# Patient Record
Sex: MALE | Race: WHITE | NOT HISPANIC OR LATINO | Employment: OTHER | URBAN - METROPOLITAN AREA
[De-identification: names, ages, dates, MRNs, and addresses within clinical notes are randomized per-mention and may not be internally consistent; named-entity substitution may affect disease eponyms.]

---

## 2017-03-08 ENCOUNTER — HOSPITAL ENCOUNTER (OUTPATIENT)
Facility: MEDICAL CENTER | Age: 67
End: 2017-03-08
Payer: COMMERCIAL

## 2017-03-08 LAB
ALBUMIN SERPL BCP-MCNC: 4.6 G/DL (ref 3.2–4.9)
ALBUMIN/GLOB SERPL: 1.3 G/DL
ALP SERPL-CCNC: 60 U/L (ref 30–99)
ALT SERPL-CCNC: 57 U/L (ref 2–50)
ANION GAP SERPL CALC-SCNC: 9 MMOL/L (ref 0–11.9)
AST SERPL-CCNC: 38 U/L (ref 12–45)
BDY FAT % MEASURED: 29.2 %
BILIRUB SERPL-MCNC: 0.5 MG/DL (ref 0.1–1.5)
BLOOD PRESSURE DIASTOLIC HTBPD: 86 MMHG
BLOOD PRESSURE SYSTOLIC HTBPS: 148 MMHG
BUN SERPL-MCNC: 26 MG/DL (ref 8–22)
CALCIUM SERPL-MCNC: 10 MG/DL (ref 8.5–10.5)
CHLORIDE SERPL-SCNC: 98 MMOL/L (ref 96–112)
CHOLEST SERPL-MCNC: 220 MG/DL (ref 100–199)
CO2 SERPL-SCNC: 25 MMOL/L (ref 20–33)
CREAT SERPL-MCNC: 1.11 MG/DL (ref 0.5–1.4)
DIABETES HTDIA: NO
EVENT NAME HTEVT: NORMAL
GLOBULIN SER CALC-MCNC: 3.5 G/DL (ref 1.9–3.5)
GLUCOSE SERPL-MCNC: 174 MG/DL (ref 65–99)
HDLC SERPL-MCNC: 34 MG/DL
HYPERTENSION HTHYP: NO
LDLC SERPL CALC-MCNC: ABNORMAL MG/DL
POTASSIUM SERPL-SCNC: 4.2 MMOL/L (ref 3.6–5.5)
PROT SERPL-MCNC: 8.1 G/DL (ref 6–8.2)
SCREENING LOC CITY HTCIT: NORMAL
SCREENING LOC STATE HTSTA: NORMAL
SCREENING LOCATION HTLOC: NORMAL
SODIUM SERPL-SCNC: 132 MMOL/L (ref 135–145)
SUBSCRIBER ID HTSID: NORMAL
TRIGL SERPL-MCNC: 707 MG/DL (ref 0–149)

## 2017-03-28 ENCOUNTER — RX ONLY (OUTPATIENT)
Age: 67
Setting detail: RX ONLY
End: 2017-03-28

## 2017-05-15 ENCOUNTER — HOSPITAL ENCOUNTER (OUTPATIENT)
Dept: LAB | Facility: MEDICAL CENTER | Age: 67
End: 2017-05-15
Attending: INTERNAL MEDICINE
Payer: MEDICARE

## 2017-05-15 DIAGNOSIS — R74.8 ELEVATED CPK: ICD-10-CM

## 2017-05-15 DIAGNOSIS — E11.9 DIABETES MELLITUS TYPE 2, NONINSULIN DEPENDENT (HCC): ICD-10-CM

## 2017-05-15 DIAGNOSIS — E78.5 DYSLIPIDEMIA: ICD-10-CM

## 2017-05-15 LAB
CHOLEST SERPL-MCNC: 162 MG/DL (ref 100–199)
CK SERPL-CCNC: 413 U/L (ref 0–154)
CREAT SERPL-MCNC: 0.97 MG/DL (ref 0.5–1.4)
CREAT UR-MCNC: 93.1 MG/DL
EST. AVERAGE GLUCOSE BLD GHB EST-MCNC: 186 MG/DL
GFR SERPL CREATININE-BSD FRML MDRD: >60 ML/MIN/1.73 M 2
HBA1C MFR BLD: 8.1 % (ref 0–5.6)
HDLC SERPL-MCNC: 38 MG/DL
LDLC SERPL CALC-MCNC: 84 MG/DL
MICROALBUMIN UR-MCNC: 13.3 MG/DL
MICROALBUMIN/CREAT UR: 143 MG/G (ref 0–30)
T4 FREE SERPL-MCNC: 0.93 NG/DL (ref 0.53–1.43)
TRIGL SERPL-MCNC: 198 MG/DL (ref 0–149)
TSH SERPL DL<=0.005 MIU/L-ACNC: 4.12 UIU/ML (ref 0.3–3.7)

## 2017-05-15 PROCEDURE — 36415 COLL VENOUS BLD VENIPUNCTURE: CPT

## 2017-05-15 PROCEDURE — 82550 ASSAY OF CK (CPK): CPT

## 2017-05-15 PROCEDURE — 83036 HEMOGLOBIN GLYCOSYLATED A1C: CPT

## 2017-05-15 PROCEDURE — 82043 UR ALBUMIN QUANTITATIVE: CPT

## 2017-05-15 PROCEDURE — 82570 ASSAY OF URINE CREATININE: CPT

## 2017-05-15 PROCEDURE — 82565 ASSAY OF CREATININE: CPT

## 2017-05-15 PROCEDURE — 84439 ASSAY OF FREE THYROXINE: CPT

## 2017-05-15 PROCEDURE — 80061 LIPID PANEL: CPT

## 2017-05-15 PROCEDURE — 83695 ASSAY OF LIPOPROTEIN(A): CPT

## 2017-05-15 PROCEDURE — 84443 ASSAY THYROID STIM HORMONE: CPT

## 2017-05-17 LAB — LPA SERPL-MCNC: 12 MG/DL

## 2017-05-18 ENCOUNTER — TELEPHONE (OUTPATIENT)
Dept: MEDICAL GROUP | Facility: PHYSICIAN GROUP | Age: 67
End: 2017-05-18

## 2017-05-18 NOTE — TELEPHONE ENCOUNTER
ESTABLISHED PATIENT PRE-VISIT PLANNING     Note: Patient will not be contacted if there is no indication to call.     1.  Reviewed note from last office visit with PCP and/or other med group provider: Yes    2.  If any orders were placed at last visit, do we have Results/Consult Notes?        •  Labs - Labs ordered, completed and results are in chart.       •  Imaging - Imaging was not ordered at last office visit.       •  Referrals - No referrals were ordered at last office visit.    3.  Immunizations were updated in [facility] using WebIZ?: Yes       •  Web Iz Recommendations: HEPATITIS A  HEPATITIS B MMR  TD    4.  Patient is due for the following Health Maintenance Topics:   Health Maintenance Due   Topic Date Due   • Annual Wellness Visit  1950   • DIABETES MONOFILAMENT / LE EXAM  1950   • IMM DTaP/Tdap/Td Vaccine (1 - Tdap) 06/19/1967   • IMM ZOSTER VACCINE  06/19/2008   • IMM PNEUMOCOCCAL 65+ (ADULT) LOW/MEDIUM RISK SERIES (2 of 2 - PPSV23) 09/30/2016   • RETINAL SCREENING  04/04/2017       - Patient has completed FLU, HEPATITIS A , HEPATITIS B, PNEUMOVAX (PPSV23), PREVNAR (PCV13) , TD, TDAP and ZOSTAVAX (Shingles) Immunization(s) per WebIZ. Chart has been updated.    5.  Patient was not informed to arrive 15 min prior to their scheduled appointment and bring in their medication bottles.

## 2017-05-19 ENCOUNTER — OFFICE VISIT (OUTPATIENT)
Dept: MEDICAL GROUP | Facility: PHYSICIAN GROUP | Age: 67
End: 2017-05-19
Payer: MEDICARE

## 2017-05-19 VITALS
RESPIRATION RATE: 18 BRPM | DIASTOLIC BLOOD PRESSURE: 60 MMHG | HEIGHT: 70 IN | BODY MASS INDEX: 34.79 KG/M2 | TEMPERATURE: 96.4 F | OXYGEN SATURATION: 94 % | HEART RATE: 89 BPM | WEIGHT: 243 LBS | SYSTOLIC BLOOD PRESSURE: 136 MMHG

## 2017-05-19 DIAGNOSIS — E11.9 DIABETES MELLITUS TYPE 2, NONINSULIN DEPENDENT (HCC): ICD-10-CM

## 2017-05-19 DIAGNOSIS — R74.8 ELEVATED CPK: ICD-10-CM

## 2017-05-19 DIAGNOSIS — E66.9 OBESITY (BMI 30-39.9): ICD-10-CM

## 2017-05-19 DIAGNOSIS — E03.9 HYPOTHYROIDISM, UNSPECIFIED TYPE: ICD-10-CM

## 2017-05-19 DIAGNOSIS — E78.5 DYSLIPIDEMIA: ICD-10-CM

## 2017-05-19 DIAGNOSIS — R63.8 INCREASED BMI: ICD-10-CM

## 2017-05-19 PROCEDURE — 3017F COLORECTAL CA SCREEN DOC REV: CPT | Performed by: INTERNAL MEDICINE

## 2017-05-19 PROCEDURE — 99214 OFFICE O/P EST MOD 30 MIN: CPT | Performed by: INTERNAL MEDICINE

## 2017-05-19 PROCEDURE — G8432 DEP SCR NOT DOC, RNG: HCPCS | Performed by: INTERNAL MEDICINE

## 2017-05-19 PROCEDURE — 3045F PR MOST RECENT HEMOGLOBIN A1C LEVEL 7.0-9.0%: CPT | Performed by: INTERNAL MEDICINE

## 2017-05-19 PROCEDURE — G8417 CALC BMI ABV UP PARAM F/U: HCPCS | Performed by: INTERNAL MEDICINE

## 2017-05-19 PROCEDURE — 1036F TOBACCO NON-USER: CPT | Performed by: INTERNAL MEDICINE

## 2017-05-19 PROCEDURE — 1101F PT FALLS ASSESS-DOCD LE1/YR: CPT | Performed by: INTERNAL MEDICINE

## 2017-05-19 PROCEDURE — 4040F PNEUMOC VAC/ADMIN/RCVD: CPT | Performed by: INTERNAL MEDICINE

## 2017-05-19 RX ORDER — LOSARTAN POTASSIUM 25 MG/1
25 TABLET ORAL DAILY
Qty: 30 TAB | Refills: 6 | Status: SHIPPED | OUTPATIENT
Start: 2017-05-19 | End: 2018-04-03

## 2017-05-19 RX ORDER — GLIPIZIDE 5 MG/1
5 TABLET ORAL 2 TIMES DAILY
Qty: 60 TAB | Refills: 6 | Status: SHIPPED | OUTPATIENT
Start: 2017-05-19 | End: 2017-09-22 | Stop reason: SDUPTHER

## 2017-05-19 RX ORDER — LEVOTHYROXINE SODIUM 0.07 MG/1
75 TABLET ORAL
Qty: 90 TAB | Refills: 3 | Status: SHIPPED | OUTPATIENT
Start: 2017-05-19 | End: 2017-08-31

## 2017-05-19 NOTE — PROGRESS NOTES
"Subjective:  Lico is a 66 y.o. male with the following   Past Medical History   Diagnosis Date   • Skin cancer    • Snoring    • Hypertension 07-10-15     hx of, took himself off lisinopril and HTCZ   • Diabetes (CMS-HCC) 07-10-15     hx of, took self off metformin   • High cholesterol 07-10-15     hx of, took self off simvastin      Family History   Problem Relation Age of Onset   • Heart Disease Mother    • Lung Disease Father    • Heart Disease Father      The patient is on the following medications:   Current outpatient prescriptions:   •  losartan (COZAAR) 25 MG Tab, Take 1 Tab by mouth every day., Disp: 30 Tab, Rfl: 6  •  glipiZIDE (GLUCOTROL) 5 MG Tab, Take 1 Tab by mouth 2 times a day., Disp: 60 Tab, Rfl: 6  •  levothyroxine (SYNTHROID) 75 MCG Tab, Take 1 Tab by mouth Every morning on an empty stomach., Disp: 90 Tab, Rfl: 3  •  metformin ER (GLUCOPHAGE XR) 500 MG TABLET SR 24 HR, Take 2 Tabs by mouth every day., Disp: 180 Tab, Rfl: 3  •  levothyroxine (SYNTHROID) 75 MCG Tab, Take 1 Tab by mouth Every morning on an empty stomach., Disp: 90 Tab, Rfl: 3    HPI; Patient is here today for follow-up visit regarding his recent labs, currently on metformin for diabetes denies having polyuria or polydipsia, on levothyroxine for hypothyroid symptoms denies having cold intolerance or constipation, per patient he has continued with weightlifting, previously suggested to hold off on heavy lifting due to elevated CPK, denies having chest pain or myalgia.   ROS:  See HPI    Blood pressure 136/60, pulse 89, temperature 35.8 °C (96.4 °F), resp. rate 18, height 1.778 m (5' 10\"), weight 110.224 kg (243 lb), SpO2 94 %.on RA  Objective:  Patient is well appearing and in no acute distress.  Pharynx is clear.  Neck is soft and supple with no cervical or supraclavicular lymphadenopathy, thyromegaly or masses, no JVD.  Lungs clear to auscultation bilaterally with normal respiratory effort. Abdomen soft, non-tender on " palpation,not distended. Heart regular rate and rhythm without murmur. Extremities without any clubbing, cyanosis, or edema.    Assessment and Plan:      1. diabetic mellitus; on metformin 500 mg XR twice a day,  hemoglobin A1c;     Ref. Range 11/9/2016 07:16 5/15/2017 07:34   Glycohemoglobin Latest Ref Range: 0.0-5.6 % 7.9 (H) 8.1 (H)      Ref. Range 5/15/2017 07:33   Micro Alb Creat Ratio Latest Ref Range: 0-30 mg/g 143 (H)   Creatinine, Urine Latest Units: mg/dL 93.10   Microalbumin, Urine Random Latest Units: mg/dL 13.3     2. Dyslipidemia/hypertriglyceridemia; used to be on statins, off medication due to side effects of fatigue and elevated CPK. Previously started on supplements for dyslipidemia;        Ref. Range 3/8/2017 09:41 5/15/2017 07:34   Cholesterol,Tot Latest Ref Range: 100-199 mg/dL 220 (H) 162   Triglycerides Latest Ref Range: 0-149 mg/dL 707 (H) 198 (H)   HDL Latest Ref Range: >=40 mg/dL 34 (A) 38 (A)   LDL Latest Ref Range: <100 mg/dL see below 84   Lipoprotein A Latest Ref Range: <=29 mg/dL  12       3. Increased BMI;         4.hypothyroidism; currently on levothyroxine 50 MCG daily.     Ref. Range 7/2/2016 08:24 11/9/2016 07:16 5/15/2017 07:34   TSH Latest Ref Range: 0.300-3.700 uIU/mL 3.870 (H) 6.800 (H) 4.120 (H)   Free T-4 Latest Ref Range: 0.53-1.43 ng/dL 0.80 0.84 0.93           5. elevated CPK. Currently is asymptomatic, patient has continued lifting heavy weights.    Ref. Range 11/9/2016 07:16 5/15/2017 07:34   CPK Total Latest Ref Range: 0-154 U/L 324 (H) 413 (H)       Ref. Range  10/17/2015 08:16  2/20/2016 08:34  11/9/2016 07:16    Creatinine  Latest Ref Range: 0.50-1.40 mg/dL  1.08  1.14  0.98    GFR If   Latest Ref Range: >60 mL/min/1.73 m 2  >60  >60  >60    GFR If Non   Latest Ref Range: >60 mL/min/1.73 m 2  >60  >60  >60          Patient is advised regarding preventive and supportive care, diet and lifestyle modification, daily walking ,exercise and  weight loss, avoidance of heavy lifting. Discussed diabetic therapy and potential complication of uncontrolled diabetes , glipizide 5 mg by mouth twice a day, losartan 25 mg daily, levothyroxine 75 MCG daily , alternative therapies for dyslipidemia , increase fluid intake , monitor labs.    Please note that this dictation was created using voice recognition software. I have worked with consultants from the vendor as well as technical experts from [facility] [employer] to optimize the interface. I have made every reasonable attempt to correct obvious errors, but I expect that there are errors of grammar and possibly content that I did not discover before finalizing the note.

## 2017-05-22 DIAGNOSIS — G56.02 CARPAL TUNNEL SYNDROME OF LEFT WRIST: ICD-10-CM

## 2017-05-31 DIAGNOSIS — M79.642 HAND PAIN, LEFT: ICD-10-CM

## 2017-06-05 ENCOUNTER — PATIENT OUTREACH (OUTPATIENT)
Dept: HEALTH INFORMATION MANAGEMENT | Facility: OTHER | Age: 67
End: 2017-06-05

## 2017-06-05 NOTE — PROGRESS NOTES
Outcome: Left Message with pt's wife Pina    Ilsa Checked & Epic Updated:  yes    [facility] Verified: yes    Attempt # 1ST

## 2017-06-07 NOTE — PROGRESS NOTES
6/7/17  -   Verify PCP: yes    Communication Preference Obtained: yes     Review Care Team: yes    Annual Wellness Visit Scheduling  1. Scheduling Status:AWV added to upcoming appt with PCP. Pt said that he sees Dr. Khan regularly.        Care Gap Scheduling (Attempt to Schedule EACH Overdue Care Gap!)     Health Maintenance Due   Topic Date Due   • Annual Wellness Visit     • DIABETES MONOFILAMENT / LE EXAM  Will discuss care gaps with PCP   • IMM DTaP/Tdap/Td Vaccine (1 - Tdap)    • RETINAL SCREENING  Records requested         MyChart Activation: already active

## 2017-07-27 ENCOUNTER — NON-PROVIDER VISIT (OUTPATIENT)
Dept: NEUROLOGY | Facility: MEDICAL CENTER | Age: 67
End: 2017-07-27
Payer: MEDICARE

## 2017-07-27 DIAGNOSIS — M79.642 HAND PAIN, LEFT: ICD-10-CM

## 2017-07-27 PROCEDURE — 95910 NRV CNDJ TEST 7-8 STUDIES: CPT | Performed by: PSYCHIATRY & NEUROLOGY

## 2017-07-27 PROCEDURE — 95886 MUSC TEST DONE W/N TEST COMP: CPT | Performed by: PSYCHIATRY & NEUROLOGY

## 2017-07-27 NOTE — MR AVS SNAPSHOT
Lico Batista Rita   2017 4:00 PM   Non-Provider Visit   MRN: [id number]    Department:  [facility]   Dept Phone:  [phone]    Description:  Male : 1950   Provider:  [facility]           Reason for Visit     Procedure           Allergies as of 2017     Allergen Noted Reactions    Pcn [Penicillins] 2012   Unspecified    As a child, unknown reaction      You were diagnosed with     Hand pain, left   [[id number]]         Vital Signs     Smoking Status                   Former Smoker           Basic Information     Date Of Birth Sex Race Ethnicity Preferred Language    1950 Male White Non- English      Your appointments     Sep 22, 2017  3:00 PM   Established Patient with Edward Khan M.D.   [facility] (--)    [address]  [address]  [facility] [id number] [phone]           You will be receiving a confirmation call a few days before your appointment from our automated call confirmation system.              Problem List              ICD-10-CM Priority Class Noted - Resolved    Swelling, mass, or lump in head and neck R22.0, R22.1   2015 - Present    Hypothyroidism E03.9   2016 - Present    Diabetes mellitus type 2, noninsulin dependent (CMS-HCC) E11.9   2016 - Present    Elevated CPK R74.8   2016 - Present    Increased BMI R63.8   2016 - Present    Dyslipidemia E78.5   2016 - Present      Health Maintenance        Date Due Completion Dates    DIABETES MONOFILAMENT / LE EXAM 1950 ---    IMM DTaP/Tdap/Td Vaccine (1 - Tdap) 3/27/2003 3/26/2003    RETINAL SCREENING 2017 (Done)    Override on 2016: Done    IMM INFLUENZA (1) 2017 10/13/2016, 2015, 10/31/2014, 2013, 10/9/2012, 10/26/2011, 10/6/2010    A1C SCREENING 11/15/2017 5/15/2017, 2016, 2016, 2016, 10/17/2015, 2014, 3/8/2014, 2013, 2013, 2012, 2012, 10/22/2011    FASTING  LIPID PROFILE 5/15/2018 5/15/2017, 3/8/2017, 7/2/2016, 2/20/2016, 10/17/2015, 11/29/2014, 10/4/2014, 3/8/2014, 9/7/2013, 4/30/2013, 12/8/2012, 4/7/2012, 2/25/2012, 10/22/2011    URINE ACR / MICROALBUMIN 5/15/2018 5/15/2017, 2/20/2016, 11/29/2014, 3/8/2014, 9/7/2013    SERUM CREATININE 5/15/2018 5/15/2017, 3/8/2017, 11/9/2016, 2/20/2016, 10/17/2015, 7/10/2015, 11/29/2014, 3/8/2014, 9/7/2013, 4/30/2013, 12/8/2012, 2/25/2012, 11/12/2011    COLONOSCOPY 2/24/2024 2/24/2014 (Done)    Override on 2/24/2014: Done            Current Immunizations     13-VALENT PCV PREVNAR 9/30/2015  8:21 AM    Influenza Vaccine Adult HD 10/13/2016  9:51 AM, 9/30/2015  8:20 AM, 10/31/2014    Influenza Vaccine Quad Inj (Pf) 10/26/2011, 10/6/2010    Influenza Vaccine Quad Inj (Preserved) 9/18/2013, 10/9/2012    Pneumococcal polysaccharide vaccine (PPSV-23) 4/1/2017, 9/18/2013    SHINGLES VACCINE 4/1/2017    TD Vaccine 3/26/2003      Below and/or attached are the medications your provider expects you to take. Review all of your home medications and newly ordered medications with your provider and/or pharmacist. Follow medication instructions as directed by your provider and/or pharmacist. Please keep your medication list with you and share with your provider. Update the information when medications are discontinued, doses are changed, or new medications (including over-the-counter products) are added; and carry medication information at all times in the event of emergency situations     Allergies:  PCN - Unspecified               Medications  Valid as of: July 27, 2017 -  5:44 PM    Generic Name Brand Name Tablet Size Instructions for use    GlipiZIDE (Tab) GLUCOTROL 5 MG Take 1 Tab by mouth 2 times a day.        Levothyroxine Sodium (Tab) SYNTHROID 75 MCG Take 1 Tab by mouth Every morning on an empty stomach.        Levothyroxine Sodium (Tab) SYNTHROID 75 MCG Take 1 Tab by mouth Every morning on an empty stomach.        Losartan Potassium (Tab)  COZAAR 25 MG Take 1 Tab by mouth every day.        MetFORMIN HCl (TABLET SR 24 HR) GLUCOPHAGE  MG Take 2 Tabs by mouth every day.        .                 Medicines prescribed today were sent to:     [facility] [place] - [address]    [facility] [place] [address]    Phone: [phone] Fax: [phone]    Open 24 Hours?: No      Medication refill instructions:       If your prescription bottle indicates you have medication refills left, it is not necessary to call your provider’s office. Please contact your pharmacy and they will refill your medication.    If your prescription bottle indicates you do not have any refills left, you may request refills at any time through one of the following ways: The online Smart Balloon system (except Urgent Care), by calling your provider’s office, or by asking your pharmacy to contact your provider’s office with a refill request. Medication refills are processed only during regular business hours and may not be available until the next business day. Your provider may request additional information or to have a follow-up visit with you prior to refilling your medication.   *Please Note: Medication refills are assigned a new Rx number when refilled electronically. Your pharmacy may indicate that no refills were authorized even though a new prescription for the same medication is available at the pharmacy. Please request the medicine by name with the pharmacy before contacting your provider for a refill.           Smart Balloon Access Code: Activation code not generated  Current Smart Balloon Status: Active

## 2017-07-27 NOTE — PROGRESS NOTES
[facility]  Neurodiagnostic Laboratory  [facility] [place] [id number] [phone]      Patient: LOLITA ALSTON Referring MD: SAMMI  Medical Record: [id number] Windy CODY: RONN  YOB: 1950 Indication: M79.642  Age: 67 Years 1 Months Technologist: TATIANA  Notes: [facility]#[id number]      Sensory NCS      Nerve / Sites Rec. Site Onset Lat Peak Lat NP Amp PP Amp SPAR Segments Distance Velocity     ms ms µV µV %  cm m/s   L MEDIAN - Dig II Antidr      Wrist Dig II NR NR NR NR NR Wrist - Dig II 14 NR   R MEDIAN - Dig II Antidr      Wrist Dig II 3.75 4.60 10.1 11.5  Wrist - Dig II 14 37.3   L ULNAR - Dig V Antidr      Wrist Dig V 3.15 3.90 13.1 25.7 100 Wrist - Dig V 14 44.4   R ULNAR - Dig V Antidr      Wrist Dig V 3.30 4.00 5.6 12.5 43 Wrist - Dig V 14 42.4               Motor NCS      Nerve / Sites Rec. Site Lat Amp Rel Amp SPAR Segments Dist. Misha     ms mV % %  cm m/s   L MEDIAN - APB      Wrist APB 9.35 5.7 100 62.4 Wrist - APB 8       Elbow APB 15.15 4.9 85.2 59.9 Elbow - Wrist 29 50.0   R MEDIAN - APB      Wrist APB 5.40 9.2 100 100 Wrist - APB 8       Elbow APB 11.00 8.1 88.8 100 Elbow - Wrist 24.5 43.8   L ULNAR - ADM      Wrist ADM 3.40 8.1 100 100 Wrist - ADM 8       B.Elbow ADM 6.90 6.9 86 100 B.Elbow - Wrist 19 54.3      A.Elbow ADM 8.70 6.7 82.4 100 A.Elbow - B.Elbow 10 55.6   R ULNAR - ADM      Wrist ADM 3.55 7.1 100 87.5 Wrist - ADM 8       B.Elbow ADM 6.35 5.2 73.8 75.2 B.Elbow - Wrist 16.5 58.9      A.Elbow ADM 8.10 5.9 83.4 88.6 A.Elbow - B.Elbow 10 57.1               F  Wave      Nerve Fmin    ms   L MEDIAN 41.20         EMG Summary Table     Spontaneous MUAP Recruitment    IA Fib PSW Fasc H.F. Amp Dur. PPP Pattern   L. ABD POLL BREVIS N None None None None N N N -   L. BICEPS N None None None None N N N N   L. FLEX CARPI RAD N None None None None N N N N   L. PRON TERES N None None None None N N N N   L. FLEX CARPI ULN N None None None None N N N N        SUMMARY:    Prolonged distal motor latencies of bilateral median ( much worse over the left)  Absent left median SAP    IMPRESSION:    ________________________________________________________________________      This NCV EMG is consistent with       Severe left CTS   Mild right CTS      Agree with surgical intervention regarding the severe left CTS  ________________________________________________________________________

## 2017-08-31 DIAGNOSIS — Z01.812 PRE-OPERATIVE LABORATORY EXAMINATION: ICD-10-CM

## 2017-08-31 LAB
ANION GAP SERPL CALC-SCNC: 11 MMOL/L (ref 0–11.9)
BUN SERPL-MCNC: 19 MG/DL (ref 8–22)
CALCIUM SERPL-MCNC: 10 MG/DL (ref 8.5–10.5)
CHLORIDE SERPL-SCNC: 100 MMOL/L (ref 96–112)
CO2 SERPL-SCNC: 24 MMOL/L (ref 20–33)
CREAT SERPL-MCNC: 0.88 MG/DL (ref 0.5–1.4)
GFR SERPL CREATININE-BSD FRML MDRD: >60 ML/MIN/1.73 M 2
GLUCOSE SERPL-MCNC: 141 MG/DL (ref 65–99)
POTASSIUM SERPL-SCNC: 4 MMOL/L (ref 3.6–5.5)
SODIUM SERPL-SCNC: 135 MMOL/L (ref 135–145)

## 2017-08-31 PROCEDURE — 93010 ELECTROCARDIOGRAM REPORT: CPT | Performed by: INTERNAL MEDICINE

## 2017-08-31 PROCEDURE — 93005 ELECTROCARDIOGRAM TRACING: CPT

## 2017-08-31 PROCEDURE — 36415 COLL VENOUS BLD VENIPUNCTURE: CPT

## 2017-08-31 PROCEDURE — 80048 BASIC METABOLIC PNL TOTAL CA: CPT

## 2017-08-31 RX ORDER — YEAST,DRIED (S. CEREVISIAE)
1 POWDER (GRAM) ORAL 2 TIMES DAILY
COMMUNITY
End: 2019-12-20

## 2017-08-31 RX ORDER — MAGNESIUM 200 MG
200 TABLET ORAL EVERY EVENING
COMMUNITY
End: 2021-03-23

## 2017-08-31 RX ORDER — UBIDECARENONE 200 MG
100 CAPSULE ORAL EVERY EVENING
COMMUNITY
End: 2022-02-28

## 2017-08-31 RX ORDER — LATANOPROST 50 UG/ML
1 SOLUTION/ DROPS OPHTHALMIC EVERY EVENING
Status: ON HOLD | COMMUNITY
End: 2022-03-09 | Stop reason: SDUPTHER

## 2017-08-31 NOTE — OR NURSING
Pre admit appt: Pt instructed to continue regularly prescribed medications through day before surgery.Per anesthesia protocol pt instructed to take these medications with a sip of water the day of surgery- synthroid.

## 2017-09-01 LAB — EKG IMPRESSION: NORMAL

## 2017-09-05 ENCOUNTER — HOSPITAL ENCOUNTER (OUTPATIENT)
Facility: MEDICAL CENTER | Age: 67
End: 2017-09-05
Attending: ORTHOPAEDIC SURGERY | Admitting: ORTHOPAEDIC SURGERY
Payer: MEDICARE

## 2017-09-05 VITALS
HEART RATE: 72 BPM | TEMPERATURE: 97.2 F | BODY MASS INDEX: 34.4 KG/M2 | WEIGHT: 240.3 LBS | DIASTOLIC BLOOD PRESSURE: 81 MMHG | OXYGEN SATURATION: 94 % | SYSTOLIC BLOOD PRESSURE: 137 MMHG | RESPIRATION RATE: 16 BRPM | HEIGHT: 70 IN

## 2017-09-05 PROBLEM — G56.02 CARPAL TUNNEL SYNDROME ON LEFT: Status: ACTIVE | Noted: 2017-09-05

## 2017-09-05 LAB — GLUCOSE BLD-MCNC: 146 MG/DL (ref 65–99)

## 2017-09-05 PROCEDURE — 160009 HCHG ANES TIME/MIN: Performed by: ORTHOPAEDIC SURGERY

## 2017-09-05 PROCEDURE — 160047 HCHG PACU  - EA ADDL 30 MINS PHASE II: Performed by: ORTHOPAEDIC SURGERY

## 2017-09-05 PROCEDURE — 500881 HCHG PACK, EXTREMITY: Performed by: ORTHOPAEDIC SURGERY

## 2017-09-05 PROCEDURE — 160036 HCHG PACU - EA ADDL 30 MINS PHASE I: Performed by: ORTHOPAEDIC SURGERY

## 2017-09-05 PROCEDURE — 501838 HCHG SUTURE GENERAL: Performed by: ORTHOPAEDIC SURGERY

## 2017-09-05 PROCEDURE — 160048 HCHG OR STATISTICAL LEVEL 1-5: Performed by: ORTHOPAEDIC SURGERY

## 2017-09-05 PROCEDURE — A6222 GAUZE <=16 IN NO W/SAL W/O B: HCPCS | Performed by: ORTHOPAEDIC SURGERY

## 2017-09-05 PROCEDURE — 700111 HCHG RX REV CODE 636 W/ 250 OVERRIDE (IP)

## 2017-09-05 PROCEDURE — 160046 HCHG PACU - 1ST 60 MINS PHASE II: Performed by: ORTHOPAEDIC SURGERY

## 2017-09-05 PROCEDURE — 700101 HCHG RX REV CODE 250

## 2017-09-05 PROCEDURE — 160002 HCHG RECOVERY MINUTES (STAT): Performed by: ORTHOPAEDIC SURGERY

## 2017-09-05 PROCEDURE — 700105 HCHG RX REV CODE 258: Performed by: ORTHOPAEDIC SURGERY

## 2017-09-05 PROCEDURE — 500126 HCHG BOVIE, NEEDLE TIP: Performed by: ORTHOPAEDIC SURGERY

## 2017-09-05 PROCEDURE — 82962 GLUCOSE BLOOD TEST: CPT

## 2017-09-05 PROCEDURE — 501480 HCHG STOCKINETTE: Performed by: ORTHOPAEDIC SURGERY

## 2017-09-05 PROCEDURE — 160029 HCHG SURGERY MINUTES - 1ST 30 MINS LEVEL 4: Performed by: ORTHOPAEDIC SURGERY

## 2017-09-05 PROCEDURE — 160035 HCHG PACU - 1ST 60 MINS PHASE I: Performed by: ORTHOPAEDIC SURGERY

## 2017-09-05 PROCEDURE — 500070 HCHG BLADE, AGEE CARPAL TUNNEL: Performed by: ORTHOPAEDIC SURGERY

## 2017-09-05 PROCEDURE — 160025 RECOVERY II MINUTES (STATS): Performed by: ORTHOPAEDIC SURGERY

## 2017-09-05 PROCEDURE — 502576 HCHG PACK, HAND: Performed by: ORTHOPAEDIC SURGERY

## 2017-09-05 PROCEDURE — 160041 HCHG SURGERY MINUTES - EA ADDL 1 MIN LEVEL 4: Performed by: ORTHOPAEDIC SURGERY

## 2017-09-05 RX ORDER — SODIUM CHLORIDE, SODIUM LACTATE, POTASSIUM CHLORIDE, CALCIUM CHLORIDE 600; 310; 30; 20 MG/100ML; MG/100ML; MG/100ML; MG/100ML
INJECTION, SOLUTION INTRAVENOUS CONTINUOUS
Status: DISCONTINUED | OUTPATIENT
Start: 2017-09-05 | End: 2017-09-05 | Stop reason: HOSPADM

## 2017-09-05 RX ORDER — LIDOCAINE HYDROCHLORIDE 10 MG/ML
INJECTION, SOLUTION INFILTRATION; PERINEURAL
Status: COMPLETED
Start: 2017-09-05 | End: 2017-09-05

## 2017-09-05 RX ORDER — BUPIVACAINE HYDROCHLORIDE 5 MG/ML
INJECTION, SOLUTION EPIDURAL; INTRACAUDAL
Status: DISCONTINUED | OUTPATIENT
Start: 2017-09-05 | End: 2017-09-05 | Stop reason: HOSPADM

## 2017-09-05 RX ADMIN — LIDOCAINE HYDROCHLORIDE 0.1 ML: 10 INJECTION, SOLUTION INFILTRATION; PERINEURAL at 09:40

## 2017-09-05 RX ADMIN — SODIUM CHLORIDE, POTASSIUM CHLORIDE, SODIUM LACTATE AND CALCIUM CHLORIDE: 600; 310; 30; 20 INJECTION, SOLUTION INTRAVENOUS at 09:40

## 2017-09-05 ASSESSMENT — PAIN SCALES - GENERAL
PAINLEVEL_OUTOF10: 0
PAINLEVEL_OUTOF10: ASSUMED PAIN PRESENT
PAINLEVEL_OUTOF10: 0
PAINLEVEL_OUTOF10: ASSUMED PAIN PRESENT
PAINLEVEL_OUTOF10: 0
PAINLEVEL_OUTOF10: 0

## 2017-09-05 NOTE — OR NURSING
1129: Patient to PACU from OR. Respirations even and unlabored, breath sounds clear bilaterally.  Patient has dressing to left hand, fingers pink and warm, cap refill <3 seconds.  Ice to hand, SCDs hooked up.  Patient not arousing to voice or touch at this time.   1140:  Patient sleeping with eyes closed, does not respond to voice or touch.   1145: Patient alert and awake, denies pain at this time.    1155: Patient alert and oriented, denies pain or nausea.  Spouse called for update.  1205: No changes.  1220: No changes, patient ready for discharge, will wait for nine more minutes for stop bang and then will be transferred to stage 2.  1230: Called report to stage two, they state they will call when they are able to take patient over.   1245: Patient resting in bed, awake, no pain or nausea.  1300: Called stage two, gave report to DRISS Crane.  1313: Patient transported to stage 2.

## 2017-09-05 NOTE — OR NURSING
1313: Settled in recliner post short ambulation from [facility].  1345: D/Luiz to care of family post uneventful stay in PACU 2.

## 2017-09-05 NOTE — DISCHARGE INSTRUCTIONS
ACTIVITY: Rest and take it easy for the first 24 hours.  A responsible adult is recommended to remain with you during that time.  It is normal to feel sleepy.  We encourage you to not do anything that requires balance, judgment or coordination.    MILD FLU-LIKE SYMPTOMS ARE NORMAL. YOU MAY EXPERIENCE GENERALIZED MUSCLE ACHES, THROAT IRRITATION, HEADACHE AND/OR SOME NAUSEA.    FOR 24 HOURS DO NOT:  Drive, operate machinery or run household appliances.  Drink beer or alcoholic beverages.   Make important decisions or sign legal documents.    SPECIAL INSTRUCTIONS:   Move fingers in dressing often  Keep operative upper extremity elevated, ice but avoid ice to fingers   Keep dressing clean, dry, and intact until otherwise instructed by surgion  May remove dressing and place bandaid on post op day 3    DIET: To avoid nausea, slowly advance diet as tolerated, avoiding spicy or greasy foods for the first day.  Add more substantial food to your diet according to your physician's instructions.  INCREASE FLUIDS AND FIBER TO AVOID CONSTIPATION.      FOLLOW-UP APPOINTMENT:  A follow-up appointment should be arranged with your doctor in his office; call to schedule.    You should CALL YOUR PHYSICIAN if you develop:  Fever greater than 101 degrees F.  Pain not relieved by medication, or persistent nausea or vomiting.  Excessive bleeding (blood soaking through dressing) or unexpected drainage from the wound.  Extreme redness or swelling around the incision site, drainage of pus or foul smelling drainage.  Inability to urinate or empty your bladder within 8 hours.  Problems with breathing or chest pain.    You should call 911 if you develop problems with breathing or chest pain.  If you are unable to contact your doctor or surgical center, you should go to the nearest emergency room or urgent care center. Dr. Alvarado's telephone #: [phone].     If any questions arise, call your doctor.  If your doctor is not available, please  feel free to call the Surgical Center at [phone].  The Center is open Monday through Friday from 7AM to 7PM.  You can also call the HEALTH HOTLINE open 24 hours/day, 7 days/week and speak to a nurse at [phone], or toll free at [phone].    A registered nurse may call you a few days after your surgery to see how you are doing after your procedure.    MEDICATIONS: Resume taking daily medication.  Take prescribed pain medication with food.  If no medication is prescribed, you may take non-aspirin pain medication if needed.  PAIN MEDICATION CAN BE VERY CONSTIPATING.  Take a stool softener or laxative such as senokot, pericolace, or milk of magnesia if needed.    Prescription given for home.  Last pain medication given at __________________.    If your physician has prescribed pain medication that includes Acetaminophen (Tylenol), do not take additional Acetaminophen (Tylenol) while taking the prescribed medication.    Depression / Suicide Risk    As you are discharged from this [facility] facility, it is important to learn how to keep safe from harming yourself.    Recognize the warning signs:  · Abrupt changes in personality, positive or negative- including increase in energy   · Giving away possessions  · Change in eating patterns- significant weight changes-  positive or negative  · Change in sleeping patterns- unable to sleep or sleeping all the time   · Unwillingness or inability to communicate  · Depression  · Unusual sadness, discouragement and loneliness  · Talk of wanting to die  · Neglect of personal appearance   · Rebelliousness- reckless behavior  · Withdrawal from people/activities they love  · Confusion- inability to concentrate     If you or a loved one observes any of these behaviors or has concerns about self-harm, here's what you can do:  · Talk about it- your feelings and reasons for harming yourself  · Remove any means that you might use to hurt yourself (examples: pills, rope,  extension cords, firearm)  · Get professional help from the community (Mental Health, Substance Abuse, psychological counseling)  · Do not be alone:Call your Safe Contact- someone whom you trust who will be there for you.  · Call your local CRISIS HOTLINE 357-6644 or 018-232-8028  · Call your local Children's Mobile Crisis Response Team [place] (915) 340-8563 or www.Venda  · Call the toll free National Suicide Prevention Hotlines   · National Suicide Prevention Lifeline 133-817-FGXC (5340)  · National Hope Line Network 800-SUICIDE (112-7986)

## 2017-09-05 NOTE — OP REPORT
DATE OF SERVICE:  09/05/2017    PREOPERATIVE DIAGNOSIS:  Left carpal tunnel syndrome.    POSTOPERATIVE DIAGNOSIS:  Left carpal tunnel syndrome.    PROCEDURES PERFORMED:  Endoscopic carpal tunnel release and wrist block.    SURGEON:  Frank Alvarado MD    ANESTHESIOLOGIST:  Rinku Martin MD    ANESTHESIA:  IV sedation.    COMPLICATIONS:  None.    DRAINS:  None.    SPECIMENS:  None.    TOURNIQUET TIME:  9 minutes at 250 mmHg.    ESTIMATED BLOOD LOSS:  None.    FLUIDS:  Crystalloid.    INDICATIONS:  The patient had ongoing problems with carpal tunnel, positive   EMGs.  The risks, benefits and options were discussed with him and he wished   to undergo the above procedure.    FINDINGS:  Noted to be a tight transcarpal ligament.  This was well   visualized.  Multiple passes were made with gaping of 2 cut edges probed and   noted to be completely released.  Distal forearm fascia was also noted to be   tightened and complete release under direct visualization.    PROCEDURE IN DETAIL:  The patient was brought to the operating room.  The left   upper extremity was prepped and draped in the usual fashion.  Time out was   called.  IV sedation was given.  The arm was exsanguinated with Esmarch and   tourniquet inflated.    Patient was checked for adequate anesthesia.  Transverse incision was made   over the proximal wrist crease.  Sharp dissection was carried out through the   skin and blunt dissection through the subcutaneous tissues.  Adequate   hemostasis was obtained with electrocautery.    Displaced flap was made at the transcarpal ligament.  Using the Crissy elevator   dilator and scope, the transverse carpal ligament was visualized.    Starting distally and working proximally multiple passes were made while   staying ulnar to prevent any injury to the nerve.  There was gaping at the 2   cut edges.  This was probed and noted to be completely released.    Blunt dissection was carried out superficial and deep to the distal  forearm   fascia and this was cut under direct visualization.  This was probed and also   noted to be completely released.    The wound was copiously irrigated.  A running Prolene subcuticular stitch was   placed.  Steri-Strips were applied proximally, wrist blocks placed with 0.5%   Marcaine with epinephrine into the median and ulnar nerves.    A bulky dressing was then applied.  The tourniquet was released and the   patient brought to recovery room in stable condition.  No complications.  The   plan is for him to keep his arm elevated starting early finger range of   motion.  Removing the dressing in 3 days, placing a Band-Aid over the wound   and progressing his range of motion.       ____________________________________     MD ALIS STOVER / TOMÁS    DD:  09/05/2017 11:33:00  DT:  09/05/2017 11:57:15    D#:  [id number]  Job#:  [id number]

## 2017-09-18 ENCOUNTER — HOSPITAL ENCOUNTER (OUTPATIENT)
Dept: LAB | Facility: MEDICAL CENTER | Age: 67
End: 2017-09-18
Attending: INTERNAL MEDICINE
Payer: MEDICARE

## 2017-09-18 DIAGNOSIS — R74.8 ELEVATED CPK: ICD-10-CM

## 2017-09-18 DIAGNOSIS — E11.9 DIABETES MELLITUS TYPE 2, NONINSULIN DEPENDENT (HCC): ICD-10-CM

## 2017-09-18 LAB
EST. AVERAGE GLUCOSE BLD GHB EST-MCNC: 151 MG/DL
HBA1C MFR BLD: 6.9 % (ref 0–5.6)

## 2017-09-18 PROCEDURE — 36415 COLL VENOUS BLD VENIPUNCTURE: CPT

## 2017-09-18 PROCEDURE — 83036 HEMOGLOBIN GLYCOSYLATED A1C: CPT

## 2017-09-18 PROCEDURE — 82550 ASSAY OF CK (CPK): CPT

## 2017-09-18 PROCEDURE — 80053 COMPREHEN METABOLIC PANEL: CPT

## 2017-09-19 LAB
ALBUMIN SERPL BCP-MCNC: 4.3 G/DL (ref 3.2–4.9)
ALBUMIN/GLOB SERPL: 1.3 G/DL
ALP SERPL-CCNC: 50 U/L (ref 30–99)
ALT SERPL-CCNC: 37 U/L (ref 2–50)
ANION GAP SERPL CALC-SCNC: 11 MMOL/L (ref 0–11.9)
AST SERPL-CCNC: 31 U/L (ref 12–45)
BILIRUB SERPL-MCNC: 0.5 MG/DL (ref 0.1–1.5)
BUN SERPL-MCNC: 19 MG/DL (ref 8–22)
CALCIUM SERPL-MCNC: 9.7 MG/DL (ref 8.5–10.5)
CHLORIDE SERPL-SCNC: 101 MMOL/L (ref 96–112)
CK SERPL-CCNC: 532 U/L (ref 0–154)
CO2 SERPL-SCNC: 23 MMOL/L (ref 20–33)
CREAT SERPL-MCNC: 0.94 MG/DL (ref 0.5–1.4)
GFR SERPL CREATININE-BSD FRML MDRD: >60 ML/MIN/1.73 M 2
GLOBULIN SER CALC-MCNC: 3.4 G/DL (ref 1.9–3.5)
GLUCOSE SERPL-MCNC: 124 MG/DL (ref 65–99)
POTASSIUM SERPL-SCNC: 4.1 MMOL/L (ref 3.6–5.5)
PROT SERPL-MCNC: 7.7 G/DL (ref 6–8.2)
SODIUM SERPL-SCNC: 135 MMOL/L (ref 135–145)

## 2017-09-22 ENCOUNTER — OFFICE VISIT (OUTPATIENT)
Dept: MEDICAL GROUP | Facility: PHYSICIAN GROUP | Age: 67
End: 2017-09-22
Payer: MEDICARE

## 2017-09-22 VITALS
HEIGHT: 70 IN | DIASTOLIC BLOOD PRESSURE: 78 MMHG | OXYGEN SATURATION: 94 % | BODY MASS INDEX: 35.68 KG/M2 | WEIGHT: 249.2 LBS | HEART RATE: 88 BPM | RESPIRATION RATE: 16 BRPM | SYSTOLIC BLOOD PRESSURE: 140 MMHG | TEMPERATURE: 97.7 F

## 2017-09-22 DIAGNOSIS — I10 ESSENTIAL HYPERTENSION: ICD-10-CM

## 2017-09-22 DIAGNOSIS — E11.9 DIABETES MELLITUS TYPE 2, NONINSULIN DEPENDENT (HCC): ICD-10-CM

## 2017-09-22 DIAGNOSIS — E03.9 HYPOTHYROIDISM, UNSPECIFIED TYPE: ICD-10-CM

## 2017-09-22 DIAGNOSIS — E78.1 HYPERTRIGLYCERIDEMIA: ICD-10-CM

## 2017-09-22 DIAGNOSIS — E66.9 OBESITY (BMI 30-39.9): ICD-10-CM

## 2017-09-22 DIAGNOSIS — R74.8 ELEVATED CPK: ICD-10-CM

## 2017-09-22 PROCEDURE — 99214 OFFICE O/P EST MOD 30 MIN: CPT | Performed by: INTERNAL MEDICINE

## 2017-09-22 RX ORDER — LOSARTAN POTASSIUM 50 MG/1
50 TABLET ORAL DAILY
Qty: 90 TAB | Refills: 3 | Status: SHIPPED | OUTPATIENT
Start: 2017-09-22 | End: 2018-04-03 | Stop reason: SDUPTHER

## 2017-09-22 RX ORDER — GLIPIZIDE 5 MG/1
TABLET ORAL
Qty: 270 TAB | Refills: 3 | Status: SHIPPED | OUTPATIENT
Start: 2017-09-22 | End: 2018-04-03 | Stop reason: SDUPTHER

## 2017-09-22 NOTE — PROGRESS NOTES
Subjective:  Lico is a 67 y.o. male with the following   Past Medical History:   Diagnosis Date   • Hypertension 07-10-15    hx of, took himself off lisinopril and HTCZ   • Diabetes (CMS-HCC) 07-10-15    hx of, took self off metformin   • High cholesterol 07-10-15    hx of, took self off simvastin   • Cancer (CMS-HCC) 2012    skin   • Glaucoma     OU   • Skin cancer    • Snoring       Family History   Problem Relation Age of Onset   • Heart Disease Mother    • Lung Disease Father    • Heart Disease Father      The patient is on the following medications:   Current Outpatient Prescriptions:   •  latanoprost (XALATAN) 0.005 % Solution, Place 1 Drop in both eyes every evening., Disp: , Rfl:   •  ASCORBIC ACID PO, Take 5 g by mouth every day., Disp: , Rfl:   •  Magnesium 400 MG Tab, Take 400 mg by mouth every day., Disp: , Rfl:   •  Coenzyme Q10 200 MG Cap, Take 200 mg by mouth every day., Disp: , Rfl:   •  aspirin EC (ECOTRIN) 81 MG Tablet Delayed Response, Take 81 mg by mouth every day., Disp: , Rfl:   •  Omega 3-6-9 Fatty Acids (OMEGA 3-6-9 COMPLEX PO), Take 3,200 mg by mouth every day., Disp: , Rfl:   •  Non Formulary Request, Take 1 Tab by mouth every day. Prostate plus, Disp: , Rfl:   •  Multiple Vitamin (MULTI VITAMIN MENS PO), Take 2 Tabs by mouth every day., Disp: , Rfl:   •  Glucos-Chond-Hyal Ac-Ca Fructo (MOVE FREE JOINT HEALTH ADVANCE) Tab, Take 1 Tab by mouth every day., Disp: , Rfl:   •  losartan (COZAAR) 25 MG Tab, Take 1 Tab by mouth every day., Disp: 30 Tab, Rfl: 6  •  glipiZIDE (GLUCOTROL) 5 MG Tab, Take 1 Tab by mouth 2 times a day., Disp: 60 Tab, Rfl: 6  •  metformin ER (GLUCOPHAGE XR) 500 MG TABLET SR 24 HR, Take 2 Tabs by mouth every day., Disp: 180 Tab, Rfl: 3  •  levothyroxine (SYNTHROID) 75 MCG Tab, Take 1 Tab by mouth Every morning on an empty stomach., Disp: 90 Tab, Rfl: 3    HPI; Patient is here today for follow-up visit regarding his recent labs, on metformin and glipizide for diabetes  "denies having polyuria or polydipsia, on losartan for hypertension denies having shortness of breath or chest pain, on levothyroxine for hypothyroidism denies having cold intolerance or constipation. Per patient  recently had left carpal tunnel surgery, denies having hand pain or numbness.        ROS:  See HPI    Blood pressure 140/78, pulse 88, temperature 36.5 °C (97.7 °F), resp. rate 16, height 1.778 m (5' 10\"), weight 113 kg (249 lb 3.2 oz), SpO2 94 %.on RA  Objective:  Patient is well appearing and in no acute distress.  Pharynx is clear.  Neck is soft and supple with no cervical or supraclavicular lymphadenopathy, thyromegaly or masses, no JVD.  Lungs clear to auscultation bilaterally with normal respiratory effort. Abdomen soft, non-tender on palpation,not distended. Heart regular rate and rhythm without murmur. Extremities without any clubbing, cyanosis, or edema.    Assessment and Plan:  1. diabetic mellitus; on metformin 500 mg XR twice a day, glipizide 5 mg by mouth twice a day,  hemoglobin A1c;    Ref. Range 5/15/2017 07:34 9/18/2017 11:11   Glycohemoglobin Latest Ref Range: 0.0 - 5.6 % 8.1 (H) 6.9 (H)              2.  elevated CPK. Office statins due to elevated CPK , recent carpal tunnel surgery, currently is asymptomatic   Ref. Range 5/15/2017 07:34 9/18/2017 11:11   CPK Total Latest Ref Range: 0 - 154 U/L 413 (H) 532 (H)      Ref. Range 5/15/2017 07:34 8/31/2017 16:34 9/18/2017 11:11   Creatinine Latest Ref Range: 0.50 - 1.40 mg/dL 0.97 0.88 0.94   GFR If  Latest Ref Range: >60 mL/min/1.73 m 2 >60 >60 >60   GFR If Non  Latest Ref Range: >60 mL/min/1.73 m 2 >60 >60 >60          Ref. Range 3/8/2017 09:41 5/15/2017 07:34   Cholesterol,Tot Latest Ref Range: 100-199 mg/dL 220 (H) 162   Triglycerides Latest Ref Range: 0-149 mg/dL 707 (H) 198 (H)   HDL Latest Ref Range: >=40 mg/dL 34 (A) 38 (A)   LDL Latest Ref Range: <100 mg/dL see below 84   Lipoprotein A Latest Ref Range: " <=29 mg/dL   12         3. Increased BMI; Noncompliant with diet and exercise           4.hypothyroidism; currently on levothyroxine 75 MCG daily.            5. Hypertension; currently on losartan 25 mg daily.        Patient is advised regarding preventive and supportive care, diet and lifestyle modification, daily walking ,exercise and weight loss, avoidance of heavy lifting, increase glipizide to 15 mg every morning and losartan to 50 mg daily and monitor blood pressure and labs..   Please note that this dictation was created using voice recognition software. I have worked with consultants from the vendor as well as technical experts from [employer] to optimize the interface. I have made every reasonable attempt to correct obvious errors, but I expect that there are errors of grammar and possibly content that I did not discover before finalizing the note.

## 2017-09-25 ENCOUNTER — TELEPHONE (OUTPATIENT)
Dept: MEDICAL GROUP | Facility: PHYSICIAN GROUP | Age: 67
End: 2017-09-25

## 2017-09-25 NOTE — TELEPHONE ENCOUNTER
Received PA request for glipizide.  Insurance will only cover #[id number] per 30 days and patient take 3 tabs QD.   PA completed on covermymeds.  PA request scanned to media.

## 2017-10-04 ENCOUNTER — APPOINTMENT (OUTPATIENT)
Dept: SOCIAL WORK | Facility: CLINIC | Age: 67
End: 2017-10-04
Payer: MEDICARE

## 2017-10-04 PROCEDURE — 90662 IIV NO PRSV INCREASED AG IM: CPT | Performed by: REGISTERED NURSE

## 2017-10-04 PROCEDURE — G0008 ADMIN INFLUENZA VIRUS VAC: HCPCS | Performed by: REGISTERED NURSE

## 2017-11-13 RX ORDER — METFORMIN HYDROCHLORIDE 500 MG/1
TABLET, EXTENDED RELEASE ORAL
Qty: 180 TAB | Refills: 3 | Status: SHIPPED | OUTPATIENT
Start: 2017-11-13 | End: 2018-07-03

## 2018-03-24 ENCOUNTER — HOSPITAL ENCOUNTER (OUTPATIENT)
Dept: LAB | Facility: MEDICAL CENTER | Age: 68
End: 2018-03-24
Attending: INTERNAL MEDICINE
Payer: MEDICARE

## 2018-03-24 DIAGNOSIS — E11.9 DIABETES MELLITUS TYPE 2, NONINSULIN DEPENDENT (HCC): ICD-10-CM

## 2018-03-24 DIAGNOSIS — R74.8 ELEVATED CPK: ICD-10-CM

## 2018-03-24 LAB
CK SERPL-CCNC: 316 U/L (ref 0–154)
CREAT SERPL-MCNC: 1.02 MG/DL (ref 0.5–1.4)
EST. AVERAGE GLUCOSE BLD GHB EST-MCNC: 140 MG/DL
HBA1C MFR BLD: 6.5 % (ref 0–5.6)
T4 FREE SERPL-MCNC: 0.86 NG/DL (ref 0.53–1.43)
TROPONIN I SERPL-MCNC: <0.01 NG/ML (ref 0–0.04)
TSH SERPL DL<=0.005 MIU/L-ACNC: 5.18 UIU/ML (ref 0.38–5.33)

## 2018-03-24 PROCEDURE — 84443 ASSAY THYROID STIM HORMONE: CPT

## 2018-03-24 PROCEDURE — 84439 ASSAY OF FREE THYROXINE: CPT

## 2018-03-24 PROCEDURE — 82565 ASSAY OF CREATININE: CPT

## 2018-03-24 PROCEDURE — 36415 COLL VENOUS BLD VENIPUNCTURE: CPT

## 2018-03-24 PROCEDURE — 82550 ASSAY OF CK (CPK): CPT

## 2018-03-24 PROCEDURE — 83036 HEMOGLOBIN GLYCOSYLATED A1C: CPT

## 2018-03-24 PROCEDURE — 84484 ASSAY OF TROPONIN QUANT: CPT

## 2018-04-03 ENCOUNTER — OFFICE VISIT (OUTPATIENT)
Dept: MEDICAL GROUP | Facility: MEDICAL CENTER | Age: 68
End: 2018-04-03
Payer: MEDICARE

## 2018-04-03 VITALS
HEIGHT: 70 IN | SYSTOLIC BLOOD PRESSURE: 142 MMHG | OXYGEN SATURATION: 96 % | WEIGHT: 240.08 LBS | BODY MASS INDEX: 34.37 KG/M2 | TEMPERATURE: 97.6 F | DIASTOLIC BLOOD PRESSURE: 90 MMHG | HEART RATE: 78 BPM | RESPIRATION RATE: 20 BRPM

## 2018-04-03 DIAGNOSIS — E78.5 DYSLIPIDEMIA: ICD-10-CM

## 2018-04-03 DIAGNOSIS — G89.29 CHRONIC RIGHT SHOULDER PAIN: ICD-10-CM

## 2018-04-03 DIAGNOSIS — E03.9 HYPOTHYROIDISM, UNSPECIFIED TYPE: ICD-10-CM

## 2018-04-03 DIAGNOSIS — M25.511 CHRONIC RIGHT SHOULDER PAIN: ICD-10-CM

## 2018-04-03 DIAGNOSIS — R74.8 ELEVATED CPK: ICD-10-CM

## 2018-04-03 DIAGNOSIS — E11.9 DIABETES MELLITUS TYPE 2, NONINSULIN DEPENDENT (HCC): ICD-10-CM

## 2018-04-03 DIAGNOSIS — G62.9 NEUROPATHY: ICD-10-CM

## 2018-04-03 PROBLEM — G56.02 CARPAL TUNNEL SYNDROME ON LEFT: Status: RESOLVED | Noted: 2017-09-05 | Resolved: 2018-04-03

## 2018-04-03 PROCEDURE — 99214 OFFICE O/P EST MOD 30 MIN: CPT | Performed by: FAMILY MEDICINE

## 2018-04-03 RX ORDER — GLIPIZIDE 5 MG/1
5 TABLET ORAL 2 TIMES DAILY
Qty: 180 TAB | Refills: 3 | Status: SHIPPED | OUTPATIENT
Start: 2018-04-03 | End: 2018-07-03 | Stop reason: SDUPTHER

## 2018-04-03 RX ORDER — METFORMIN HYDROCHLORIDE 500 MG/1
1000 TABLET, EXTENDED RELEASE ORAL
Qty: 180 TAB | Refills: 3 | Status: CANCELLED | OUTPATIENT
Start: 2018-04-03

## 2018-04-03 RX ORDER — METFORMIN HYDROCHLORIDE 500 MG/1
500 TABLET, EXTENDED RELEASE ORAL 2 TIMES DAILY
Qty: 180 TAB | Refills: 3 | Status: SHIPPED | OUTPATIENT
Start: 2018-04-03 | End: 2019-05-28 | Stop reason: SDUPTHER

## 2018-04-03 RX ORDER — LOSARTAN POTASSIUM 50 MG/1
50 TABLET ORAL DAILY
Qty: 90 TAB | Refills: 3 | Status: SHIPPED | OUTPATIENT
Start: 2018-04-03 | End: 2019-03-26 | Stop reason: SDUPTHER

## 2018-04-03 RX ORDER — LEVOTHYROXINE SODIUM 0.07 MG/1
75 TABLET ORAL
Qty: 90 TAB | Refills: 3 | Status: SHIPPED | OUTPATIENT
Start: 2018-04-03 | End: 2020-02-03

## 2018-04-03 ASSESSMENT — PATIENT HEALTH QUESTIONNAIRE - PHQ9: CLINICAL INTERPRETATION OF PHQ2 SCORE: 0

## 2018-04-04 NOTE — ASSESSMENT & PLAN NOTE
About 1 month ago the patient fell onto his right outstretched arm. Since this time, he gets occasional and intermittent right lateral shoulder pain without elbow or wrist pain.

## 2018-04-04 NOTE — ASSESSMENT & PLAN NOTE
The patient has a history of dyslipidemia but stopped his statin in the past because of elevated CPK.

## 2018-04-04 NOTE — PROGRESS NOTES
[facility]  Progress Note  Established Patient    Subjective:   Lico Salinas is a 67 y.o. male here today with a chief complaint of neuropathy. The patient is alone.     Neuropathy (CMS-[facility])  Patient describes a several month history of sharp pains and tingling in his bilateral feet, worse on the right. There is no back pain or sharp shooting pain down the leg. He does have diabetes.    Right shoulder pain  About 1 month ago the patient fell onto his right outstretched arm. Since this time, he gets occasional and intermittent right lateral shoulder pain without elbow or wrist pain.     Hypothyroidism  The patient has hypothyroidism managed with Synthroid 75 µg daily.    Diabetes mellitus type 2, noninsulin dependent (CMS-[facility])  Patient takes metformin extended release 500 mg twice daily and glipizide 10 mg in the morning and 5 mg at night for his diabetes. He also taking baby aspirin and losartan. Patient did have evidence of microalbuminuria in the past. His statin was stopped because of elevated CPK.    Elevated CPK  The patient has evidence of elevated CPK in the past. Isoenzymes were ordered which showed a predominantly MM subtype. He was advised to stop working out and stop his statin medicine in the past and this only seems to have improved his CPK somewhat. Patient denies muscle aches or pains.    Dyslipidemia  The patient has a history of dyslipidemia but stopped his statin in the past because of elevated CPK.      Current Outpatient Prescriptions on File Prior to Visit   Medication Sig Dispense Refill   • metformin ER (GLUCOPHAGE XR) 500 MG TABLET SR 24 HR TAKE TWO TABLETS BY MOUTH EVERY  Tab 3   • latanoprost (XALATAN) 0.005 % Solution Place 1 Drop in both eyes every evening.     • ASCORBIC ACID PO Take 5 g by mouth every day.     • Magnesium 400 MG Tab Take 400 mg by mouth every day.     • Coenzyme Q10 200 MG Cap Take 200 mg by mouth every day.     • aspirin EC (ECOTRIN) 81 MG  "Tablet Delayed Response Take 81 mg by mouth every day.     • Omega 3-6-9 Fatty Acids (OMEGA 3-6-9 COMPLEX PO) Take 3,200 mg by mouth every day.     • Non Formulary Request Take 1 Tab by mouth every day. Prostate plus     • Multiple Vitamin (MULTI VITAMIN MENS PO) Take 2 Tabs by mouth every day.     • Glucos-Chond-Hyal Ac-Ca Fructo (MOVE FREE JOINT HEALTH ADVANCE) Tab Take 1 Tab by mouth every day.       No current facility-administered medications on file prior to visit.        Past Medical History:   Diagnosis Date   • Diabetes (CMS-[facility]) 07-10-15   • Glaucoma     OU   • High cholesterol 07-10-15    hx of, took self off simvastin   • Hypertension 07-10-15    hx of, took himself off lisinopril and HTCZ   • Hypothyroidism    • Skin cancer    • Snoring        Allergies: Pcn [penicillins]    Surgical History:  has a past surgical history that includes lymph node excision (Left, 7/21/2015); carpal tunnel endoscopic (Left, 9/5/2017); and lumbar disc replacement (08/2013).    Family History: family history includes Heart Disease in his father and mother; Lung Disease in his father.    Social History:  reports that he quit smoking about 38 years ago. His smoking use included Cigarettes. He has a 20.00 pack-year smoking history. He has never used smokeless tobacco. He reports that he drinks about 0.5 oz of alcohol per week . He reports that he does not use drugs.    ROS: no CP or SOB.        Objective:     Vitals:    04/03/18 1714 04/03/18 1718   BP: 142/90 142/90   Pulse: 78    Resp: 20    Temp: 36.4 °C (97.6 °F)    SpO2: 96%    Weight: 108.9 kg (240 lb 1.3 oz)    Height: 1.778 m (5' 10\")        Physical Exam:  General: alert in no apparent distress.   Cardio: regular rate and rhythm, no murmurs, rubs or gallops.   Resp: CTAB no w/r/r.   R shoulder: No tenderness to palpation over shoulder with normal strength and no pain on internal rotation, external rotation or empty can.    Monofilament testing with a 10 gram force: " sensation intact: diminished on Right.  Visual Inspection: Feet without maceration, ulcers, fissures.  Pedal pulses: intact bilaterally          Assessment and Plan:     1. Neuropathy (CMS-HCC)  Suspect diabetic peripheral neuropathy. Nonetheless, the patient would like to proceed with EMG. He's not currently interested in medication.   - REFERRAL TO NEURODIAGNOSTICS (EEG,EP,EMG/NCS/DBS) Modality Requested: EMG-Comment Extremities    2. Hypothyroidism, unspecified type  - levothyroxine (SYNTHROID) 75 MCG Tab; Take 1 Tab by mouth Every morning on an empty stomach.  Dispense: 90 Tab; Refill: 3  - TSH; Future    3. Diabetes mellitus type 2, noninsulin dependent (CMS-HCC)  Decrease glipizide dose.   - glipiZIDE (GLUCOTROL) 5 MG Tab; Take 1 Tab by mouth 2 times a day.  Dispense: 180 Tab; Refill: 3  - losartan (COZAAR) 50 MG Tab; Take 1 Tab by mouth every day.  Dispense: 90 Tab; Refill: 3  - metFORMIN ER (GLUCOPHAGE XR) 500 MG TABLET SR 24 HR; Take 1 Tab by mouth 2 times a day.  Dispense: 180 Tab; Refill: 3  - HEMOGLOBIN A1C; Future  - MICROALBUMIN CREAT RATIO URINE; Future  - LIPID PROFILE; Future    4. Elevated CPK  Will proceed with workup below to start.   - CREATINE KINASE; Future  - ALDOLASE; Future  - LDH; Future    5. Dyslipidemia  - holding statin 2/2 elevated CPK.     6. Chronic right shoulder pain  Suspect muscle strain.   - DX-SHOULDER 2+ RIGHT; Future        Followup: Return in about 6 weeks (around 5/15/2018), or if symptoms worsen or fail to improve.

## 2018-04-04 NOTE — ASSESSMENT & PLAN NOTE
Patient describes a several month history of sharp pains and tingling in his bilateral feet, worse on the right. There is no back pain or sharp shooting pain down the leg. He does have diabetes.

## 2018-04-04 NOTE — ASSESSMENT & PLAN NOTE
Patient takes metformin extended release 500 mg twice daily and glipizide 10 mg in the morning and 5 mg at night for his diabetes. He also taking baby aspirin and losartan. Patient did have evidence of microalbuminuria in the past. His statin was stopped because of elevated CPK.

## 2018-04-07 ENCOUNTER — HOSPITAL ENCOUNTER (OUTPATIENT)
Dept: RADIOLOGY | Facility: MEDICAL CENTER | Age: 68
End: 2018-04-07
Attending: FAMILY MEDICINE
Payer: MEDICARE

## 2018-04-07 DIAGNOSIS — G89.29 CHRONIC RIGHT SHOULDER PAIN: ICD-10-CM

## 2018-04-07 DIAGNOSIS — M25.511 CHRONIC RIGHT SHOULDER PAIN: ICD-10-CM

## 2018-04-07 PROCEDURE — 73030 X-RAY EXAM OF SHOULDER: CPT | Mod: RT

## 2018-06-30 ENCOUNTER — HOSPITAL ENCOUNTER (OUTPATIENT)
Dept: LAB | Facility: MEDICAL CENTER | Age: 68
End: 2018-06-30
Attending: FAMILY MEDICINE
Payer: MEDICARE

## 2018-06-30 DIAGNOSIS — E03.9 HYPOTHYROIDISM, UNSPECIFIED TYPE: ICD-10-CM

## 2018-06-30 DIAGNOSIS — R74.8 ELEVATED CPK: ICD-10-CM

## 2018-06-30 DIAGNOSIS — E11.9 DIABETES MELLITUS TYPE 2, NONINSULIN DEPENDENT (HCC): ICD-10-CM

## 2018-06-30 LAB
CHOLEST SERPL-MCNC: 167 MG/DL (ref 100–199)
CK SERPL-CCNC: 348 U/L (ref 0–154)
CREAT UR-MCNC: 110.6 MG/DL
EST. AVERAGE GLUCOSE BLD GHB EST-MCNC: 128 MG/DL
HBA1C MFR BLD: 6.1 % (ref 0–5.6)
HDLC SERPL-MCNC: 30 MG/DL
LDH SERPL L TO P-CCNC: 171 U/L (ref 107–266)
LDLC SERPL CALC-MCNC: ABNORMAL MG/DL
MICROALBUMIN UR-MCNC: <0.7 MG/DL
MICROALBUMIN/CREAT UR: NORMAL MG/G (ref 0–30)
TRIGL SERPL-MCNC: 434 MG/DL (ref 0–149)
TSH SERPL DL<=0.005 MIU/L-ACNC: 3.66 UIU/ML (ref 0.38–5.33)

## 2018-06-30 PROCEDURE — 80061 LIPID PANEL: CPT

## 2018-06-30 PROCEDURE — 83615 LACTATE (LD) (LDH) ENZYME: CPT

## 2018-06-30 PROCEDURE — 83036 HEMOGLOBIN GLYCOSYLATED A1C: CPT

## 2018-06-30 PROCEDURE — 84443 ASSAY THYROID STIM HORMONE: CPT

## 2018-06-30 PROCEDURE — 36415 COLL VENOUS BLD VENIPUNCTURE: CPT

## 2018-06-30 PROCEDURE — 82085 ASSAY OF ALDOLASE: CPT

## 2018-06-30 PROCEDURE — 82570 ASSAY OF URINE CREATININE: CPT

## 2018-06-30 PROCEDURE — 82550 ASSAY OF CK (CPK): CPT

## 2018-06-30 PROCEDURE — 82043 UR ALBUMIN QUANTITATIVE: CPT

## 2018-07-02 LAB — ALDOLASE SERPL-CCNC: 5.8 U/L (ref 1.5–8.1)

## 2018-07-03 ENCOUNTER — OFFICE VISIT (OUTPATIENT)
Dept: MEDICAL GROUP | Facility: MEDICAL CENTER | Age: 68
End: 2018-07-03
Payer: MEDICARE

## 2018-07-03 VITALS
BODY MASS INDEX: 33.36 KG/M2 | SYSTOLIC BLOOD PRESSURE: 132 MMHG | HEIGHT: 70 IN | HEART RATE: 74 BPM | OXYGEN SATURATION: 94 % | TEMPERATURE: 98.2 F | WEIGHT: 233.03 LBS | RESPIRATION RATE: 20 BRPM | DIASTOLIC BLOOD PRESSURE: 70 MMHG

## 2018-07-03 DIAGNOSIS — R74.8 ELEVATED CPK: ICD-10-CM

## 2018-07-03 DIAGNOSIS — E03.9 HYPOTHYROIDISM, UNSPECIFIED TYPE: ICD-10-CM

## 2018-07-03 DIAGNOSIS — G62.9 NEUROPATHY: ICD-10-CM

## 2018-07-03 DIAGNOSIS — E78.5 DYSLIPIDEMIA: ICD-10-CM

## 2018-07-03 DIAGNOSIS — M25.511 CHRONIC RIGHT SHOULDER PAIN: ICD-10-CM

## 2018-07-03 DIAGNOSIS — G89.29 CHRONIC RIGHT SHOULDER PAIN: ICD-10-CM

## 2018-07-03 DIAGNOSIS — E11.9 DIABETES MELLITUS TYPE 2, NONINSULIN DEPENDENT (HCC): ICD-10-CM

## 2018-07-03 PROCEDURE — 99214 OFFICE O/P EST MOD 30 MIN: CPT | Performed by: FAMILY MEDICINE

## 2018-07-03 RX ORDER — GABAPENTIN 300 MG/1
300 CAPSULE ORAL
Qty: 90 CAP | Refills: 1 | Status: SHIPPED | OUTPATIENT
Start: 2018-07-03 | End: 2019-04-17 | Stop reason: SDUPTHER

## 2018-07-03 RX ORDER — GLIPIZIDE 5 MG/1
5 TABLET ORAL DAILY
Qty: 30 TAB | Refills: 0
Start: 2018-07-03 | End: 2019-08-19 | Stop reason: SDUPTHER

## 2018-07-03 NOTE — ASSESSMENT & PLAN NOTE
Mild and persistent. Normal LDH and aldolase. No persistent symptoms, though does have a subacute R shoulder pain.

## 2018-07-03 NOTE — PROGRESS NOTES
"[facility]  Progress Note  Established Patient    Subjective:   Lico Salinas is a 68 y.o. male here today with a chief complaint of diabetes. The patient is alone.     Right shoulder pain  At the last visit the patient described a 1 month history of post-traumatic right shoulder pain. This has improved but he still gets an occasional R lateral shoulder \"ache\". XR is normal.     Neuropathy (HCC)  The patient has sharp pains and tingling in his bilateral feet, worse on the right. He says sometimes the symptoms are positional. He has an EMG upcoming. He has DM and is s/p lumbar surgery.     Hypothyroidism  Controlled on Synthroid.     Elevated CPK  Mild and persistent. Normal LDH and aldolase. No persistent symptoms, though does have a subacute R shoulder pain.     Dyslipidemia  The patient has a history of dyslipidemia but stopped his statin in the past because of elevated CPK. He also states he couldn't tolerate some of the side effects. He is currently on Fish Oil and not interested in restarting a statin.     Diabetes mellitus type 2, noninsulin dependent (CMS-HCC)  Patient takes metformin extended release 500 mg twice daily and glipizide 10 mg in the morning and 5 mg at night for his diabetes. He also taking baby aspirin and losartan. Patient did have evidence of microalbuminuria in the past but most recent check is normal. His statin was stopped because of elevated CPK and side effects. A1c is 6.5.       Current Outpatient Prescriptions on File Prior to Visit   Medication Sig Dispense Refill   • levothyroxine (SYNTHROID) 75 MCG Tab Take 1 Tab by mouth Every morning on an empty stomach. 90 Tab 3   • losartan (COZAAR) 50 MG Tab Take 1 Tab by mouth every day. 90 Tab 3   • metFORMIN ER (GLUCOPHAGE XR) 500 MG TABLET SR 24 HR Take 1 Tab by mouth 2 times a day. 180 Tab 3   • latanoprost (XALATAN) 0.005 % Solution Place 1 Drop in both eyes every evening.     • ASCORBIC ACID PO Take 5 g by mouth every " "day.     • Magnesium 400 MG Tab Take 400 mg by mouth every day.     • Coenzyme Q10 200 MG Cap Take 200 mg by mouth every day.     • aspirin EC (ECOTRIN) 81 MG Tablet Delayed Response Take 81 mg by mouth every day.     • Omega 3-6-9 Fatty Acids (OMEGA 3-6-9 COMPLEX PO) Take 3,200 mg by mouth every day.     • Non Formulary Request Take 1 Tab by mouth every day. Prostate plus     • Multiple Vitamin (MULTI VITAMIN MENS PO) Take 2 Tabs by mouth every day.     • Glucos-Chond-Hyal Ac-Ca Fructo (MOVE FREE JOINT HEALTH ADVANCE) Tab Take 1 Tab by mouth every day.       No current facility-administered medications on file prior to visit.        Past Medical History:   Diagnosis Date   • Diabetes (HCC) 07-10-15   • Glaucoma     OU   • High cholesterol 07-10-15    hx of, took self off simvastin   • Hypertension 07-10-15    hx of, took himself off lisinopril and HTCZ   • Hypothyroidism    • Skin cancer    • Snoring        Allergies: Pcn [penicillins]    Surgical History:  has a past surgical history that includes lymph node excision (Left, 7/21/2015); carpal tunnel endoscopic (Left, 9/5/2017); and lumbar disc replacement (08/2013).    Family History: family history includes Heart Disease in his father and mother; Lung Disease in his father.    Social History:  reports that he quit smoking about 38 years ago. His smoking use included Cigarettes. He has a 20.00 pack-year smoking history. He has never used smokeless tobacco. He reports that he drinks about 0.5 oz of alcohol per week . He reports that he does not use drugs.    ROS: no fever or nausea.        Objective:     Vitals:    07/03/18 1033   BP: 132/70   Pulse: 74   Resp: 20   Temp: 36.8 °C (98.2 °F)   SpO2: 94%   Weight: 105.7 kg (233 lb 0.4 oz)   Height: 1.778 m (5' 10\")       Physical Exam:  General: alert in no apparent distress.   Gait: normal.         Assessment and Plan:     1. Neuropathy (HCC)  Patient wants to try something at night.  - EMG. Depending on results, may " consider lumbar imaging (is s/p surgery), though this may just be DPN.   - gabapentin (NEURONTIN) 300 MG Cap; Take 1 Cap by mouth every bedtime.  Dispense: 90 Cap; Refill: 1    2. Diabetes mellitus type 2, noninsulin dependent (HCC)  - continue losartan and ASA.   - patient declines statin.   - continue Metformin 500 BID.   - decrease glipizide to 5 q day.     3. Elevated CPK  Stable, mild. Normal LDH and aldolase. Offered rheumatology referral, patient declines and would like to monitor.   - continue to monitor.     4. Hypothyroidism, unspecified type  - continue Synthroid.     5. Chronic right shoulder pain  Suspect rotator cuff. Offered injection or PT, patient declines.   - supportive are.     6. Dyslipidemia  Patient declines statin.   - continue Omega 3.         Followup: Return in about 3 months (around 10/3/2018), or if symptoms worsen or fail to improve, for DM.

## 2018-07-03 NOTE — ASSESSMENT & PLAN NOTE
The patient has a history of dyslipidemia but stopped his statin in the past because of elevated CPK. He also states he couldn't tolerate some of the side effects. He is currently on Fish Oil and not interested in restarting a statin.

## 2018-07-03 NOTE — ASSESSMENT & PLAN NOTE
The patient has sharp pains and tingling in his bilateral feet, worse on the right. He says sometimes the symptoms are positional. He has an EMG upcoming. He has DM and is s/p lumbar surgery.

## 2018-07-03 NOTE — ASSESSMENT & PLAN NOTE
Patient takes metformin extended release 500 mg twice daily and glipizide 10 mg in the morning and 5 mg at night for his diabetes. He also taking baby aspirin and losartan. Patient did have evidence of microalbuminuria in the past but most recent check is normal. His statin was stopped because of elevated CPK and side effects. A1c is 6.5.

## 2018-07-03 NOTE — ASSESSMENT & PLAN NOTE
"At the last visit the patient described a 1 month history of post-traumatic right shoulder pain. This has improved but he still gets an occasional R lateral shoulder \"ache\". XR is normal.   "

## 2018-07-31 ENCOUNTER — NON-PROVIDER VISIT (OUTPATIENT)
Dept: NEUROLOGY | Facility: MEDICAL CENTER | Age: 68
End: 2018-07-31
Payer: MEDICARE

## 2018-07-31 DIAGNOSIS — G62.9 PERIPHERAL NERVE DISORDER: ICD-10-CM

## 2018-07-31 PROCEDURE — 95908 NRV CNDJ TST 3-4 STUDIES: CPT | Performed by: SPECIALIST

## 2018-07-31 PROCEDURE — 95886 MUSC TEST DONE W/N TEST COMP: CPT | Performed by: SPECIALIST

## 2018-08-01 NOTE — PROCEDURES
DATE OF SERVICE:  2018    EMG AND NERVE CONDUCTION STUDY    DATE OF STUDY:  2018    ORDERING PHYSICIAN:  Mynor Teresa MD    Nerve conduction studies of the right lower extremity reveal the followin.  Right common peroneal motor response could not be obtained with either   proximal or distal stimuli.  2.  Right tibial motor distal latency is within normal limits.  Amplitude is   significantly decreased and proximal response could not be elicited.    Needle examination of selected muscles studied in the right lower extremity   reveals no acute denervation changes.  Chronic neuropathic changes consisting   of decreased recruitment were noted in the extensor digitorum brevis and   abductor hallucis muscles.  The vastus lateralis, tibialis anterior, and   gastrocnemius muscles were normal electrophysiologically on the right side.    Nerve conduction studies of the left lower extremity revealed the followin.  Left peroneal motor distal latency is within normal limits.  Amplitude is   decreased and conduction velocity is mildly decreased.  2.  Left tibial motor distal latency is within normal limits.  Amplitude is   decreased and conduction velocity is decreased.    Needle examination of selected muscles studied in the left lower extremity   reveals no acute denervation changes.  Chronic neuropathic changes consisting   of decreased recruitment are noted in the extensor digitorum brevis and   abductor hallucis muscles.  The vastus lateralis, tibialis anterior, and   gastrocnemius muscles were normal electrophysiologically on the left side.    Nerve Conduction Studies     Stim Site NR Onset (ms) Norm Onset (ms) O-P Amp (mV) Norm O-P Amp Site1 Site2 Delta-0 (ms) Dist (cm) Misha (m/s) Norm Misha (m/s)   Left Peroneal EDB Motor (Ext Dig Brev)   Ankle    5.9 <6.1 *1.1 >2.5 B Fib Ankle 8.9 33.0 *37 >38   B Fib    14.8  0.6          Right Peroneal EDB Motor (Ext Dig Brev)   Ankle *NR  <6.1  >2.5 B Fib  Ankle  0.0  >38   B Fib *NR             Left Tibial Motor (Abd Leigh Brev)   Ankle    3.9 <6.1 *0.9 >3.0 Knee Ankle 13.4 39.0 *29 >35   Knee    17.3  0.4          Right Tibial Motor (Abd Leigh Brev)   Ankle    3.1 <6.1 *0.3 >3.0 Knee Ankle  0.0  >35   Knee *NR                           Electromyography     Side Muscle Nerve Root Ins Act Fibs Psw Amp Dur Poly Recrt Int Pat Comment   Right VastusLat Femoral L2-4 Nml Nml Nml Nml Nml 0 Nml Nml    Right AntTibialis Dp Br Fibular L4-5 Nml Nml Nml Nml Nml 0 Nml Nml    Right Gastroc Tibial S1-2 Nml Nml Nml Nml Nml 0 Nml Nml    Right Ext Dig Brev Dp Br Fibular L5, S1 Nml Nml Nml Nml Nml 0 *Reduced *50%    Right AbdHallucis MedPlantar S1-2 Nml Nml Nml Nml Nml 0 *Reduced *50%    Left VastusLat Femoral L2-4 Nml Nml Nml Nml Nml 0 Nml Nml    Left AntTibialis Dp Br Fibular L4-5 Nml Nml Nml Nml Nml 0 Nml Nml    Left Gastroc Tibial S1-2 Nml Nml Nml Nml Nml 0 Nml Nml    Left Ext Dig Brev Dp Br Fibular L5, S1 Nml Nml Nml Nml Nml 0 *Reduced *75%    Left AbdHallucis MedPlantar S1-2 Nml Nml Nml Nml Nml 0 *Reduced *50%        IMPRESSION:  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  Spinal   stenosis could also produce this electrophysiologic pattern.  Clinical   correlation is suggested.       ____________________________________     G MD AARON MULLEN / TOMÁS    DD:  07/31/2018 15:23:53  DT:  07/31/2018 15:38:13    D#:  [id number]  Job#:  [id number]

## 2018-10-05 ENCOUNTER — HOSPITAL ENCOUNTER (OUTPATIENT)
Facility: MEDICAL CENTER | Age: 68
End: 2018-10-05
Payer: MEDICARE

## 2018-10-05 LAB
CREAT UR-MCNC: 62 MG/DL
EST. AVERAGE GLUCOSE BLD GHB EST-MCNC: 148 MG/DL
HBA1C MFR BLD: 6.8 % (ref 0–5.6)
MICROALBUMIN UR-MCNC: <0.7 MG/DL
MICROALBUMIN/CREAT UR: NORMAL MG/G (ref 0–30)

## 2018-10-05 PROCEDURE — 82043 UR ALBUMIN QUANTITATIVE: CPT

## 2018-10-05 PROCEDURE — 83036 HEMOGLOBIN GLYCOSYLATED A1C: CPT

## 2018-10-05 PROCEDURE — 82570 ASSAY OF URINE CREATININE: CPT

## 2018-10-17 ENCOUNTER — OFFICE VISIT (OUTPATIENT)
Dept: MEDICAL GROUP | Facility: MEDICAL CENTER | Age: 68
End: 2018-10-17
Payer: MEDICARE

## 2018-10-17 VITALS
HEART RATE: 88 BPM | HEIGHT: 70 IN | WEIGHT: 237 LBS | DIASTOLIC BLOOD PRESSURE: 82 MMHG | BODY MASS INDEX: 33.93 KG/M2 | SYSTOLIC BLOOD PRESSURE: 130 MMHG

## 2018-10-17 DIAGNOSIS — E11.9 DIABETES MELLITUS TYPE 2, NONINSULIN DEPENDENT (HCC): ICD-10-CM

## 2018-10-17 DIAGNOSIS — G62.9 NEUROPATHY: ICD-10-CM

## 2018-10-17 DIAGNOSIS — M79.18 RIGHT BUTTOCK PAIN: ICD-10-CM

## 2018-10-17 DIAGNOSIS — E78.5 DYSLIPIDEMIA: ICD-10-CM

## 2018-10-17 DIAGNOSIS — E03.9 HYPOTHYROIDISM, UNSPECIFIED TYPE: ICD-10-CM

## 2018-10-17 DIAGNOSIS — R74.8 ELEVATED CPK: ICD-10-CM

## 2018-10-17 PROCEDURE — 99214 OFFICE O/P EST MOD 30 MIN: CPT | Performed by: FAMILY MEDICINE

## 2018-10-17 RX ORDER — PREDNISOLONE ACETATE 10 MG/ML
SUSPENSION/ DROPS OPHTHALMIC
COMMUNITY
Start: 2018-10-04 | End: 2019-08-20

## 2018-10-17 RX ORDER — TIMOLOL MALEATE 5 MG/ML
SOLUTION/ DROPS OPHTHALMIC
COMMUNITY
Start: 2018-10-04 | End: 2021-01-06

## 2018-10-17 NOTE — ASSESSMENT & PLAN NOTE
"The patient describes some recent buttock pain on the right. It is located deeply and described as feeling \"bruise\". Of note, the patient had a severe right-sided lower extremity axonal neuropathy.  "

## 2018-10-17 NOTE — PROGRESS NOTES
"[facility]  Progress Note  Established Patient    Subjective:   Lico Salinas is a 68 y.o. male here today with a chief complaint of diabetes. The patient is alone.     Diabetes mellitus type 2, noninsulin dependent (CMS-HCC)  The patient's diabetes is under excellent control on his current medication regimen. He is also on daily aspirin and losartan. He is not taking a statin due to elevated CPK levels.    Dyslipidemia  Patient has dyslipidemia but is not on statin therapy due to elevated CPK levels.    Elevated CPK  The patient has mild and persistently elevated CPK with normal LDH and aldolase. He has declined rheumatology consultation.    Hypothyroidism  The patient's hypothyroidism is controlled with Synthroid. His last TSH was normal.    Neuropathy ([facility])  The patient has sharp pains and tingling in his bilateral feet, worse on the right. He says sometimes the symptoms are positional. He has diabetes and is also status post lumbar surgery. Of note, an EMG demonstrated a severe right-sided axonal neuropathy.    Right buttock pain  The patient describes some recent buttock pain on the right. It is located deeply and described as feeling \"bruise\". Of note, the patient had a severe right-sided lower extremity axonal neuropathy.      Current Outpatient Prescriptions on File Prior to Visit   Medication Sig Dispense Refill   • gabapentin (NEURONTIN) 300 MG Cap Take 1 Cap by mouth every bedtime. 90 Cap 1   • glipiZIDE (GLUCOTROL) 5 MG Tab Take 1 Tab by mouth every day. 30 Tab 0   • levothyroxine (SYNTHROID) 75 MCG Tab Take 1 Tab by mouth Every morning on an empty stomach. 90 Tab 3   • losartan (COZAAR) 50 MG Tab Take 1 Tab by mouth every day. 90 Tab 3   • metFORMIN ER (GLUCOPHAGE XR) 500 MG TABLET SR 24 HR Take 1 Tab by mouth 2 times a day. 180 Tab 3   • latanoprost (XALATAN) 0.005 % Solution Place 1 Drop in both eyes every evening.     • ASCORBIC ACID PO Take 5 g by mouth every day.     • Magnesium 400 " "MG Tab Take 400 mg by mouth every day.     • Coenzyme Q10 200 MG Cap Take 200 mg by mouth every day.     • aspirin EC (ECOTRIN) 81 MG Tablet Delayed Response Take 81 mg by mouth every day.     • Omega 3-6-9 Fatty Acids (OMEGA 3-6-9 COMPLEX PO) Take 3,200 mg by mouth every day.     • Multiple Vitamin (MULTI VITAMIN MENS PO) Take 2 Tabs by mouth every day.     • Glucos-Chond-Hyal Ac-Ca Fructo (MOVE FREE JOINT HEALTH ADVANCE) Tab Take 1 Tab by mouth every day.     • Non Formulary Request Take 1 Tab by mouth every day. Prostate plus       No current facility-administered medications on file prior to visit.        Past Medical History:   Diagnosis Date   • Diabetes (HCC) 07-10-15   • Glaucoma     OU   • High cholesterol 07-10-15    hx of, took self off simvastin   • Hypertension 07-10-15    hx of, took himself off lisinopril and HTCZ   • Hypothyroidism    • Skin cancer    • Snoring        Allergies: Pcn [penicillins]    Surgical History:  has a past surgical history that includes lymph node excision (Left, 7/21/2015); carpal tunnel endoscopic (Left, 9/5/2017); and lumbar disc replacement (08/2013).    Family History: family history includes Heart Disease in his father and mother; Lung Disease in his father.    Social History:  reports that he quit smoking about 38 years ago. His smoking use included Cigarettes. He has a 20.00 pack-year smoking history. He has never used smokeless tobacco. He reports that he drinks about 0.5 oz of alcohol per week . He reports that he does not use drugs.    ROS: no fever or nausea.        Objective:     Vitals:    10/17/18 0800   BP: 130/82   BP Location: Left arm   Patient Position: Sitting   Pulse: 88   Weight: 107.5 kg (237 lb)   Height: 1.778 m (5' 10\")       Physical Exam:  General: alert in no apparent distress.   MSK: No tenderness to palpation of the right buttock. Some numbness in the right lower extremity. Motor function intact in the bilateral lower extremities. Negative RAJAN " on the right.         Assessment and Plan:     1. Neuropathy (HCC)  Consider lumbar or diabetic etiology.   - REFERRAL TO PAIN CLINIC    2. Right buttock pain  Consider hamstrings/gluteal strain vs lumbar etiology.   - recommended stretches, handout given.  - REFERRAL TO PAIN CLINIC    3. Elevated CPK  - CREATINE KINASE; Future    4. Dyslipidemia  - LIPID PROFILE; Future    5. Diabetes mellitus type 2, noninsulin dependent (HCC)  - continue current regimen.  - BASIC METABOLIC PANEL; Future  - HEMOGLOBIN A1C; Future    6. Hypothyroidism, unspecified type  - continue Synthroid.  - TSH; Future        Followup: Return in about 6 months (around 4/17/2019), or if symptoms worsen or fail to improve.

## 2018-10-17 NOTE — ASSESSMENT & PLAN NOTE
The patient has sharp pains and tingling in his bilateral feet, worse on the right. He says sometimes the symptoms are positional. He has diabetes and is also status post lumbar surgery. Of note, an EMG demonstrated a severe right-sided axonal neuropathy.

## 2018-10-17 NOTE — ASSESSMENT & PLAN NOTE
The patient's diabetes is under excellent control on his current medication regimen. He is also on daily aspirin and losartan. He is not taking a statin due to elevated CPK levels.

## 2018-10-17 NOTE — PROGRESS NOTES
"RN-CDE Note    Subjective:     Health changes since last visit/interval Hx: complaining of hip pain on the right side.     Medications (including changes made today)  Current Outpatient Prescriptions   Medication Sig Dispense Refill   • prednisoLONE acetate (PRED FORTE) 1 % Suspension      • timolol (TIMOPTIC) 0.5 % Solution      • gabapentin (NEURONTIN) 300 MG Cap Take 1 Cap by mouth every bedtime. 90 Cap 1   • glipiZIDE (GLUCOTROL) 5 MG Tab Take 1 Tab by mouth every day. 30 Tab 0   • levothyroxine (SYNTHROID) 75 MCG Tab Take 1 Tab by mouth Every morning on an empty stomach. 90 Tab 3   • losartan (COZAAR) 50 MG Tab Take 1 Tab by mouth every day. 90 Tab 3   • metFORMIN ER (GLUCOPHAGE XR) 500 MG TABLET SR 24 HR Take 1 Tab by mouth 2 times a day. 180 Tab 3   • latanoprost (XALATAN) 0.005 % Solution Place 1 Drop in both eyes every evening.     • ASCORBIC ACID PO Take 5 g by mouth every day.     • Magnesium 400 MG Tab Take 400 mg by mouth every day.     • Coenzyme Q10 200 MG Cap Take 200 mg by mouth every day.     • aspirin EC (ECOTRIN) 81 MG Tablet Delayed Response Take 81 mg by mouth every day.     • Omega 3-6-9 Fatty Acids (OMEGA 3-6-9 COMPLEX PO) Take 3,200 mg by mouth every day.     • Multiple Vitamin (MULTI VITAMIN MENS PO) Take 2 Tabs by mouth every day.     • Glucos-Chond-Hyal Ac-Ca Fructo (MOVE FREE JOINT HEALTH ADVANCE) Tab Take 1 Tab by mouth every day.     • Non Formulary Request Take 1 Tab by mouth every day. Prostate plus       No current facility-administered medications for this visit.        Taking daily ASA: Yes  Taking above medications as prescribed: yes  SIDE EFFECTS: Patient denies side effects to medications    Exercise: moderate regular exercise, aerobic < 3 days a week, goes when his hip is not bothering him to much  Diet: \"healthy\" diet  in general  Patient's body mass index is 34.01 kg/m². Exercise and nutrition counseling were performed at this visit.      Health Maintenance:   Health " Maintenance Due   Topic Date Due   • Annual Wellness Visit  1950   • IMM HEP B VACCINE (1 of 3 - Risk 3-dose series) 06/19/1967   • RETINAL SCREENING  04/04/2017   • IMM ZOSTER VACCINES (2 of 3) 05/27/2017   • IMM INFLUENZA (1) 09/01/2018       Immunizations:   PPSV23: Up-to-date  [id number]: Up-to-date  Tdap: Up-to-date  Flu: Up-to-date  Hep B: Due    DM:   Last A1c:   Lab Results   Component Value Date/Time    HBA1C 6.8 (H) 10/05/2018 01:00 PM      A1C GOAL: < 7    Glucose monitoring frequency: not testing    Hypoglycemic episodes: no    Last Retinal Exam: August 2018 Provider: Rachael dumont, request sent for records  Daily Foot Exam: Yes, complains of some numbness  Routine Dental Exams: Yes    Lab Results   Component Value Date/Time    MALBCRT see below 10/05/2018 01:00 PM    MICROALBUR <0.7 10/05/2018 01:00 PM        ACR Albumin/Creatinine Ratio goal <30     HTN:   Blood pressure goal <140/<80 .   Currently Rx ACE/ARB: Yes    Dyslipidemia:    Lab Results   Component Value Date/Time    CHOLSTRLTOT 167 06/30/2018 07:46 AM    LDL see below 06/30/2018 07:46 AM    HDL 30 (A) 06/30/2018 07:46 AM    TRIGLYCERIDE 434 (H) 06/30/2018 07:46 AM       Lab Results   Component Value Date/Time    SODIUM 135 09/18/2017 11:11 AM    POTASSIUM 4.1 09/18/2017 11:11 AM    CHLORIDE 101 09/18/2017 11:11 AM    CO2 23 09/18/2017 11:11 AM    GLUCOSE 124 (H) 09/18/2017 11:11 AM    BUN 19 09/18/2017 11:11 AM    CREATININE 1.02 03/24/2018 07:06 AM     Lab Results   Component Value Date/Time    ALKPHOSPHAT 50 09/18/2017 11:11 AM    ASTSGOT 31 09/18/2017 11:11 AM    ALTSGPT 37 09/18/2017 11:11 AM    TBILIRUBIN 0.5 09/18/2017 11:11 AM        Currently Rx Statin: No    He  reports that he quit smoking about 38 years ago. His smoking use included Cigarettes. He has a 20.00 pack-year smoking history. He has never used smokeless tobacco.    Objective:     Exam:  Monofilament: not done    Plan:     Discussed and educated on:   - All  medications, side effects and compliance (discussed carefully)  - Annual eye examinations at Ophthalmology  - Factors Affecting Blood Glucose Control: stress  - Foot Care: what to look for when checking feet every day and when to contact HCP  - HbA1C: target    Recommended medication changes: none

## 2018-10-17 NOTE — ASSESSMENT & PLAN NOTE
The patient has mild and persistently elevated CPK with normal LDH and aldolase. He has declined rheumatology consultation.

## 2018-10-17 NOTE — LETTER
[facility]  Mynor Teresa M.D.  [address]  [place] [address]  Fax: [phone]   Authorization for Release/Disclosure of   Protected Health Information   Name: LICO ALSTON : 1950 SSN: [id number]   Address: [address] [place] [address] [place] [address] Phone:    [phone] (home)    I authorize the entity listed below to release/disclose the PHI below to:   [facility]/Mynor Teresa M.D. and Mynor Teresa M.D.   Provider or Entity Name:  Rinku Urias M.D.   Address   [facility], [facility], [facility]   Phone:      Fax:  [phone]   Reason for request: continuity of care   Information to be released:    [  ] LAST COLONOSCOPY,  including any PATH REPORT and follow-up  [  ] LAST FIT/COLOGUARD RESULT [  ] LAST DEXA  [  ] LAST MAMMOGRAM  [  ] LAST PAP  [  ] LAST LABS [xxx  ] RETINA EXAM REPORT  [  ] IMMUNIZATION RECORDS  [  ] Release all info      [  ] Check here and initial the line next to each item to release ALL health information INCLUDING  _____ Care and treatment for drug and / or alcohol abuse  _____ HIV testing, infection status, or AIDS  _____ Genetic Testing    DATES OF SERVICE OR TIME PERIOD TO BE DISCLOSED: _____________  I understand and acknowledge that:  * This Authorization may be revoked at any time by you in writing, except if your health information has already been used or disclosed.  * Your health information that will be used or disclosed as a result of you signing this authorization could be re-disclosed by the recipient. If this occurs, your re-disclosed health information may no longer be protected by State or Federal laws.  * You may refuse to sign this Authorization. Your refusal will not affect your ability to obtain treatment.  * This Authorization becomes effective upon signing and will  on (date) __________.      If no date is indicated, this Authorization will  one (1) year from the signature date.    Name: Lico Mauricio Rita    Signature:   Date:     10/17/2018       PLEASE FAX REQUESTED RECORDS BACK TO: [phone]

## 2019-03-26 DIAGNOSIS — E11.9 DIABETES MELLITUS TYPE 2, NONINSULIN DEPENDENT (HCC): ICD-10-CM

## 2019-03-26 RX ORDER — LOSARTAN POTASSIUM 50 MG/1
50 TABLET ORAL DAILY
Qty: 100 TAB | Refills: 4 | Status: SHIPPED | OUTPATIENT
Start: 2019-03-26 | End: 2020-04-22

## 2019-04-09 ENCOUNTER — TELEPHONE (OUTPATIENT)
Dept: MEDICAL GROUP | Facility: MEDICAL CENTER | Age: 69
End: 2019-04-09

## 2019-04-10 ENCOUNTER — HOSPITAL ENCOUNTER (OUTPATIENT)
Dept: LAB | Facility: MEDICAL CENTER | Age: 69
End: 2019-04-10
Attending: FAMILY MEDICINE
Payer: MEDICARE

## 2019-04-10 DIAGNOSIS — E03.9 HYPOTHYROIDISM, UNSPECIFIED TYPE: ICD-10-CM

## 2019-04-10 DIAGNOSIS — R74.8 ELEVATED CPK: ICD-10-CM

## 2019-04-10 DIAGNOSIS — E11.9 DIABETES MELLITUS TYPE 2, NONINSULIN DEPENDENT (HCC): ICD-10-CM

## 2019-04-10 DIAGNOSIS — E78.5 DYSLIPIDEMIA: ICD-10-CM

## 2019-04-10 LAB
ANION GAP SERPL CALC-SCNC: 14 MMOL/L (ref 0–11.9)
BUN SERPL-MCNC: 24 MG/DL (ref 8–22)
CALCIUM SERPL-MCNC: 9.6 MG/DL (ref 8.5–10.5)
CHLORIDE SERPL-SCNC: 105 MMOL/L (ref 96–112)
CHOLEST SERPL-MCNC: 189 MG/DL (ref 100–199)
CK SERPL-CCNC: 247 U/L (ref 0–154)
CO2 SERPL-SCNC: 21 MMOL/L (ref 20–33)
CREAT SERPL-MCNC: 0.99 MG/DL (ref 0.5–1.4)
EST. AVERAGE GLUCOSE BLD GHB EST-MCNC: 154 MG/DL
FASTING STATUS PATIENT QL REPORTED: NORMAL
GLUCOSE SERPL-MCNC: 136 MG/DL (ref 65–99)
HBA1C MFR BLD: 7 % (ref 0–5.6)
HDLC SERPL-MCNC: 33 MG/DL
LDLC SERPL CALC-MCNC: ABNORMAL MG/DL
POTASSIUM SERPL-SCNC: 4.2 MMOL/L (ref 3.6–5.5)
SODIUM SERPL-SCNC: 140 MMOL/L (ref 135–145)
TRIGL SERPL-MCNC: 564 MG/DL (ref 0–149)
TSH SERPL DL<=0.005 MIU/L-ACNC: 3.35 UIU/ML (ref 0.38–5.33)

## 2019-04-10 PROCEDURE — 80048 BASIC METABOLIC PNL TOTAL CA: CPT

## 2019-04-10 PROCEDURE — 83036 HEMOGLOBIN GLYCOSYLATED A1C: CPT

## 2019-04-10 PROCEDURE — 82550 ASSAY OF CK (CPK): CPT

## 2019-04-10 PROCEDURE — 36415 COLL VENOUS BLD VENIPUNCTURE: CPT

## 2019-04-10 PROCEDURE — 80061 LIPID PANEL: CPT

## 2019-04-10 PROCEDURE — 84443 ASSAY THYROID STIM HORMONE: CPT

## 2019-04-17 ENCOUNTER — OFFICE VISIT (OUTPATIENT)
Dept: MEDICAL GROUP | Facility: MEDICAL CENTER | Age: 69
End: 2019-04-17
Payer: MEDICARE

## 2019-04-17 VITALS
RESPIRATION RATE: 18 BRPM | HEIGHT: 70 IN | SYSTOLIC BLOOD PRESSURE: 122 MMHG | OXYGEN SATURATION: 95 % | TEMPERATURE: 96.5 F | DIASTOLIC BLOOD PRESSURE: 70 MMHG | WEIGHT: 238 LBS | BODY MASS INDEX: 34.07 KG/M2 | HEART RATE: 71 BPM

## 2019-04-17 DIAGNOSIS — R74.8 ELEVATED CPK: ICD-10-CM

## 2019-04-17 DIAGNOSIS — E11.9 DIABETES MELLITUS TYPE 2, NONINSULIN DEPENDENT (HCC): ICD-10-CM

## 2019-04-17 DIAGNOSIS — G62.9 NEUROPATHY: ICD-10-CM

## 2019-04-17 DIAGNOSIS — E78.5 DYSLIPIDEMIA: ICD-10-CM

## 2019-04-17 DIAGNOSIS — R22.9 SUBCUTANEOUS NODULE: ICD-10-CM

## 2019-04-17 DIAGNOSIS — E03.9 HYPOTHYROIDISM, UNSPECIFIED TYPE: ICD-10-CM

## 2019-04-17 PROCEDURE — 8041 PR SCP AHA: Performed by: FAMILY MEDICINE

## 2019-04-17 PROCEDURE — 99214 OFFICE O/P EST MOD 30 MIN: CPT | Mod: 25 | Performed by: FAMILY MEDICINE

## 2019-04-17 RX ORDER — GABAPENTIN 300 MG/1
600 CAPSULE ORAL
Qty: 90 CAP | Refills: 1
Start: 2019-04-17 | End: 2019-04-17 | Stop reason: SDUPTHER

## 2019-04-17 RX ORDER — FENOFIBRATE 145 MG/1
145 TABLET, COATED ORAL DAILY
Qty: 90 TAB | Refills: 4 | Status: SHIPPED | OUTPATIENT
Start: 2019-04-17 | End: 2020-03-30 | Stop reason: SDUPTHER

## 2019-04-17 RX ORDER — GABAPENTIN 300 MG/1
600 CAPSULE ORAL
Qty: 180 CAP | Refills: 1 | Status: SHIPPED | OUTPATIENT
Start: 2019-04-17 | End: 2020-10-14

## 2019-04-17 ASSESSMENT — PATIENT HEALTH QUESTIONNAIRE - PHQ9: CLINICAL INTERPRETATION OF PHQ2 SCORE: 0

## 2019-04-17 NOTE — ASSESSMENT & PLAN NOTE
3 months ago the patient noticed a subcutaneous nodule on the dorsal portion of his right finger.  It is bothersome to him and is painful when he strikes it against objects.

## 2019-04-17 NOTE — ASSESSMENT & PLAN NOTE
Patient has a severe axonal neuropathy in the setting of diabetes.  He does follow with pain management who is considering a lumbar spine MRI.

## 2019-04-17 NOTE — ASSESSMENT & PLAN NOTE
Patient has a severe hyperglyceridemia.  He cannot tolerate statin therapy due to an elevated CPK.

## 2019-04-17 NOTE — ASSESSMENT & PLAN NOTE
Patient has a mild and persistently elevated CPK.  Normal LDH and aldolase.  Patient denies myalgias.  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  Patient has declined rheumatology consultation.  I offered him a muscle biopsy today but he declines.

## 2019-04-17 NOTE — PROGRESS NOTES
Subjective:     Lico Salinas is a 68 y.o. male here today for diabetes and Annual Health Assessment.    Subcutaneous nodule  3 months ago the patient noticed a subcutaneous nodule on the dorsal portion of his right finger.  It is bothersome to him and is painful when he strikes it against objects.    Hypothyroidism  Patient's hypothyroidism is controlled with Synthroid.    Diabetes mellitus type 2, noninsulin dependent (CMS-HCC)  The patient's diabetes is under excellent control on his current medication regimen. He is also on daily aspirin and losartan. He is not taking a statin due to elevated CPK levels.    Elevated CPK  Patient has a mild and persistently elevated CPK.  Normal LDH and aldolase.  Patient denies myalgias.  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  Patient has declined rheumatology consultation.  I offered him a muscle biopsy today but he declines.     Dyslipidemia  Patient has a severe hyperglyceridemia.  He cannot tolerate statin therapy due to an elevated CPK.    Neuropathy (HCC)  Patient has a severe axonal neuropathy in the setting of diabetes.  He does follow with pain management who is considering a lumbar spine MRI.      Health Maintenance Summary                Annual Wellness Visit Overdue 1950     IMM HEP B VACCINE Overdue 6/19/1967     IMM ZOSTER VACCINES Overdue 5/27/2017      Done 4/1/2017 Imm Admin: Zoster Vaccine Live (ZVL) (Zostavax)    DIABETES MONOFILAMENT / LE EXAM Overdue 4/3/2019      Done 4/3/2018 AMB DIABETIC MONOFILAMENT LOWER EXTREMITY EXAM    RETINAL SCREENING Next Due 6/21/2019      Done 6/21/2018 REFERRAL FOR RETINAL SCREENING EXAM     Patient has more history with this topic...    URINE ACR / MICROALBUMIN Next Due 10/5/2019      Done 10/5/2018 MICROALBUMIN CREAT RATIO URINE     Patient has more history with this topic...    A1C SCREENING Next Due 10/10/2019      Done 4/10/2019 HEMOGLOBIN A1C      Patient has more history with this topic...     FASTING LIPID PROFILE Next Due 4/10/2020      Done 4/10/2019 LIPID PROFILE      Patient has more history with this topic...    SERUM CREATININE Next Due 4/10/2020      Done 4/10/2019 BASIC METABOLIC PANEL      Patient has more history with this topic...    COLONOSCOPY Next Due 2/24/2024      Done 2/24/2014     IMM DTaP/Tdap/Td Vaccine Next Due 10/31/2024      Done 10/31/2014 Imm Admin: Tdap Vaccine     Patient has more history with this topic...           Annual Health Assessment Questions:     1.  Are you currently engaging in any exercise or physical activity? Yes    2.  How would you describe your mood or emotional well-being today? good    3.  Have you had any falls in the last year? No    4.  Have you noticed any problems with your balance or had difficulty walking? No    5.  In the last six months have you experienced any leakage of urine? No    6. DPA/Advanced Directive: Patient does not have an Advanced Directive.  A packet and workshop information was given on Advanced Directives.    Current medicines (including changes today)  Current Outpatient Prescriptions   Medication Sig Dispense Refill   • fenofibrate (TRICOR) 145 MG Tab Take 1 Tab by mouth every day. 90 Tab 4   • gabapentin (NEURONTIN) 300 MG Cap Take 2 Caps by mouth every bedtime. 180 Cap 1   • losartan (COZAAR) 50 MG Tab Take 1 Tab by mouth every day. 100 Tab 4   • timolol (TIMOPTIC) 0.5 % Solution      • glipiZIDE (GLUCOTROL) 5 MG Tab Take 1 Tab by mouth every day. 30 Tab 0   • levothyroxine (SYNTHROID) 75 MCG Tab Take 1 Tab by mouth Every morning on an empty stomach. 90 Tab 3   • metFORMIN ER (GLUCOPHAGE XR) 500 MG TABLET SR 24 HR Take 1 Tab by mouth 2 times a day. 180 Tab 3   • latanoprost (XALATAN) 0.005 % Solution Place 1 Drop in both eyes every evening.     • ASCORBIC ACID PO Take 5 g by mouth every day.     • Magnesium 400 MG Tab Take 400 mg by mouth every day.     • Coenzyme Q10 200 MG Cap Take 200 mg by mouth every day.     • aspirin EC  "(ECOTRIN) 81 MG Tablet Delayed Response Take 81 mg by mouth every day.     • Omega 3-6-9 Fatty Acids (OMEGA 3-6-9 COMPLEX PO) Take 3,200 mg by mouth every day.     • Non Formulary Request Take 1 Tab by mouth every day. Prostate plus     • Multiple Vitamin (MULTI VITAMIN MENS PO) Take 2 Tabs by mouth every day.     • Glucos-Chond-Hyal Ac-Ca Fructo (MOVE FREE JOINT HEALTH ADVANCE) Tab Take 1 Tab by mouth 2 Times a Day.     • prednisoLONE acetate (PRED FORTE) 1 % Suspension        No current facility-administered medications for this visit.        He  has a past medical history of Diabetes (HCC) (07-10-15); Glaucoma; High cholesterol (07-10-15); Hypertension (07-10-15); Hypothyroidism; Skin cancer; and Snoring.    Pcn [penicillins]    He  reports that he quit smoking about 39 years ago. His smoking use included Cigarettes. He has a 20.00 pack-year smoking history. He has never used smokeless tobacco. He reports that he drinks about 0.5 oz of alcohol per week . He reports that he does not use drugs.  Counseling given: Not Answered      ROS   No fever or nausea.     Objective:     Physical Exam:  /70 (BP Location: Left arm, Patient Position: Sitting, BP Cuff Size: Large adult)   Pulse 71   Temp 35.8 °C (96.5 °F) (Temporal)   Resp 18   Ht 1.778 m (5' 10\")   Wt 108 kg (238 lb)   SpO2 95%  Body mass index is 34.15 kg/m².   Constitutional: Alert, no distress.  Skin: firm and moveable subcutaneous nodule on dorsal portion of middle finger.     Monofilament testing with a 10 gram force: sensation intact: decreased on right  Visual Inspection: Feet without maceration, ulcers, fissures.  Pedal pulses: intact bilaterally      Assessment and Plan:     1. Elevated CPK  - mild and stable.   - declines rheum consult, muscle biopsy.     2. Dyslipidemia  Can't tolerate statin but severe hypertriglyceridemia.   - fenofibrate (TRICOR) 145 MG Tab; Take 1 Tab by mouth every day.  Dispense: 90 Tab; Refill: 4  - Lipid Profile; " Future  - Comp Metabolic Panel; Future    3. Diabetes mellitus type 2, noninsulin dependent (HCC)  - continue current regimen.   - Diabetic Monofilament LE Exam  - HEMOGLOBIN A1C; Future    4. Hypothyroidism, unspecified type  - continue Synthroid.     5. Neuropathy (HCC)  - f/u pain management.   - gabapentin (NEURONTIN) 300 MG Cap; Take 2 Caps by mouth every bedtime.  Dispense: 180 Cap; Refill: 1    6. Subcutaneous nodule  - REFERRAL TO HAND SURGERY    Discussion today about general wellness and lifestyle habits:    · Engage in regular physical activity and social activities.  · Prevent falls and reduce trip hazards; using ambulatory aides, hearing and vision testing if appropriate.  · Steps to improve urinary incontinence.  · Advanced care planning.    Follow-Up: Return in about 4 months (around 8/17/2019), or if symptoms worsen or fail to improve.         PLEASE NOTE: This dictation was created using voice recognition software. I have made every reasonable attempt to correct obvious errors, but I expect that there are errors of grammar and possibly content that I did not discover before finalizing the note.

## 2019-08-09 ENCOUNTER — TELEPHONE (OUTPATIENT)
Dept: MEDICAL GROUP | Facility: MEDICAL CENTER | Age: 69
End: 2019-08-09

## 2019-08-09 NOTE — TELEPHONE ENCOUNTER
ESTABLISHED PATIENT PRE-VISIT PLANNING     Patient was NOT contacted to complete PVP.     Note: Patient will not be contacted if there is no indication to call.     1.  Reviewed notes from the last few office visits within the medical group: Yes    2.  If any orders were placed at last visit or intended to be done for this visit (i.e. 6 mos follow-up), do we have Results/Consult Notes?        •  Labs - Labs ordered, NOT completed. Patient advised to complete prior to next appointment.   Note: If patient appointment is for lab review and patient did not complete labs, check with provider if OK to reschedule patient until labs completed.       •  Imaging - Imaging ordered, completed and results are in chart.       •  Referrals - Referral ordered, patient has NOT been seen.    3. Is this appointment scheduled as a Hospital Follow-Up? No    4.  Immunizations were updated in Epic using WebIZ?: Epic matches WebIZ       •  Web Iz Recommendations: HEPATITIS A , HEPATITIS B, MMR  and SHINGRIX (Shingles)    5.  Patient is due for the following Health Maintenance Topics:   Health Maintenance Due   Topic Date Due   • Annual Wellness Visit  1950   • HEPATITIS C SCREENING  1950   • IMM HEP B VACCINE (1 of 3 - Risk 3-dose series) 06/19/1967   • IMM ZOSTER VACCINES (2 of 3) 05/27/2017   • RETINAL SCREENING  06/21/2019       6. Orders for overdue Health Maintenance topics pended in Pre-Charting? NO    7.  AHA (MDX) form printed for Provider? No, already completed    8.  Patient was NOT informed to arrive 15 min prior to their scheduled appointment and bring in their medication bottles.

## 2019-08-19 ENCOUNTER — HOSPITAL ENCOUNTER (OUTPATIENT)
Dept: LAB | Facility: MEDICAL CENTER | Age: 69
End: 2019-08-19
Attending: FAMILY MEDICINE
Payer: MEDICARE

## 2019-08-19 DIAGNOSIS — E78.5 DYSLIPIDEMIA: ICD-10-CM

## 2019-08-19 DIAGNOSIS — E11.9 DIABETES MELLITUS TYPE 2, NONINSULIN DEPENDENT (HCC): ICD-10-CM

## 2019-08-19 LAB
ALBUMIN SERPL BCP-MCNC: 4.6 G/DL (ref 3.2–4.9)
ALBUMIN/GLOB SERPL: 1.8 G/DL
ALP SERPL-CCNC: 35 U/L (ref 30–99)
ALT SERPL-CCNC: 25 U/L (ref 2–50)
ANION GAP SERPL CALC-SCNC: 11 MMOL/L (ref 0–11.9)
AST SERPL-CCNC: 20 U/L (ref 12–45)
BILIRUB SERPL-MCNC: 0.5 MG/DL (ref 0.1–1.5)
BUN SERPL-MCNC: 22 MG/DL (ref 8–22)
CALCIUM SERPL-MCNC: 9.6 MG/DL (ref 8.5–10.5)
CHLORIDE SERPL-SCNC: 103 MMOL/L (ref 96–112)
CHOLEST SERPL-MCNC: 165 MG/DL (ref 100–199)
CO2 SERPL-SCNC: 24 MMOL/L (ref 20–33)
CREAT SERPL-MCNC: 1.11 MG/DL (ref 0.5–1.4)
EST. AVERAGE GLUCOSE BLD GHB EST-MCNC: 146 MG/DL
FASTING STATUS PATIENT QL REPORTED: NORMAL
GLOBULIN SER CALC-MCNC: 2.5 G/DL (ref 1.9–3.5)
GLUCOSE SERPL-MCNC: 138 MG/DL (ref 65–99)
HBA1C MFR BLD: 6.7 % (ref 0–5.6)
HDLC SERPL-MCNC: 34 MG/DL
LDLC SERPL CALC-MCNC: 83 MG/DL
POTASSIUM SERPL-SCNC: 4.1 MMOL/L (ref 3.6–5.5)
PROT SERPL-MCNC: 7.1 G/DL (ref 6–8.2)
SODIUM SERPL-SCNC: 138 MMOL/L (ref 135–145)
TRIGL SERPL-MCNC: 241 MG/DL (ref 0–149)

## 2019-08-19 PROCEDURE — 36415 COLL VENOUS BLD VENIPUNCTURE: CPT

## 2019-08-19 PROCEDURE — 80061 LIPID PANEL: CPT

## 2019-08-19 PROCEDURE — 80053 COMPREHEN METABOLIC PANEL: CPT

## 2019-08-19 PROCEDURE — 83036 HEMOGLOBIN GLYCOSYLATED A1C: CPT

## 2019-08-20 ENCOUNTER — OFFICE VISIT (OUTPATIENT)
Dept: MEDICAL GROUP | Facility: MEDICAL CENTER | Age: 69
End: 2019-08-20
Payer: MEDICARE

## 2019-08-20 VITALS
HEART RATE: 71 BPM | OXYGEN SATURATION: 93 % | SYSTOLIC BLOOD PRESSURE: 132 MMHG | TEMPERATURE: 96.3 F | WEIGHT: 237.2 LBS | BODY MASS INDEX: 33.96 KG/M2 | DIASTOLIC BLOOD PRESSURE: 90 MMHG | HEIGHT: 70 IN

## 2019-08-20 DIAGNOSIS — E11.9 DIABETES MELLITUS TYPE 2, NONINSULIN DEPENDENT (HCC): ICD-10-CM

## 2019-08-20 DIAGNOSIS — Z72.89 OTHER PROBLEMS RELATED TO LIFESTYLE: ICD-10-CM

## 2019-08-20 DIAGNOSIS — R74.8 ELEVATED CPK: ICD-10-CM

## 2019-08-20 DIAGNOSIS — E03.9 HYPOTHYROIDISM, UNSPECIFIED TYPE: ICD-10-CM

## 2019-08-20 DIAGNOSIS — E78.5 DYSLIPIDEMIA: ICD-10-CM

## 2019-08-20 PROCEDURE — 99214 OFFICE O/P EST MOD 30 MIN: CPT | Performed by: FAMILY MEDICINE

## 2019-08-20 NOTE — ASSESSMENT & PLAN NOTE
Patient has a severe hyperglyceridemia.  He cannot tolerate statin therapy due to an elevated CPK.Is doing well on fenofibrate.

## 2019-08-20 NOTE — LETTER
[facility]  Mynor Teresa M.D.  [address]  [place] [address]  Fax: [phone]   Authorization for Release/Disclosure of   Protected Health Information   Name: LICO ALSTON : 1950 SSN: [id number]   Address: [address] [place] [address] [place] [address] Phone:    [phone] (home)    I authorize the entity listed below to release/disclose the PHI below to:   [facility]/Mynor Teresa M.D. and Mynor Teresa M.D.   Provider or Entity Name:     Address   City, State, Zip   Phone:      Fax:     Reason for request: continuity of care   Information to be released:    [  ] LAST COLONOSCOPY,  including any PATH REPORT and follow-up  [  ] LAST FIT/COLOGUARD RESULT [  ] LAST DEXA  [  ] LAST MAMMOGRAM  [  ] LAST PAP  [  ] LAST LABS [  ] RETINA EXAM REPORT  [  ] IMMUNIZATION RECORDS  [  ] Release all info      [  ] Check here and initial the line next to each item to release ALL health information INCLUDING  _____ Care and treatment for drug and / or alcohol abuse  _____ HIV testing, infection status, or AIDS  _____ Genetic Testing    DATES OF SERVICE OR TIME PERIOD TO BE DISCLOSED: _____________  I understand and acknowledge that:  * This Authorization may be revoked at any time by you in writing, except if your health information has already been used or disclosed.  * Your health information that will be used or disclosed as a result of you signing this authorization could be re-disclosed by the recipient. If this occurs, your re-disclosed health information may no longer be protected by State or Federal laws.  * You may refuse to sign this Authorization. Your refusal will not affect your ability to obtain treatment.  * This Authorization becomes effective upon signing and will  on (date) __________.      If no date is indicated, this Authorization will  one (1) year from the signature date.    Name: Lico Alston    Signature:   Date:     2019       PLEASE  FAX REQUESTED RECORDS BACK TO: [phone]

## 2019-08-20 NOTE — ASSESSMENT & PLAN NOTE
Patient has a mild and persistently elevated CPK.  Normal LDH and aldolase.  Patient denies myalgias.  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  Patient has declined subspecialty consultation.  I offered him a muscle biopsy in the past but he declines.

## 2019-08-21 DIAGNOSIS — E11.9 DIABETES MELLITUS TYPE 2, NONINSULIN DEPENDENT (HCC): ICD-10-CM

## 2019-08-21 NOTE — TELEPHONE ENCOUNTER
Insurance request 100 day supply.   Was the patient seen in the last year in this department? Yes    Does patient have an active prescription for medications requested? No     Received Request Via: Pharmacy     Future Appointments       Provider Department Center    12/20/2019 10:20 AM Mynor Teresa M.D. [facility]

## 2019-12-14 ENCOUNTER — HOSPITAL ENCOUNTER (OUTPATIENT)
Dept: LAB | Facility: MEDICAL CENTER | Age: 69
End: 2019-12-14
Attending: FAMILY MEDICINE
Payer: MEDICARE

## 2019-12-14 DIAGNOSIS — Z72.89 OTHER PROBLEMS RELATED TO LIFESTYLE: ICD-10-CM

## 2019-12-14 DIAGNOSIS — E11.9 DIABETES MELLITUS TYPE 2, NONINSULIN DEPENDENT (HCC): ICD-10-CM

## 2019-12-14 DIAGNOSIS — R74.8 ELEVATED CPK: ICD-10-CM

## 2019-12-14 DIAGNOSIS — E03.9 HYPOTHYROIDISM, UNSPECIFIED TYPE: ICD-10-CM

## 2019-12-14 LAB
CK SERPL-CCNC: 187 U/L (ref 0–154)
HCV AB SER QL: NEGATIVE
TSH SERPL DL<=0.005 MIU/L-ACNC: 4.63 UIU/ML (ref 0.38–5.33)

## 2019-12-14 PROCEDURE — 36415 COLL VENOUS BLD VENIPUNCTURE: CPT

## 2019-12-14 PROCEDURE — 86803 HEPATITIS C AB TEST: CPT

## 2019-12-14 PROCEDURE — 82550 ASSAY OF CK (CPK): CPT

## 2019-12-14 PROCEDURE — 84443 ASSAY THYROID STIM HORMONE: CPT

## 2019-12-14 PROCEDURE — 83036 HEMOGLOBIN GLYCOSYLATED A1C: CPT

## 2019-12-17 LAB
EST. AVERAGE GLUCOSE BLD GHB EST-MCNC: 171 MG/DL
HBA1C MFR BLD: 7.6 % (ref 0–5.6)

## 2019-12-20 ENCOUNTER — OFFICE VISIT (OUTPATIENT)
Dept: MEDICAL GROUP | Facility: MEDICAL CENTER | Age: 69
End: 2019-12-20
Payer: MEDICARE

## 2019-12-20 VITALS
DIASTOLIC BLOOD PRESSURE: 68 MMHG | WEIGHT: 230 LBS | HEART RATE: 67 BPM | HEIGHT: 70 IN | TEMPERATURE: 97.4 F | BODY MASS INDEX: 32.93 KG/M2 | RESPIRATION RATE: 18 BRPM | SYSTOLIC BLOOD PRESSURE: 128 MMHG | OXYGEN SATURATION: 96 %

## 2019-12-20 DIAGNOSIS — E11.9 DIABETES MELLITUS TYPE 2, NONINSULIN DEPENDENT (HCC): ICD-10-CM

## 2019-12-20 DIAGNOSIS — Z23 NEED FOR VACCINATION: Primary | ICD-10-CM

## 2019-12-20 DIAGNOSIS — E03.9 HYPOTHYROIDISM, UNSPECIFIED TYPE: ICD-10-CM

## 2019-12-20 DIAGNOSIS — Z00.00 HEALTHCARE MAINTENANCE: ICD-10-CM

## 2019-12-20 DIAGNOSIS — E78.5 DYSLIPIDEMIA: ICD-10-CM

## 2019-12-20 DIAGNOSIS — R74.8 ELEVATED CPK: ICD-10-CM

## 2019-12-20 PROCEDURE — G0439 PPPS, SUBSEQ VISIT: HCPCS | Performed by: FAMILY MEDICINE

## 2019-12-20 PROCEDURE — G0008 ADMIN INFLUENZA VIRUS VAC: HCPCS | Performed by: FAMILY MEDICINE

## 2019-12-20 PROCEDURE — 90662 IIV NO PRSV INCREASED AG IM: CPT | Performed by: FAMILY MEDICINE

## 2019-12-20 RX ORDER — MELATONIN
1000 DAILY
COMMUNITY
End: 2022-02-28

## 2019-12-20 RX ORDER — METFORMIN HYDROCHLORIDE 500 MG/1
1000 TABLET, EXTENDED RELEASE ORAL 2 TIMES DAILY
Qty: 360 TAB | Refills: 1 | Status: SHIPPED | OUTPATIENT
Start: 2019-12-20 | End: 2020-03-30 | Stop reason: SDUPTHER

## 2019-12-20 ASSESSMENT — PATIENT HEALTH QUESTIONNAIRE - PHQ9
CLINICAL INTERPRETATION OF PHQ2 SCORE: 0
SUM OF ALL RESPONSES TO PHQ QUESTIONS 1-9: 0

## 2019-12-20 ASSESSMENT — ACTIVITIES OF DAILY LIVING (ADL): BATHING_REQUIRES_ASSISTANCE: 0

## 2019-12-20 ASSESSMENT — ENCOUNTER SYMPTOMS: GENERAL WELL-BEING: GOOD

## 2019-12-20 NOTE — ASSESSMENT & PLAN NOTE
Patient has a mild and persistently elevated CPK.  Normal LDH and aldolase.  Patient denies myalgias.  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  Patient has declined subspecialty consultation.  I offered him a muscle biopsy in the past but he declines.  Repeat CPK looks normal.

## 2019-12-20 NOTE — ASSESSMENT & PLAN NOTE
Patient's dyslipidemia is under reasonable control on fibrate alone.  He is not on statins because of elevated CPK.

## 2019-12-20 NOTE — ASSESSMENT & PLAN NOTE
This is less well controlled on metformin alone.  The patient is also describing some loose stool.

## 2019-12-20 NOTE — ASSESSMENT & PLAN NOTE
Lipids: done 2019.   Fasting Glucose: has diabetes.   Hepatitis C Screen: done 2019 and normal.   Colonoscopy: records requested from [facility].  AAA US: visualized in 2012 on other US and normal.     Pneumonia vaccine: Prevnar given 2015, Pneumovax given 2017.   Tdap: given 2014.

## 2019-12-20 NOTE — PROGRESS NOTES
Chief Complaint   Patient presents with   • Annual Wellness Visit         HPI:  Lico Salinas is a 69 y.o. here for Medicare Annual Wellness Visit    Healthcare maintenance  Lipids: done 2019.   Fasting Glucose: has diabetes.   Hepatitis C Screen: done 2019 and normal.   Colonoscopy: records requested from [facility].  AAA US: visualized in 2012 on other US and normal.     Pneumonia vaccine: Prevnar given 2015, Pneumovax given 2017.   Tdap: given 2014.     Hypothyroidism  Patient's hypothyroidism is at goal on Synthroid.    Elevated CPK  Patient has a mild and persistently elevated CPK.  Normal LDH and aldolase.  Patient denies myalgias.  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  Patient has declined subspecialty consultation.  I offered him a muscle biopsy in the past but he declines.  Repeat CPK looks normal.    Dyslipidemia  Patient's dyslipidemia is under reasonable control on fibrate alone.  He is not on statins because of elevated CPK.    Diabetes mellitus type 2, noninsulin dependent (CMS-HCC)  This is less well controlled on metformin alone.  The patient is also describing some loose stool.        Patient Active Problem List    Diagnosis Date Noted   • Healthcare maintenance 12/20/2019   • Subcutaneous nodule 04/17/2019   • Right buttock pain 10/17/2018   • Neuropathy (HCC) 04/03/2018   • Right shoulder pain 04/03/2018   • Hypothyroidism 11/11/2016   • Diabetes mellitus type 2, noninsulin dependent (HCC) 11/11/2016   • Elevated CPK 11/11/2016   • Dyslipidemia 11/11/2016       Current Outpatient Medications   Medication Sig Dispense Refill   • vitamin D3, cholecalciferol, 1000 UNIT Tab Take 1,000 Units by mouth every day.     • metFORMIN ER (GLUCOPHAGE XR) 500 MG TABLET SR 24 HR Take 2 Tabs by mouth 2 times a day. 360 Tab 1   • fenofibrate (TRICOR) 145 MG Tab Take 1 Tab by mouth every day. 90 Tab 4   • gabapentin (NEURONTIN) 300 MG Cap Take 2 Caps by mouth every bedtime. 180 Cap 1   • losartan  (COZAAR) 50 MG Tab Take 1 Tab by mouth every day. 100 Tab 4   • timolol (TIMOPTIC) 0.5 % Solution      • levothyroxine (SYNTHROID) 75 MCG Tab Take 1 Tab by mouth Every morning on an empty stomach. 90 Tab 3   • latanoprost (XALATAN) 0.005 % Solution Place 1 Drop in both eyes every evening.     • ASCORBIC ACID PO Take 5 g by mouth every day.     • Magnesium 400 MG Tab Take 400 mg by mouth every day.     • Coenzyme Q10 200 MG Cap Take 200 mg by mouth every day.     • aspirin EC (ECOTRIN) 81 MG Tablet Delayed Response Take 81 mg by mouth every day.     • Omega 3-6-9 Fatty Acids (OMEGA 3-6-9 COMPLEX PO) Take 3,200 mg by mouth every day.     • Non Formulary Request Take 1 Tab by mouth every day. Prostate plus     • Multiple Vitamin (MULTI VITAMIN MENS PO) Take 2 Tabs by mouth every day.       No current facility-administered medications for this visit.         Patient is taking medications as noted in medication list.  Current supplements as per medication list.     Allergies: Pcn [penicillins]    Current social contact/activities: Pt. Enjoys spending time with family and friends, goes out to the movies, exercises and enjoys a little traveling     Is patient current with immunizations? No, due for FLU, HEPATITIS B and SHINGRIX (Shingles). Patient is interested in receiving FLU today.    He  reports that he quit smoking about 39 years ago. His smoking use included cigarettes. He has a 20.00 pack-year smoking history. He has never used smokeless tobacco. He reports current alcohol use of about 0.5 oz of alcohol per week. He reports that he does not use drugs.  Counseling given: Not Answered        DPA/Advanced directive: Patient does not have an Advanced Directive.  A packet and workshop information was given on Advanced Directives.    ROS:    Gait: Uses no assistive device   Ostomy: No  Other tubes: No   Amputations: No   Chronic oxygen use No   Last eye exam few weeks ago   Wears hearing aids: Yes   : Denies any urinary  leakage during the last 6 months      Screening:    DIABETES    Has patient ever had diabetes education? Yes, and patient is interested in more. Referral pended.      Depression Screening    Little interest or pleasure in doing things?  0 - not at all  Feeling down, depressed, or hopeless? 0 - not at all  Patient Health Questionnaire Score: 0    If depressive symptoms identified deferred to follow up visit unless specifically addressed in assessment and plan.    Interpretation of PHQ-9 Total Score   Score Severity   1-4 No Depression   5-9 Mild Depression   10-14 Moderate Depression   15-19 Moderately Severe Depression   20-27 Severe Depression    Screening for Cognitive Impairment    Three Minute Recall (village, kitchen, baby)  3/3    Rafal clock face with all 12 numbers and set the hands to show 10 past 10.  Yes 5/5  If cognitive concerns identified, deferred for follow up unless specifically addressed in assessment and plan.    Fall Risk Assessment    Has the patient had two or more falls in the last year or any fall with injury in the last year?  No  If fall risk identified, deferred for follow up unless specifically addressed in assessment and plan.    Safety Assessment    Throw rugs on floor.  Yes  Handrails on all stairs.  Yes  Good lighting in all hallways.  Yes  Difficulty hearing.  Yes  Patient counseled about all safety risks that were identified.    Functional Assessment ADLs    Are there any barriers preventing you from cooking for yourself or meeting nutritional needs?  No.    Are there any barriers preventing you from driving safely or obtaining transportation?  No.    Are there any barriers preventing you from using a telephone or calling for help?  No.    Are there any barriers preventing you from shopping?  No.    Are there any barriers preventing you from taking care of your own finances?  No.    Are there any barriers preventing you from managing your medications?  No.    Are there any barriers  preventing you from showering, bathing or dressing yourself?  No.    Are you currently engaging in any exercise or physical activity?  Yes.  Pt. Goes to the GYM, walks and works on the yard.   What is your perception of your health?  Good.    Health Maintenance Summary                Annual Wellness Visit Overdue 1950     IMM HEP B VACCINE Overdue 6/19/1967     IMM ZOSTER VACCINES Overdue 5/27/2017      Done 4/1/2017 Imm Admin: Zoster Vaccine Live (ZVL) (Zostavax)    RETINAL SCREENING Overdue 6/21/2019      Done 6/21/2018 REFERRAL FOR RETINAL SCREENING EXAM     Patient has more history with this topic...    IMM INFLUENZA Overdue 9/1/2019      Done 9/24/2018 Imm Admin: Influenza, Unspecified - Historical Data     Patient has more history with this topic...    URINE ACR / MICROALBUMIN Overdue 10/5/2019      Done 10/5/2018 MICROALBUMIN CREAT RATIO URINE     Patient has more history with this topic...    DIABETES MONOFILAMENT / LE EXAM Next Due 4/17/2020      Done 4/17/2019 AMB DIABETIC MONOFILAMENT LOWER EXTREMITY EXAM     Patient has more history with this topic...    A1C SCREENING Next Due 6/14/2020      Done 12/14/2019 HEMOGLOBIN A1C     Patient has more history with this topic...    FASTING LIPID PROFILE Next Due 8/19/2020      Done 8/19/2019 LIPID PROFILE     Patient has more history with this topic...    SERUM CREATININE Next Due 8/19/2020      Done 8/19/2019 COMP METABOLIC PANEL     Patient has more history with this topic...    COLONOSCOPY Next Due 2/24/2024      Done 2/24/2014     IMM DTaP/Tdap/Td Vaccine Next Due 10/31/2024      Done 10/31/2014 Imm Admin: Tdap Vaccine     Patient has more history with this topic...          Patient Care Team:  Mynor Teresa M.D. as PCP - General (Family Medicine)  Rinku Urias M.D. as Consulting Physician (Ophthalmology)    Social History     Tobacco Use   • Smoking status: Former Smoker     Packs/day: 1.00     Years: 20.00     Pack years: 20.00     Types:  "Cigarettes     Last attempt to quit: 1980     Years since quittin.9   • Smokeless tobacco: Never Used   Substance Use Topics   • Alcohol use: Yes     Alcohol/week: 0.5 oz     Types: 1 Cans of beer per week     Comment: 3-4 beers per week   • Drug use: No     Family History   Problem Relation Age of Onset   • Heart Disease Mother    • Lung Disease Father    • Heart Disease Father      He  has a past medical history of Diabetes (HCC) (07-10-15), Glaucoma, High cholesterol (07-10-15), Hypertension (07-10-15), Hypothyroidism, Skin cancer, and Snoring.   Past Surgical History:   Procedure Laterality Date   • CARPAL TUNNEL ENDOSCOPIC Left 2017    Procedure: CARPAL TUNNEL ENDOSCOPIC VERSUS;  Surgeon: Frank Alvarado M.D.;  Location: SURGERY [facility];  Service: Orthopedics   • LYMPH NODE EXCISION Left 2015    Procedure: LYMPH NODE EXCISION DEEP CERVICAL;  Surgeon: Michele Gan M.D.;  Location: SURGERY [facility] DAY [facility];  Service:    • LUMBAR DISC REPLACEMENT  2013           Exam:     /68 (BP Location: Left arm, Patient Position: Sitting, BP Cuff Size: Adult)   Pulse 67   Temp 36.3 °C (97.4 °F) (Temporal)   Resp 18   Ht 1.778 m (5' 10\")   Wt 104.3 kg (230 lb)   SpO2 96%  Body mass index is 33 kg/m².    Hearing fair.    Dentition fair  Alert, oriented in no acute distress.  Eye contact is good, speech goal directed, affect calm  Cardio: RRR no m/r/g.   Resp: CTAB no w/r/r.     Assessment and Plan. The following treatment and monitoring plan is recommended:      1. Need for vaccination  - INFLUENZA VACCINE, HIGH DOSE (65+ ONLY)    2. Hypothyroidism, unspecified type  - continue Synthroid.     3. Elevated CPK  - improving.     4. Diabetes mellitus type 2, noninsulin dependent (HCC)  Transition to metformin XR and increase dose to 1000 BID with diabetic education.   - metFORMIN ER (GLUCOPHAGE XR) 500 MG TABLET SR 24 HR; Take 2 Tabs by mouth 2 times a day.  Dispense: 360 Tab; Refill: " 1  - REFERRAL TO [facility] IMPROVEMENT PROGRAMS (HIP) Services Requested: Registered Dietitian for Medical Nutrition Therapy; Reason for Visit: Medical Condition Requiring Nutrition Counseling    5. Dyslipidemia  - continue fibrate.     6. Healthcare maintenance  - Subsequent Annual Wellness Visit - Includes PPPS ()      Services suggested: No services needed at this time  Health Care Screening recommendations as per orders if indicated.  Referrals offered: PT/OT/Nutrition counseling/Behavioral Health/Smoking cessation as per orders if indicated.    Discussion today about general wellness and lifestyle habits:    · Prevent falls and reduce trip hazards; Cautioned about securing or removing rugs.  · Have a working fire alarm and carbon monoxide detector;   · Engage in regular physical activity and social activities       Follow-up: Return in about 4 months (around 4/20/2020), or if symptoms worsen or fail to improve, for DM.

## 2020-01-13 ENCOUNTER — NON-PROVIDER VISIT (OUTPATIENT)
Dept: HEALTH INFORMATION MANAGEMENT | Facility: MEDICAL CENTER | Age: 70
End: 2020-01-13
Payer: MEDICARE

## 2020-01-13 DIAGNOSIS — E11.9 DIABETES MELLITUS TYPE 2, NONINSULIN DEPENDENT (HCC): ICD-10-CM

## 2020-01-13 PROCEDURE — 97802 MEDICAL NUTRITION INDIV IN: CPT | Performed by: DIETITIAN, REGISTERED

## 2020-01-14 NOTE — PROGRESS NOTES
1/13/2020    Mynor Teresa M.D.  69 y.o.   Time in/out: 10:17-11:18am    Anthropometrics/Objective  There were no vitals filed for this visit.    There is no height or weight on file to calculate BMI.      Estimated Caloric needs: 1805 Kcal (MSJ based on wt of 230 lbs recorded on 12/20/19)    See comprehensive patient history form for further information     Subjective:  · Pt referred for T2DM nutrition education - taking Metformin  · Retired, lives with wife, does his own cooking - states he enjoys cooking  · Reports losing ~30 lbs over last 1.5 years; feels comfortable at current weight  · Current PA: Goes to gym for 30-60 min, 3x/wk (strength training, treadmill, elliptical, stationary bike)  · Also does yard work as weather permits   · Already avoids sweetened beverages   · Reported feeling he already eats pretty well, but has not seen a dietitian before nor received previous education on nutrition/diabetes    Diet Recall:     B- 2 cups cereal + 2 cups milk + 1 banana OR 2 packets Maple oatmeal OR homemade protein shake (2 scoops soy protein, 3 handfuls kale, 1 cup berries, 2 cups milk)   S- nuts, corn chips  L- chicken sandwich OR burger + fries,   S- nuts, granola bar, small chocolate bar  D- whole potato or 1 cup rice, beef/chicken/fish/pork, vegetables or salad  S- sugar-free ice cream, piece of pie (seldom)   Bevs: water, milk, unsweetened tea, diet soda     *Pt doesn't always snack - listed above are some typical choices if snacking      Nutrition Diagnosis (PES Statement)    Altered nutrition-related lab values related to endocrine dysfunction as evidenced by A1C = 7.6 on 12/14/19.    Client history:  Condition(s) associated with a diagnosis or treatment (specify) hypothyroidism, dyslipidemia, T2DM, neuropathy, elevated CPK    Biochemical data, medical test and procedures  Lab Results   Component Value Date/Time    HBA1C 7.6 (H) 12/14/2019 09:03 AM   @  Lab Results   Component Value Date/Time     POCGLUCOSE 146 (H) 09/05/2017 09:47 AM     Lab Results   Component Value Date/Time    CHOLSTRLTOT 165 08/19/2019 08:37 AM    LDL 83 08/19/2019 08:37 AM    HDL 34 (A) 08/19/2019 08:37 AM    TRIGLYCERIDE 241 (H) 08/19/2019 08:37 AM         Nutrition Intervention  Nutrition Prescription  Carb grams: 45-60 g per meal (starting at lower end) per ADA recommendations     Meal and Snack  Recommend a general/healthful diet  Follow Plate Method: 1/2 plate non-starchy vegetables, 3-4 ounces lean protein, 1-2 heart healthy fats, and 1 cup of complex carbohydrates  Include lean protein with carbs at breakfast  Consistent carbs throughout the day      Comprehensive Nutrition education Instruction or training leading to in-depth nutrition related knowledge about:  Combine carb, protein and fat at each meal, Meal timing and spacing, Metabolism of carb, protein, fat, Physical activity/exercise, Portion control, Label Reading and Handouts provided regarding topics discussed    Handouts Provided: Nutrition Basics, My Plate Planner, Eat Right New Food Label, Carbohydrate Servings      Monitoring & Evaluation Plan    Behavioral-Environmental:  Behavior: Read food labels - servings per container, serving size, total carb to stay within recommended carb range   Physical activity: Continue current exercise regimen and increase frequency as tolerated.    Food / Nutrient Intake:  Food intake: Emphasis on complex vs. simple carb foods (45-60g per meal) - combine with lean protein and healthy fats, consistent carbs throughout day     Fluid/Beverage Intake: Continue to avoid sweetened beverages   Macronutrients intake: Follow plate method (see above)    Physical Signs / Symptoms:  HbA1c trends toward 7% or per ADA guidelines  Triglycerides trend WNL     Assessment Notes: Pt has already made beneficial changes on his own, such as avoiding sweetened beverages and limiting pie to 1-2x/month at most. However, he is still consuming considerable  amounts of carbs for breakfast most days with little protein. Discussed nutrition basics and effects of carbohydrates on blood sugar, differentiated between simple/complex carbs, food examples, and balancing portions with non-starchy veggies/lean protein/healthy fats at meals. Briefly discussed benefit of PA for diabetes - reinforced current exercise habits. Set goal to stay between 45-60g carbs per meal and emphasized aiming for the lower end of that range. Set goal to also include lean protein with breakfast. Pt will benefit from review of topics discussed today and additional nutrition education for diabetes management.               Follow-Up: 2 weeks

## 2020-01-27 ENCOUNTER — NON-PROVIDER VISIT (OUTPATIENT)
Dept: HEALTH INFORMATION MANAGEMENT | Facility: MEDICAL CENTER | Age: 70
End: 2020-01-27
Payer: MEDICARE

## 2020-01-27 DIAGNOSIS — E11.9 DIABETES MELLITUS TYPE 2, NONINSULIN DEPENDENT (HCC): ICD-10-CM

## 2020-01-27 PROCEDURE — 97803 MED NUTRITION INDIV SUBSEQ: CPT | Performed by: DIETITIAN, REGISTERED

## 2020-01-27 NOTE — PROGRESS NOTES
Nutrition Reassess    1/27/2020    Mynor Teresa M.D.   69 y.o.   Time: in/out: 10:05-10:38am      Subjective:  · Being mindful of his carb portions (decreased cereal from 2 cups milk, 2 cups cereal to 1 cup of each)   · Looking at carbs on food labels and aiming for range between 45-60 g carbs per meal  · Doesn't check blood sugars - said his doctor has never told him to check b/c his ranges have been fine?? A1C = 7.6 (12/14/19)  · States he's trying to add more protein with carb-rich breakfasts (ie. cereal/oatmeal)   · Will sometimes snack on peanuts or piece of fruit (apple, orange)  · Eat outs several times per week (usually fast food)  · Still maintaining prev. stated exercise regimen       Anthropometrics/Objective    There were no vitals filed for this visit.  There is no height or weight on file to calculate BMI.     Previous weight/date: 230 lbs (12/20/19)        BG Values: Not checking blood sugars  Medication changes: N/A    Diet Recall (1/26/20)   B: 1 cup cereal, 1 cup milk    S: none   L:  Burkinan higgins sandwich on whole grain bread, 12 potato chips    S: none   D:  7 chicken wings, small-medium size French fries ([employer])   S:  1 slice Pashto chocolate cake    ReAssesment/Notes:  Pt reports understanding topics discussed and that he is already doing most of healthy recommendations - although diet recall doesn't seem to reflect this. He has however improved his portions (decreased cereal/milk portions since last visit). Reviewed topics discussed at last visit (plate method) and tips for making healthier choices when eating out (ie portion food out/to-go box, limiting/avoiding fried foods). Again emphasized including 1/2 plate with nonstarchy veggies. Set goals to include protein (boiled egg, turkey higgins) with cereal/oatmeal breakfast and to include protein (ie. peanuts) when having fruit for snack.      Handouts provided:  Dining Out nutrition tips, Healthy Snack Ideas (lean  protein)    Follow-up: 2 weeks.

## 2020-02-10 ENCOUNTER — NON-PROVIDER VISIT (OUTPATIENT)
Dept: HEALTH INFORMATION MANAGEMENT | Facility: MEDICAL CENTER | Age: 70
End: 2020-02-10
Payer: MEDICARE

## 2020-02-10 VITALS — HEIGHT: 70 IN | WEIGHT: 220 LBS | BODY MASS INDEX: 31.5 KG/M2

## 2020-02-10 DIAGNOSIS — E11.9 DIABETES MELLITUS TYPE 2, NONINSULIN DEPENDENT (HCC): ICD-10-CM

## 2020-02-10 PROCEDURE — 97803 MED NUTRITION INDIV SUBSEQ: CPT | Performed by: DIETITIAN, REGISTERED

## 2020-02-10 NOTE — PROGRESS NOTES
"Nutrition Reassess    2/10/2020    Mynor Teresa M.D.   69 y.o.     Time: in/out: 10:00-10:35am       Subjective:  · Pt reports he weighed 220 lbs on Friday - previously fluctuated between 223-227 lbs  · Didn't try the tips for dining out discussed at last visit (has been mostly eating at home) - wants to keep in car to reference when going out to eat in the future  · Reading labels and aiming to stay in carb range (45-60g/meal)  · Lifts weights at gym for 30-60 min, ~3 days/wk  · Walks around [place] 30-60 min 2-3 days/wk  · Feels he's doing good and has implemented many healthy dietary changes  · [facility]; wants to get new A1C    Anthropometrics/Objective    Vitals:    02/10/20 1048   Weight: 99.8 kg (220 lb)   Height: 1.778 m (5' 10\")     Body mass index is 31.57 kg/m².              BG Values: Pt not currently checking blood sugars at home. See previous note.  A1C (12/14/19) = 7.6%    Diet Recall   B: Homemade protein shake (soy protein) + 1 slice toast   L: [employer]: 2 eggs, hashbrowns, ground beef   D: 2 chili dogs (homemade)   S: none    ReAssesment/Notes: Today we discussed the benefits of physical activity on blood sugar management - reinforced pt's current exercise habits and encouraged him to continue/increase as tolerated. Discussed continuing to include protein with carbs and aim for recommended carb range per meal. Pt asked about swapping cow's milk out for almond milk in protein shake. Recommended an unsweetened almond milk fortified with calcium and vitamin D if he chooses to swap - showed what to look for on food label. Per pt's dietary recalls, suspect high intake of saturated fats. Pt would likely benefit from discussion on different types of fats and how to choose healthier unsaturated fats given history of dyslipidemia. We can review this at next visit.    Handouts Provided:  \"Fit Facts\": Exercise and Type 2 Diabetes    Goals: "   1. Continue current exercise regimen.  2. Stay within 45-60g range for carbs at each meal (emphasis on lower end).  3. Continue including lean protein source with carbs at meals & snacks.    Follow-up: 2 weeks.

## 2020-02-24 ENCOUNTER — NON-PROVIDER VISIT (OUTPATIENT)
Dept: HEALTH INFORMATION MANAGEMENT | Facility: MEDICAL CENTER | Age: 70
End: 2020-02-24
Payer: MEDICARE

## 2020-02-24 DIAGNOSIS — E11.9 DIABETES MELLITUS TYPE 2, NONINSULIN DEPENDENT (HCC): ICD-10-CM

## 2020-02-24 PROCEDURE — 97803 MED NUTRITION INDIV SUBSEQ: CPT | Performed by: DIETITIAN, REGISTERED

## 2020-02-24 NOTE — PROGRESS NOTES
Nutrition Reassess    2/25/2020    Mynor Teresa M.D.   69 y.o.     Time in/out: 9:56-10:27am    Subjective:  · Pt reports still looking at food labels for total carbs (g) and trying to stay within 45-60g range per meal   · [facility] - reports value of 7.7% (+0.1%) in ~2 months based on 7.6% recorded in chart on 12/14/19  · Reports he is still taking metformin  · Pt confused that his A1C went up since making dietary changes   · Reports still lifting weights 3 days/wk and will walk around [employer]  · Worked in his yard this past weekend  · Went to [employer] - had Chicken/Seafood Pasta Carbonara and 2 breadsticks (~125g carbs) - says this is a big improvement from what he used to have     Anthropometrics/Objective    There were no vitals filed for this visit.  There is no height or weight on file to calculate BMI.     Diet Recall:  B:  2 pancakes w/ sugar-free syrup + 2 turkey sausages  L:  Chicken wings, french fries, Sprite Zero   D:  [place]: 2 slices multigrain bread, 1 banana, peanut butter, sandoval  *reports usually has salad (may add + protein + 1 cup carb (rice, mashed potato) for dinner     ReAssesment/Notes: Pt has made significant changes to diet since initial visit. Consistently including a protein at breakfast, not snacking on high-carb foods (prev. was having corn chips, granola bar), reports portioning carbs at meals, and reports having lost some weight. Although, suspect he is eating more carbs than reported as evidenced by [employer] meal (see above). Per chart, pt's A1C was 6.7% in 8/19/19 - suspect he may have been more active during summer months. Reviewed benefit of physical activity for blood sugar management and encouraged pt to include more brisk walking each day. Encouraged pt to aim closer to lower end of 45-60g range for carbs and using plate method for portioning carbs at restaurants (or looking up nutrition facts online). Advised pt include more  non-starchy veggies with meals. Next visit, recommend obtaining another diet recall and following up on exercise habits.     Goals:   1. Brisk walk 30 min 2-3 days per week   2. Continue previous exercise regimen in addition to new goal   3. Include 1/2 plate non-starchy veggies    Follow-up: 2 weeks.

## 2020-03-25 ENCOUNTER — TELEPHONE (OUTPATIENT)
Dept: MEDICAL GROUP | Facility: MEDICAL CENTER | Age: 70
End: 2020-03-25

## 2020-03-25 DIAGNOSIS — E03.9 HYPOTHYROIDISM, UNSPECIFIED TYPE: ICD-10-CM

## 2020-03-25 DIAGNOSIS — R74.8 ELEVATED CPK: ICD-10-CM

## 2020-03-25 DIAGNOSIS — E78.5 DYSLIPIDEMIA: ICD-10-CM

## 2020-03-25 DIAGNOSIS — E11.9 DIABETES MELLITUS TYPE 2, NONINSULIN DEPENDENT (HCC): ICD-10-CM

## 2020-03-25 NOTE — TELEPHONE ENCOUNTER
----- Message from Elvira Lopez sent at 3/20/2020  2:18 PM PDT -----  Regarding: LAB ORDER QUESTION  Hello!    Patient called today in regards to his appointment April 20th, 2020. He is wondering if he needs to get lab work done prior to this visit.     Thank you!  Elvira

## 2020-03-30 DIAGNOSIS — E11.9 DIABETES MELLITUS TYPE 2, NONINSULIN DEPENDENT (HCC): ICD-10-CM

## 2020-03-30 DIAGNOSIS — E78.5 DYSLIPIDEMIA: ICD-10-CM

## 2020-03-30 RX ORDER — FENOFIBRATE 145 MG/1
145 TABLET, COATED ORAL DAILY
Qty: 90 TAB | Refills: 4 | Status: SHIPPED | OUTPATIENT
Start: 2020-03-30 | End: 2021-04-02 | Stop reason: SDUPTHER

## 2020-03-30 RX ORDER — METFORMIN HYDROCHLORIDE 500 MG/1
1000 TABLET, EXTENDED RELEASE ORAL 2 TIMES DAILY
Qty: 360 TAB | Refills: 4 | Status: SHIPPED
Start: 2020-03-30 | End: 2020-04-20

## 2020-04-10 ENCOUNTER — HOSPITAL ENCOUNTER (OUTPATIENT)
Dept: LAB | Facility: MEDICAL CENTER | Age: 70
End: 2020-04-10
Attending: FAMILY MEDICINE
Payer: MEDICARE

## 2020-04-10 DIAGNOSIS — R74.8 ELEVATED CPK: ICD-10-CM

## 2020-04-10 DIAGNOSIS — E03.9 HYPOTHYROIDISM, UNSPECIFIED TYPE: ICD-10-CM

## 2020-04-10 DIAGNOSIS — E11.9 DIABETES MELLITUS TYPE 2, NONINSULIN DEPENDENT (HCC): ICD-10-CM

## 2020-04-10 DIAGNOSIS — E78.5 DYSLIPIDEMIA: ICD-10-CM

## 2020-04-10 LAB
ALBUMIN SERPL BCP-MCNC: 4.7 G/DL (ref 3.2–4.9)
ALBUMIN/GLOB SERPL: 1.7 G/DL
ALP SERPL-CCNC: 42 U/L (ref 30–99)
ALT SERPL-CCNC: 16 U/L (ref 2–50)
ANION GAP SERPL CALC-SCNC: 12 MMOL/L (ref 7–16)
AST SERPL-CCNC: 17 U/L (ref 12–45)
BILIRUB SERPL-MCNC: 0.4 MG/DL (ref 0.1–1.5)
BUN SERPL-MCNC: 23 MG/DL (ref 8–22)
CALCIUM SERPL-MCNC: 9.8 MG/DL (ref 8.5–10.5)
CHLORIDE SERPL-SCNC: 98 MMOL/L (ref 96–112)
CHOLEST SERPL-MCNC: 178 MG/DL (ref 100–199)
CK SERPL-CCNC: 188 U/L (ref 0–154)
CO2 SERPL-SCNC: 25 MMOL/L (ref 20–33)
CREAT SERPL-MCNC: 1.19 MG/DL (ref 0.5–1.4)
CREAT UR-MCNC: 88.91 MG/DL
EST. AVERAGE GLUCOSE BLD GHB EST-MCNC: 171 MG/DL
GLOBULIN SER CALC-MCNC: 2.8 G/DL (ref 1.9–3.5)
GLUCOSE SERPL-MCNC: 160 MG/DL (ref 65–99)
HBA1C MFR BLD: 7.6 % (ref 0–5.6)
HDLC SERPL-MCNC: 36 MG/DL
LDLC SERPL CALC-MCNC: 102 MG/DL
MICROALBUMIN UR-MCNC: <1.2 MG/DL
MICROALBUMIN/CREAT UR: NORMAL MG/G (ref 0–30)
POTASSIUM SERPL-SCNC: 4.6 MMOL/L (ref 3.6–5.5)
PROT SERPL-MCNC: 7.5 G/DL (ref 6–8.2)
SODIUM SERPL-SCNC: 135 MMOL/L (ref 135–145)
TRIGL SERPL-MCNC: 200 MG/DL (ref 0–149)
TSH SERPL DL<=0.005 MIU/L-ACNC: 5.16 UIU/ML (ref 0.38–5.33)

## 2020-04-10 PROCEDURE — 36415 COLL VENOUS BLD VENIPUNCTURE: CPT

## 2020-04-10 PROCEDURE — 82550 ASSAY OF CK (CPK): CPT

## 2020-04-10 PROCEDURE — 83036 HEMOGLOBIN GLYCOSYLATED A1C: CPT

## 2020-04-10 PROCEDURE — 80053 COMPREHEN METABOLIC PANEL: CPT

## 2020-04-10 PROCEDURE — 84443 ASSAY THYROID STIM HORMONE: CPT

## 2020-04-10 PROCEDURE — 82570 ASSAY OF URINE CREATININE: CPT

## 2020-04-10 PROCEDURE — 82043 UR ALBUMIN QUANTITATIVE: CPT

## 2020-04-10 PROCEDURE — 80061 LIPID PANEL: CPT

## 2020-04-20 ENCOUNTER — OFFICE VISIT (OUTPATIENT)
Dept: MEDICAL GROUP | Facility: MEDICAL CENTER | Age: 70
End: 2020-04-20
Payer: MEDICARE

## 2020-04-20 VITALS
RESPIRATION RATE: 18 BRPM | WEIGHT: 225.6 LBS | TEMPERATURE: 97 F | HEIGHT: 70 IN | OXYGEN SATURATION: 95 % | BODY MASS INDEX: 32.3 KG/M2 | SYSTOLIC BLOOD PRESSURE: 128 MMHG | DIASTOLIC BLOOD PRESSURE: 64 MMHG | HEART RATE: 63 BPM

## 2020-04-20 DIAGNOSIS — E11.9 DIABETES MELLITUS TYPE 2, NONINSULIN DEPENDENT (HCC): ICD-10-CM

## 2020-04-20 DIAGNOSIS — E03.9 HYPOTHYROIDISM, UNSPECIFIED TYPE: ICD-10-CM

## 2020-04-20 DIAGNOSIS — R74.8 ELEVATED CPK: ICD-10-CM

## 2020-04-20 DIAGNOSIS — E78.5 DYSLIPIDEMIA: ICD-10-CM

## 2020-04-20 PROCEDURE — 99214 OFFICE O/P EST MOD 30 MIN: CPT | Performed by: FAMILY MEDICINE

## 2020-04-20 RX ORDER — SITAGLIPTIN AND METFORMIN HYDROCHLORIDE 1000; 50 MG/1; MG/1
1 TABLET, FILM COATED ORAL 2 TIMES DAILY WITH MEALS
Qty: 180 TAB | Refills: 3 | Status: SHIPPED | OUTPATIENT
Start: 2020-04-20 | End: 2020-08-25

## 2020-04-20 ASSESSMENT — PATIENT HEALTH QUESTIONNAIRE - PHQ9: CLINICAL INTERPRETATION OF PHQ2 SCORE: 0

## 2020-04-20 NOTE — LETTER
Date: 20    From: [facility]  [address]  [address]  [facility] [facility] [address]  Phone:   [phone]  Fax:   [phone]  Recipient: Rinku Urias MD  Fax: [phone]    Patient: Lico Salinas  : 1950  Primary Care Provider: Mynor Teresa M.D.    Message:     Would you please fax patient's most recent retina eye exam for continuity of care.    Thank you,    Le DELA CRUZ  Phone: [phone]    Confidentiality / Privacy Note: If you are not the intended recipient of this document, this document should be returned to [facility] immediately. Please contact the sender at [facility] so that we can arrange for the return of this transmission to us at no cost to you. The document accompanying this transmission contains information from [facility], which is confidential, proprietary or copyrighted and is intended solely for the use of the individual or entity named on this transaction. If you are not the intended recipient, you are notified that disclosing, copying, distributing or taking any action in reliance on the contents of this information is strictly prohibited. This prohibition includes, without limitations, displaying this transmission or any portion thereof, on any public bulletin board.            [address] [place] [id number] - [phone] - [facility]

## 2020-04-20 NOTE — ASSESSMENT & PLAN NOTE
Patient has a very mild and persistently elevated CPK.  Normal LDH and aldolase.  Patient denies myalgias.  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

## 2020-04-20 NOTE — PROGRESS NOTES
[facility]  Progress Note  Established Patient    Subjective:   Lico Salinas is a 69 y.o. male here today with a chief complaint of diabetes. The patient is alone.     Diabetes mellitus type 2, noninsulin dependent (CMS-[facility])  Hypoglycemic therapy: Metformin XR 1000 BID  Last A1c: 4/10 was 7.6.   Aspirin:  Taking.   Statin:  Not taking 2/2 elevated CPK.   ACE:  No indication.   Urine Microalbumin:  Done 4/2020 and normal.   Monofilament Exam:  Done 04/20/20.   Retinal Screening: records requested.      Dyslipidemia  Patient's dyslipidemia is under reasonable control on fibrate alone.  He is not on statins because of elevated CPK.    Elevated CPK  Patient has a very mild and persistently elevated CPK.  Normal LDH and aldolase.  Patient denies myalgias.  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    Hypothyroidism  Patient's hypothyroidism is at goal on Synthroid.      Current Outpatient Medications on File Prior to Visit   Medication Sig Dispense Refill   • Glucos-Chondroit-Hyaluron-MSM (GLUCOSAMINE CHONDROITIN JOINT) Tab Take 2 Tabs by mouth every day.     • fenofibrate (TRICOR) 145 MG Tab Take 1 Tab by mouth every day. 90 Tab 4   • levothyroxine (SYNTHROID) 75 MCG Tab TAKE 1 TABLET BY MOUTH IN THE MORNING ON AN EMPTY STOMACH 90 Tab 3   • vitamin D3, cholecalciferol, 1000 UNIT Tab Take 1,000 Units by mouth every day.     • gabapentin (NEURONTIN) 300 MG Cap Take 2 Caps by mouth every bedtime. 180 Cap 1   • losartan (COZAAR) 50 MG Tab Take 1 Tab by mouth every day. 100 Tab 4   • timolol (TIMOPTIC) 0.5 % Solution      • latanoprost (XALATAN) 0.005 % Solution Place 1 Drop in both eyes every evening.     • ASCORBIC ACID PO Take 5 g by mouth every day.     • Magnesium 400 MG Tab Take 400 mg by mouth every day.     • Coenzyme Q10 200 MG Cap Take 200 mg by mouth every day.     • aspirin EC (ECOTRIN) 81 MG Tablet Delayed Response Take 81 mg by mouth every day.     •  "Omega 3-6-9 Fatty Acids (OMEGA 3-6-9 COMPLEX PO) Take 3,200 mg by mouth every day.     • Non Formulary Request Take 1 Tab by mouth every day. Prostate plus     • Multiple Vitamin (MULTI VITAMIN MENS PO) Take 2 Tabs by mouth every day.       No current facility-administered medications on file prior to visit.        Past Medical History:   Diagnosis Date   • Diabetes (HCC) 07-10-15   • Glaucoma     OU   • High cholesterol 07-10-15    hx of, took self off simvastin   • Hypertension 07-10-15    hx of, took himself off lisinopril and HTCZ   • Hypothyroidism    • Skin cancer    • Snoring        Allergies: Pcn [penicillins]    Surgical History:  has a past surgical history that includes lymph node excision (Left, 7/21/2015); carpal tunnel endoscopic (Left, 9/5/2017); and lumbar disc replacement (08/2013).    Family History: family history includes Heart Disease in his father and mother; Lung Disease in his father.    Social History:  reports that he quit smoking about 40 years ago. His smoking use included cigarettes. He has a 20.00 pack-year smoking history. He has never used smokeless tobacco. He reports current alcohol use of about 0.5 oz of alcohol per week. He reports that he does not use drugs.    ROS: no SOB or fever.        Objective:     Vitals:    04/20/20 0855   BP: 128/64   BP Location: Left arm   Patient Position: Sitting   BP Cuff Size: Adult   Pulse: 63   Resp: 18   Temp: 36.1 °C (97 °F)   TempSrc: Temporal   SpO2: 95%   Weight: 102.3 kg (225 lb 9.6 oz)   Height: 1.778 m (5' 10\")       Physical Exam:  General: alert in no apparent distress.     Monofilament testing with a 10 gram force: sensation intact: decreased bilaterally  Visual Inspection: Feet without maceration, ulcers, fissures.  Pedal pulses: intact bilaterally        Assessment and Plan:     1. Diabetes mellitus type 2, noninsulin dependent (HCC)  - stop metformin alone, start janumet.   - sitagliptan-metformin (JANUMET)  MG per tablet; " Take 1 Tab by mouth 2 times a day, with meals.  Dispense: 180 Tab; Refill: 3    2. Elevated CPK  - offered muscle biopsy and neuromuscular referral today, patient declines. CPK very mildly elevated so likelihood of patholgy is low.     3. Dyslipidemia  - continue fibrate.     4. Hypothyroidism, unspecified type  - continue Synthroid.         Followup: Return in about 4 months (around 8/20/2020), or if symptoms worsen or fail to improve.

## 2020-04-20 NOTE — ASSESSMENT & PLAN NOTE
Hypoglycemic therapy: Metformin XR 1000 BID  Last A1c: 4/10 was 7.6.   Aspirin:  Taking.   Statin:  Not taking 2/2 elevated CPK.   ACE:  No indication.   Urine Microalbumin:  Done 4/2020 and normal.   Monofilament Exam:  Done 04/20/20.   Retinal Screening: records requested.

## 2020-06-08 NOTE — OR NURSING
Called pt to get more information. Stating she thinks at first she pulled a muscle, however still in pain. She would like a predinose dose tonny as she has swelling in back. It is in her lower back goes down into buttock on left side. Got out of bed 11 days ago and it put her on her knees. States Dr Ames Failing would give her a predinose dose tonny every now and then for her asthma and thinks that will help. She is allergic to Ibuprofen.  Uses [facility] on [address] Patient allergies and NPO status verified, home medication reconciliation completed, belongings secured. Patient verbalizes understanding of pain scale, expected course of stay and plan of care. Surgical site verified with patient, IV access established sequentials placed on BLE.

## 2020-06-18 ENCOUNTER — PATIENT OUTREACH (OUTPATIENT)
Dept: HEALTH INFORMATION MANAGEMENT | Facility: OTHER | Age: 70
End: 2020-06-18

## 2020-06-18 SDOH — ECONOMIC STABILITY: FOOD INSECURITY: WITHIN THE PAST 12 MONTHS, THE FOOD YOU BOUGHT JUST DIDN'T LAST AND YOU DIDN'T HAVE MONEY TO GET MORE.: NEVER TRUE

## 2020-06-18 SDOH — ECONOMIC STABILITY: TRANSPORTATION INSECURITY
IN THE PAST 12 MONTHS, HAS THE LACK OF TRANSPORTATION KEPT YOU FROM MEDICAL APPOINTMENTS OR FROM GETTING MEDICATIONS?: NO

## 2020-06-18 SDOH — ECONOMIC STABILITY: TRANSPORTATION INSECURITY
IN THE PAST 12 MONTHS, HAS LACK OF TRANSPORTATION KEPT YOU FROM MEETINGS, WORK, OR FROM GETTING THINGS NEEDED FOR DAILY LIVING?: NO

## 2020-06-18 SDOH — ECONOMIC STABILITY: FOOD INSECURITY: WITHIN THE PAST 12 MONTHS, YOU WORRIED THAT YOUR FOOD WOULD RUN OUT BEFORE YOU GOT MONEY TO BUY MORE.: NEVER TRUE

## 2020-06-18 NOTE — PROGRESS NOTES
1. HealthConnect Verified: yes    2. Verify PCP: yes    3. Review and add  to Care Team: yes      4. Reviewed/Updated the following with patient:       •   Communication Preference Obtained? YES  • MyChart Activation: already active       •   E-Mail Address Obtained? YES       •   Appointment Day and Time Preferences? YES       •   Preferred Pharmacy? YES       •   Preferred Lab? YES    6. Care Gap Scheduling (Attempt to Schedule EACH Overdue Care Gap!)    No appt at this time

## 2020-06-19 ENCOUNTER — TELEPHONE (OUTPATIENT)
Dept: HEALTH INFORMATION MANAGEMENT | Facility: OTHER | Age: 70
End: 2020-06-19

## 2020-06-19 NOTE — TELEPHONE ENCOUNTER
1. Caller Name: Lico Salinas                          Call Back Number: There are no phone numbers on file.      How would the patient prefer to be contacted with a response: [facility] message    2. SPECIFIC Action To Be Taken:Lab Orders    3. Diagnosis/Clinical Reason for Request: Routine labs and would also like to gave Antibody testing for covid19.    4. Specialty & Provider Name/Lab/Imaging Location: [facility]    5. Is appointment scheduled for requested order/referral: yes - 8/25/2020

## 2020-06-22 DIAGNOSIS — E11.9 DIABETES MELLITUS TYPE 2, NONINSULIN DEPENDENT (HCC): ICD-10-CM

## 2020-06-22 DIAGNOSIS — Z87.898 HISTORY OF FEVER: ICD-10-CM

## 2020-06-22 NOTE — TELEPHONE ENCOUNTER
A1c and COVID Ab test ordered. Patient may get a few days before our visit. Please inform the patient that the COVID antibody test is not FDA approved and we are uncertain as to its accuracy. Additionally, we do not know how much it will cost and if it will be covered by insurance.

## 2020-08-13 ENCOUNTER — HOSPITAL ENCOUNTER (OUTPATIENT)
Dept: LAB | Facility: MEDICAL CENTER | Age: 70
End: 2020-08-13
Attending: FAMILY MEDICINE
Payer: MEDICARE

## 2020-08-13 DIAGNOSIS — E11.9 DIABETES MELLITUS TYPE 2, NONINSULIN DEPENDENT (HCC): ICD-10-CM

## 2020-08-13 DIAGNOSIS — Z87.898 HISTORY OF FEVER: ICD-10-CM

## 2020-08-13 LAB
EST. AVERAGE GLUCOSE BLD GHB EST-MCNC: 134 MG/DL
HBA1C MFR BLD: 6.3 % (ref 0–5.6)

## 2020-08-13 PROCEDURE — 83036 HEMOGLOBIN GLYCOSYLATED A1C: CPT

## 2020-08-13 PROCEDURE — 36415 COLL VENOUS BLD VENIPUNCTURE: CPT

## 2020-08-13 PROCEDURE — 86769 SARS-COV-2 COVID-19 ANTIBODY: CPT

## 2020-08-14 LAB — SARS-COV-2 AB SERPL QL IA: NORMAL

## 2020-08-21 ENCOUNTER — TELEPHONE (OUTPATIENT)
Dept: MEDICAL GROUP | Facility: PHYSICIAN GROUP | Age: 70
End: 2020-08-21

## 2020-08-21 NOTE — TELEPHONE ENCOUNTER
ESTABLISHED PATIENT PRE-VISIT PLANNING     Patient was NOT contacted to complete PVP.     Note: Patient will not be contacted if there is no indication to call.     1.  Reviewed notes from the last few office visits within the medical group: Yes    2.  If any orders were placed at last visit or intended to be done for this visit (i.e. 6 mos follow-up), do we have Results/Consult Notes?        •  Labs - Labs ordered, completed on 08/13/2020 and results are in chart.   Note: If patient appointment is for lab review and patient did not complete labs, check with provider if OK to reschedule patient until labs completed.       •  Imaging - Imaging was not ordered at last office visit.       •  Referrals - No referrals were ordered at last office visit.    3. Is this appointment scheduled as a Hospital Follow-Up? No    4.  Immunizations were updated in Epic using WebIZ?: Epic matches WebIZ       •  Web Iz Recommendations: HEPATITIS B and SHINGRIX (Shingles)    5.  Patient is due for the following Health Maintenance Topics:   Health Maintenance Due   Topic Date Due   • IMM HEP B VACCINE (1 of 3 - Risk 3-dose series) [birth year]19/1969   • IMM ZOSTER VACCINES (2 of 3) 05/27/2017   • RETINAL SCREENING  06/21/2019       - Patient is up-to-date on all Health Maintenance topics. No records have been requested at this time.    6. Orders for overdue Health Maintenance topics pended in Pre-Charting? N\A    7.  AHA (MDX) form printed for Provider? YES    8.  Patient was NOT informed to arrive 15 min prior to their scheduled appointment and bring in their medication bottles.

## 2020-08-25 ENCOUNTER — OFFICE VISIT (OUTPATIENT)
Dept: MEDICAL GROUP | Facility: MEDICAL CENTER | Age: 70
End: 2020-08-25
Payer: MEDICARE

## 2020-08-25 ENCOUNTER — HOSPITAL ENCOUNTER (OUTPATIENT)
Dept: RADIOLOGY | Facility: MEDICAL CENTER | Age: 70
End: 2020-08-25
Attending: FAMILY MEDICINE
Payer: MEDICARE

## 2020-08-25 VITALS
BODY MASS INDEX: 30.64 KG/M2 | HEIGHT: 70 IN | HEART RATE: 65 BPM | SYSTOLIC BLOOD PRESSURE: 136 MMHG | DIASTOLIC BLOOD PRESSURE: 70 MMHG | TEMPERATURE: 97.4 F | OXYGEN SATURATION: 95 % | WEIGHT: 214 LBS | RESPIRATION RATE: 16 BRPM

## 2020-08-25 DIAGNOSIS — G89.29 CHRONIC LEFT SHOULDER PAIN: ICD-10-CM

## 2020-08-25 DIAGNOSIS — M25.512 CHRONIC LEFT SHOULDER PAIN: ICD-10-CM

## 2020-08-25 DIAGNOSIS — R63.4 UNINTENDED WEIGHT LOSS: ICD-10-CM

## 2020-08-25 DIAGNOSIS — G62.9 NEUROPATHY: ICD-10-CM

## 2020-08-25 DIAGNOSIS — R74.8 ELEVATED CPK: ICD-10-CM

## 2020-08-25 DIAGNOSIS — R35.0 URINARY FREQUENCY: ICD-10-CM

## 2020-08-25 DIAGNOSIS — E11.9 DIABETES MELLITUS TYPE 2, NONINSULIN DEPENDENT (HCC): ICD-10-CM

## 2020-08-25 DIAGNOSIS — Z12.11 COLON CANCER SCREENING: ICD-10-CM

## 2020-08-25 LAB
APPEARANCE UR: CLEAR
BILIRUB UR STRIP-MCNC: NEGATIVE MG/DL
COLOR UR AUTO: YELLOW
GLUCOSE UR STRIP.AUTO-MCNC: NEGATIVE MG/DL
KETONES UR STRIP.AUTO-MCNC: NEGATIVE MG/DL
LEUKOCYTE ESTERASE UR QL STRIP.AUTO: NEGATIVE
NITRITE UR QL STRIP.AUTO: NEGATIVE
PH UR STRIP.AUTO: 6.5 [PH] (ref 5–8)
PROT UR QL STRIP: NEGATIVE MG/DL
RBC UR QL AUTO: NEGATIVE
SP GR UR STRIP.AUTO: 1.02
UROBILINOGEN UR STRIP-MCNC: NORMAL MG/DL

## 2020-08-25 PROCEDURE — 73030 X-RAY EXAM OF SHOULDER: CPT | Mod: LT

## 2020-08-25 PROCEDURE — 99214 OFFICE O/P EST MOD 30 MIN: CPT | Mod: 25 | Performed by: FAMILY MEDICINE

## 2020-08-25 PROCEDURE — 81002 URINALYSIS NONAUTO W/O SCOPE: CPT | Performed by: FAMILY MEDICINE

## 2020-08-25 PROCEDURE — 8041 PR SCP AHA: Performed by: FAMILY MEDICINE

## 2020-08-25 PROCEDURE — 71046 X-RAY EXAM CHEST 2 VIEWS: CPT

## 2020-08-25 RX ORDER — MELOXICAM 7.5 MG/1
7.5 TABLET ORAL DAILY
Qty: 5 TAB | Refills: 0 | Status: SHIPPED | OUTPATIENT
Start: 2020-08-25 | End: 2020-08-30

## 2020-08-25 RX ORDER — TAMSULOSIN HYDROCHLORIDE 0.4 MG/1
0.4 CAPSULE ORAL
Qty: 90 CAP | Refills: 0 | Status: SHIPPED | OUTPATIENT
Start: 2020-08-25 | End: 2020-10-21

## 2020-08-25 NOTE — ASSESSMENT & PLAN NOTE
"For the past 3 or 4 months the patient states that he has been experiencing a constant \"achy\" posterior shoulder pain on the left that rates 3 or 4 out of 10 in severity.  This occurred after a fall and is worse when he lies on it.  Ibuprofen does provide some relief.  "

## 2020-08-25 NOTE — PROGRESS NOTES
"Subjective:     Lico Salinas is a 70 y.o. male here today for shoulder pain, weight loss and Annual Health Assessment.    Chronic left shoulder pain  For the past 3 or 4 months the patient states that he has been experiencing a constant \"achy\" posterior shoulder pain on the left that rates 3 or 4 out of 10 in severity.  This occurred after a fall and is worse when he lies on it.  Ibuprofen does provide some relief.    Unintended weight loss  Patient describes 4 months of unintended weight loss associated with muscle loss.  He denies fevers, chills and night sweats.  On chart review, in April 2019 he weighed 238 pounds, in August 2019 he weighed 237 pounds, in December 2019 he weighed 230 pounds, in April 2020 he weighed 225 pounds and today he weighs 214 pounds.  Earlier on in the year he was trying to lose weight but over the past 4 months, he has not really been trying.    Urinary frequency  Patient describes some recent nocturia along with urinary urgency.  Plan of care urinalysis today is reassuring.    Diabetes mellitus type 2, noninsulin dependent (CMS-HCC)  Patient's A1c is at goal.  He is on Janumet.  He is also maintained on losartan.  He is not on a statin because of an elevated CPK.    Elevated CPK  Patient has an elevated CPK which is very mild with normal aldolase and LDH.  He and his wife are describing some generalized muscle mass loss.    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       Health Maintenance Summary                IMM HEP B VACCINE Overdue 6/19/1969     RETINAL SCREENING Overdue 6/21/2019      Done 6/21/2018 REFERRAL FOR RETINAL SCREENING EXAM     Patient has more history with this topic...    IMM ZOSTER VACCINES Postponed 1/21/2021 Originally 5/27/2017. System: vaccine not available, other system reasons     Done 4/1/2017 Imm Admin: Zoster Vaccine Live (ZVL) (Zostavax) - HISTORICAL DATA    IMM INFLUENZA Next Due 9/1/2020      Done " 12/20/2019 Imm Admin: Influenza Vaccine Adult HD     Patient has more history with this topic...    Annual Wellness Visit Next Due 12/20/2020      Done 12/20/2019 SUBSEQUENT ANNUAL WELLNESS VISIT-INCLUDES PPPS ()    A1C SCREENING Next Due 2/13/2021      Done 8/13/2020 HEMOGLOBIN A1C     Patient has more history with this topic...    FASTING LIPID PROFILE Next Due 4/10/2021      Done 4/10/2020 LIPID PROFILE     Patient has more history with this topic...    URINE ACR / MICROALBUMIN Next Due 4/10/2021      Done 4/10/2020 MICROALBUMIN CREAT RATIO URINE     Patient has more history with this topic...    SERUM CREATININE Next Due 4/10/2021      Done 4/10/2020 COMP METABOLIC PANEL     Patient has more history with this topic...    DIABETES MONOFILAMENT / LE EXAM Next Due 4/20/2021      Done 4/20/2020 [employer]: WORKFLOW - DIABETES - DIABETIC FOOT EXAM PERFORMED     Patient has more history with this topic...    COLONOSCOPY Next Due 2/24/2024      Done 2/24/2014      Patient has more history with this topic...    IMM DTaP/Tdap/Td Vaccine Next Due 10/31/2024      Done 10/31/2014 Imm Admin: Tdap Vaccine     Patient has more history with this topic...           Annual Health Assessment Questions:     1.  Are you currently engaging in any exercise or physical activity? Yes    2.  How would you describe your mood or emotional well-being today? fair    3.  Have you had any falls in the last year? No    4.  Have you noticed any problems with your balance or had difficulty walking? No    5.  In the last six months have you experienced any leakage of urine? No    6. DPA/Advanced Directive: Patient does not have an Advanced Directive.  A packet and workshop information was given on Advanced Directives.    Current medicines (including changes today)  Current Outpatient Medications   Medication Sig Dispense Refill   • meloxicam (MOBIC) 7.5 MG Tab Take 1 Tab by mouth every day for 5 days. Take with food. 5 Tab 0   • metformin  (GLUCOPHAGE) 1000 MG tablet Take 1 Tab by mouth 2 times a day, with meals. 200 Tab 4   • tamsulosin (FLOMAX) 0.4 MG capsule Take 1 Cap by mouth ONE-HALF HOUR AFTER BREAKFAST. 90 Cap 0   • losartan (COZAAR) 50 MG Tab Take 1 tablet by mouth once daily 100 Tab 4   • Glucos-Chondroit-Hyaluron-MSM (GLUCOSAMINE CHONDROITIN JOINT) Tab Take 2 Tabs by mouth every day.     • fenofibrate (TRICOR) 145 MG Tab Take 1 Tab by mouth every day. 90 Tab 4   • levothyroxine (SYNTHROID) 75 MCG Tab TAKE 1 TABLET BY MOUTH IN THE MORNING ON AN EMPTY STOMACH 90 Tab 3   • vitamin D3, cholecalciferol, 1000 UNIT Tab Take 1,000 Units by mouth every day.     • gabapentin (NEURONTIN) 300 MG Cap Take 2 Caps by mouth every bedtime. 180 Cap 1   • timolol (TIMOPTIC) 0.5 % Solution      • latanoprost (XALATAN) 0.005 % Solution Place 1 Drop in both eyes every evening.     • ASCORBIC ACID PO Take 5 g by mouth every day.     • Magnesium 400 MG Tab Take 400 mg by mouth every day.     • Coenzyme Q10 200 MG Cap Take 200 mg by mouth every day.     • aspirin EC (ECOTRIN) 81 MG Tablet Delayed Response Take 81 mg by mouth every day.     • Omega 3-6-9 Fatty Acids (OMEGA 3-6-9 COMPLEX PO) Take 3,200 mg by mouth every day.     • Non Formulary Request Take 1 Tab by mouth every day. Prostate plus     • Multiple Vitamin (MULTI VITAMIN MENS PO) Take 2 Tabs by mouth every day.       No current facility-administered medications for this visit.        He  has a past medical history of Diabetes (HCC) (07-10-15), Glaucoma, High cholesterol (07-10-15), Hypertension (07-10-15), Hypothyroidism, Skin cancer, and Snoring.    Pcn [penicillins]    He  reports that he quit smoking about 40 years ago. His smoking use included cigarettes. He has a 20.00 pack-year smoking history. He has never used smokeless tobacco. He reports current alcohol use of about 0.5 oz of alcohol per week. He reports that he does not use drugs.  Counseling given: Not Answered      ROS no fever or cough.      "Objective:     Physical Exam:  /70 (BP Location: Left arm, Patient Position: Sitting, BP Cuff Size: Large adult)   Pulse 65   Temp 36.3 °C (97.4 °F) (Temporal)   Resp 16   Ht 1.778 m (5' 10\")   Wt 97.1 kg (214 lb)   SpO2 95%  Body mass index is 30.71 kg/m².   : bilobed, slightly enlarged prostate with possible small nodule at 7 o'clock position.   MSK: Patient demonstrates pain and weakness on left shoulder internal rotation.  Otherwise, left shoulder external rotation and empty can testing is normal.    Assessment and Plan:     1. Diabetes mellitus type 2, noninsulin dependent (HCC)  - stop Janumet, start metformin.   - POCT Urinalysis  - metformin (GLUCOPHAGE) 1000 MG tablet; Take 1 Tab by mouth 2 times a day, with meals.  Dispense: 200 Tab; Refill: 4    2. Urinary frequency  Suspect BPH. Possible nodule so may require urology consult but will get PSA first and see back in 2 weeks.   - POCT Urinalysis  - PROSTATE SPECIFIC AG DIAGNOSTIC; Future  - tamsulosin (FLOMAX) 0.4 MG capsule; Take 1 Cap by mouth ONE-HALF HOUR AFTER BREAKFAST.  Dispense: 90 Cap; Refill: 0    3. Chronic left shoulder pain  Suspect rotator cuff tendinitis.  We will try 5 days of Mobic and provided the AAOS shoulder handout to try at home.  I will see him back in 2 weeks for reassessment and he will get an x-ray meantime.  - DX-SHOULDER 2+ LEFT; Future  - meloxicam (MOBIC) 7.5 MG Tab; Take 1 Tab by mouth every day for 5 days. Take with food.  Dispense: 5 Tab; Refill: 0    4. Unintended weight loss  No other B symptoms and this weight loss is mild.  I will refer him for colonoscopy as he is due and also obtain a PSA considering his symptoms.  We will also obtain the following labs and I will reassess him in 2 weeks.  - CBC WITH DIFFERENTIAL; Future  - Comp Metabolic Panel; Future  - TSH; Future  - Sed Rate; Future  - CRP QUANTITIVE (NON-CARDIAC); Future  - DX-CHEST-2 VIEWS; Future    5. Elevated CPK  - CREATINE KINASE; Future  - " REFERRAL TO NEUROLOGY    6. Neuropathy  - REFERRAL TO NEUROLOGY    7. Colon cancer screening  - REFERRAL TO GASTROENTEROLOGY    Discussion today about general wellness and lifestyle habits:    · Engage in regular physical activity and social activities.  · Prevent falls and reduce trip hazards; using ambulatory aides, hearing and vision testing if appropriate.  · Steps to improve urinary incontinence.  · Advanced care planning.    Follow-Up: Return in about 2 weeks (around 9/8/2020), or if symptoms worsen or fail to improve.         PLEASE NOTE: This dictation was created using voice recognition software. I have made every reasonable attempt to correct obvious errors, but I expect that there are errors of grammar and possibly content that I did not discover before finalizing the note.

## 2020-08-25 NOTE — ASSESSMENT & PLAN NOTE
Patient's A1c is at goal.  He is on Janumet.  He is also maintained on losartan.  He is not on a statin because of an elevated CPK.

## 2020-08-25 NOTE — ASSESSMENT & PLAN NOTE
Patient has an elevated CPK which is very mild with normal aldolase and LDH.  He and his wife are describing some generalized muscle mass loss.

## 2020-08-25 NOTE — ASSESSMENT & PLAN NOTE
Patient describes 4 months of unintended weight loss associated with muscle loss.  He denies fevers, chills and night sweats.  On chart review, in April 2019 he weighed 238 pounds, in August 2019 he weighed 237 pounds, in December 2019 he weighed 230 pounds, in April 2020 he weighed 225 pounds and today he weighs 214 pounds.  Earlier on in the year he was trying to lose weight but over the past 4 months, he has not really been trying.

## 2020-08-25 NOTE — ASSESSMENT & PLAN NOTE
Patient describes some recent nocturia along with urinary urgency.  Plan of care urinalysis today is reassuring.

## 2020-08-28 ENCOUNTER — HOSPITAL ENCOUNTER (OUTPATIENT)
Dept: LAB | Facility: MEDICAL CENTER | Age: 70
End: 2020-08-28
Attending: FAMILY MEDICINE
Payer: MEDICARE

## 2020-08-28 DIAGNOSIS — R63.4 UNINTENDED WEIGHT LOSS: ICD-10-CM

## 2020-08-28 DIAGNOSIS — R74.8 ELEVATED CPK: ICD-10-CM

## 2020-08-28 DIAGNOSIS — R35.0 URINARY FREQUENCY: ICD-10-CM

## 2020-08-28 LAB
BASOPHILS # BLD AUTO: 0.6 % (ref 0–1.8)
BASOPHILS # BLD: 0.07 K/UL (ref 0–0.12)
EOSINOPHIL # BLD AUTO: 0.15 K/UL (ref 0–0.51)
EOSINOPHIL NFR BLD: 1.3 % (ref 0–6.9)
ERYTHROCYTE [DISTWIDTH] IN BLOOD BY AUTOMATED COUNT: 44.9 FL (ref 35.9–50)
ERYTHROCYTE [SEDIMENTATION RATE] IN BLOOD BY WESTERGREN METHOD: 10 MM/HOUR (ref 0–20)
HCT VFR BLD AUTO: 42.5 % (ref 42–52)
HGB BLD-MCNC: 14 G/DL (ref 14–18)
IMM GRANULOCYTES # BLD AUTO: 0.04 K/UL (ref 0–0.11)
IMM GRANULOCYTES NFR BLD AUTO: 0.3 % (ref 0–0.9)
LYMPHOCYTES # BLD AUTO: 4.98 K/UL (ref 1–4.8)
LYMPHOCYTES NFR BLD: 41.5 % (ref 22–41)
MCH RBC QN AUTO: 29.6 PG (ref 27–33)
MCHC RBC AUTO-ENTMCNC: 32.9 G/DL (ref 33.7–35.3)
MCV RBC AUTO: 89.9 FL (ref 81.4–97.8)
MONOCYTES # BLD AUTO: 0.54 K/UL (ref 0–0.85)
MONOCYTES NFR BLD AUTO: 4.5 % (ref 0–13.4)
NEUTROPHILS # BLD AUTO: 6.21 K/UL (ref 1.82–7.42)
NEUTROPHILS NFR BLD: 51.8 % (ref 44–72)
NRBC # BLD AUTO: 0 K/UL
NRBC BLD-RTO: 0 /100 WBC
PLATELET # BLD AUTO: 237 K/UL (ref 164–446)
PMV BLD AUTO: 10.9 FL (ref 9–12.9)
PSA SERPL-MCNC: 4.56 NG/ML (ref 0–4)
RBC # BLD AUTO: 4.73 M/UL (ref 4.7–6.1)
TSH SERPL DL<=0.005 MIU/L-ACNC: 2.59 UIU/ML (ref 0.38–5.33)
WBC # BLD AUTO: 12 K/UL (ref 4.8–10.8)

## 2020-08-28 PROCEDURE — 86140 C-REACTIVE PROTEIN: CPT

## 2020-08-28 PROCEDURE — 82550 ASSAY OF CK (CPK): CPT

## 2020-08-28 PROCEDURE — 85652 RBC SED RATE AUTOMATED: CPT

## 2020-08-28 PROCEDURE — 36415 COLL VENOUS BLD VENIPUNCTURE: CPT

## 2020-08-28 PROCEDURE — 84153 ASSAY OF PSA TOTAL: CPT

## 2020-08-28 PROCEDURE — 85025 COMPLETE CBC W/AUTO DIFF WBC: CPT

## 2020-08-28 PROCEDURE — 84443 ASSAY THYROID STIM HORMONE: CPT

## 2020-08-28 PROCEDURE — 80053 COMPREHEN METABOLIC PANEL: CPT

## 2020-08-29 LAB
ALBUMIN SERPL BCP-MCNC: 4.4 G/DL (ref 3.2–4.9)
ALBUMIN/GLOB SERPL: 1.7 G/DL
ALP SERPL-CCNC: 33 U/L (ref 30–99)
ALT SERPL-CCNC: 13 U/L (ref 2–50)
ANION GAP SERPL CALC-SCNC: 13 MMOL/L (ref 7–16)
AST SERPL-CCNC: 14 U/L (ref 12–45)
BILIRUB SERPL-MCNC: 0.4 MG/DL (ref 0.1–1.5)
BUN SERPL-MCNC: 17 MG/DL (ref 8–22)
CALCIUM SERPL-MCNC: 9.6 MG/DL (ref 8.5–10.5)
CHLORIDE SERPL-SCNC: 102 MMOL/L (ref 96–112)
CK SERPL-CCNC: 214 U/L (ref 0–154)
CO2 SERPL-SCNC: 21 MMOL/L (ref 20–33)
CREAT SERPL-MCNC: 0.98 MG/DL (ref 0.5–1.4)
CRP SERPL HS-MCNC: 0.37 MG/DL (ref 0–0.75)
GLOBULIN SER CALC-MCNC: 2.6 G/DL (ref 1.9–3.5)
GLUCOSE SERPL-MCNC: 129 MG/DL (ref 65–99)
POTASSIUM SERPL-SCNC: 4.2 MMOL/L (ref 3.6–5.5)
PROT SERPL-MCNC: 7 G/DL (ref 6–8.2)
SODIUM SERPL-SCNC: 136 MMOL/L (ref 135–145)

## 2020-09-09 ENCOUNTER — OFFICE VISIT (OUTPATIENT)
Dept: MEDICAL GROUP | Facility: MEDICAL CENTER | Age: 70
End: 2020-09-09
Payer: MEDICARE

## 2020-09-09 VITALS
TEMPERATURE: 97.5 F | HEART RATE: 58 BPM | RESPIRATION RATE: 16 BRPM | BODY MASS INDEX: 30.43 KG/M2 | SYSTOLIC BLOOD PRESSURE: 128 MMHG | HEIGHT: 70 IN | WEIGHT: 212.6 LBS | OXYGEN SATURATION: 97 % | DIASTOLIC BLOOD PRESSURE: 70 MMHG

## 2020-09-09 DIAGNOSIS — R63.4 UNINTENDED WEIGHT LOSS: ICD-10-CM

## 2020-09-09 DIAGNOSIS — R74.8 ELEVATED CPK: ICD-10-CM

## 2020-09-09 DIAGNOSIS — G89.29 CHRONIC LEFT SHOULDER PAIN: ICD-10-CM

## 2020-09-09 DIAGNOSIS — M25.512 CHRONIC LEFT SHOULDER PAIN: ICD-10-CM

## 2020-09-09 DIAGNOSIS — E11.9 DIABETES MELLITUS TYPE 2, NONINSULIN DEPENDENT (HCC): ICD-10-CM

## 2020-09-09 DIAGNOSIS — D72.829 LEUKOCYTOSIS, UNSPECIFIED TYPE: ICD-10-CM

## 2020-09-09 DIAGNOSIS — E03.9 HYPOTHYROIDISM, UNSPECIFIED TYPE: ICD-10-CM

## 2020-09-09 DIAGNOSIS — R35.0 URINARY FREQUENCY: ICD-10-CM

## 2020-09-09 DIAGNOSIS — R97.20 ELEVATED PSA: ICD-10-CM

## 2020-09-09 DIAGNOSIS — G62.9 NEUROPATHY: ICD-10-CM

## 2020-09-09 PROCEDURE — 20610 DRAIN/INJ JOINT/BURSA W/O US: CPT | Mod: LT | Performed by: FAMILY MEDICINE

## 2020-09-09 PROCEDURE — 99214 OFFICE O/P EST MOD 30 MIN: CPT | Mod: 25 | Performed by: FAMILY MEDICINE

## 2020-09-09 RX ORDER — TRIAMCINOLONE ACETONIDE 40 MG/ML
40 INJECTION, SUSPENSION INTRA-ARTICULAR; INTRAMUSCULAR ONCE
Status: COMPLETED | OUTPATIENT
Start: 2020-09-09 | End: 2020-09-09

## 2020-09-09 RX ADMIN — Medication 4 ML: at 10:14

## 2020-09-09 RX ADMIN — TRIAMCINOLONE ACETONIDE 40 MG: 40 INJECTION, SUSPENSION INTRA-ARTICULAR; INTRAMUSCULAR at 10:12

## 2020-09-09 ASSESSMENT — FIBROSIS 4 INDEX: FIB4 SCORE: 1.18

## 2020-09-09 NOTE — ASSESSMENT & PLAN NOTE
The patient has lost 2 pounds since our visit several weeks ago but he states that he weighs himself every day and he is consistently between 205 and 210 pounds.  He believes that he has stopped the weight loss.  He denies any symptoms at our last visit.  His PSA was slightly elevated and there was a possible prostate nodule on examination.

## 2020-09-09 NOTE — ASSESSMENT & PLAN NOTE
Patient continues to complain of left shoulder pain nonresponsive to home exercises and Mobic.  X-rays show some degenerative change.

## 2020-09-09 NOTE — ASSESSMENT & PLAN NOTE
This persists because the patient could not tolerate the Flomax, which was helping with urinary frequency.  He states that the Flomax caused him to develop insomnia and he also could not climax during sex, which he attributes to this medicine.

## 2020-09-09 NOTE — PROGRESS NOTES
[facility]  Progress Note  Established Patient    Subjective:   Lico Salinas is a 70 y.o. male here today with a chief complaint of shoulder pain. The patient is alone.     Chronic left shoulder pain  Patient continues to complain of left shoulder pain nonresponsive to home exercises and Mobic.  X-rays show some degenerative change.    Diabetes mellitus type 2, noninsulin dependent (CMS-HCC)  Patient's type 2 diabetes is well controlled.  I recently reduced him to metformin alone.    Elevated CPK  Patient is scheduled to see the neurologist for this issue.    Hypothyroidism  Patient's hypothyroidism is at goal on Synthroid.    Neuropathy (HCC)  Patient is scheduled to see the neurologist.    Unintended weight loss  The patient has lost 2 pounds since our visit several weeks ago but he states that he weighs himself every day and he is consistently between 205 and 210 pounds.  He believes that he has stopped the weight loss.  He denies any symptoms at our last visit.  His PSA was slightly elevated and there was a possible prostate nodule on examination.    Urinary frequency  This persists because the patient could not tolerate the Flomax, which was helping with urinary frequency.  He states that the Flomax caused him to develop insomnia and he also could not climax during sex, which he attributes to this medicine.      Current Outpatient Medications on File Prior to Visit   Medication Sig Dispense Refill   • metformin (GLUCOPHAGE) 1000 MG tablet Take 1 Tab by mouth 2 times a day, with meals. 200 Tab 4   • tamsulosin (FLOMAX) 0.4 MG capsule Take 1 Cap by mouth ONE-HALF HOUR AFTER BREAKFAST. 90 Cap 0   • losartan (COZAAR) 50 MG Tab Take 1 tablet by mouth once daily 100 Tab 4   • Glucos-Chondroit-Hyaluron-MSM (GLUCOSAMINE CHONDROITIN JOINT) Tab Take 2 Tabs by mouth every day.     • fenofibrate (TRICOR) 145 MG Tab Take 1 Tab by mouth every day. 90 Tab 4   • levothyroxine (SYNTHROID) 75 MCG Tab TAKE 1  TABLET BY MOUTH IN THE MORNING ON AN EMPTY STOMACH 90 Tab 3   • vitamin D3, cholecalciferol, 1000 UNIT Tab Take 1,000 Units by mouth every day.     • gabapentin (NEURONTIN) 300 MG Cap Take 2 Caps by mouth every bedtime. 180 Cap 1   • timolol (TIMOPTIC) 0.5 % Solution      • latanoprost (XALATAN) 0.005 % Solution Place 1 Drop in both eyes every evening.     • ASCORBIC ACID PO Take 5 g by mouth every day.     • Magnesium 400 MG Tab Take 400 mg by mouth every day.     • Coenzyme Q10 200 MG Cap Take 200 mg by mouth every day.     • aspirin EC (ECOTRIN) 81 MG Tablet Delayed Response Take 81 mg by mouth every day.     • Omega 3-6-9 Fatty Acids (OMEGA 3-6-9 COMPLEX PO) Take 3,200 mg by mouth every day.     • Non Formulary Request Take 1 Tab by mouth every day. Prostate plus     • Multiple Vitamin (MULTI VITAMIN MENS PO) Take 2 Tabs by mouth every day.       No current facility-administered medications on file prior to visit.        Past Medical History:   Diagnosis Date   • Diabetes (HCC) 07-10-15   • Glaucoma     OU   • High cholesterol 07-10-15    hx of, took self off simvastin   • Hypertension 07-10-15    hx of, took himself off lisinopril and HTCZ   • Hypothyroidism    • Skin cancer    • Snoring        Allergies: Pcn [penicillins]    Surgical History:  has a past surgical history that includes lymph node excision (Left, 7/21/2015); carpal tunnel endoscopic (Left, 9/5/2017); and lumbar disc replacement (08/2013).    Family History: family history includes Heart Disease in his father and mother; Lung Disease in his father.    Social History:  reports that he quit smoking about 40 years ago. His smoking use included cigarettes. He has a 20.00 pack-year smoking history. He has never used smokeless tobacco. He reports current alcohol use of about 0.5 oz of alcohol per week. He reports that he does not use drugs.    ROS: no fever or cough.        Objective:     Vitals:    09/09/20 0923   BP: 128/70   BP Location: Left arm  "  Patient Position: Sitting   BP Cuff Size: Adult long   Pulse: (!) 58   Resp: 16   Temp: 36.4 °C (97.5 °F)   TempSrc: Temporal   SpO2: 97%   Weight: 96.4 kg (212 lb 9.6 oz)   Height: 1.778 m (5' 10\")       Physical Exam:  General: alert in no apparent distress.     Shoulder Injection Procedure Note:   Risks and benefits of the procedure were discussed with the patient and informed consent was obtained. The needle insertion site was prepped with betadine and using a posterior approach a 23 gauge 1.5 inch needle was used to inject 4 cc of 1% lidocaine without epinephrine and 1 cc of triamcinolone 40 mg/mL into the glenohumeral joint. The area was cleansed with an alcohol swab and a Band-Aid was applied. No complications were encountered and aftercare was discussed.       Assessment and Plan:     1. Urinary frequency  I discussed options including trying the Flomax in the morning or transitioning to finasteride.  The patient declines and would like to monitor for now.    2. Unintended weight loss  Patient believes that he has stopped losing weight.  He does have an elevated PSA at discussed elsewhere.    3. Hypothyroidism, unspecified type  - continue Synthroid.     4. Elevated CPK  - neuro appt upcoming.    5. Diabetes mellitus type 2, noninsulin dependent (HCC)  - continue current regimen.     6. Leukocytosis, unspecified type  On labs.   - CBC WITH DIFFERENTIAL; Future    7. Elevated PSA  We will get a total and free PSA. Will likely need to see urology after test considering possible nodule.   - PSA TOTAL + %FREE; Future    8. Chronic left shoulder pain  - joint injection (see procedure note above).   - Consent for AMB Surgery/Procedure    9. Neuropathy  - awaiting neuro.         Followup: Return in about 2 months (around 11/9/2020), or if symptoms worsen or fail to improve.         "

## 2020-09-10 ENCOUNTER — HOSPITAL ENCOUNTER (OUTPATIENT)
Dept: LAB | Facility: MEDICAL CENTER | Age: 70
End: 2020-09-10
Attending: FAMILY MEDICINE
Payer: MEDICARE

## 2020-09-10 ENCOUNTER — PATIENT MESSAGE (OUTPATIENT)
Dept: MEDICAL GROUP | Facility: MEDICAL CENTER | Age: 70
End: 2020-09-10

## 2020-09-10 DIAGNOSIS — R97.20 ELEVATED PSA: ICD-10-CM

## 2020-09-10 DIAGNOSIS — D72.829 LEUKOCYTOSIS, UNSPECIFIED TYPE: ICD-10-CM

## 2020-09-10 LAB
BASOPHILS # BLD AUTO: 0.2 % (ref 0–1.8)
BASOPHILS # BLD: 0.03 K/UL (ref 0–0.12)
EOSINOPHIL # BLD AUTO: 0.02 K/UL (ref 0–0.51)
EOSINOPHIL NFR BLD: 0.1 % (ref 0–6.9)
ERYTHROCYTE [DISTWIDTH] IN BLOOD BY AUTOMATED COUNT: 45.1 FL (ref 35.9–50)
HCT VFR BLD AUTO: 43.9 % (ref 42–52)
HGB BLD-MCNC: 14.3 G/DL (ref 14–18)
IMM GRANULOCYTES # BLD AUTO: 0.07 K/UL (ref 0–0.11)
IMM GRANULOCYTES NFR BLD AUTO: 0.4 % (ref 0–0.9)
LYMPHOCYTES # BLD AUTO: 4.68 K/UL (ref 1–4.8)
LYMPHOCYTES NFR BLD: 29.5 % (ref 22–41)
MCH RBC QN AUTO: 28.9 PG (ref 27–33)
MCHC RBC AUTO-ENTMCNC: 32.6 G/DL (ref 33.7–35.3)
MCV RBC AUTO: 88.9 FL (ref 81.4–97.8)
MONOCYTES # BLD AUTO: 0.8 K/UL (ref 0–0.85)
MONOCYTES NFR BLD AUTO: 5 % (ref 0–13.4)
NEUTROPHILS # BLD AUTO: 10.28 K/UL (ref 1.82–7.42)
NEUTROPHILS NFR BLD: 64.8 % (ref 44–72)
NRBC # BLD AUTO: 0 K/UL
NRBC BLD-RTO: 0 /100 WBC
PLATELET # BLD AUTO: 289 K/UL (ref 164–446)
PMV BLD AUTO: 11.2 FL (ref 9–12.9)
RBC # BLD AUTO: 4.94 M/UL (ref 4.7–6.1)
WBC # BLD AUTO: 15.9 K/UL (ref 4.8–10.8)

## 2020-09-10 PROCEDURE — 85025 COMPLETE CBC W/AUTO DIFF WBC: CPT

## 2020-09-10 PROCEDURE — 36415 COLL VENOUS BLD VENIPUNCTURE: CPT

## 2020-09-10 PROCEDURE — 84153 ASSAY OF PSA TOTAL: CPT

## 2020-09-10 PROCEDURE — 84154 ASSAY OF PSA FREE: CPT

## 2020-09-13 LAB
PSA FREE MFR SERPL: 16 %
PSA FREE SERPL-MCNC: 0.6 NG/ML
PSA SERPL-MCNC: 3.7 NG/ML (ref 0–4)

## 2020-09-14 DIAGNOSIS — R97.20 ELEVATED PSA: ICD-10-CM

## 2020-09-14 DIAGNOSIS — N40.2 PROSTATE NODULE: ICD-10-CM

## 2020-09-16 DIAGNOSIS — D72.829 LEUKOCYTOSIS, UNSPECIFIED TYPE: ICD-10-CM

## 2020-10-01 ENCOUNTER — HOSPITAL ENCOUNTER (OUTPATIENT)
Dept: LAB | Facility: MEDICAL CENTER | Age: 70
End: 2020-10-01
Attending: FAMILY MEDICINE
Payer: MEDICARE

## 2020-10-01 DIAGNOSIS — D72.829 LEUKOCYTOSIS, UNSPECIFIED TYPE: ICD-10-CM

## 2020-10-01 LAB
BASOPHILS # BLD AUTO: 0.7 % (ref 0–1.8)
BASOPHILS # BLD: 0.07 K/UL (ref 0–0.12)
EOSINOPHIL # BLD AUTO: 0.12 K/UL (ref 0–0.51)
EOSINOPHIL NFR BLD: 1.2 % (ref 0–6.9)
ERYTHROCYTE [DISTWIDTH] IN BLOOD BY AUTOMATED COUNT: 48.4 FL (ref 35.9–50)
HCT VFR BLD AUTO: 42.8 % (ref 42–52)
HGB BLD-MCNC: 13.9 G/DL (ref 14–18)
IMM GRANULOCYTES # BLD AUTO: 0.03 K/UL (ref 0–0.11)
IMM GRANULOCYTES NFR BLD AUTO: 0.3 % (ref 0–0.9)
LYMPHOCYTES # BLD AUTO: 4.97 K/UL (ref 1–4.8)
LYMPHOCYTES NFR BLD: 49.2 % (ref 22–41)
MCH RBC QN AUTO: 29.5 PG (ref 27–33)
MCHC RBC AUTO-ENTMCNC: 32.5 G/DL (ref 33.7–35.3)
MCV RBC AUTO: 90.9 FL (ref 81.4–97.8)
MONOCYTES # BLD AUTO: 0.35 K/UL (ref 0–0.85)
MONOCYTES NFR BLD AUTO: 3.5 % (ref 0–13.4)
NEUTROPHILS # BLD AUTO: 4.56 K/UL (ref 1.82–7.42)
NEUTROPHILS NFR BLD: 45.1 % (ref 44–72)
NRBC # BLD AUTO: 0 K/UL
NRBC BLD-RTO: 0 /100 WBC
PLATELET # BLD AUTO: 241 K/UL (ref 164–446)
PMV BLD AUTO: 11.1 FL (ref 9–12.9)
RBC # BLD AUTO: 4.71 M/UL (ref 4.7–6.1)
WBC # BLD AUTO: 10.1 K/UL (ref 4.8–10.8)

## 2020-10-01 PROCEDURE — 85025 COMPLETE CBC W/AUTO DIFF WBC: CPT

## 2020-10-01 PROCEDURE — 36415 COLL VENOUS BLD VENIPUNCTURE: CPT

## 2020-10-05 ENCOUNTER — PATIENT MESSAGE (OUTPATIENT)
Dept: MEDICAL GROUP | Facility: MEDICAL CENTER | Age: 70
End: 2020-10-05

## 2020-10-05 ENCOUNTER — OFFICE VISIT (OUTPATIENT)
Dept: MEDICAL GROUP | Facility: MEDICAL CENTER | Age: 70
End: 2020-10-05
Payer: MEDICARE

## 2020-10-05 VITALS
DIASTOLIC BLOOD PRESSURE: 60 MMHG | OXYGEN SATURATION: 98 % | SYSTOLIC BLOOD PRESSURE: 122 MMHG | TEMPERATURE: 97.3 F | HEIGHT: 70 IN | WEIGHT: 203 LBS | RESPIRATION RATE: 18 BRPM | HEART RATE: 63 BPM | BODY MASS INDEX: 29.06 KG/M2

## 2020-10-05 DIAGNOSIS — R63.4 UNINTENDED WEIGHT LOSS: ICD-10-CM

## 2020-10-05 DIAGNOSIS — R61 NIGHT SWEAT: ICD-10-CM

## 2020-10-05 DIAGNOSIS — J30.1 ALLERGIC RHINITIS DUE TO POLLEN, UNSPECIFIED SEASONALITY: ICD-10-CM

## 2020-10-05 DIAGNOSIS — F43.9 STRESS: ICD-10-CM

## 2020-10-05 DIAGNOSIS — Z23 NEED FOR VACCINATION: ICD-10-CM

## 2020-10-05 DIAGNOSIS — E11.9 DIABETES MELLITUS TYPE 2, NONINSULIN DEPENDENT (HCC): ICD-10-CM

## 2020-10-05 PROBLEM — J30.9 ALLERGIC RHINITIS: Status: ACTIVE | Noted: 2020-10-05

## 2020-10-05 PROCEDURE — G0008 ADMIN INFLUENZA VIRUS VAC: HCPCS | Performed by: FAMILY MEDICINE

## 2020-10-05 PROCEDURE — 99214 OFFICE O/P EST MOD 30 MIN: CPT | Mod: 25 | Performed by: FAMILY MEDICINE

## 2020-10-05 PROCEDURE — 90472 IMMUNIZATION ADMIN EACH ADD: CPT | Performed by: FAMILY MEDICINE

## 2020-10-05 PROCEDURE — 90750 HZV VACC RECOMBINANT IM: CPT | Performed by: FAMILY MEDICINE

## 2020-10-05 PROCEDURE — 90662 IIV NO PRSV INCREASED AG IM: CPT | Performed by: FAMILY MEDICINE

## 2020-10-05 RX ORDER — METFORMIN HYDROCHLORIDE 500 MG/1
1000 TABLET, EXTENDED RELEASE ORAL DAILY
Qty: 360 TAB | Refills: 1 | Status: SHIPPED | OUTPATIENT
Start: 2020-10-05 | End: 2020-10-05

## 2020-10-05 RX ORDER — METFORMIN HYDROCHLORIDE 500 MG/1
1000 TABLET, EXTENDED RELEASE ORAL 2 TIMES DAILY
Qty: 360 TAB | Refills: 1 | Status: SHIPPED | OUTPATIENT
Start: 2020-10-05 | End: 2021-01-22

## 2020-10-05 RX ORDER — SULFAMETHOXAZOLE AND TRIMETHOPRIM 800; 160 MG/1; MG/1
1 TABLET ORAL 2 TIMES DAILY
COMMUNITY
Start: 2020-09-25 | End: 2020-11-23

## 2020-10-05 ASSESSMENT — FIBROSIS 4 INDEX: FIB4 SCORE: 1.16

## 2020-10-05 NOTE — PROGRESS NOTES
[facility]  Progress Note  Established Patient    Subjective:   Lico Salinas is a 70 y.o. male here today with a chief complaint of weight loss.     Allergic rhinitis  Patient describes a few weeks of a slightly runny nose.  He notices that he has quite a bit of phlegm buildup in the mornings and this improves after he blows his nose.  He has not tried anything to help.    Diabetes mellitus type 2, noninsulin dependent (CMS-HCC)  I recently transitioned the patient from Janumet back to metformin because of a relatively low hemoglobin A1c.  Since making this change, he has noticed some loose stools.    Night sweat  Please see discussion regarding unintended weight loss.    Unintended weight loss  Patient has over 5 months of unintended weight loss.  He previously denied fevers, chills and night sweats.  He continues to deny fever and chills but now states that he does get some occasional mild night sweats.  On chart review, in April 2019 he weighed 238 pounds. Today he weighs 203 lbs. At one point, he thought that his weight loss had stopped, however, it doesn't appear to have done so.  He will be having a colonoscopy in a few weeks.  He has had some extensive laboratory testing, all of which has been reassuring with the exception of a slightly elevated PSA and an elevated CPK.  The patient does follow-up with urology now. He will be seeing the neurologist soon for the CPK.  He states that many years ago he had the BCG vaccination.      Current Outpatient Medications on File Prior to Visit   Medication Sig Dispense Refill   • sulfamethoxazole-trimethoprim (BACTRIM DS) 800-160 MG tablet Take 1 Tab by mouth 2 Times a Day.     • losartan (COZAAR) 50 MG Tab Take 1 tablet by mouth once daily 100 Tab 4   • Glucos-Chondroit-Hyaluron-MSM (GLUCOSAMINE CHONDROITIN JOINT) Tab Take 2 Tabs by mouth every day.     • fenofibrate (TRICOR) 145 MG Tab Take 1 Tab by mouth every day. 90 Tab 4   • levothyroxine  (SYNTHROID) 75 MCG Tab TAKE 1 TABLET BY MOUTH IN THE MORNING ON AN EMPTY STOMACH 90 Tab 3   • vitamin D3, cholecalciferol, 1000 UNIT Tab Take 1,000 Units by mouth every day.     • gabapentin (NEURONTIN) 300 MG Cap Take 2 Caps by mouth every bedtime. 180 Cap 1   • timolol (TIMOPTIC) 0.5 % Solution      • latanoprost (XALATAN) 0.005 % Solution Place 1 Drop in both eyes every evening.     • ASCORBIC ACID PO Take 5 g by mouth every 48 hours.     • Magnesium 200 MG Tab Take 200 mg by mouth every day.     • Coenzyme Q10 200 MG Cap Take 200 mg by mouth every day.     • aspirin EC (ECOTRIN) 81 MG Tablet Delayed Response Take 81 mg by mouth every day.     • Omega 3-6-9 Fatty Acids (OMEGA 3-6-9 COMPLEX PO) Take 3,200 mg by mouth every day.     • Non Formulary Request Take 1 Tab by mouth every day. Prostate plus     • Multiple Vitamin (MULTI VITAMIN MENS PO) Take 1 Tab by mouth every day.     • tamsulosin (FLOMAX) 0.4 MG capsule Take 1 Cap by mouth ONE-HALF HOUR AFTER BREAKFAST. (Patient not taking: Reported on 10/5/2020) 90 Cap 0     No current facility-administered medications on file prior to visit.        Past Medical History:   Diagnosis Date   • Diabetes (HCC) 07-10-15   • Glaucoma     OU   • High cholesterol 07-10-15    hx of, took self off simvastin   • Hypertension 07-10-15    hx of, took himself off lisinopril and HTCZ   • Hypothyroidism    • Skin cancer    • Snoring        Allergies: Pcn [penicillins]    Surgical History:  has a past surgical history that includes lymph node excision (Left, 7/21/2015); carpal tunnel endoscopic (Left, 9/5/2017); and lumbar disc replacement.    Family History: family history includes Heart Disease in his father and mother; Lung Disease in his father.    Social History:  reports that he quit smoking about 40 years ago. His smoking use included cigarettes. He has a 20.00 pack-year smoking history. He has never used smokeless tobacco. He reports current alcohol use of about 0.5 oz of  "alcohol per week. He reports that he does not use drugs.    ROS: no fever or cough.        Objective:     Vitals:    10/05/20 1102   BP: 122/60   BP Location: Left arm   Patient Position: Sitting   BP Cuff Size: Adult long   Pulse: 63   Resp: 18   Temp: 36.3 °C (97.3 °F)   TempSrc: Temporal   SpO2: 98%   Weight: 92.1 kg (203 lb)   Height: 1.778 m (5' 10\")       Physical Exam:  General: alert in no apparent distress.   ENMT: Pharyngeal cobblestoning.      Assessment and Plan:     1. Need for vaccination  - INFLUENZA VACCINE, HIGH DOSE (65+ ONLY)  - Shingles Vaccine (Shingrix)    2. Unintended weight loss  Patient continues to demonstrate unintended weight loss, now complaining of night sweats.  I encouraged him to get a colonoscopy which is already scheduled.  He is already following with urology for his slightly elevated PSA.  In order to rule out additional occult malignancy, I will obtain a CT scan of his chest, abdomen and pelvis.  I will see him back in several weeks and in the interim he will see the subspecialists mentioned above.  He is a little bit stressed about what could be going wrong so I encouraged stress reduction.  I offered Ativan for sleep, which the patient would like to hold off on now.  - CT-CHEST,ABDOMEN,PELVIS WITH; Future  - CREATININE; Future    3. Diabetes mellitus type 2, noninsulin dependent (HCC)  -Changed to extended release metformin because of diarrhea.    4. Night sweat  - CT-CHEST,ABDOMEN,PELVIS WITH; Future  - CREATININE; Future    5. Allergic rhinitis due to pollen, unspecified seasonality  - flonase.         Followup: Return in about 4 weeks (around 11/2/2020), or if symptoms worsen or fail to improve.         "

## 2020-10-05 NOTE — ASSESSMENT & PLAN NOTE
Patient has over 5 months of unintended weight loss.  He previously denied fevers, chills and night sweats.  He continues to deny fever and chills but now states that he does get some occasional mild night sweats.  On chart review, in April 2019 he weighed 238 pounds. Today he weighs 203 lbs. At one point, he thought that his weight loss had stopped, however, it doesn't appear to have done so.  He will be having a colonoscopy in a few weeks.  He has had some extensive laboratory testing, all of which has been reassuring with the exception of a slightly elevated PSA and an elevated CPK.  The patient does follow-up with urology now. He will be seeing the neurologist soon for the CPK.  He states that many years ago he had the BCG vaccination.

## 2020-10-05 NOTE — ASSESSMENT & PLAN NOTE
I recently transitioned the patient from Janumet back to metformin because of a relatively low hemoglobin A1c.  Since making this change, he has noticed some loose stools.

## 2020-10-05 NOTE — ASSESSMENT & PLAN NOTE
Patient describes a few weeks of a slightly runny nose.  He notices that he has quite a bit of phlegm buildup in the mornings and this improves after he blows his nose.  He has not tried anything to help.

## 2020-10-06 ENCOUNTER — HOSPITAL ENCOUNTER (OUTPATIENT)
Dept: LAB | Facility: MEDICAL CENTER | Age: 70
End: 2020-10-06
Attending: FAMILY MEDICINE
Payer: MEDICARE

## 2020-10-06 DIAGNOSIS — R63.4 UNINTENDED WEIGHT LOSS: ICD-10-CM

## 2020-10-06 DIAGNOSIS — R61 NIGHT SWEAT: ICD-10-CM

## 2020-10-06 LAB — CREAT SERPL-MCNC: 1.19 MG/DL (ref 0.5–1.4)

## 2020-10-06 PROCEDURE — 36415 COLL VENOUS BLD VENIPUNCTURE: CPT

## 2020-10-06 PROCEDURE — 82565 ASSAY OF CREATININE: CPT

## 2020-10-06 RX ORDER — LORAZEPAM 0.5 MG/1
TABLET ORAL
Qty: 1 TAB | Refills: 0 | Status: SHIPPED | OUTPATIENT
Start: 2020-10-06 | End: 2020-10-06

## 2020-10-06 NOTE — PROGRESS NOTES
Controlled Substance Assessment:   - Is the medication, if previously prescribed, working as intended and expected to treat   the patients symptoms: N/A.   - Does the patient have a history of substance abuse: no.   - Has the patient demonstrated aberrant behavior or intoxication: no.   - Is there reason to believe that the patient is not using medication as prescribed or diverting the medication: no.   - Is there reason to believe that the patient is currently misusing this medication or addicted to the controlled substance: no.   - Is it necessary to verify that controlled substances, other that those authorized under the treatment plan, are not present in the body of this patient: no.  - Are there any blood or urine test that indicate inappropriate use of the medication or other controlled substances : no.   - I have reviewed the . Does the  report irregular/inconsistent medication use or indicate that the medication is being used inappropriately or that the patient is using another controlled substance not prescribed or known to me: no.   - Has the patient been issues another CS for the same medication from another provider for ongoing treatment:no.   - Is there reason to believe that the patient is using other drugs, including alcohol, prescription or illicit drugs, that may interact negatively with controlled substance or have not been prescribed by a practitioner who is treating the patient: no.   - Are there any known early refill attempts or claims that medication has been lost or stolen: no.   - Are there are any major changes in the patient's mental or physical health that would affect the medical appropriateness of this medication: no.   - Has the patient increased the dose of medication without provider authorization: no.  - Has the patient been reluctant to cooperate with any exam, analysis or test recommended by me: no.   - Has the patient demonstrated reluctance to stop or reduce use of the  medicine: no.  - Has the patient requested or demanded a controlled substance that is likely to be abused or cause dependency or addiction: no.   - Is there any other evidence that the patient is chronically using opioids, misusing, abusing, illegally using or addicted to any drug or failing to comply with the instructions of the practitioner concerning the use of the controlled substance: no  - Are there any other factors that the practitioner determines is necessary to make an informed professional judgment concerning the medical appropriateness of the prescription: no.

## 2020-10-08 ENCOUNTER — HOSPITAL ENCOUNTER (OUTPATIENT)
Dept: RADIOLOGY | Facility: MEDICAL CENTER | Age: 70
End: 2020-10-08
Attending: FAMILY MEDICINE
Payer: MEDICARE

## 2020-10-08 DIAGNOSIS — N63.0 BREAST MASS: ICD-10-CM

## 2020-10-08 DIAGNOSIS — R92.8 OTHER ABNORMAL AND INCONCLUSIVE FINDINGS ON DIAGNOSTIC IMAGING OF BREAST: ICD-10-CM

## 2020-10-08 DIAGNOSIS — R63.4 UNINTENDED WEIGHT LOSS: ICD-10-CM

## 2020-10-08 DIAGNOSIS — E11.9 DIABETES MELLITUS TYPE 2, NONINSULIN DEPENDENT (HCC): ICD-10-CM

## 2020-10-08 DIAGNOSIS — R61 NIGHT SWEAT: ICD-10-CM

## 2020-10-08 PROCEDURE — 71260 CT THORAX DX C+: CPT

## 2020-10-08 PROCEDURE — 700117 HCHG RX CONTRAST REV CODE 255: Performed by: FAMILY MEDICINE

## 2020-10-08 RX ADMIN — IOHEXOL 100 ML: 350 INJECTION, SOLUTION INTRAVENOUS at 09:30

## 2020-10-08 RX ADMIN — IOHEXOL 25 ML: 240 INJECTION, SOLUTION INTRATHECAL; INTRAVASCULAR; INTRAVENOUS; ORAL at 09:30

## 2020-10-08 NOTE — PROGRESS NOTES
Discussed results of CT with patient by phone including colon and breast findings. He states that over 20 years ago he had a 3rd nipple removed on the R breast. His wife checks the area intermittently and it has never grown.     I recommended a STAT diagnostic mammogram with US. He will call to schedule today. He is scheduled to get a colonoscopy soon.

## 2020-10-09 ENCOUNTER — HOSPITAL ENCOUNTER (OUTPATIENT)
Dept: LAB | Facility: MEDICAL CENTER | Age: 70
End: 2020-10-09
Attending: FAMILY MEDICINE
Payer: MEDICARE

## 2020-10-09 DIAGNOSIS — E11.9 DIABETES MELLITUS TYPE 2, NONINSULIN DEPENDENT (HCC): ICD-10-CM

## 2020-10-09 LAB
BASOPHILS # BLD AUTO: 0.5 % (ref 0–1.8)
BASOPHILS # BLD: 0.05 K/UL (ref 0–0.12)
CREAT SERPL-MCNC: 1.26 MG/DL (ref 0.5–1.4)
EOSINOPHIL # BLD AUTO: 0.11 K/UL (ref 0–0.51)
EOSINOPHIL NFR BLD: 1.2 % (ref 0–6.9)
ERYTHROCYTE [DISTWIDTH] IN BLOOD BY AUTOMATED COUNT: 50.1 FL (ref 35.9–50)
HCT VFR BLD AUTO: 42.6 % (ref 42–52)
HGB BLD-MCNC: 13.7 G/DL (ref 14–18)
IMM GRANULOCYTES # BLD AUTO: 0.03 K/UL (ref 0–0.11)
IMM GRANULOCYTES NFR BLD AUTO: 0.3 % (ref 0–0.9)
LYMPHOCYTES # BLD AUTO: 3.92 K/UL (ref 1–4.8)
LYMPHOCYTES NFR BLD: 42.3 % (ref 22–41)
MCH RBC QN AUTO: 29.7 PG (ref 27–33)
MCHC RBC AUTO-ENTMCNC: 32.2 G/DL (ref 33.7–35.3)
MCV RBC AUTO: 92.4 FL (ref 81.4–97.8)
MONOCYTES # BLD AUTO: 0.3 K/UL (ref 0–0.85)
MONOCYTES NFR BLD AUTO: 3.2 % (ref 0–13.4)
NEUTROPHILS # BLD AUTO: 4.85 K/UL (ref 1.82–7.42)
NEUTROPHILS NFR BLD: 52.5 % (ref 44–72)
NRBC # BLD AUTO: 0 K/UL
NRBC BLD-RTO: 0 /100 WBC
PLATELET # BLD AUTO: 264 K/UL (ref 164–446)
PMV BLD AUTO: 10.6 FL (ref 9–12.9)
RBC # BLD AUTO: 4.61 M/UL (ref 4.7–6.1)
WBC # BLD AUTO: 9.3 K/UL (ref 4.8–10.8)

## 2020-10-09 PROCEDURE — 36415 COLL VENOUS BLD VENIPUNCTURE: CPT

## 2020-10-09 PROCEDURE — 85025 COMPLETE CBC W/AUTO DIFF WBC: CPT

## 2020-10-09 PROCEDURE — 82565 ASSAY OF CREATININE: CPT

## 2020-10-12 ENCOUNTER — HOSPITAL ENCOUNTER (OUTPATIENT)
Dept: RADIOLOGY | Facility: MEDICAL CENTER | Age: 70
End: 2020-10-12
Attending: FAMILY MEDICINE
Payer: MEDICARE

## 2020-10-12 DIAGNOSIS — R92.8 OTHER ABNORMAL AND INCONCLUSIVE FINDINGS ON DIAGNOSTIC IMAGING OF BREAST: ICD-10-CM

## 2020-10-12 DIAGNOSIS — N63.0 BREAST MASS: ICD-10-CM

## 2020-10-12 PROCEDURE — G0279 TOMOSYNTHESIS, MAMMO: HCPCS

## 2020-10-12 PROCEDURE — 76642 ULTRASOUND BREAST LIMITED: CPT | Mod: RT

## 2020-10-14 DIAGNOSIS — G62.9 NEUROPATHY: ICD-10-CM

## 2020-10-14 RX ORDER — GABAPENTIN 300 MG/1
CAPSULE ORAL
Qty: 200 CAP | Refills: 1 | Status: SHIPPED | OUTPATIENT
Start: 2020-10-14 | End: 2021-04-01

## 2020-10-15 ENCOUNTER — HOSPITAL ENCOUNTER (OUTPATIENT)
Dept: RADIOLOGY | Facility: MEDICAL CENTER | Age: 70
End: 2020-10-15
Attending: FAMILY MEDICINE
Payer: MEDICARE

## 2020-10-15 DIAGNOSIS — R92.8 ABNORMAL FINDING ON BREAST IMAGING: ICD-10-CM

## 2020-10-15 LAB — PATHOLOGY CONSULT NOTE: NORMAL

## 2020-10-15 PROCEDURE — 88360 TUMOR IMMUNOHISTOCHEM/MANUAL: CPT

## 2020-10-15 PROCEDURE — 88341 IMHCHEM/IMCYTCHM EA ADD ANTB: CPT | Mod: 91

## 2020-10-15 PROCEDURE — 88305 TISSUE EXAM BY PATHOLOGIST: CPT

## 2020-10-15 PROCEDURE — 19083 BX BREAST 1ST LESION US IMAG: CPT | Mod: RT

## 2020-10-15 PROCEDURE — 88342 IMHCHEM/IMCYTCHM 1ST ANTB: CPT | Mod: XU

## 2020-10-16 ENCOUNTER — TELEPHONE (OUTPATIENT)
Dept: RADIOLOGY | Facility: MEDICAL CENTER | Age: 70
End: 2020-10-16

## 2020-10-16 NOTE — TELEPHONE ENCOUNTER
Spoke w/ pt to let him know bx results are still pending and rbc will call him as soon as we get results- kaitlin

## 2020-10-20 ENCOUNTER — TELEPHONE (OUTPATIENT)
Dept: RADIOLOGY | Facility: MEDICAL CENTER | Age: 70
End: 2020-10-20

## 2020-10-21 ENCOUNTER — OFFICE VISIT (OUTPATIENT)
Dept: NEUROLOGY | Facility: MEDICAL CENTER | Age: 70
End: 2020-10-21
Payer: MEDICARE

## 2020-10-21 VITALS
TEMPERATURE: 97.4 F | OXYGEN SATURATION: 98 % | RESPIRATION RATE: 16 BRPM | HEART RATE: 61 BPM | WEIGHT: 200.62 LBS | BODY MASS INDEX: 28.72 KG/M2 | SYSTOLIC BLOOD PRESSURE: 118 MMHG | DIASTOLIC BLOOD PRESSURE: 65 MMHG | HEIGHT: 70 IN

## 2020-10-21 DIAGNOSIS — D24.9: ICD-10-CM

## 2020-10-21 DIAGNOSIS — R29.2 ABNORMAL PLANTAR REFLEX: ICD-10-CM

## 2020-10-21 DIAGNOSIS — E11.42 DIABETIC POLYNEUROPATHY ASSOCIATED WITH TYPE 2 DIABETES MELLITUS (HCC): ICD-10-CM

## 2020-10-21 DIAGNOSIS — R53.1 WEAKNESS GENERALIZED: Primary | ICD-10-CM

## 2020-10-21 PROCEDURE — 99205 OFFICE O/P NEW HI 60 MIN: CPT | Performed by: PSYCHIATRY & NEUROLOGY

## 2020-10-21 ASSESSMENT — ENCOUNTER SYMPTOMS
WEAKNESS: 0
SENSORY CHANGE: 0
WEIGHT LOSS: 1
MYALGIAS: 0
FOCAL WEAKNESS: 0
MEMORY LOSS: 0
INSOMNIA: 1
FALLS: 0

## 2020-10-21 ASSESSMENT — FIBROSIS 4 INDEX: FIB4 SCORE: 1.058973101884528324

## 2020-10-21 NOTE — Clinical Note
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

## 2020-10-21 NOTE — PROGRESS NOTES
"Subjective:      Lico Salinas is a 70 y.o. male who presents for consultation from the office of Dr. Mynor Teresa MD, with complaints of some generalized weakness and loss of endurance, but whose CPK values have been elevated.    HPI    Is a very pleasant 70-year-old left-handed gentleman who has never really had complaints of weakness that has progressed.  Rather he does recognize some endurance changes though this has been minimal.  He works out 3-4 times in a week, he feels that he has been holding steady in terms of the strength that he has been able to build.  Though the muscles can be sore after workout, he denies any pain or tenderness between workouts.  He has not been aware of any focal atrophy of certain muscle groups, he denies signs suggestive of fasciculations.  He has had some reduction overall in muscle size, even his wife has noted that he has lost \"my butt\".  Still, he can do leg lifts and squats in the gym without major issue.    He has noted that certain kinds of exercises, if he avoids them for several months, he can never regain his original strength.  He is not convinced that he is any more fatigued after work-up.  He denies any bulbar symptoms, rash, renal disease, changes in bowel or bladder habit, neck pain with radiating symptoms or Lhermitte's phenomena, arthralgias or joint swelling and tenderness, etc.    With all of the above, Dr. Elizabeth ordered his first of 2 CPK values, the first back in April, 2020 at 188, the next in August, 2020 at 214.  Liver and renal profiles otherwise were unremarkable.  TSH has been normal at 4.56.    He does have some tingling sensations and discomfort in the right foot, especially in the toes.  The symptoms drive him nuts towards the end of the day, especially if he is seated for long intervals of time, but also when he gets into bed.  He finds that standing up and walking around helps but as soon as he lays down again, the symptoms recur.  " "He is not aware of symptoms such as this in the left foot.  He denies constant burning or tingling, loss of sensation with analgesia in the feet.  There is no focal weakness in the feet that he has been able to identify.  Walking is not curtailed.  On gabapentin 600 mg taken numeral 3 hours prior to going to bed, this helps the symptoms consistently.    He has a history of diabetes, hypertension, dyslipidemia, hypothyroidism and glaucoma bilaterally.  There is no history of CVA, MS, CAD, PVD, autoimmune disease, psychiatric disease, MS, seizure, neurodegenerative disease, neuromuscular or myopathic disease.  There is no surgical history of note.    No one in the family has a diagnosed history of neuropathic or muscular disease.  His mother  from complications of COPD, his father from heart disease, hypertension and dyslipidemia.  Of his 2 siblings, one has passed, but neither again have a history of neurologic disease.    He quit tobacco use in , drinks maybe once every month.    He is on Neurontin 600 mg in the evening, Synthroid, TriCor, baby aspirin, fish oil, vitamin D with calcium, several eyedrops for his glaucoma, Cozaar, TriCor, Synthroid, and Glucophage.    Review of Systems   Constitutional: Positive for weight loss. Negative for malaise/fatigue.   Musculoskeletal: Negative for falls and myalgias.   Skin: Negative for rash.   Neurological: Negative for sensory change, focal weakness and weakness.   Psychiatric/Behavioral: Negative for memory loss. The patient has insomnia.    All other systems reviewed and are negative.       Objective:     /65 (BP Location: Left arm, Patient Position: Sitting, BP Cuff Size: Adult)   Pulse 61   Temp 36.3 °C (97.4 °F) (Temporal)   Resp 16   Ht 1.778 m (5' 10\")   Wt 91 kg (200 lb 9.9 oz)   SpO2 98%   BMI 28.79 kg/m²      Physical Exam    He appears in no acute distress.  His vital signs are stable.  There is no malar rash or sialorrhea.  The neck is " supple, range of motion is full, carotid pulses are present bilaterally without asymmetry.  Cardiac evaluation reveals a regular rhythm.  Straight leg raising is negative bilaterally.  There is no lower extremity edema.    Fully oriented, there is no aphasia, agnosia, apraxia, or inattention.  There is no obvious, focal cognitive deficit.    PERRLA/EOMI, there is no hypophonia, tachyphemia or dysarthria.  Visual fields are full, facial movements are symmetric, sensory exam is intact to temperature and light touch bilaterally.  The tongue and uvula are midline, there is no dysarthria, shoulder shrug and head rotation are intact and symmetric.  There are no tongue fasciculations, there is no evidence of atrophy with scalloping.    Neuromuscular exam reveals no evidence of focal atrophy though there may be some loss of muscle size bilaterally in the shoulder girdles.  There are no fasciculations throughout, even with percussion.  Tone is normal bilaterally, there is no rigidity or spasticity.  There is no tremor or drift.  There may be some weakness with biceps and triceps on the left, though this is subtle at worst.  There may be a little weakness with the iliopsoas on the right as well.  Strength is otherwise intact distally in all 4 extremities.    Reflexes are diminished in both upper extremities at all points, biceps are little bit more pronounced.  Knee jerks are symmetric bilaterally, ankle jerks are present as well.  The left toe is upgoing, the right downgoing.    He stands easily, arm swing is symmetric, gait is normal and station and stride length.  There is no appendicular dystaxia.  Fine motor control with repetitive movements in all 4 extremities are normal with symmetric amplitude and frequencies.  Heel and toe walking are normal.    Sensory exam reveals a stocking pattern loss of vibration below the knees, temperature is intact.  There is no sensory loss in the upper extremities.  Romberg is absent.      Assessment/Plan:     1. Weakness generalized  A difficult thing to clearly identify as to pathology, he has very few symptoms, certainly nothing localizing.  The exam itself is fairly benign though there may be a little weakness in some proximal muscle groups.  He has nothing to suggest a neuropathic process such as motor neuron disease or motor neuronopathy.  I am not sure what to do with the elevated CPK values, in and of themselves minimally up, possibly related to exercise, etc.  EMG/NCV studies will be helpful in this regard.    He is not parkinsonian, has nothing on exam to suggest a myelopathic process, he is a little old for new onset demyelinating disease.  If primarily myopathic, he lacks findings on exam that would be consistent with most of the muscular dystrophy's of adult onset.  There is also no family history that would be expected.  Inflammatory myositis is also unusual, the CPK values are usually far more elevated.  He also lacks rash, muscle tenderness, progressive weakness associated with exercise, etc.  Though he has a very mild diabetes-related polyneuropathy, diabetics do not typically suffer from myopathy.  Thyroid disease has been controlled, he also does not overuse alcohol.  He is not on a statin agent, TriCor is not typically associated with myopathic abnormalities.    EMG/NCV studies of the first diagnostic step, subsequent evaluations will be made depending on these results.  We will call him with these results if significant, otherwise we talk specifics when I see him in the office.    - REFERRAL TO NEURODIAGNOSTICS (EEG,EP,EMG/NCS/DBS) Modality Requested: EMG/NCS-Comment Extremities  - MR-BRAIN-W/O; Future  - EKG; Future  - PT AND PTT  - Basic Metabolic Panel; Future    2. Diabetic polyneuropathy associated with type 2 diabetes mellitus (HCC)  As above, this is a mild sensory process, most of what he is dealing with is a restlessness that could be symptomatic of the neuropathy or  "simply primary RLS.  Already on gabapentin 6 mg taken 3 hours before bed, this is an appropriate treatment, he has been getting good benefit.  Gabapentin is not typically associated with this type of weakness nor elevations in CPK.  EMG/NCV studies can be done for thoroughness to characterize what, if anything, is detectable.    - REFERRAL TO NEURODIAGNOSTICS (EEG,EP,EMG/NCS/DBS) Modality Requested: EMG/NCS-Comment Extremities    3. Abnormal plantar reflex  His left Babinski sign is unexpected, suggesting something either in the brain or spine, though he is not overtly myelopathic, and thus given his risk of cerebrovascular disease, imaging of the brain needs done for thoroughness.  This is not obviously a cause for a more generalized \"weakness\", it certainly is not going to explain any elevation of CPK.  Any abnormalities would be coincidental and nothing more.   We will follow-up in the next several months.    - MR-BRAIN-W/O; Future  - EKG; Future  - PT AND PTT  - Basic Metabolic Panel; Future    Time: 60 minutes spent face-to-face for exam, review, discussion, and education, of this over 50% of the time spent counseling and coordinating care.  "

## 2020-10-28 ENCOUNTER — NON-PROVIDER VISIT (OUTPATIENT)
Dept: NEUROLOGY | Facility: MEDICAL CENTER | Age: 70
End: 2020-10-28
Payer: MEDICARE

## 2020-10-28 DIAGNOSIS — R53.1 WEAKNESS GENERALIZED: ICD-10-CM

## 2020-10-28 DIAGNOSIS — E11.42 DIABETIC POLYNEUROPATHY ASSOCIATED WITH TYPE 2 DIABETES MELLITUS (HCC): ICD-10-CM

## 2020-10-28 PROCEDURE — 95886 MUSC TEST DONE W/N TEST COMP: CPT | Performed by: PSYCHIATRY & NEUROLOGY

## 2020-10-28 PROCEDURE — 95912 NRV CNDJ TEST 11-12 STUDIES: CPT | Performed by: PSYCHIATRY & NEUROLOGY

## 2020-10-28 NOTE — PROCEDURES
"NERVE CONDUCTION STUDIES AND ELECTROMYOGRAPHY REPORT  [facility]  10/28/2020           IMPRESSION:  This is an abnormal electrodiagnostic study due to:  1. Chronic inactive reinnervation changes of the right tibialis anterior which can be seen in old right L4-5 radiculopathy but in itself is not diagnostic.  2. Moderate right median neuropathy at the wrist (carpal tunnel syndrome) without evidence of neurogenic changes in the right abductor pollicis brevis.  3. Evidence suggestive of a sensorimotor large fiber peripheral neuropathy in the right lower extremity.  If further characterization is desired, recommend studies of the bilateral lower extremities.    Otherwise no evidence of right cervical radiculopathy, right lumbosacral radiculopathy, myopathy in the extremities studied.  Recommend clinical correlation of the above.    Gale Peterson MD  Neurology - Neurophysiology  [facility]          REASON FOR REFERRAL:  Mr. Lico Salinas 70 y.o. referred by Dr. Adonis Rose for evaluation of possible weakness of uncertain etiology duration of at least 3 months.  Patient has noticed some reduction in muscle bulk with time and with reduced endurance.  There is associated tingling sensation and discomfort in the right foot specifically the toes.  There are no sensory symptoms on the left leg.  He does have a history of diabetes and has intermittent tingling in the right hand at night.    Height: 5'10\"  Weight: 196 lbs    Symptom focused neurological exam shows normal strength in the right upper and lower extremity.  There is normal sensation to light touch as well.      ELECTRODIAGNOSTIC EXAMINATION:  Nerve conduction studies (NCS) and electromyography (EMG) are utilized to evaluate direct or indirect damage to the peripheral nervous system. NCS are performed to measure the nerve(s) response(s) to electrostimulation across a given nerve segment. EMG evaluates the passive and " active electrical activity of the muscle(s) in question.  Muscles are innervated by specific peripheral nerves and roots. Often times, several nerves the muscle to be examined in order to determine the presence or absence of the disease process. Furthermore, nerves and muscles may need to be tested in a [id number]-[id number]-[id number] comparison, as well as in additional extremities, as this may be crucial in characterizing the extent of the disease process, which may be diffuse or isolated and of varying degree of severity. The extent of the neurodiagnostic exam is justified as it may help arrive to a proper diagnosis, which ultimately may contribute to better management of the patient. Therefore, the nerves to muscles examined during the study were medically necessary.    Unless otherwise noted, temperature of the extremity(s) study was monitored before and during the examination and remained between 32 and 36 degrees C for the upper extremities, and between 30 and 36 degrees C for the lower extremities. The patient tolerated testing well, without any complications.       NERVE CONDUCTION STUDY SUMMARY:  Selected nerves of the right upper and lower extremity are studied.    Abnormal right median sensory response due to prolonged latency.  Abnormal right median motor response to prolonged distal latency.  Normal right ulnar sensory and motor responses.  Unobtainable right common peroneal motor response at the extensor digitorum brevis.  Abnormal right tibial motor response at the abductor hallucis brevis due to low amplitude.  Proximal stimulation was unobtainable.  Normal common peroneal motor response at the tibialis anterior.  Unobtainable right sural sensory response.      NEEDLE EMG SUMMARY:  Concentric needle study of selected right upper and lower extremity muscles is performed.     Insertion activity is normal in all muscles sampled.   Mildly large amplitude prolonged duration motor unit potentials are appreciated in the  right tibialis anterior.  Otherwise with activation, there are normal morphology (amplitude/duration) motor unit action potentials firing with normal recruitment in remaining muscles tested.       PATIENT DATA TABLES  Nerve Conduction Studies     Stim Site NR Onset (ms) Norm Onset (ms) O-P Amp (µV) Norm O-P Amp Site1 Site2 Delta-P (ms) Dist (cm) Misha (m/s) Norm Misha (m/s)   Right Median Anti Sensory (2nd Digit)  33°C   Wrist    3.8 <3.8 14.5 >10 Wrist 2nd Digit 5.3 14.0 *26 >50   Right Sural Anti Sensory (Lat Mall)   Calf *NR  <4.6  >3 Calf Lat Mall  14.0  >40   Right Ulnar Anti Sensory (5th Digit)  33°C   Wrist    2.4 <3.2 14.5 >5 Wrist 5th Digit 3.3 14.0 *42 >50        Stim Site NR Onset (ms) Norm Onset (ms) O-P Amp (mV) Norm O-P Amp Site1 Site2 Delta-0 (ms) Dist (cm) Misha (m/s) Norm Misha (m/s)   Right Median Motor (Abd Poll Brev)  33°C   Wrist    *5.7 <4 6.3 >5 Elbow Wrist 5.0 29.0 58 >50   Elbow    10.7  5.9          Right Peroneal EDB Motor (Ext Dig Brev)   Ankle *NR  <6  >2.5 B Fib Ankle  0.0  >40   B Fib *NR     Poplt B Fib  0.0     Poplt *NR             Right Peroneal TA Motor (AntTibialis)   Fib Head    4.5 <4.5 5.7 >3 Poplit Fib Head 1.4 10.0 71 >40   Poplit    5.9  5.5          Right Tibial Motor (Abd Leigh Brev)   Ankle    4.8 <6 *0.2 >4 Knee Ankle  0.0  >40   Knee *NR             Right Ulnar Motor (Abd Dig Min)  33°C   Wrist    3.1 <3.1 7.0 >7 B Elbow Wrist 4.0 20.0 50 >50   B Elbow    7.1  6.2  A Elbow B Elbow 1.6 10.0 63    A Elbow    8.7  6.1               Stim Site NR Peak (ms) Norm Peak (ms) P-T Amp (µV) Site1 Site2 Delta-P (ms) Norm Delta (ms)   Right Median/Ulnar Palm Comparison (Wrist - 8cm)  33°C   Median Palm    *3.0 <2.3 23.6 Median Palm Ulnar Palm *1.1 <0.3   Ulnar Palm    1.9 <2.3 10.8       Site 3    2.1  6.5                              Electromyography     Side Muscle Nerve Root Ins Act Fibs Psw Amp Dur Poly Recrt Int Pat Comment   Right 1stDorInt Ulnar C8-T1 Nml Nml Nml Nml Nml 0 Nml Nml     Right PronatorTeres Median C6-7 Nml Nml Nml Nml Nml 0 Nml Nml    Right Biceps Musculocut C5-6 Nml Nml Nml Nml Nml 0 Nml Nml    Right Triceps Radial C6-7-8 Nml Nml Nml Nml Nml 0 Nml Nml    Right Deltoid Axillary C5-6 Nml Nml Nml Nml Nml 0 Nml Nml    Right AntTibialis Dp Br Fibular L4-5 Nml Nml Nml *Incr *>12ms 0 Nml Nml    Right Gastroc Tibial S1-2 Nml Nml Nml Nml Nml 0 Nml Nml    Right VastusLat Femoral L2-4 Nml Nml Nml Nml Nml 0 Nml Nml    Right GluteusMed SupGluteal L5-S1 Nml Nml Nml Nml Nml 0 Nml Nml    Right VastusMed Femoral L2-4 Nml Nml Nml Nml Nml 0 Nml Nml    Right Abd Poll Brev Median C8-T1 Nml Nml Nml Nml Nml 0 Nml Nml

## 2020-11-03 ENCOUNTER — HOSPITAL ENCOUNTER (OUTPATIENT)
Dept: LAB | Facility: MEDICAL CENTER | Age: 70
End: 2020-11-03
Attending: PSYCHIATRY & NEUROLOGY
Payer: MEDICARE

## 2020-11-03 DIAGNOSIS — R29.2 ABNORMAL PLANTAR REFLEX: ICD-10-CM

## 2020-11-03 DIAGNOSIS — R53.1 WEAKNESS GENERALIZED: ICD-10-CM

## 2020-11-03 LAB
ANION GAP SERPL CALC-SCNC: 11 MMOL/L (ref 7–16)
APTT PPP: 29.3 SEC (ref 24.7–36)
BUN SERPL-MCNC: 24 MG/DL (ref 8–22)
CALCIUM SERPL-MCNC: 9.9 MG/DL (ref 8.5–10.5)
CHLORIDE SERPL-SCNC: 101 MMOL/L (ref 96–112)
CO2 SERPL-SCNC: 24 MMOL/L (ref 20–33)
CREAT SERPL-MCNC: 0.96 MG/DL (ref 0.5–1.4)
FASTING STATUS PATIENT QL REPORTED: NORMAL
GLUCOSE SERPL-MCNC: 142 MG/DL (ref 65–99)
INR PPP: 1.05 (ref 0.87–1.13)
POTASSIUM SERPL-SCNC: 4.2 MMOL/L (ref 3.6–5.5)
PROTHROMBIN TIME: 14 SEC (ref 12–14.6)
SODIUM SERPL-SCNC: 136 MMOL/L (ref 135–145)

## 2020-11-03 PROCEDURE — 85610 PROTHROMBIN TIME: CPT

## 2020-11-03 PROCEDURE — 36415 COLL VENOUS BLD VENIPUNCTURE: CPT

## 2020-11-03 PROCEDURE — 85730 THROMBOPLASTIN TIME PARTIAL: CPT

## 2020-11-03 PROCEDURE — 80048 BASIC METABOLIC PNL TOTAL CA: CPT

## 2020-11-04 ENCOUNTER — HOSPITAL ENCOUNTER (OUTPATIENT)
Dept: CARDIOLOGY | Facility: MEDICAL CENTER | Age: 70
End: 2020-11-04
Attending: PSYCHIATRY & NEUROLOGY
Payer: MEDICARE

## 2020-11-04 ENCOUNTER — PRE-ADMISSION TESTING (OUTPATIENT)
Dept: ADMISSIONS | Facility: MEDICAL CENTER | Age: 70
End: 2020-11-04
Attending: PSYCHIATRY & NEUROLOGY
Payer: MEDICARE

## 2020-11-04 DIAGNOSIS — Z01.812 PRE-OPERATIVE LABORATORY EXAMINATION: ICD-10-CM

## 2020-11-04 LAB
COVID ORDER STATUS COVID19: NORMAL
EKG IMPRESSION: NORMAL
SARS-COV-2 RNA RESP QL NAA+PROBE: NOTDETECTED
SPECIMEN SOURCE: NORMAL

## 2020-11-04 PROCEDURE — U0003 INFECTIOUS AGENT DETECTION BY NUCLEIC ACID (DNA OR RNA); SEVERE ACUTE RESPIRATORY SYNDROME CORONAVIRUS 2 (SARS-COV-2) (CORONAVIRUS DISEASE [COVID-19]), AMPLIFIED PROBE TECHNIQUE, MAKING USE OF HIGH THROUGHPUT TECHNOLOGIES AS DESCRIBED BY CMS-2020-01-R: HCPCS

## 2020-11-04 PROCEDURE — 93005 ELECTROCARDIOGRAM TRACING: CPT | Performed by: PSYCHIATRY & NEUROLOGY

## 2020-11-04 PROCEDURE — 93010 ELECTROCARDIOGRAM REPORT: CPT | Performed by: INTERNAL MEDICINE

## 2020-11-09 ENCOUNTER — HOSPITAL ENCOUNTER (OUTPATIENT)
Dept: RADIOLOGY | Facility: MEDICAL CENTER | Age: 70
End: 2020-11-09
Attending: PSYCHIATRY & NEUROLOGY
Payer: MEDICARE

## 2020-11-09 ENCOUNTER — ANESTHESIA EVENT (OUTPATIENT)
Dept: RADIOLOGY | Facility: MEDICAL CENTER | Age: 70
End: 2020-11-09
Payer: MEDICARE

## 2020-11-09 ENCOUNTER — ANESTHESIA (OUTPATIENT)
Dept: RADIOLOGY | Facility: MEDICAL CENTER | Age: 70
End: 2020-11-09
Payer: MEDICARE

## 2020-11-09 VITALS
WEIGHT: 196.21 LBS | OXYGEN SATURATION: 96 % | HEART RATE: 83 BPM | BODY MASS INDEX: 28.09 KG/M2 | HEIGHT: 70 IN | TEMPERATURE: 97.8 F | RESPIRATION RATE: 18 BRPM | DIASTOLIC BLOOD PRESSURE: 68 MMHG | SYSTOLIC BLOOD PRESSURE: 115 MMHG

## 2020-11-09 DIAGNOSIS — R53.1 WEAKNESS GENERALIZED: ICD-10-CM

## 2020-11-09 DIAGNOSIS — R29.2 ABNORMAL PLANTAR REFLEX: ICD-10-CM

## 2020-11-09 PROCEDURE — 700105 HCHG RX REV CODE 258: Performed by: ANESTHESIOLOGY

## 2020-11-09 PROCEDURE — 70551 MRI BRAIN STEM W/O DYE: CPT

## 2020-11-09 PROCEDURE — 700111 HCHG RX REV CODE 636 W/ 250 OVERRIDE (IP): Performed by: ANESTHESIOLOGY

## 2020-11-09 PROCEDURE — 700101 HCHG RX REV CODE 250: Performed by: ANESTHESIOLOGY

## 2020-11-09 RX ORDER — SODIUM CHLORIDE, SODIUM LACTATE, POTASSIUM CHLORIDE, CALCIUM CHLORIDE 600; 310; 30; 20 MG/100ML; MG/100ML; MG/100ML; MG/100ML
INJECTION, SOLUTION INTRAVENOUS CONTINUOUS
Status: DISCONTINUED | OUTPATIENT
Start: 2020-11-09 | End: 2020-11-10 | Stop reason: HOSPADM

## 2020-11-09 RX ORDER — ONDANSETRON 2 MG/ML
4 INJECTION INTRAMUSCULAR; INTRAVENOUS
Status: DISCONTINUED | OUTPATIENT
Start: 2020-11-09 | End: 2020-11-10 | Stop reason: HOSPADM

## 2020-11-09 RX ORDER — OXYCODONE HCL 5 MG/5 ML
5 SOLUTION, ORAL ORAL
Status: DISCONTINUED | OUTPATIENT
Start: 2020-11-09 | End: 2020-11-10 | Stop reason: HOSPADM

## 2020-11-09 RX ORDER — DIPHENHYDRAMINE HYDROCHLORIDE 50 MG/ML
12.5 INJECTION INTRAMUSCULAR; INTRAVENOUS
Status: DISCONTINUED | OUTPATIENT
Start: 2020-11-09 | End: 2020-11-10 | Stop reason: HOSPADM

## 2020-11-09 RX ORDER — HALOPERIDOL 5 MG/ML
1 INJECTION INTRAMUSCULAR
Status: DISCONTINUED | OUTPATIENT
Start: 2020-11-09 | End: 2020-11-10 | Stop reason: HOSPADM

## 2020-11-09 RX ORDER — OXYCODONE HCL 5 MG/5 ML
10 SOLUTION, ORAL ORAL
Status: DISCONTINUED | OUTPATIENT
Start: 2020-11-09 | End: 2020-11-10 | Stop reason: HOSPADM

## 2020-11-09 RX ORDER — MEPERIDINE HYDROCHLORIDE 25 MG/ML
12.5 INJECTION INTRAMUSCULAR; INTRAVENOUS; SUBCUTANEOUS
Status: DISCONTINUED | OUTPATIENT
Start: 2020-11-09 | End: 2020-11-10 | Stop reason: HOSPADM

## 2020-11-09 RX ORDER — SODIUM CHLORIDE, SODIUM LACTATE, POTASSIUM CHLORIDE, CALCIUM CHLORIDE 600; 310; 30; 20 MG/100ML; MG/100ML; MG/100ML; MG/100ML
INJECTION, SOLUTION INTRAVENOUS
Status: DISCONTINUED | OUTPATIENT
Start: 2020-11-09 | End: 2020-11-09 | Stop reason: SURG

## 2020-11-09 RX ADMIN — PROPOFOL 200 MG: 10 INJECTION, EMULSION INTRAVENOUS at 09:48

## 2020-11-09 RX ADMIN — SODIUM CHLORIDE, POTASSIUM CHLORIDE, SODIUM LACTATE AND CALCIUM CHLORIDE: 600; 310; 30; 20 INJECTION, SOLUTION INTRAVENOUS at 09:35

## 2020-11-09 RX ADMIN — EPHEDRINE SULFATE 10 MG: 50 INJECTION INTRAMUSCULAR; INTRAVENOUS; SUBCUTANEOUS at 09:49

## 2020-11-09 RX ADMIN — SODIUM CHLORIDE, POTASSIUM CHLORIDE, SODIUM LACTATE AND CALCIUM CHLORIDE: 600; 310; 30; 20 INJECTION, SOLUTION INTRAVENOUS at 09:43

## 2020-11-09 ASSESSMENT — FIBROSIS 4 INDEX: FIB4 SCORE: 1.058973101884528324

## 2020-11-09 ASSESSMENT — PAIN SCALES - GENERAL: PAIN_LEVEL: 0

## 2020-11-09 NOTE — ANESTHESIA PREPROCEDURE EVALUATION
Relevant Problems   ENDO   (+) Diabetes mellitus type 2, noninsulin dependent (HCC)   (+) Hypothyroidism       Physical Exam    Airway   Mallampati: II  TM distance: >3 FB  Neck ROM: full       Cardiovascular - normal exam  Rhythm: regular  Rate: normal  (-) murmur     Dental - normal exam           Pulmonary - normal exam  Breath sounds clear to auscultation     Abdominal    Neurological - normal exam                 Anesthesia Plan    ASA 3       Plan - general       Airway plan will be LMA        Induction: intravenous    Postoperative Plan: Postoperative administration of opioids is intended.    Pertinent diagnostic labs and testing reviewed    Informed Consent:    Anesthetic plan and risks discussed with patient.    Use of blood products discussed with: patient whom consented to blood products.

## 2020-11-09 NOTE — DISCHARGE INSTRUCTIONS
MRI ADULT DISCHARGE INSTRUCTIONS    You have been medicated today for your scan. Please follow the instructions below to ensure your safe recovery. If you have any questions or problems, feel free to call us at [phone] or [phone].     1.   Have someone stay with you to assist you as needed.    2.   Do not drive or operate any mechanical devices.    3.   Do not perform any activity that requires concentration. Make no major decisions over the next 24 hours.     4.   Be careful changing positions from laying down to sitting or standing, as you may become dizzy.     5.   Do not drink alcohol for 48 hours.    6.   There are no restrictions for eating your normal meals. Drink fluids.    7.   You may continue your usual medications for pain, tranquilizers, muscle relaxants or sedatives when awake.     8.   Tomorrow, you may continue your normal daily activities.     9.   Pressure dressing on 10 - 15 minutes. If swelling or bleeding occurs when removed, continue placing direct pressure on injection site for another 5 minutes, or until bleeding stops.     I have been informed of and understand the above discharge instructions.

## 2020-11-09 NOTE — ANESTHESIA PROCEDURE NOTES
Airway    Date/Time: 11/9/2020 9:52 AM  Performed by: Alf Ferraro M.D.  Authorized by: Alf Ferraro M.D.     Location:  OR  Urgency:  Elective  Indications for Airway Management:  Anesthesia      Spontaneous Ventilation: absent    Sedation Level:  Deep  Preoxygenated: Yes    Final Airway Type:  Supraglottic airway  Final Supraglottic Airway:  Standard LMA    SGA Size:  4  Number of Attempts at Approach:  1

## 2020-11-09 NOTE — ANESTHESIA POSTPROCEDURE EVALUATION
Patient: Lico Salinas    Procedure Summary     Date: 11/09/20 [address]    Anesthesia Start: 0942 Anesthesia Stop: 1014    Procedure: MR-BRAIN-W/O Diagnosis:       Weakness generalized      Abnormal plantar reflex    Scheduled Providers:  Responsible Provider: Alf Ferraro M.D.    Anesthesia Type: general ASA Status: 3          Final Anesthesia Type: general  Last vitals  BP   Blood Pressure : 116/63    Temp   36.7 °C (98 °F)    Pulse   Pulse: 63   Resp   18    SpO2   97 %      Anesthesia Post Evaluation    Patient location during evaluation: PACU  Patient participation: complete - patient participated  Level of consciousness: awake and alert  Pain score: 0    Airway patency: patent  Anesthetic complications: no  Cardiovascular status: adequate and hemodynamically stable  Respiratory status: acceptable  Hydration status: acceptable    PONV: none           Nurse Pain Score: 0 (NPRS)

## 2020-11-09 NOTE — ANESTHESIA TIME REPORT
Anesthesia Start and Stop Event Times     Date Time Event    11/9/2020 0927 Ready for Procedure     0942 Anesthesia Start     1014 Anesthesia Stop        Responsible Staff  11/09/20    Name Role Begin End    Alf Ferraro M.D. Anesth 0942 1014        Preop Diagnosis (Free Text):  Pre-op Diagnosis             Preop Diagnosis (Codes):    Post op Diagnosis  Weight loss      Premium Reason  Non-Premium    Comments:                                                                  MRI brain

## 2020-11-09 NOTE — ANESTHESIA QCDR
2019 [facility] Clinical Data Registry (for Quality Improvement)     Postoperative nausea/vomiting risk protocol (Adult = 18 yrs and Pediatric 3-17 yrs)- (430 and 463)  General inhalation anesthetic (NOT TIVA) with PONV risk factors: Yes  Provision of anti-emetic therapy with at least 2 different classes of agents: Yes   Patient DID NOT receive anti-emetic therapy and reason is documented in Medical Record:  N/A    Multimodal Pain Management- (477)  Non-emergent surgery AND patient age >= 18:   Use of Multimodal Pain Management, two or more drugs and/or interventions, NOT including systemic opioids:   Exception: Documented allergy to multiple classes of analgesics:     Smoking Abstinence (404)  Patient is current smoker (cigarette, pipe, e-cig, marijuanna):   Elective Surgery:   Abstinence instructions provided prior to day of surgery:   Patient abstained from smoking on day of surgery:     Pre-Op Beta-Blocker in Isolated CABG (44)  Isolated CABG AND patient age >= 18:   Beta-blocker admin within 24 hours of surgical incision:   Exception:of medical reason(s) for not administering beta blocker within 24 hours prior to surgical incision (e.g., not  indicated,other medical reason):     PACU assessment of acute postoperative pain prior to Anesthesia Care End- Applies to Patients Age = 18- (ABG7)  Initial PACU pain score is which of the following: < 7/10  Patient unable to report pain score: N/A    Post-anesthetic transfer of care checklist/protocol to PACU/ICU- (426 and 427)  Upon conclusion of case, patient transferred to which of the following locations: PACU/Non-ICU  Use of transfer checklist/protocol: Yes  Exclusion: Service Performed in Patient Hospital Room (and thus did not require transfer): N/A  Unplanned admission to ICU related to anesthesia service up through end of PACU care- (MD51)  Unplanned admission to ICU (not initially anticipated at anesthesia start time): No

## 2020-11-16 ENCOUNTER — HOSPITAL ENCOUNTER (OUTPATIENT)
Dept: LAB | Facility: MEDICAL CENTER | Age: 70
End: 2020-11-16
Attending: UROLOGY
Payer: MEDICARE

## 2020-11-16 LAB — PSA SERPL-MCNC: 3.33 NG/ML (ref 0–4)

## 2020-11-16 PROCEDURE — 84153 ASSAY OF PSA TOTAL: CPT

## 2020-11-16 PROCEDURE — 36415 COLL VENOUS BLD VENIPUNCTURE: CPT

## 2020-11-23 ENCOUNTER — OFFICE VISIT (OUTPATIENT)
Dept: MEDICAL GROUP | Facility: MEDICAL CENTER | Age: 70
End: 2020-11-23
Payer: MEDICARE

## 2020-11-23 VITALS
TEMPERATURE: 97.4 F | WEIGHT: 199 LBS | OXYGEN SATURATION: 97 % | SYSTOLIC BLOOD PRESSURE: 132 MMHG | HEART RATE: 66 BPM | DIASTOLIC BLOOD PRESSURE: 68 MMHG | BODY MASS INDEX: 28.49 KG/M2 | RESPIRATION RATE: 18 BRPM | HEIGHT: 70 IN

## 2020-11-23 DIAGNOSIS — E03.9 HYPOTHYROIDISM, UNSPECIFIED TYPE: ICD-10-CM

## 2020-11-23 DIAGNOSIS — F32.A ANXIETY AND DEPRESSION: ICD-10-CM

## 2020-11-23 DIAGNOSIS — E11.42 DIABETIC POLYNEUROPATHY ASSOCIATED WITH TYPE 2 DIABETES MELLITUS (HCC): ICD-10-CM

## 2020-11-23 DIAGNOSIS — N28.1 KIDNEY CYST, ACQUIRED: ICD-10-CM

## 2020-11-23 DIAGNOSIS — K29.60 EROSIVE GASTRITIS: ICD-10-CM

## 2020-11-23 DIAGNOSIS — R74.8 ELEVATED CPK: ICD-10-CM

## 2020-11-23 DIAGNOSIS — D64.9 ANEMIA, UNSPECIFIED TYPE: ICD-10-CM

## 2020-11-23 DIAGNOSIS — R35.0 URINARY FREQUENCY: ICD-10-CM

## 2020-11-23 DIAGNOSIS — R59.0 HILAR LYMPHADENOPATHY: ICD-10-CM

## 2020-11-23 DIAGNOSIS — F41.9 ANXIETY AND DEPRESSION: ICD-10-CM

## 2020-11-23 DIAGNOSIS — E11.9 DIABETES MELLITUS TYPE 2, NONINSULIN DEPENDENT (HCC): ICD-10-CM

## 2020-11-23 DIAGNOSIS — E78.5 DYSLIPIDEMIA: ICD-10-CM

## 2020-11-23 DIAGNOSIS — N63.0 BREAST MASS: ICD-10-CM

## 2020-11-23 DIAGNOSIS — R61 NIGHT SWEAT: ICD-10-CM

## 2020-11-23 DIAGNOSIS — R53.1 WEAKNESS GENERALIZED: ICD-10-CM

## 2020-11-23 DIAGNOSIS — R63.4 UNINTENDED WEIGHT LOSS: ICD-10-CM

## 2020-11-23 PROCEDURE — 99214 OFFICE O/P EST MOD 30 MIN: CPT | Performed by: FAMILY MEDICINE

## 2020-11-23 RX ORDER — VENLAFAXINE HYDROCHLORIDE 37.5 MG/1
37.5 CAPSULE, EXTENDED RELEASE ORAL DAILY
Qty: 30 CAP | Refills: 0 | Status: SHIPPED | OUTPATIENT
Start: 2020-11-23 | End: 2020-12-02

## 2020-11-23 RX ORDER — ALFUZOSIN HYDROCHLORIDE 10 MG/1
10 TABLET, EXTENDED RELEASE ORAL EVERY MORNING
COMMUNITY
End: 2021-04-02 | Stop reason: SDUPTHER

## 2020-11-23 RX ORDER — CIPROFLOXACIN 500 MG/1
1 TABLET, FILM COATED ORAL
COMMUNITY
End: 2020-12-16

## 2020-11-23 RX ORDER — LORAZEPAM 0.5 MG/1
0.5 TABLET ORAL NIGHTLY PRN
Qty: 5 TAB | Refills: 0 | Status: SHIPPED | OUTPATIENT
Start: 2020-11-23 | End: 2020-11-28

## 2020-11-23 ASSESSMENT — FIBROSIS 4 INDEX: FIB4 SCORE: 1.058973101884528324

## 2020-11-23 NOTE — ASSESSMENT & PLAN NOTE
The patient's the patient started losing weight he has developed progressively worsening anxiety and depression.  He does have passive thoughts of suicide but states that there is no active suicidal ideation.  He has ruminations that tend to be worse at night and are associated with insomnia.  He was recently started on Cipro by his urologist and wonders if this could be the cause of the symptoms.

## 2020-11-23 NOTE — ASSESSMENT & PLAN NOTE
In August the patient first came to see me for 4 months of unintended weight loss.  In April 2019 he weighed 238 pounds, today he weighs almost 200 pounds.  He has had an extensive laboratory and imaging work-up including an MRI of the brain and a CT scan of the chest, abdomen and pelvis.  He did have a slightly elevated PSA which has improved and the patient is now following with urology.  He is also following with neurology because of his elevated CPK and reports of weakness.  Today, the patient states that his weight has stabilized somewhat.  He remains quite anxious about what could be going on with him, however, and would like to see an oncologist to consider a PET scan or alternative diagnostic modality.  During the work-up, it was also discovered that the patient had a breast mass which was biopsied and determined to be benign.  He did follow-up with the breast surgeon who is currently monitoring this.  Patient has had a colonoscopy demonstrating diverticulosis and an EGD showing erosive gastritis.

## 2020-11-23 NOTE — ASSESSMENT & PLAN NOTE
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  He has been following with surgery who is monitoring it.

## 2020-11-23 NOTE — PROGRESS NOTES
[facility]  Progress Note  Established Patient    Subjective:   Lico Salinas is a 70 y.o. male here today with a chief complaint of weight loss. The patient is alone, both of us are masked.     Hypothyroidism  This is at goal on Synthroid.    Diabetes mellitus type 2, noninsulin dependent (CMS-HCC)  This is currently managed with metformin.    Elevated CPK  Patient is now following with neurology for this issue.    Dyslipidemia  This is under reasonable control on a fibrate alone.  The patient is not on a statin because of a history of elevated CPK.    Diabetic polyneuropathy associated with type 2 diabetes mellitus (HCC)  Patient continues to report neuropathy, worse at night.  Gabapentin is only providing limited relief.    Unintended weight loss  In August the patient first came to see me for 4 months of unintended weight loss.  In April 2019 he weighed 238 pounds, today he weighs almost 200 pounds.  He has had an extensive laboratory and imaging work-up including an MRI of the brain and a CT scan of the chest, abdomen and pelvis.  He did have a slightly elevated PSA which has improved and the patient is now following with urology.  He is also following with neurology because of his elevated CPK and reports of weakness.  Today, the patient states that his weight has stabilized somewhat.  He remains quite anxious about what could be going on with him, however, and would like to see an oncologist to consider a PET scan or alternative diagnostic modality.  During the work-up, it was also discovered that the patient had a breast mass which was biopsied and determined to be benign.  He did follow-up with the breast surgeon who is currently monitoring this.  Patient has had a colonoscopy demonstrating diverticulosis and an EGD showing erosive gastritis.    Urinary frequency  Patient is now following with urology for this issue.    Night sweat  At 1 point the patient was describing night sweats  associated with unintended weight loss.    Weakness generalized  Patient is following with neurology for this issue.    Anxiety and depression  The patient's the patient started losing weight he has developed progressively worsening anxiety and depression.  He does have passive thoughts of suicide but states that there is no active suicidal ideation.  He has ruminations that tend to be worse at night and are associated with insomnia.  He was recently started on Cipro by his urologist and wonders if this could be the cause of the symptoms.    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  He has been following with surgery who is monitoring it.    Erosive gastritis  This was noted on recent EGD.  The patient was started on omeprazole.    Hilar lymphadenopathy  And mild hilar lymphadenopathy was noted on recent CT scan.    Kidney cyst, acquired  Some recent kidney cysts were noted on recent CT scan.  Patient is now following with urology.    Anemia  A mild anemia was noted on recent labs. Colonoscopy reassuring.    Controlled Substance Assessment:   - Is the medication, if previously prescribed, working as intended and expected to treat   the patients symptoms: N/A.   - Does the patient have a history of substance abuse: no.   - Has the patient demonstrated aberrant behavior or intoxication: no.   - Is there reason to believe that the patient is not using medication as prescribed or diverting the medication: no.   - Is there reason to believe that the patient is currently misusing this medication or addicted to the controlled substance: no.   - Is it necessary to verify that controlled substances, other that those authorized under the treatment plan, are not present in the body of this patient: no.  - Are there any blood or urine test that indicate inappropriate use of the medication or other controlled substances : no.   - I  have reviewed the . Does the  report irregular/inconsistent medication use or indicate that the medication is being used inappropriately or that the patient is using another controlled substance not prescribed or known to me: no.   - Has the patient been issues another CS for the same medication from another provider for ongoing treatment:no.   - Is there reason to believe that the patient is using other drugs, including alcohol, prescription or illicit drugs, that may interact negatively with controlled substance or have not been prescribed by a practitioner who is treating the patient: no.   - Are there any known early refill attempts or claims that medication has been lost or stolen: no.   - Are there are any major changes in the patient's mental or physical health that would affect the medical appropriateness of this medication: no.   - Has the patient increased the dose of medication without provider authorization: no.  - Has the patient been reluctant to cooperate with any exam, analysis or test recommended by me: no.   - Has the patient demonstrated reluctance to stop or reduce use of the medicine: no.  - Has the patient requested or demanded a controlled substance that is likely to be abused or cause dependency or addiction: no.   - Is there any other evidence that the patient is chronically using opioids, misusing, abusing, illegally using or addicted to any drug or failing to comply with the instructions of the practitioner concerning the use of the controlled substance: no  - Are there any other factors that the practitioner determines is necessary to make an informed professional judgment concerning the medical appropriateness of the prescription: no.     Current Outpatient Medications on File Prior to Visit   Medication Sig Dispense Refill   • ciprofloxacin (CIPRO) 500 MG Tab Take 1 Tab by mouth.     • alfuzosin (UROXATRAL) 10 MG SR tablet Take 10 mg by mouth every day.     • omeprazole (PRILOSEC)  20 MG delayed-release capsule Take 1 Cap by mouth every day.     • gabapentin (NEURONTIN) 300 MG Cap TAKE 2 CAPSULES BY MOUTH AT BEDTIME 200 Cap 1   • metFORMIN ER (GLUCOPHAGE XR) 500 MG TABLET SR 24 HR Take 2 Tabs by mouth 2 times a day. 360 Tab 1   • losartan (COZAAR) 50 MG Tab Take 1 tablet by mouth once daily 100 Tab 4   • Glucos-Chondroit-Hyaluron-MSM (GLUCOSAMINE CHONDROITIN JOINT) Tab Take 2 Tabs by mouth every day.     • fenofibrate (TRICOR) 145 MG Tab Take 1 Tab by mouth every day. 90 Tab 4   • levothyroxine (SYNTHROID) 75 MCG Tab TAKE 1 TABLET BY MOUTH IN THE MORNING ON AN EMPTY STOMACH 90 Tab 3   • vitamin D3, cholecalciferol, 1000 UNIT Tab Take 1,000 Units by mouth every day.     • timolol (TIMOPTIC) 0.5 % Solution      • latanoprost (XALATAN) 0.005 % Solution Place 1 Drop in both eyes every evening.     • ASCORBIC ACID PO Take 5 g by mouth every 48 hours.     • Magnesium 200 MG Tab Take 200 mg by mouth every day.     • Coenzyme Q10 200 MG Cap Take 200 mg by mouth every day.     • aspirin EC (ECOTRIN) 81 MG Tablet Delayed Response Take 81 mg by mouth every day.     • Omega 3-6-9 Fatty Acids (OMEGA 3-6-9 COMPLEX PO) Take 3,200 mg by mouth every day.     • Non Formulary Request Take 1 Tab by mouth every day. Prostate plus     • Multiple Vitamin (MULTI VITAMIN MENS PO) Take 1 Tab by mouth every day.       No current facility-administered medications on file prior to visit.        Past Medical History:   Diagnosis Date   • Diabetes (HCC) 07-10-15   • Glaucoma     OU   • High cholesterol 07-10-15    hx of, took self off simvastin   • Hypertension 07-10-15    hx of, took himself off lisinopril and HTCZ   • Hypothyroidism    • Skin cancer    • Snoring        Allergies: Pcn [penicillins]    Surgical History:  has a past surgical history that includes lymph node excision (Left, 7/21/2015); carpal tunnel endoscopic (Left, 9/5/2017); and lumbar disc replacement.    Family History: family history includes Heart  "Disease in his father and mother; Lung Disease in his father.    Social History:  reports that he quit smoking about 40 years ago. His smoking use included cigarettes. He has a 20.00 pack-year smoking history. He has never used smokeless tobacco. He reports current alcohol use of about 0.5 oz of alcohol per week. He reports that he does not use drugs.    ROS: no fever or cough.        Objective:     Vitals:    11/23/20 0900   BP: 132/68   BP Location: Left arm   Patient Position: Sitting   BP Cuff Size: Adult long   Pulse: 66   Resp: 18   Temp: 36.3 °C (97.4 °F)   TempSrc: Temporal   SpO2: 97%   Weight: 90.3 kg (199 lb)   Height: 1.778 m (5' 10\")       Physical Exam:  Affect: anxious.        Assessment and Plan:     1. Hypothyroidism, unspecified type  - continue Synthroid.     2. Diabetes mellitus type 2, noninsulin dependent (HCC)  - A1c in f/u.     3. Elevated CPK  - f/u neuro.     4. Dyslipidemia  - continue fenofibrate.     5. Unintended weight loss  Reassured by extensive workup up to this point. Patient requests oncology referral and consideration of PET scan. I will place this referral. I'm not sure what precipitated the initial weight loss but suspect that it's largely being driven by anxiety at this point, though it has stabilized lately. Will address this and closely f/u.   - REFERRAL TO HEMATOLOGY ONCOLOGY    6. Night sweat  - see above.   - REFERRAL TO HEMATOLOGY ONCOLOGY    7. Weakness generalized  - f/u neuro.     8. Diabetic polyneuropathy associated with type 2 diabetes mellitus (HCC)  - continue gabapentin.   - start effexor.     9. Anxiety and depression  I advised the patient if he develops suicidal ideation he needs to proceed to ER. Offered counseling but he declines for now. He has a friend who is a counselor who he might talk to. Will start effexor and 5 days of ativan at night. The effexor should be helpful for depression, anxiety and neuropathy. Will f/u 3 weeks. I suspect the anxiety and " depression is a product of the concerns surrounding the initial weight loss but now is driving the weight loss and needs to be aggressively addressed. I informed him I don't think cipro is causing these symptoms. Recommended exercise.   - venlafaxine XR (EFFEXOR XR) 37.5 MG CAPSULE SR 24 HR; Take 1 Cap by mouth every day.  Dispense: 30 Cap; Refill: 0  - LORazepam (ATIVAN) 0.5 MG Tab; Take 1 Tab by mouth at bedtime as needed (sleep) for up to 5 days.  Dispense: 5 Tab; Refill: 0    10. Urinary frequency  - f/u urology.     11. Breast mass  - f/u surgery.     12. Erosive gastritis  - continue omeprazole.     13. Hilar lymphadenopathy  - REFERRAL TO HEMATOLOGY ONCOLOGY    14. Kidney cyst, acquired  - f/u urology.     Note: will discuss medication de-escalation in f/u.         Followup: Return in about 3 weeks (around 12/14/2020), or if symptoms worsen or fail to improve.

## 2020-11-23 NOTE — ASSESSMENT & PLAN NOTE
This is under reasonable control on a fibrate alone.  The patient is not on a statin because of a history of elevated CPK.

## 2020-11-23 NOTE — ASSESSMENT & PLAN NOTE
Patient continues to report neuropathy, worse at night.  Gabapentin is only providing limited relief.

## 2020-12-02 ENCOUNTER — TELEMEDICINE (OUTPATIENT)
Dept: MEDICAL GROUP | Facility: MEDICAL CENTER | Age: 70
End: 2020-12-02
Payer: MEDICARE

## 2020-12-02 VITALS
WEIGHT: 200 LBS | SYSTOLIC BLOOD PRESSURE: 133 MMHG | HEART RATE: 70 BPM | DIASTOLIC BLOOD PRESSURE: 75 MMHG | HEIGHT: 70 IN | BODY MASS INDEX: 28.63 KG/M2

## 2020-12-02 DIAGNOSIS — E03.9 HYPOTHYROIDISM, UNSPECIFIED TYPE: ICD-10-CM

## 2020-12-02 DIAGNOSIS — R35.0 URINARY FREQUENCY: ICD-10-CM

## 2020-12-02 DIAGNOSIS — F41.9 ANXIETY AND DEPRESSION: ICD-10-CM

## 2020-12-02 DIAGNOSIS — E11.9 DIABETES MELLITUS TYPE 2, NONINSULIN DEPENDENT (HCC): ICD-10-CM

## 2020-12-02 DIAGNOSIS — E11.42 DIABETIC POLYNEUROPATHY ASSOCIATED WITH TYPE 2 DIABETES MELLITUS (HCC): ICD-10-CM

## 2020-12-02 DIAGNOSIS — F32.A ANXIETY AND DEPRESSION: ICD-10-CM

## 2020-12-02 DIAGNOSIS — G47.00 INSOMNIA, UNSPECIFIED TYPE: ICD-10-CM

## 2020-12-02 DIAGNOSIS — R40.0 SOMNOLENCE, DAYTIME: ICD-10-CM

## 2020-12-02 PROCEDURE — 99214 OFFICE O/P EST MOD 30 MIN: CPT | Mod: 95,CR | Performed by: FAMILY MEDICINE

## 2020-12-02 RX ORDER — AMITRIPTYLINE HYDROCHLORIDE 25 MG/1
25 TABLET, FILM COATED ORAL
Qty: 30 TAB | Refills: 0 | Status: SHIPPED | OUTPATIENT
Start: 2020-12-02 | End: 2021-01-04

## 2020-12-02 ASSESSMENT — FIBROSIS 4 INDEX: FIB4 SCORE: 1.058973101884528324

## 2020-12-02 NOTE — PROGRESS NOTES
Telemedicine Visit: Established Patient     This encounter was conducted via Zoom.   Verbal consent was obtained. Patient's identity was verified.    Subjective:     Lico Salinas is a 70 y.o. male presenting for evaluation and management of urinary frequency.    Urinary frequency  Patient describes several months of urinary frequency without dysuria.  He has seen his urologist who changed him from Flomax to alfuzosin and added Cipro.  Patient states he is feeling a little bit better but notices some rather troublesome nocturia keeps him up at night.  Patient had a fairly reassuring MRI of the brain in November 2020 and a PSA in November which was reassuring.    Anxiety and depression  Ever since the patient started losing weight he started to develop progressively worsening anxiety and depression.  At last visit, I started him on Effexor.  He is not sure if this is helping but states that some days he actually does pretty well.  Continues to describe some intermittent episodes of rather severe anxiety, however.    Diabetes mellitus type 2, noninsulin dependent (CMS-HCC)  Due for A1c in follow-up.    Diabetic polyneuropathy associated with type 2 diabetes mellitus (HCC)  Patient's neuropathy remains bothersome to him.  Gabapentin does help but takes a little while for this to kick in.    Hypothyroidism  At goal on Synthroid.    Insomnia  Patient continues to describe insomnia and it is very limitedly responsive to Ativan.  This is in the setting of ruminations and anxiety.    Somnolence, daytime  Patient describes daytime somnolence.      ROS no fever or cough.     Allergies   Allergen Reactions   • Pcn [Penicillins] Unspecified     As a child, unknown reaction       Current medicines (including changes today)  Current Outpatient Medications   Medication Sig Dispense Refill   • amitriptyline (ELAVIL) 25 MG Tab Take 1 Tab by mouth every bedtime. 30 Tab 0   • ciprofloxacin (CIPRO) 500 MG Tab Take 1 Tab by mouth.      • alfuzosin (UROXATRAL) 10 MG SR tablet Take 10 mg by mouth every day.     • omeprazole (PRILOSEC) 20 MG delayed-release capsule Take 1 Cap by mouth every day.     • gabapentin (NEURONTIN) 300 MG Cap TAKE 2 CAPSULES BY MOUTH AT BEDTIME 200 Cap 1   • metFORMIN ER (GLUCOPHAGE XR) 500 MG TABLET SR 24 HR Take 2 Tabs by mouth 2 times a day. 360 Tab 1   • losartan (COZAAR) 50 MG Tab Take 1 tablet by mouth once daily 100 Tab 4   • Glucos-Chondroit-Hyaluron-MSM (GLUCOSAMINE CHONDROITIN JOINT) Tab Take 2 Tabs by mouth every day.     • fenofibrate (TRICOR) 145 MG Tab Take 1 Tab by mouth every day. 90 Tab 4   • levothyroxine (SYNTHROID) 75 MCG Tab TAKE 1 TABLET BY MOUTH IN THE MORNING ON AN EMPTY STOMACH 90 Tab 3   • vitamin D3, cholecalciferol, 1000 UNIT Tab Take 1,000 Units by mouth every day.     • timolol (TIMOPTIC) 0.5 % Solution      • latanoprost (XALATAN) 0.005 % Solution Place 1 Drop in both eyes every evening.     • ASCORBIC ACID PO Take 5 g by mouth every 48 hours.     • Magnesium 200 MG Tab Take 200 mg by mouth every day.     • Coenzyme Q10 200 MG Cap Take 200 mg by mouth every day.     • aspirin EC (ECOTRIN) 81 MG Tablet Delayed Response Take 81 mg by mouth every day.     • Omega 3-6-9 Fatty Acids (OMEGA 3-6-9 COMPLEX PO) Take 3,200 mg by mouth every day.     • Non Formulary Request Take 1 Tab by mouth every day. Prostate plus     • Multiple Vitamin (MULTI VITAMIN MENS PO) Take 1 Tab by mouth every day.       No current facility-administered medications for this visit.        Patient Active Problem List    Diagnosis Date Noted   • Insomnia 12/02/2020   • Somnolence, daytime 12/02/2020   • Anxiety and depression 11/23/2020   • Breast mass 11/23/2020   • Erosive gastritis 11/23/2020   • Hilar lymphadenopathy 11/23/2020   • Kidney cyst, acquired 11/23/2020   • Anemia 11/23/2020   • Weakness generalized 10/21/2020   • Allergic rhinitis 10/05/2020   • Chronic left shoulder pain 08/25/2020   • Unintended weight loss  "08/25/2020   • Urinary frequency 08/25/2020   • Healthcare maintenance 12/20/2019   • Subcutaneous nodule 04/17/2019   • Right buttock pain 10/17/2018   • Diabetic polyneuropathy associated with type 2 diabetes mellitus (HCC) 04/03/2018   • Right shoulder pain 04/03/2018   • Hypothyroidism 11/11/2016   • Diabetes mellitus type 2, noninsulin dependent (HCC) 11/11/2016   • Elevated CPK 11/11/2016   • Dyslipidemia 11/11/2016       Family History   Problem Relation Age of Onset   • Heart Disease Mother    • Lung Disease Father    • Heart Disease Father        He  has a past medical history of Diabetes (HCC) (07-10-15), Glaucoma, High cholesterol (07-10-15), Hypertension (07-10-15), Hypothyroidism, Skin cancer, and Snoring.  He  has a past surgical history that includes lymph node excision (Left, 7/21/2015); carpal tunnel endoscopic (Left, 9/5/2017); and lumbar disc replacement.       Objective:   Vitals obtained by patient:  /75 (BP Location: Left arm, Patient Position: Sitting, BP Cuff Size: Adult)   Pulse 70   Ht 1.778 m (5' 10\")   Wt 90.7 kg (200 lb)   BMI 28.70 kg/m²       Physical Exam:  Constitutional: Alert, appears anxious.      Assessment and Plan:     1. Urinary frequency  Suspect BPH. Take alfuzosin at night, get labs below.   - URINALYSIS,CULTURE IF INDICATED; Future    2. Anxiety and depression  - stop effexor, start amitriptyline at night which should help with anxiety and depression as well as insomnia and neuropathy.  - amitriptyline (ELAVIL) 25 MG Tab; Take 1 Tab by mouth every bedtime.  Dispense: 30 Tab; Refill: 0    3. Insomnia, unspecified type  -See above.    4. Hypothyroidism, unspecified type  -Continue Synthroid.  - TSH; Future    5. Diabetes mellitus type 2, noninsulin dependent (HCC)  - HEMOGLOBIN A1C; Future  - Comp Metabolic Panel; Future    6. Diabetic polyneuropathy associated with type 2 diabetes mellitus (HCC)  -Start amitriptyline, continue gabapentin.    7. Somnolence, " daytime  -Offered sleep study, patient declined.          Follow-up: Return if symptoms worsen or fail to improve.

## 2020-12-02 NOTE — ASSESSMENT & PLAN NOTE
Ever since the patient started losing weight he started to develop progressively worsening anxiety and depression.  At last visit, I started him on Effexor.  He is not sure if this is helping but states that some days he actually does pretty well.  Continues to describe some intermittent episodes of rather severe anxiety, however.

## 2020-12-02 NOTE — ASSESSMENT & PLAN NOTE
Patient describes several months of urinary frequency without dysuria.  He has seen his urologist who changed him from Flomax to alfuzosin and added Cipro.  Patient states he is feeling a little bit better but notices some rather troublesome nocturia keeps him up at night.  Patient had a fairly reassuring MRI of the brain in November 2020 and a PSA in November which was reassuring.

## 2020-12-02 NOTE — ASSESSMENT & PLAN NOTE
Patient continues to describe insomnia and it is very limitedly responsive to Ativan.  This is in the setting of ruminations and anxiety.

## 2020-12-02 NOTE — ASSESSMENT & PLAN NOTE
Patient's neuropathy remains bothersome to him.  Gabapentin does help but takes a little while for this to kick in.

## 2020-12-10 ENCOUNTER — HOSPITAL ENCOUNTER (OUTPATIENT)
Dept: LAB | Facility: MEDICAL CENTER | Age: 70
End: 2020-12-10
Attending: FAMILY MEDICINE
Payer: MEDICARE

## 2020-12-10 DIAGNOSIS — E03.9 HYPOTHYROIDISM, UNSPECIFIED TYPE: ICD-10-CM

## 2020-12-10 DIAGNOSIS — E11.9 DIABETES MELLITUS TYPE 2, NONINSULIN DEPENDENT (HCC): ICD-10-CM

## 2020-12-10 DIAGNOSIS — R35.0 URINARY FREQUENCY: ICD-10-CM

## 2020-12-10 LAB
ALBUMIN SERPL BCP-MCNC: 4.3 G/DL (ref 3.2–4.9)
ALBUMIN/GLOB SERPL: 1.3 G/DL
ALP SERPL-CCNC: 44 U/L (ref 30–99)
ALT SERPL-CCNC: 15 U/L (ref 2–50)
ANION GAP SERPL CALC-SCNC: 10 MMOL/L (ref 7–16)
APPEARANCE UR: CLEAR
AST SERPL-CCNC: 19 U/L (ref 12–45)
BILIRUB SERPL-MCNC: 0.3 MG/DL (ref 0.1–1.5)
BILIRUB UR QL STRIP.AUTO: NEGATIVE
BUN SERPL-MCNC: 22 MG/DL (ref 8–22)
CALCIUM SERPL-MCNC: 10.1 MG/DL (ref 8.4–10.2)
CHLORIDE SERPL-SCNC: 102 MMOL/L (ref 96–112)
CO2 SERPL-SCNC: 24 MMOL/L (ref 20–33)
COLOR UR: YELLOW
CREAT SERPL-MCNC: 1.07 MG/DL (ref 0.5–1.4)
EST. AVERAGE GLUCOSE BLD GHB EST-MCNC: 134 MG/DL
FASTING STATUS PATIENT QL REPORTED: NORMAL
GLOBULIN SER CALC-MCNC: 3.4 G/DL (ref 1.9–3.5)
GLUCOSE SERPL-MCNC: 91 MG/DL (ref 65–99)
GLUCOSE UR STRIP.AUTO-MCNC: NEGATIVE MG/DL
HBA1C MFR BLD: 6.3 % (ref 0–5.6)
KETONES UR STRIP.AUTO-MCNC: NEGATIVE MG/DL
LEUKOCYTE ESTERASE UR QL STRIP.AUTO: NEGATIVE
MICRO URNS: NORMAL
NITRITE UR QL STRIP.AUTO: NEGATIVE
PH UR STRIP.AUTO: 7.5 [PH] (ref 5–8)
POTASSIUM SERPL-SCNC: 4.4 MMOL/L (ref 3.6–5.5)
PROT SERPL-MCNC: 7.7 G/DL (ref 6–8.2)
PROT UR QL STRIP: NEGATIVE MG/DL
RBC UR QL AUTO: NEGATIVE
SODIUM SERPL-SCNC: 136 MMOL/L (ref 135–145)
SP GR UR STRIP.AUTO: 1.02
TSH SERPL DL<=0.005 MIU/L-ACNC: 2.42 UIU/ML (ref 0.38–5.33)

## 2020-12-10 PROCEDURE — 83036 HEMOGLOBIN GLYCOSYLATED A1C: CPT

## 2020-12-10 PROCEDURE — 81003 URINALYSIS AUTO W/O SCOPE: CPT

## 2020-12-10 PROCEDURE — 36415 COLL VENOUS BLD VENIPUNCTURE: CPT

## 2020-12-10 PROCEDURE — 80053 COMPREHEN METABOLIC PANEL: CPT

## 2020-12-10 PROCEDURE — 84443 ASSAY THYROID STIM HORMONE: CPT

## 2020-12-16 ENCOUNTER — TELEPHONE (OUTPATIENT)
Dept: MEDICAL GROUP | Facility: MEDICAL CENTER | Age: 70
End: 2020-12-16

## 2020-12-16 ENCOUNTER — HOSPITAL ENCOUNTER (EMERGENCY)
Facility: MEDICAL CENTER | Age: 70
End: 2020-12-16
Attending: EMERGENCY MEDICINE
Payer: MEDICARE

## 2020-12-16 ENCOUNTER — OFFICE VISIT (OUTPATIENT)
Dept: MEDICAL GROUP | Facility: MEDICAL CENTER | Age: 70
End: 2020-12-16
Payer: MEDICARE

## 2020-12-16 VITALS
SYSTOLIC BLOOD PRESSURE: 102 MMHG | BODY MASS INDEX: 27.6 KG/M2 | HEART RATE: 71 BPM | OXYGEN SATURATION: 99 % | DIASTOLIC BLOOD PRESSURE: 60 MMHG | HEIGHT: 70 IN | TEMPERATURE: 97.1 F | RESPIRATION RATE: 20 BRPM | WEIGHT: 192.8 LBS

## 2020-12-16 VITALS
WEIGHT: 189 LBS | HEART RATE: 79 BPM | OXYGEN SATURATION: 98 % | TEMPERATURE: 98.1 F | RESPIRATION RATE: 15 BRPM | SYSTOLIC BLOOD PRESSURE: 133 MMHG | BODY MASS INDEX: 27.06 KG/M2 | DIASTOLIC BLOOD PRESSURE: 78 MMHG | HEIGHT: 70 IN

## 2020-12-16 DIAGNOSIS — Z23 NEED FOR VACCINATION: ICD-10-CM

## 2020-12-16 DIAGNOSIS — E11.9 DIABETES MELLITUS TYPE 2, NONINSULIN DEPENDENT (HCC): ICD-10-CM

## 2020-12-16 DIAGNOSIS — F41.9 ANXIETY AND DEPRESSION: ICD-10-CM

## 2020-12-16 DIAGNOSIS — R35.0 URINARY FREQUENCY: ICD-10-CM

## 2020-12-16 DIAGNOSIS — R63.4 UNINTENDED WEIGHT LOSS: ICD-10-CM

## 2020-12-16 DIAGNOSIS — E03.9 HYPOTHYROIDISM, UNSPECIFIED TYPE: ICD-10-CM

## 2020-12-16 DIAGNOSIS — F32.0 CURRENT MILD EPISODE OF MAJOR DEPRESSIVE DISORDER WITHOUT PRIOR EPISODE (HCC): ICD-10-CM

## 2020-12-16 DIAGNOSIS — F32.A ANXIETY AND DEPRESSION: ICD-10-CM

## 2020-12-16 DIAGNOSIS — R40.0 SOMNOLENCE, DAYTIME: ICD-10-CM

## 2020-12-16 DIAGNOSIS — D64.9 ANEMIA, UNSPECIFIED TYPE: ICD-10-CM

## 2020-12-16 LAB
AMPHET UR QL SCN: NEGATIVE
BARBITURATES UR QL SCN: NEGATIVE
BENZODIAZ UR QL SCN: NEGATIVE
BZE UR QL SCN: NEGATIVE
CANNABINOIDS UR QL SCN: NEGATIVE
METHADONE UR QL SCN: NEGATIVE
OPIATES UR QL SCN: NEGATIVE
OXYCODONE UR QL SCN: NEGATIVE
PCP UR QL SCN: NEGATIVE
POC BREATHALIZER: 0 PERCENT (ref 0–0.01)
PROPOXYPH UR QL SCN: NEGATIVE

## 2020-12-16 PROCEDURE — 90791 PSYCH DIAGNOSTIC EVALUATION: CPT

## 2020-12-16 PROCEDURE — 99214 OFFICE O/P EST MOD 30 MIN: CPT | Performed by: FAMILY MEDICINE

## 2020-12-16 PROCEDURE — 302970 POC BREATHALIZER

## 2020-12-16 PROCEDURE — 80307 DRUG TEST PRSMV CHEM ANLYZR: CPT

## 2020-12-16 PROCEDURE — 302970 POC BREATHALIZER: Performed by: EMERGENCY MEDICINE

## 2020-12-16 PROCEDURE — 99285 EMERGENCY DEPT VISIT HI MDM: CPT

## 2020-12-16 ASSESSMENT — PATIENT HEALTH QUESTIONNAIRE - PHQ9
5. POOR APPETITE OR OVEREATING: NEARLY EVERY DAY
1. LITTLE INTEREST OR PLEASURE IN DOING THINGS: MORE THAN HALF THE DAYS
9. THOUGHTS THAT YOU WOULD BE BETTER OFF DEAD, OR OF HURTING YOURSELF: MORE THAN HALF THE DAYS
8. MOVING OR SPEAKING SO SLOWLY THAT OTHER PEOPLE COULD HAVE NOTICED. OR THE OPPOSITE, BEING SO FIGETY OR RESTLESS THAT YOU HAVE BEEN MOVING AROUND A LOT MORE THAN USUAL: MORE THAN HALF THE DAYS
3. TROUBLE FALLING OR STAYING ASLEEP OR SLEEPING TOO MUCH: NEARLY EVERY DAY
SUM OF ALL RESPONSES TO PHQ QUESTIONS 1-9: 22
SUM OF ALL RESPONSES TO PHQ9 QUESTIONS 1 AND 2: 5
2. FEELING DOWN, DEPRESSED, IRRITABLE, OR HOPELESS: NEARLY EVERY DAY
7. TROUBLE CONCENTRATING ON THINGS, SUCH AS READING THE NEWSPAPER OR WATCHING TELEVISION: NEARLY EVERY DAY
6. FEELING BAD ABOUT YOURSELF - OR THAT YOU ARE A FAILURE OR HAVE LET YOURSELF OR YOUR FAMILY DOWN: MORE THAN HALF THE DAYS
4. FEELING TIRED OR HAVING LITTLE ENERGY: MORE THAN HALF THE DAYS

## 2020-12-16 ASSESSMENT — ANXIETY QUESTIONNAIRES
5. BEING SO RESTLESS THAT IT IS HARD TO SIT STILL: NEARLY EVERY DAY
2. NOT BEING ABLE TO STOP OR CONTROL WORRYING: NEARLY EVERY DAY
GAD7 TOTAL SCORE: 20
3. WORRYING TOO MUCH ABOUT DIFFERENT THINGS: NEARLY EVERY DAY
7. FEELING AFRAID AS IF SOMETHING AWFUL MIGHT HAPPEN: NEARLY EVERY DAY
4. TROUBLE RELAXING: NEARLY EVERY DAY
6. BECOMING EASILY ANNOYED OR IRRITABLE: MORE THAN HALF THE DAYS
1. FEELING NERVOUS, ANXIOUS, OR ON EDGE: NEARLY EVERY DAY

## 2020-12-16 ASSESSMENT — FIBROSIS 4 INDEX
FIB4 SCORE: 1.337939701417107651
FIB4 SCORE: 1.337939701417107651

## 2020-12-16 ASSESSMENT — LIFESTYLE VARIABLES: DO YOU DRINK ALCOHOL: NO

## 2020-12-16 NOTE — DISCHARGE PLANNING
[facility]  Crisis/Safety Plan    Name:  Lico Salinas  MRN:  [id number]  Date:  2020    Warning signs that a crisis may be developing for me or I may be at risk:  1) feeling overwhelmed  2) brain going off in many directions  3) not wanting to live    Coping strategies I can use on my own (relaxation, physical activity, etc):  1) relaxation  2) do exercises  3) yard work    Ways I can make my environment safe:  1) no guns or weapons  2) ask for help  3)    Things I want to tell myself when I feel a crisis developin) stay calm  2) don't panic  3) tell bad thoughts to leave    People I can contact for support or distraction (and their phone numbers):  1) wife, Pina  2) brotherWon  3) friend, Lester    If I’m not able to reach my support people, or the above strategies don’t help, I can contact the following professionals, agencies, or hotlines:  1) Crisis Call Center ():  [phone] -OR- [phone]  2) Crisis Text Line ():  Text CARE TO 899969  3) [facility] [phone]  4) [facility] [phone]    Mandie Watkins R.N.

## 2020-12-16 NOTE — TELEPHONE ENCOUNTER
Anaid ([phone] ext: [phone]) from [facility] left a mess to inform that she got the referral with pertinent information, will have Dr. Elizondo review and they'll reach out to pt for an appointment.  Dr. Teresa, renee SEQUEIRA...

## 2020-12-16 NOTE — TELEPHONE ENCOUNTER
At the last visit the patient was referred to oncology for unintended weight loss. He states he's had trouble getting in to see them. Please call oncology and ask them to reach out to patient to schedule.

## 2020-12-16 NOTE — ED PROVIDER NOTES
CHIEF COMPLAINT  Chief Complaint   Patient presents with   • Sent by MD delgadillo depression worsening, sent by MD FISHER  Lico Salinas is a 70 y.o. male here for evaluation of depression.  Patient states that he was over at his doctor's office, and was feeling very sad.  He states that he has been intermittently having thoughts of wanting to harm himself, but states that he would never do it.  He states that it is not worth it.  He has no homicidal ideations.  Patient denies alcohol use, and denies pill ingestion other than his normal prescription medications.  He has no other concerns at this time.  No chest pain, no shortness of breath, no headache.  No back pain.  No leg pain.    ROS;  Please see HPI  O/W negative     PAST MEDICAL HISTORY   has a past medical history of Diabetes (HCC) (07-10-15), Glaucoma, High cholesterol (07-10-15), Hypertension (07-10-15), Hypothyroidism, Skin cancer, and Snoring.    SOCIAL HISTORY  Social History     Tobacco Use   • Smoking status: Former Smoker     Packs/day: 1.00     Years: 20.00     Pack years: 20.00     Types: Cigarettes     Quit date: 1980     Years since quittin.9   • Smokeless tobacco: Never Used   Substance and Sexual Activity   • Alcohol use: Yes     Alcohol/week: 0.5 oz     Types: 1 Cans of beer per week     Comment: has not been drinking lately    • Drug use: No   • Sexual activity: Yes     Partners: Female       SURGICAL HISTORY   has a past surgical history that includes lymph node excision (Left, 2015); carpal tunnel endoscopic (Left, 2017); and lumbar disc replacement.    CURRENT MEDICATIONS  Home Medications     Reviewed by Lou Ortega R.N. (Registered Nurse) on 20 at 1237  Med List Status: Partial   Medication Last Dose Status   alfuzosin (UROXATRAL) 10 MG SR tablet  Active   amitriptyline (ELAVIL) 25 MG Tab  Active   ASCORBIC ACID PO  Active   aspirin EC (ECOTRIN) 81 MG Tablet Delayed Response  Active   Coenzyme Q10 200 MG  "Cap  Active   fenofibrate (TRICOR) 145 MG Tab  Active   gabapentin (NEURONTIN) 300 MG Cap  Active   Glucos-Chondroit-Hyaluron-MSM (GLUCOSAMINE CHONDROITIN JOINT) Tab  Active   latanoprost (XALATAN) 0.005 % Solution  Active   levothyroxine (SYNTHROID) 75 MCG Tab  Active   losartan (COZAAR) 50 MG Tab  Active   Magnesium 200 MG Tab  Active   metFORMIN ER (GLUCOPHAGE XR) 500 MG TABLET SR 24 HR  Active   Multiple Vitamin (MULTI VITAMIN MENS PO)  Active   Non Formulary Request  Active   Omega 3-6-9 Fatty Acids (OMEGA 3-6-9 COMPLEX PO)  Active   omeprazole (PRILOSEC) 20 MG delayed-release capsule  Active   timolol (TIMOPTIC) 0.5 % Solution  Active   vitamin D3, cholecalciferol, 1000 UNIT Tab  Active                ALLERGIES  Allergies   Allergen Reactions   • Pcn [Penicillins] Unspecified     As a child, unknown reaction       REVIEW OF SYSTEMS  See HPI for further details. Review of systems as above, otherwise all other systems are negative.     PHYSICAL EXAM  VITAL SIGNS: /70   Pulse 63   Temp 36.2 °C (97.1 °F) (Temporal)   Resp 18   Ht 1.778 m (5' 10\")   Wt 85.7 kg (189 lb)   SpO2 98%   BMI 27.12 kg/m²     Constitutional: Well developed, well nourished. No acute distress.  HEENT: Normocephalic, atraumatic. MMM  Neck: Supple, Full range of motion   Chest/Pulmonary:  No respiratory distress.  Equal expansion   Musculoskeletal: No deformity, no edema, neurovascular intact.   Neuro: Clear speech, appropriate, cooperative, cranial nerves II-XII grossly intact.  Psych: Normal mood and affect      PROCEDURES     MEDICAL RECORD  I have reviewed patient's medical record and pertinent results are listed.    COURSE & MEDICAL DECISION MAKING  I have reviewed any medical record information, laboratory studies and radiographic results as noted above.    1:29 PM  Patient will be seen and evaluated by life skills.    1:55 PM  Per velma francis, the pt told him he wanted to try and suffocate himself, but changed his mind because " of being too afraid to do so.     2:17 PM  I spoke to the alert team.  She has made arrangements for the pt to go directly over to Behavioral health.  They are waiting for him.  The pts wife is currently ok taking the pt from here, and the pt has no current si/hi.     FINAL IMPRESSION  Depression    Electronically signed by: Sebastián Hartmann D.O., 12/16/2020 1:28 PM

## 2020-12-16 NOTE — ASSESSMENT & PLAN NOTE
Ever since the patient started losing weight he started to develop progressively worsening anxiety and depression.  I have tried him on Effexor but he was not sure if that was helping.  I subsequently switched him to amitriptyline to get some benefit in terms of anxiety and depression, as well as neuropathy and insomnia.  This does help with the insomnia but it is not helping his anxiety and depression too much.  In fact, he has acutely worsened over the past several weeks.  He has thoughts of self-harm and states that he has even demonstrated concerning behaviors such as passive attempts at smothering himself.  These are not serious attempts but he will just shove his face into a pillow with the idea of smothering himself.  We have checked his TSH and it is normal.  He completed the SHARONDA-7 and PHQ 9 today (see media), both of which were very concerning.  The patient reports that at this moment he is not currently suicidal but these thoughts come and go more than half the days of the week.

## 2020-12-16 NOTE — ASSESSMENT & PLAN NOTE
In August the patient first came to see me for 4 months of unintended weight loss.  In April 2019 he weighed 238 pounds, today he weighs 192 pounds.  He has had an extensive laboratory and imaging work-up including an MRI of the brain and a CT scan of the chest, abdomen and pelvis.  He did have a slightly elevated PSA which has improved and the patient is now following with urology.  He is also following with neurology because of his elevated CPK and reports of weakness. During the work-up, it was also discovered that the patient had a breast mass which was biopsied and determined to be benign.  He did follow-up with the breast surgeon who is currently monitoring this.  Patient has had a colonoscopy demonstrating diverticulosis and an EGD showing erosive gastritis.  Today, the patient describes acutely worsening anxiety and depression.  At the last visit, I referred him to oncology considering the weight loss but he has had difficulty making this appointment.

## 2020-12-16 NOTE — TELEPHONE ENCOUNTER
Left a mess with [facility] asking about the status of patient's referral.  I sent pt a mess via my chart asking that he contacts them for an paula. As well.  I re faxed the referral with pertinent information.  Dr. Teresa, please note...

## 2020-12-16 NOTE — PROGRESS NOTES
[facility]  Progress Note  Established Patient    Subjective:   Lico Salinas is a 70 y.o. male here today with a chief complaint of anxiety and depression. The patient is accompanied by his wife, all of us are masked.     Anxiety and depression  Ever since the patient started losing weight he started to develop progressively worsening anxiety and depression.  I have tried him on Effexor but he was not sure if that was helping.  I subsequently switched him to amitriptyline to get some benefit in terms of anxiety and depression, as well as neuropathy and insomnia.  This does help with the insomnia but it is not helping his anxiety and depression too much.  In fact, he has acutely worsened over the past several weeks.  He has thoughts of self-harm and states that he has even demonstrated concerning behaviors such as passive attempts at smothering himself.  These are not serious attempts but he will just shove his face into a pillow with the idea of smothering himself.  We have checked his TSH and it is normal.  He completed the SHARONDA-7 and PHQ 9 today (see media), both of which were very concerning.  The patient reports that at this moment he is not currently suicidal but these thoughts come and go more than half the days of the week.    Anemia  Patient has a mild anemia with recent colonoscopy and EGD showing erosive gastritis and a small ulcer.  He is currently maintained on omeprazole.    Diabetes mellitus type 2, noninsulin dependent (CMS-HCC)  Patient's A1c is below 6.5 on Metformin alone.    Hypothyroidism  On Synthroid.    Somnolence, daytime  The patient has declined sleep study in the past.    Urinary frequency  Patient is following with urology for this issue.  They are considering doing surgery.    Unintended weight loss  In August the patient first came to see me for 4 months of unintended weight loss.  In April 2019 he weighed 238 pounds, today he weighs 192 pounds.  He has had an extensive  laboratory and imaging work-up including an MRI of the brain and a CT scan of the chest, abdomen and pelvis.  He did have a slightly elevated PSA which has improved and the patient is now following with urology.  He is also following with neurology because of his elevated CPK and reports of weakness. During the work-up, it was also discovered that the patient had a breast mass which was biopsied and determined to be benign.  He did follow-up with the breast surgeon who is currently monitoring this.  Patient has had a colonoscopy demonstrating diverticulosis and an EGD showing erosive gastritis.  Today, the patient describes acutely worsening anxiety and depression.  At the last visit, I referred him to oncology considering the weight loss but he has had difficulty making this appointment.      Current Outpatient Medications on File Prior to Visit   Medication Sig Dispense Refill   • amitriptyline (ELAVIL) 25 MG Tab Take 1 Tab by mouth every bedtime. 30 Tab 0   • alfuzosin (UROXATRAL) 10 MG SR tablet Take 10 mg by mouth every day.     • omeprazole (PRILOSEC) 20 MG delayed-release capsule Take 1 Cap by mouth every day.     • gabapentin (NEURONTIN) 300 MG Cap TAKE 2 CAPSULES BY MOUTH AT BEDTIME 200 Cap 1   • metFORMIN ER (GLUCOPHAGE XR) 500 MG TABLET SR 24 HR Take 2 Tabs by mouth 2 times a day. 360 Tab 1   • losartan (COZAAR) 50 MG Tab Take 1 tablet by mouth once daily 100 Tab 4   • Glucos-Chondroit-Hyaluron-MSM (GLUCOSAMINE CHONDROITIN JOINT) Tab Take 2 Tabs by mouth every day.     • fenofibrate (TRICOR) 145 MG Tab Take 1 Tab by mouth every day. 90 Tab 4   • levothyroxine (SYNTHROID) 75 MCG Tab TAKE 1 TABLET BY MOUTH IN THE MORNING ON AN EMPTY STOMACH 90 Tab 3   • vitamin D3, cholecalciferol, 1000 UNIT Tab Take 1,000 Units by mouth every day.     • timolol (TIMOPTIC) 0.5 % Solution      • latanoprost (XALATAN) 0.005 % Solution Place 1 Drop in both eyes every evening.     • ASCORBIC ACID PO Take 5 g by mouth every 48  "hours.     • Magnesium 200 MG Tab Take 200 mg by mouth every day.     • Coenzyme Q10 200 MG Cap Take 200 mg by mouth every day.     • aspirin EC (ECOTRIN) 81 MG Tablet Delayed Response Take 81 mg by mouth every day.     • Omega 3-6-9 Fatty Acids (OMEGA 3-6-9 COMPLEX PO) Take 3,200 mg by mouth every day.     • Non Formulary Request Take 1 Tab by mouth every day. Prostate plus     • Multiple Vitamin (MULTI VITAMIN MENS PO) Take 1 Tab by mouth every day.       No current facility-administered medications on file prior to visit.        Past Medical History:   Diagnosis Date   • Diabetes (HCC) 07-10-15   • Glaucoma     OU   • High cholesterol 07-10-15    hx of, took self off simvastin   • Hypertension 07-10-15    hx of, took himself off lisinopril and HTCZ   • Hypothyroidism    • Skin cancer    • Snoring        Allergies: Pcn [penicillins]    Surgical History:  has a past surgical history that includes lymph node excision (Left, 7/21/2015); carpal tunnel endoscopic (Left, 9/5/2017); and lumbar disc replacement.    Family History: family history includes Heart Disease in his father and mother; Lung Disease in his father.    Social History:  reports that he quit smoking about 40 years ago. His smoking use included cigarettes. He has a 20.00 pack-year smoking history. He has never used smokeless tobacco. He reports current alcohol use of about 0.5 oz of alcohol per week. He reports that he does not use drugs.    ROS: No manic symptoms, no fever, no cough.        Objective:     Vitals:    12/16/20 1020   BP: 102/60   BP Location: Left arm   Patient Position: Sitting   BP Cuff Size: Adult long   Pulse: 71   Resp: 20   Temp: 36.2 °C (97.1 °F)   TempSrc: Temporal   SpO2: 99%   Weight: 87.5 kg (192 lb 12.8 oz)   Height: 1.778 m (5' 10\")       Physical Exam:  General: alert in no apparent distress.   Affect: flat.       Assessment and Plan:     1. Anxiety and depression  Patient has severe anxiety and depression nonresponsive to " duloxetine and amitriptyline now with some concerns for suicidal ideation.  I asked that he proceed directly to the [facility] for psychiatric evaluation and I believe he may require psychiatric hospitalizatoin.  If he requires an inpatient mental health admission, he prefers to go to [facility]. I asked that his wife drive him.     3. Anemia, unspecified type  - monitor, may improve with tx of erosive gastritis.     4. Diabetes mellitus type 2, noninsulin dependent (HCC)  - will need to de-escalate metformin in f/u.     5. Hypothyroidism, unspecified type  - continue Synthroid.     6. Somnolence, daytime  - will revisit sleep study in f/u.     7. Urinary frequency  - f/u urology.     8. Unintended weight loss  I do believe that the patient's unintended weight loss at this point is being driven by his serious depression and anxiety.  Nonetheless, out of an abundance of caution, I do think that seeing the oncologist is wise and I will work on trying to facilitate this.        Followup: Return if symptoms worsen or fail to improve.

## 2020-12-16 NOTE — ED TRIAGE NOTES
"Chief Complaint   Patient presents with   • Sent by MD delgadillo depression worsening, sent by MD [facility] to [facility], pt on monitor and in gown, labs drawn and sent. Pt consists of: sent by MD d/t depression worsening, wife wrote a letter being concerned about . Pt states that MD sent for further eval. Pt states that he he has thoughts of hurting himself the past month, thoughts consist of carbon monoxide poisoning or pills. Pt states he doesn't take any antidepressants, pt denies any stressors r/t depression. Pt has tried acting out on attempting to hurt self w/ pillow as stated in wife's note. Pt placed on SI precautions, educated on POC. In gown, belongings in bags, all equipment removed.    Medications given en route:n/a    /70   Pulse 63   Temp 36.2 °C (97.1 °F) (Temporal)   Resp 18   Ht 1.778 m (5' 10\")   Wt 85.7 kg (189 lb)   SpO2 98%   BMI 27.12 kg/m²   "

## 2020-12-16 NOTE — ASSESSMENT & PLAN NOTE
Patient has a mild anemia with recent colonoscopy and EGD showing erosive gastritis and a small ulcer.  He is currently maintained on omeprazole.

## 2020-12-16 NOTE — CONSULTS
"[facility]   TRIAGE ASSESSMENT    Name: Lico Salinas  MRN: [id number]  : 1950  Age: 70 y.o.  Date of assessment: 2020  PCP: Mynor Teresa M.D.  Persons in attendance: Patient    CHIEF COMPLAINT/PRESENTING ISSUE (as stated by patient): 70 year old male BIB his wife today from pt's PCP, Dr. Mynor Teresa, d/t increasing depression with passing SI;  pt alert, oriented x 4; calm; cooperative; pleasant; with organized thoughts and behaviors; no delusions, paranoia, hallucinations noted; insight, judgment adequate; currently denies SI, HI, or self-harm ideation; states he has had \"passing thought, maybe doing some pills, a couple days ago, I talked myself out of it\" and \"took a pillow, tried to smother myself last night but stopped\"; states \"thoughts started three weeks ago\" and identifies current stressors as \"trouble sleeping at night\" and \"health conditions, losing weight, tests were negative, have referral to oncology, they aren't calling me, I have a prostate problem, medical bills keep coming, it is eating into finances\"; pt states he feels overwhelmed by these issues but is willing to accept MH help; future; denies current, or h/o, outpt MH providers; currently taking RX Elavil 25 mg PO QHS x 10 days and Neurontin 600 mg PO QHS (nueropathy), taking as prescribed by his PCP: denies h/o inpt MH/CD tx; denies substance use; pt retired; lives with his wife, Pina, of 36 years, who is a positive support system    Writer RN spoke with  pt's wife, Pina, [phone], who states she can transport pt to [facility] today for voluntary  intake evaluation    Chief Complaint   Patient presents with   • Sent by MD     pt depression worsening, sent by MD        CURRENT LIVING SITUATION/SOCIAL SUPPORT: ; lives with his wife, Pina, of 36 years, who is a positive support system    BEHAVIORAL HEALTH TREATMENT HISTORY  Does patient/parent report a history of prior behavioral " health treatment for patient?   No:    SAFETY ASSESSMENT - SELF  Does patient acknowledge current or past symptoms of dangerousness to self? Yes-passive SI x 3 weeks, with thoughts to ingest pills 2 days ago, to smother self with a pillow last night  Does parent/significant other report patient has current or past symptoms of dangerousness to self? N\A  Does presenting problem suggest symptoms of dangerousness to self? Yes:     Past Current    Suicidal Thoughts: [x]  []    Suicidal Plans: [x]  []    Suicidal Intent: []  []    Suicide Attempts: []  []    Self-Injury []  []      For any boxes checked above, provide detail: passive SI x 3 weeks, with thoughts to ingest pills 2 days ago, to smother self with a pillow last night    History of suicide by family member: yes - sister, heroin OD, 30 years ago  History of suicide by friend/significant other: no  Recent change in frequency/specificity/intensity of suicidal thoughts or self-harm behavior? yes - approx 3 weeks  Current access to firearms, medications, or other identified means of suicide/self-harm? yes - RX medications  If yes, willing to restrict access to means of suicide/self-harm? yes - keep medications locked and secure  Protective factors present:  Future-oriented, Hopefulness, Optimism, Positive coping skills, Positive self-efficacy, Strong family connections and Willing to address in treatment    SAFETY ASSESSMENT - OTHERS  Does patient acknowledge current or past symptoms of aggressive behavior or risk to others? no  Does parent/significant other report patient has current or past symptoms of aggressive behavior or risk to others?  N\A  Does presenting problem suggest symptoms of dangerousness to others? No    Crisis Safety Plan completed and copy given to patient? yes    ABUSE/NEGLECT SCREENING  Does patient report feeling “unsafe” in his/her home, or afraid of anyone?  no  Does patient report any history of physical, sexual, or emotional abuse?   no  Does parent or significant other report any of the above? N\A  Is there evidence of neglect by self?  no  Is there evidence of neglect by a caregiver? no  Does the patient/parent report any history of CPS/APS/police involvement related to suspected abuse/neglect or domestic violence? no  Based on the information provided during the current assessment, is a mandated report of suspected abuse/neglect being made?  No    SUBSTANCE USE SCREENING  Pt denies substance use    UDS negative  ETOH negative      MENTAL STATUS   Participation: Active verbal participation, Attentive, Engaged and Open to feedback  Grooming: Casual and Neat  Orientation: Alert and Fully Oriented  Behavior: Calm  Eye contact: Good  Mood: Anxious  Affect: Flexible and Full range  Thought process: Logical, Goal-directed and Circumstantial  Thought content: Within normal limits  Speech: Rate within normal limits and Volume within normal limits  Perception: Within normal limits  Memory:  No gross evidence of memory deficits  Insight: Adequate  Judgment:  Adequate  Other:    Collateral information:   Source:  [] Significant other present in person:   [x] Significant other by telephone pt's wifePina, [phone]  [] [facility]   [x] [facility]  [x] [facility] Medical Record  [] Other:     [] Unable to complete full assessment due to:  [] Acute intoxication  [] Patient declined to participate/engage  [] Patient verbally unresponsive  [] Significant cognitive deficits  [] Significant perceptual distortions or behavioral disorganization  [] Other:      CLINICAL IMPRESSIONS:  Primary:  Depressed mood  Secondary:         IDENTIFIED NEEDS/PLAN:  [Trigger DISPOSITION list for any items marked]    []  Imminent safety risk - self [] Imminent safety risk - others   []  Acute substance withdrawal []  Psychosis/Impaired reality testing   [x]  Mood/anxiety []  Substance use/Addictive behavior   []  Maladaptive behaviro []  Parent/child conflict    []  Family/Couples conflict [x]  Biomedical   []  Housing []  Financial   []   Legal  Occupational/Educational   []  Domestic violence []  Other:     Recommended Plan of Care:  Refer to [facility], [facility] and [facility]: [employer] [facility]; writer RN reviewed community  resources with  Pt and pt's wife, Lynne, with written information given; writer RN to send pt referral to [facility] outpt program; writer to send pt referral to [facility] fax #[phone], for voluntary MH intake evaluation; pt and pt's wife verbalized understanding; pt to DC to self today with transport to [facility] with transport by pt's wife     Does patient express agreement with the above plan? yes    Referral appointment(s) scheduled? yes -[facility] today    Alert team only:   I have discussed findings and recommendations with Dr. Hartmann who is in agreement with these recommendations    Referral information sent to the following [facility] providers : [facility] fax #[phone]    If applicable : Referred  to :BRENDON Watkins R.N.  12/16/2020

## 2020-12-17 ENCOUNTER — TELEPHONE (OUTPATIENT)
Dept: MEDICAL GROUP | Facility: MEDICAL CENTER | Age: 70
End: 2020-12-17

## 2020-12-17 NOTE — ED NOTES
"Pt discharged ambulatory with steady gait with Alert team, AOx4, pt to be taken to [facility]. Pt in possession of belongings. Pt provided discharge education and information pertaining to medications and follow up appointments. Pt received copy of discharge instructions and verbalized understanding.     /78   Pulse 79   Temp 36.7 °C (98.1 °F) (Temporal)   Resp 15   Ht 1.778 m (5' 10\")   Wt 85.7 kg (189 lb)   SpO2 98%   BMI 27.12 kg/m²   "

## 2020-12-18 NOTE — TELEPHONE ENCOUNTER
Spoke with Dr. Elizondo at [facility]. He thinks a PET scan could certainly be reasonable. Will order this after discussing with patient.

## 2020-12-23 ENCOUNTER — PATIENT MESSAGE (OUTPATIENT)
Dept: HEALTH INFORMATION MANAGEMENT | Facility: OTHER | Age: 70
End: 2020-12-23

## 2020-12-23 ENCOUNTER — HOSPITAL ENCOUNTER (OUTPATIENT)
Dept: BEHAVIORAL HEALTH | Facility: MEDICAL CENTER | Age: 70
End: 2020-12-23
Attending: PSYCHIATRY & NEUROLOGY
Payer: MEDICARE

## 2020-12-23 ENCOUNTER — PATIENT OUTREACH (OUTPATIENT)
Dept: HEALTH INFORMATION MANAGEMENT | Facility: OTHER | Age: 70
End: 2020-12-23

## 2020-12-23 DIAGNOSIS — F41.9 ANXIETY AND DEPRESSION: ICD-10-CM

## 2020-12-23 DIAGNOSIS — F32.A ANXIETY AND DEPRESSION: ICD-10-CM

## 2020-12-23 DIAGNOSIS — R40.0 SOMNOLENCE, DAYTIME: ICD-10-CM

## 2020-12-23 PROCEDURE — 90791 PSYCH DIAGNOSTIC EVALUATION: CPT

## 2020-12-23 RX ORDER — MIRTAZAPINE 15 MG/1
15 TABLET, FILM COATED ORAL EVERY EVENING
Status: ON HOLD | COMMUNITY
End: 2021-01-13
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
## 2020-12-23 NOTE — PROGRESS NOTES
I have reviewed the note by Karen Montemayor RN and agree with the assessment and treatment plan.    1. Admit to Intensive Outpatient Program on 12/29/2020   - Group therapy per schedule,    - Psychoeducational groups per schedule,   - Individual/family counseling sessions with  per treatment plan.  2. Symptoms necessitating Intensive Outpatient Treatment: depression, anxiety  3. Medical screening/Physical exam per primary care provider or referring facility.    Chery Cohen MD

## 2020-12-24 NOTE — PROGRESS NOTES
HIPPA verified PQ/Epic. Reaching out to member for follow up from Hospital. Member does not currently need any additional aftercare. Member states he and his PCP have been messaging through my chart. No other concerns at this time.

## 2020-12-28 ENCOUNTER — APPOINTMENT (OUTPATIENT)
Dept: MEDICAL GROUP | Facility: MEDICAL CENTER | Age: 70
End: 2020-12-28
Payer: MEDICARE

## 2020-12-29 ENCOUNTER — APPOINTMENT (OUTPATIENT)
Dept: BEHAVIORAL HEALTH | Facility: MEDICAL CENTER | Age: 70
End: 2020-12-29
Attending: PSYCHIATRY & NEUROLOGY
Payer: MEDICARE

## 2020-12-31 ENCOUNTER — TELEPHONE (OUTPATIENT)
Dept: BEHAVIORAL HEALTH | Facility: MEDICAL CENTER | Age: 70
End: 2020-12-31

## 2020-12-31 ENCOUNTER — PATIENT MESSAGE (OUTPATIENT)
Dept: MEDICAL GROUP | Facility: MEDICAL CENTER | Age: 70
End: 2020-12-31

## 2020-12-31 DIAGNOSIS — R26.81 UNSTEADINESS: ICD-10-CM

## 2020-12-31 DIAGNOSIS — R46.89 BEHAVIORAL CHANGE: ICD-10-CM

## 2020-12-31 NOTE — TELEPHONE ENCOUNTER
Anytime a patient is hospitalized, decisions on discharge and transfer are made by the physician attending the patient in the hospital.

## 2020-12-31 NOTE — PATIENT COMMUNICATION
Pt's wife stated that pt's been discharged from the [facility]al [facility] today. She asked if you'd order a MRI for pt. I informed that you asked that pt follow up with an paula as well.  I scheduled pt for this coming Friday,  Dr. Teresa, please advise....

## 2021-01-01 ENCOUNTER — PATIENT MESSAGE (OUTPATIENT)
Dept: MEDICAL GROUP | Facility: MEDICAL CENTER | Age: 71
End: 2021-01-01

## 2021-01-01 DIAGNOSIS — R63.4 UNINTENDED WEIGHT LOSS: ICD-10-CM

## 2021-01-01 DIAGNOSIS — R74.8 ELEVATED CPK: ICD-10-CM

## 2021-01-01 DIAGNOSIS — E11.9 DIABETES MELLITUS TYPE 2, NONINSULIN DEPENDENT (HCC): ICD-10-CM

## 2021-01-04 ENCOUNTER — PATIENT MESSAGE (OUTPATIENT)
Dept: MEDICAL GROUP | Facility: MEDICAL CENTER | Age: 71
End: 2021-01-04

## 2021-01-04 RX ORDER — AMITRIPTYLINE HYDROCHLORIDE 25 MG/1
25 TABLET, FILM COATED ORAL
Qty: 30 TAB | Refills: 1 | Status: SHIPPED | OUTPATIENT
Start: 2021-01-04 | End: 2021-01-22

## 2021-01-06 ENCOUNTER — HOSPITAL ENCOUNTER (OUTPATIENT)
Facility: MEDICAL CENTER | Age: 71
End: 2021-01-13
Attending: EMERGENCY MEDICINE | Admitting: INTERNAL MEDICINE
Payer: MEDICARE

## 2021-01-06 ENCOUNTER — APPOINTMENT (OUTPATIENT)
Dept: RADIOLOGY | Facility: MEDICAL CENTER | Age: 71
End: 2021-01-06
Attending: EMERGENCY MEDICINE
Payer: MEDICARE

## 2021-01-06 DIAGNOSIS — F32.A ANXIETY AND DEPRESSION: ICD-10-CM

## 2021-01-06 DIAGNOSIS — R45.851 SUICIDAL IDEATION: ICD-10-CM

## 2021-01-06 DIAGNOSIS — F03.91 DEMENTIA WITH BEHAVIORAL DISTURBANCE, UNSPECIFIED DEMENTIA TYPE: ICD-10-CM

## 2021-01-06 DIAGNOSIS — F41.9 ANXIETY AND DEPRESSION: ICD-10-CM

## 2021-01-06 DIAGNOSIS — R41.0 CONFUSION: ICD-10-CM

## 2021-01-06 PROBLEM — I10 ESSENTIAL HYPERTENSION: Status: ACTIVE | Noted: 2021-01-06

## 2021-01-06 PROBLEM — F03.90 DEMENTIA (HCC): Status: ACTIVE | Noted: 2021-01-06

## 2021-01-06 LAB
ALBUMIN SERPL BCP-MCNC: 4.1 G/DL (ref 3.2–4.9)
ALBUMIN/GLOB SERPL: 2 G/DL
ALP SERPL-CCNC: 43 U/L (ref 30–99)
ALT SERPL-CCNC: 19 U/L (ref 2–50)
AMPHET UR QL SCN: NEGATIVE
ANION GAP SERPL CALC-SCNC: 12 MMOL/L (ref 7–16)
APPEARANCE UR: CLEAR
AST SERPL-CCNC: 16 U/L (ref 12–45)
BARBITURATES UR QL SCN: NEGATIVE
BASOPHILS # BLD AUTO: 0.4 % (ref 0–1.8)
BASOPHILS # BLD: 0.04 K/UL (ref 0–0.12)
BENZODIAZ UR QL SCN: POSITIVE
BILIRUB SERPL-MCNC: 0.4 MG/DL (ref 0.1–1.5)
BILIRUB UR QL STRIP.AUTO: NEGATIVE
BUN SERPL-MCNC: 26 MG/DL (ref 8–22)
BZE UR QL SCN: NEGATIVE
CALCIUM SERPL-MCNC: 9.1 MG/DL (ref 8.5–10.5)
CANNABINOIDS UR QL SCN: NEGATIVE
CHLORIDE SERPL-SCNC: 106 MMOL/L (ref 96–112)
CO2 SERPL-SCNC: 19 MMOL/L (ref 20–33)
COLOR UR: YELLOW
COVID ORDER STATUS COVID19: NORMAL
CREAT SERPL-MCNC: 1 MG/DL (ref 0.5–1.4)
EKG IMPRESSION: NORMAL
EOSINOPHIL # BLD AUTO: 0.2 K/UL (ref 0–0.51)
EOSINOPHIL NFR BLD: 1.9 % (ref 0–6.9)
ERYTHROCYTE [DISTWIDTH] IN BLOOD BY AUTOMATED COUNT: 43 FL (ref 35.9–50)
ETHANOL BLD-MCNC: <10.1 MG/DL (ref 0–10)
GLOBULIN SER CALC-MCNC: 2.1 G/DL (ref 1.9–3.5)
GLUCOSE SERPL-MCNC: 190 MG/DL (ref 65–99)
GLUCOSE UR STRIP.AUTO-MCNC: NEGATIVE MG/DL
HCT VFR BLD AUTO: 38.4 % (ref 42–52)
HGB BLD-MCNC: 12.7 G/DL (ref 14–18)
IMM GRANULOCYTES # BLD AUTO: 0.02 K/UL (ref 0–0.11)
IMM GRANULOCYTES NFR BLD AUTO: 0.2 % (ref 0–0.9)
KETONES UR STRIP.AUTO-MCNC: NEGATIVE MG/DL
LACTATE BLD-SCNC: 2 MMOL/L (ref 0.5–2)
LEUKOCYTE ESTERASE UR QL STRIP.AUTO: NEGATIVE
LYMPHOCYTES # BLD AUTO: 4.38 K/UL (ref 1–4.8)
LYMPHOCYTES NFR BLD: 41.7 % (ref 22–41)
MCH RBC QN AUTO: 29.8 PG (ref 27–33)
MCHC RBC AUTO-ENTMCNC: 33.1 G/DL (ref 33.7–35.3)
MCV RBC AUTO: 90.1 FL (ref 81.4–97.8)
METHADONE UR QL SCN: NEGATIVE
MICRO URNS: NORMAL
MONOCYTES # BLD AUTO: 0.41 K/UL (ref 0–0.85)
MONOCYTES NFR BLD AUTO: 3.9 % (ref 0–13.4)
NEUTROPHILS # BLD AUTO: 5.45 K/UL (ref 1.82–7.42)
NEUTROPHILS NFR BLD: 51.9 % (ref 44–72)
NITRITE UR QL STRIP.AUTO: NEGATIVE
NRBC # BLD AUTO: 0 K/UL
NRBC BLD-RTO: 0 /100 WBC
OPIATES UR QL SCN: NEGATIVE
OXYCODONE UR QL SCN: NEGATIVE
PCP UR QL SCN: NEGATIVE
PH UR STRIP.AUTO: 5.5 [PH] (ref 5–8)
PLATELET # BLD AUTO: 216 K/UL (ref 164–446)
PMV BLD AUTO: 10.7 FL (ref 9–12.9)
POTASSIUM SERPL-SCNC: 3.4 MMOL/L (ref 3.6–5.5)
PROPOXYPH UR QL SCN: NEGATIVE
PROT SERPL-MCNC: 6.2 G/DL (ref 6–8.2)
PROT UR QL STRIP: NEGATIVE MG/DL
RBC # BLD AUTO: 4.26 M/UL (ref 4.7–6.1)
RBC UR QL AUTO: NEGATIVE
SARS-COV+SARS-COV-2 AG RESP QL IA.RAPID: NOTDETECTED
SODIUM SERPL-SCNC: 137 MMOL/L (ref 135–145)
SP GR UR STRIP.AUTO: 1.01
SPECIMEN SOURCE: NORMAL
TROPONIN T SERPL-MCNC: 17 NG/L (ref 6–19)
UROBILINOGEN UR STRIP.AUTO-MCNC: 0.2 MG/DL
WBC # BLD AUTO: 10.5 K/UL (ref 4.8–10.8)

## 2021-01-06 PROCEDURE — 96374 THER/PROPH/DIAG INJ IV PUSH: CPT

## 2021-01-06 PROCEDURE — 99220 PR INITIAL OBSERVATION CARE,LEVL III: CPT | Performed by: INTERNAL MEDICINE

## 2021-01-06 PROCEDURE — 90791 PSYCH DIAGNOSTIC EVALUATION: CPT | Performed by: SOCIAL WORKER

## 2021-01-06 PROCEDURE — 700111 HCHG RX REV CODE 636 W/ 250 OVERRIDE (IP): Performed by: EMERGENCY MEDICINE

## 2021-01-06 PROCEDURE — 71045 X-RAY EXAM CHEST 1 VIEW: CPT

## 2021-01-06 PROCEDURE — 82077 ASSAY SPEC XCP UR&BREATH IA: CPT

## 2021-01-06 PROCEDURE — C9803 HOPD COVID-19 SPEC COLLECT: HCPCS | Performed by: EMERGENCY MEDICINE

## 2021-01-06 PROCEDURE — G0378 HOSPITAL OBSERVATION PER HR: HCPCS

## 2021-01-06 PROCEDURE — 87426 SARSCOV CORONAVIRUS AG IA: CPT

## 2021-01-06 PROCEDURE — 80307 DRUG TEST PRSMV CHEM ANLYZR: CPT

## 2021-01-06 PROCEDURE — 81003 URINALYSIS AUTO W/O SCOPE: CPT | Mod: XU

## 2021-01-06 PROCEDURE — 700102 HCHG RX REV CODE 250 W/ 637 OVERRIDE(OP): Performed by: INTERNAL MEDICINE

## 2021-01-06 PROCEDURE — 302970 POC BREATHALIZER: Performed by: EMERGENCY MEDICINE

## 2021-01-06 PROCEDURE — 99285 EMERGENCY DEPT VISIT HI MDM: CPT

## 2021-01-06 PROCEDURE — 700111 HCHG RX REV CODE 636 W/ 250 OVERRIDE (IP): Performed by: INTERNAL MEDICINE

## 2021-01-06 PROCEDURE — 84484 ASSAY OF TROPONIN QUANT: CPT

## 2021-01-06 PROCEDURE — 85025 COMPLETE CBC W/AUTO DIFF WBC: CPT

## 2021-01-06 PROCEDURE — 83605 ASSAY OF LACTIC ACID: CPT

## 2021-01-06 PROCEDURE — 96372 THER/PROPH/DIAG INJ SC/IM: CPT | Mod: XU

## 2021-01-06 PROCEDURE — 93005 ELECTROCARDIOGRAM TRACING: CPT | Performed by: EMERGENCY MEDICINE

## 2021-01-06 PROCEDURE — 70450 CT HEAD/BRAIN W/O DYE: CPT

## 2021-01-06 PROCEDURE — 80053 COMPREHEN METABOLIC PANEL: CPT

## 2021-01-06 PROCEDURE — A9270 NON-COVERED ITEM OR SERVICE: HCPCS | Performed by: INTERNAL MEDICINE

## 2021-01-06 PROCEDURE — 90791 PSYCH DIAGNOSTIC EVALUATION: CPT

## 2021-01-06 PROCEDURE — 36415 COLL VENOUS BLD VENIPUNCTURE: CPT

## 2021-01-06 RX ORDER — FINASTERIDE 5 MG/1
5 TABLET, FILM COATED ORAL EVERY EVENING
Status: DISCONTINUED | OUTPATIENT
Start: 2021-01-06 | End: 2021-01-13 | Stop reason: HOSPADM

## 2021-01-06 RX ORDER — LORAZEPAM 0.5 MG/1
0.5 TABLET ORAL
Status: DISCONTINUED | OUTPATIENT
Start: 2021-01-06 | End: 2021-01-13 | Stop reason: HOSPADM

## 2021-01-06 RX ORDER — ALFUZOSIN HYDROCHLORIDE 10 MG/1
10 TABLET, EXTENDED RELEASE ORAL EVERY MORNING
Status: DISCONTINUED | OUTPATIENT
Start: 2021-01-06 | End: 2021-01-06

## 2021-01-06 RX ORDER — BISACODYL 10 MG
10 SUPPOSITORY, RECTAL RECTAL
Status: DISCONTINUED | OUTPATIENT
Start: 2021-01-06 | End: 2021-01-13 | Stop reason: HOSPADM

## 2021-01-06 RX ORDER — POLYETHYLENE GLYCOL 3350 17 G/17G
1 POWDER, FOR SOLUTION ORAL
Status: DISCONTINUED | OUTPATIENT
Start: 2021-01-06 | End: 2021-01-13 | Stop reason: HOSPADM

## 2021-01-06 RX ORDER — LEVOTHYROXINE SODIUM 0.07 MG/1
75 TABLET ORAL
Status: DISCONTINUED | OUTPATIENT
Start: 2021-01-06 | End: 2021-01-13 | Stop reason: HOSPADM

## 2021-01-06 RX ORDER — FLUOXETINE HYDROCHLORIDE 20 MG/1
20 CAPSULE ORAL EVERY MORNING
COMMUNITY
End: 2021-02-03

## 2021-01-06 RX ORDER — FLUOXETINE HYDROCHLORIDE 20 MG/1
20 CAPSULE ORAL EVERY MORNING
Status: DISCONTINUED | OUTPATIENT
Start: 2021-01-06 | End: 2021-01-13 | Stop reason: HOSPADM

## 2021-01-06 RX ORDER — AMITRIPTYLINE HYDROCHLORIDE 10 MG/1
25 TABLET, FILM COATED ORAL
Status: DISCONTINUED | OUTPATIENT
Start: 2021-01-06 | End: 2021-01-13 | Stop reason: HOSPADM

## 2021-01-06 RX ORDER — FENOFIBRATE 134 MG/1
134 CAPSULE ORAL EVERY EVENING
Status: DISCONTINUED | OUTPATIENT
Start: 2021-01-06 | End: 2021-01-13 | Stop reason: HOSPADM

## 2021-01-06 RX ORDER — DORZOLAMIDE HYDROCHLORIDE AND TIMOLOL MALEATE 20; 5 MG/ML; MG/ML
1 SOLUTION/ DROPS OPHTHALMIC 2 TIMES DAILY
Status: DISCONTINUED | OUTPATIENT
Start: 2021-01-06 | End: 2021-01-13 | Stop reason: HOSPADM

## 2021-01-06 RX ORDER — LOSARTAN POTASSIUM 50 MG/1
50 TABLET ORAL
Status: DISCONTINUED | OUTPATIENT
Start: 2021-01-06 | End: 2021-01-13 | Stop reason: HOSPADM

## 2021-01-06 RX ORDER — DORZOLAMIDE HYDROCHLORIDE AND TIMOLOL MALEATE 20; 5 MG/ML; MG/ML
1 SOLUTION/ DROPS OPHTHALMIC 2 TIMES DAILY
Status: ON HOLD | COMMUNITY
Start: 2020-12-15 | End: 2022-03-09

## 2021-01-06 RX ORDER — LATANOPROST 50 UG/ML
1 SOLUTION/ DROPS OPHTHALMIC EVERY EVENING
Status: DISCONTINUED | OUTPATIENT
Start: 2021-01-06 | End: 2021-01-13 | Stop reason: HOSPADM

## 2021-01-06 RX ORDER — LORAZEPAM 0.5 MG/1
0.5 TABLET ORAL
Status: ON HOLD | COMMUNITY
End: 2021-01-13

## 2021-01-06 RX ORDER — METFORMIN HYDROCHLORIDE 500 MG/1
1000 TABLET, EXTENDED RELEASE ORAL 2 TIMES DAILY
Status: DISCONTINUED | OUTPATIENT
Start: 2021-01-06 | End: 2021-01-07

## 2021-01-06 RX ORDER — LORAZEPAM 2 MG/ML
1 INJECTION INTRAMUSCULAR ONCE
Status: COMPLETED | OUTPATIENT
Start: 2021-01-06 | End: 2021-01-06

## 2021-01-06 RX ORDER — ONDANSETRON 4 MG/1
4 TABLET, ORALLY DISINTEGRATING ORAL EVERY 4 HOURS PRN
Status: DISCONTINUED | OUTPATIENT
Start: 2021-01-06 | End: 2021-01-06

## 2021-01-06 RX ORDER — GABAPENTIN 300 MG/1
600 CAPSULE ORAL EVERY EVENING
Status: DISCONTINUED | OUTPATIENT
Start: 2021-01-06 | End: 2021-01-13 | Stop reason: HOSPADM

## 2021-01-06 RX ORDER — FINASTERIDE 5 MG/1
5 TABLET, FILM COATED ORAL DAILY
COMMUNITY
Start: 2020-12-09 | End: 2021-04-02 | Stop reason: SDUPTHER

## 2021-01-06 RX ORDER — ONDANSETRON 2 MG/ML
4 INJECTION INTRAMUSCULAR; INTRAVENOUS EVERY 4 HOURS PRN
Status: DISCONTINUED | OUTPATIENT
Start: 2021-01-06 | End: 2021-01-06

## 2021-01-06 RX ORDER — FENOFIBRATE 145 MG/1
145 TABLET, COATED ORAL EVERY EVENING
Status: DISCONTINUED | OUTPATIENT
Start: 2021-01-06 | End: 2021-01-06

## 2021-01-06 RX ORDER — OMEPRAZOLE 20 MG/1
20 CAPSULE, DELAYED RELEASE ORAL
Status: DISCONTINUED | OUTPATIENT
Start: 2021-01-06 | End: 2021-01-13 | Stop reason: HOSPADM

## 2021-01-06 RX ORDER — AMOXICILLIN 250 MG
2 CAPSULE ORAL 2 TIMES DAILY
Status: DISCONTINUED | OUTPATIENT
Start: 2021-01-06 | End: 2021-01-13 | Stop reason: HOSPADM

## 2021-01-06 RX ORDER — TAMSULOSIN HYDROCHLORIDE 0.4 MG/1
0.4 CAPSULE ORAL
Status: DISCONTINUED | OUTPATIENT
Start: 2021-01-06 | End: 2021-01-13 | Stop reason: HOSPADM

## 2021-01-06 RX ORDER — MIRTAZAPINE 15 MG/1
15 TABLET, FILM COATED ORAL EVERY EVENING
Status: DISCONTINUED | OUTPATIENT
Start: 2021-01-06 | End: 2021-01-11

## 2021-01-06 RX ADMIN — FINASTERIDE 5 MG: 5 TABLET, FILM COATED ORAL at 18:18

## 2021-01-06 RX ADMIN — ENOXAPARIN SODIUM 40 MG: 40 INJECTION SUBCUTANEOUS at 16:47

## 2021-01-06 RX ADMIN — METFORMIN HYDROCHLORIDE 1000 MG: 500 TABLET, EXTENDED RELEASE ORAL at 18:17

## 2021-01-06 RX ADMIN — TAMSULOSIN HYDROCHLORIDE 0.4 MG: 0.4 CAPSULE ORAL at 16:45

## 2021-01-06 RX ADMIN — LORAZEPAM 1 MG: 2 INJECTION INTRAMUSCULAR; INTRAVENOUS at 16:44

## 2021-01-06 RX ADMIN — LORAZEPAM 1 MG: 2 INJECTION INTRAMUSCULAR; INTRAVENOUS at 06:15

## 2021-01-06 RX ADMIN — LOSARTAN POTASSIUM 50 MG: 50 TABLET, FILM COATED ORAL at 16:48

## 2021-01-06 RX ADMIN — GABAPENTIN 600 MG: 300 CAPSULE ORAL at 18:17

## 2021-01-06 RX ADMIN — MIRTAZAPINE 15 MG: 15 TABLET, FILM COATED ORAL at 18:18

## 2021-01-06 RX ADMIN — ASPIRIN 81 MG: 81 TABLET, COATED ORAL at 18:18

## 2021-01-06 RX ADMIN — FLUOXETINE 20 MG: 20 CAPSULE ORAL at 16:45

## 2021-01-06 RX ADMIN — LATANOPROST 1 DROP: 50 SOLUTION OPHTHALMIC at 18:18

## 2021-01-06 RX ADMIN — FENOFIBRATE 134 MG: 134 CAPSULE ORAL at 18:18

## 2021-01-06 RX ADMIN — DORZOLAMIDE HYDROCHLORIDE AND TIMOLOL MALEATE 1 DROP: 20; 5 SOLUTION/ DROPS OPHTHALMIC at 18:18

## 2021-01-06 RX ADMIN — AMITRIPTYLINE HYDROCHLORIDE 25 MG: 10 TABLET, FILM COATED ORAL at 21:35

## 2021-01-06 ASSESSMENT — FIBROSIS 4 INDEX: FIB4 SCORE: 1.337939701417107651
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
## 2021-01-06 NOTE — ED NOTES
Report received, rounded on patient, no s.s of major distress noted at this time.  Will monitor.
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
## 2021-01-06 NOTE — DISCHARGE PLANNING
[facility]   BELEN has [facility]ed [facility].
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
## 2021-01-06 NOTE — DISCHARGE PLANNING
SW has received copy of Pt Legal Hold from DRISS Bob.     Per Hailey Pt has [facility].     BELEN has placed call to [facility]. She is currently waiting on status of the [facility] regarding acceptance or divert. She will contact BELEN once she receives update from her management.
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
## 2021-01-06 NOTE — DISCHARGE PLANNING
"Alert Team  Per SW, [facility] has declined this pt, stating his case is medical not psych and transferring him to their medical unit would be a \"lateral transfer,\" so not allowed.  Pt has already been to [facility] twice for inpt in the last month and they, too, feel his case is NOT psych, but medical; likely dementia.  Pt adamantly requests to NOT return to [facility] and had a visceral reaction to even mentioning it.  SW to send transfer packet to [facility], but if they refuse this pt might need to be admitted medically.  They also only do transfers during business hours, so we won't know til tomorrow.  ERP and SW notified.  "
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
## 2021-01-06 NOTE — DISCHARGE PLANNING
Alert Team  Consult order noted.  Pt #1 on list of consults; will be seen shortly.    Diet ordered as a courtesy.

## 2021-01-06 NOTE — ED NOTES
Med rec completed per phone call to Pt's pharmacies [facility] on [address] () and [facility] on [address] () to obtain active medication list and Pt at bedside to confirm times of last doses.  Pt has transferred prescriptions out of [facility] and [facility]; Pt's primary pharmacy is now [facility].  Allergies reviewed with Pt.  No oral antibiotics in last 14 days.  Pt takes ASPIRIN.

## 2021-01-07 PROBLEM — R41.89 COGNITIVE IMPAIRMENT: Status: ACTIVE | Noted: 2020-10-21

## 2021-01-07 PROBLEM — Z79.899 CHRONIC PRESCRIPTION BENZODIAZEPINE USE: Status: ACTIVE | Noted: 2021-01-07

## 2021-01-07 LAB
ALBUMIN SERPL BCP-MCNC: 4.1 G/DL (ref 3.2–4.9)
ALBUMIN/GLOB SERPL: 1.7 G/DL
ALP SERPL-CCNC: 46 U/L (ref 30–99)
ALT SERPL-CCNC: 17 U/L (ref 2–50)
ANION GAP SERPL CALC-SCNC: 9 MMOL/L (ref 7–16)
AST SERPL-CCNC: 24 U/L (ref 12–45)
BILIRUB SERPL-MCNC: 0.3 MG/DL (ref 0.1–1.5)
BUN SERPL-MCNC: 19 MG/DL (ref 8–22)
CALCIUM SERPL-MCNC: 9 MG/DL (ref 8.5–10.5)
CHLORIDE SERPL-SCNC: 102 MMOL/L (ref 96–112)
CO2 SERPL-SCNC: 25 MMOL/L (ref 20–33)
CREAT SERPL-MCNC: 1.04 MG/DL (ref 0.5–1.4)
ERYTHROCYTE [DISTWIDTH] IN BLOOD BY AUTOMATED COUNT: 44.8 FL (ref 35.9–50)
FOLATE SERPL-MCNC: 13.1 NG/ML
GLOBULIN SER CALC-MCNC: 2.4 G/DL (ref 1.9–3.5)
GLUCOSE BLD-MCNC: 102 MG/DL (ref 65–99)
GLUCOSE BLD-MCNC: 140 MG/DL (ref 65–99)
GLUCOSE SERPL-MCNC: 128 MG/DL (ref 65–99)
HCT VFR BLD AUTO: 43 % (ref 42–52)
HGB BLD-MCNC: 14.1 G/DL (ref 14–18)
HIV 1+2 AB+HIV1 P24 AG SERPL QL IA: NORMAL
MCH RBC QN AUTO: 30.3 PG (ref 27–33)
MCHC RBC AUTO-ENTMCNC: 32.8 G/DL (ref 33.7–35.3)
MCV RBC AUTO: 92.5 FL (ref 81.4–97.8)
PLATELET # BLD AUTO: 216 K/UL (ref 164–446)
PMV BLD AUTO: 10.1 FL (ref 9–12.9)
POTASSIUM SERPL-SCNC: 4.1 MMOL/L (ref 3.6–5.5)
PROT SERPL-MCNC: 6.5 G/DL (ref 6–8.2)
RBC # BLD AUTO: 4.65 M/UL (ref 4.7–6.1)
SODIUM SERPL-SCNC: 136 MMOL/L (ref 135–145)
T4 FREE SERPL-MCNC: 1.18 NG/DL (ref 0.93–1.7)
TREPONEMA PALLIDUM IGG+IGM AB [PRESENCE] IN SERUM OR PLASMA BY IMMUNOASSAY: NORMAL
TSH SERPL DL<=0.005 MIU/L-ACNC: 2.31 UIU/ML (ref 0.38–5.33)
TSH SERPL DL<=0.005 MIU/L-ACNC: 3.16 UIU/ML (ref 0.38–5.33)
VIT B12 SERPL-MCNC: 1597 PG/ML (ref 211–911)
WBC # BLD AUTO: 10.4 K/UL (ref 4.8–10.8)

## 2021-01-07 PROCEDURE — 84157 ASSAY OF PROTEIN OTHER: CPT | Mod: XU

## 2021-01-07 PROCEDURE — 82784 ASSAY IGA/IGD/IGG/IGM EACH: CPT

## 2021-01-07 PROCEDURE — 80053 COMPREHEN METABOLIC PANEL: CPT

## 2021-01-07 PROCEDURE — 83519 RIA NONANTIBODY: CPT | Mod: 91

## 2021-01-07 PROCEDURE — 700102 HCHG RX REV CODE 250 W/ 637 OVERRIDE(OP): Performed by: NURSE PRACTITIONER

## 2021-01-07 PROCEDURE — 82945 GLUCOSE OTHER FLUID: CPT

## 2021-01-07 PROCEDURE — 84443 ASSAY THYROID STIM HORMONE: CPT | Mod: 91

## 2021-01-07 PROCEDURE — 82607 VITAMIN B-12: CPT

## 2021-01-07 PROCEDURE — 86788 WEST NILE VIRUS AB IGM: CPT

## 2021-01-07 PROCEDURE — A9270 NON-COVERED ITEM OR SERVICE: HCPCS | Performed by: INTERNAL MEDICINE

## 2021-01-07 PROCEDURE — 86694 HERPES SIMPLEX NES ANTBDY: CPT

## 2021-01-07 PROCEDURE — G0378 HOSPITAL OBSERVATION PER HR: HCPCS

## 2021-01-07 PROCEDURE — 82962 GLUCOSE BLOOD TEST: CPT | Mod: 91

## 2021-01-07 PROCEDURE — 700102 HCHG RX REV CODE 250 W/ 637 OVERRIDE(OP): Performed by: INTERNAL MEDICINE

## 2021-01-07 PROCEDURE — 87389 HIV-1 AG W/HIV-1&-2 AB AG IA: CPT

## 2021-01-07 PROCEDURE — 84439 ASSAY OF FREE THYROXINE: CPT

## 2021-01-07 PROCEDURE — 99205 OFFICE O/P NEW HI 60 MIN: CPT | Performed by: STUDENT IN AN ORGANIZED HEALTH CARE EDUCATION/TRAINING PROGRAM

## 2021-01-07 PROCEDURE — 82746 ASSAY OF FOLIC ACID SERUM: CPT

## 2021-01-07 PROCEDURE — 87798 DETECT AGENT NOS DNA AMP: CPT

## 2021-01-07 PROCEDURE — 86780 TREPONEMA PALLIDUM: CPT

## 2021-01-07 PROCEDURE — 95816 EEG AWAKE AND DROWSY: CPT | Mod: 26 | Performed by: STUDENT IN AN ORGANIZED HEALTH CARE EDUCATION/TRAINING PROGRAM

## 2021-01-07 PROCEDURE — 84166 PROTEIN E-PHORESIS/URINE/CSF: CPT

## 2021-01-07 PROCEDURE — 85027 COMPLETE CBC AUTOMATED: CPT

## 2021-01-07 PROCEDURE — 99226 PR SUBSEQUENT OBSERVATION CARE,LEVEL III: CPT | Performed by: STUDENT IN AN ORGANIZED HEALTH CARE EDUCATION/TRAINING PROGRAM

## 2021-01-07 PROCEDURE — 86255 FLUORESCENT ANTIBODY SCREEN: CPT | Mod: 91

## 2021-01-07 PROCEDURE — 84157 ASSAY OF PROTEIN OTHER: CPT

## 2021-01-07 PROCEDURE — 95816 EEG AWAKE AND DROWSY: CPT | Performed by: STUDENT IN AN ORGANIZED HEALTH CARE EDUCATION/TRAINING PROGRAM

## 2021-01-07 PROCEDURE — 83520 IMMUNOASSAY QUANT NOS NONAB: CPT

## 2021-01-07 PROCEDURE — 36415 COLL VENOUS BLD VENIPUNCTURE: CPT

## 2021-01-07 PROCEDURE — 86789 WEST NILE VIRUS ANTIBODY: CPT

## 2021-01-07 RX ORDER — DEXTROSE MONOHYDRATE 25 G/50ML
50 INJECTION, SOLUTION INTRAVENOUS
Status: DISCONTINUED | OUTPATIENT
Start: 2021-01-07 | End: 2021-01-13 | Stop reason: HOSPADM

## 2021-01-07 RX ADMIN — METFORMIN HYDROCHLORIDE 1000 MG: 500 TABLET, EXTENDED RELEASE ORAL at 05:23

## 2021-01-07 RX ADMIN — DOCUSATE SODIUM 50 MG AND SENNOSIDES 8.6 MG 2 TABLET: 8.6; 5 TABLET, FILM COATED ORAL at 17:49

## 2021-01-07 RX ADMIN — DORZOLAMIDE HYDROCHLORIDE AND TIMOLOL MALEATE 1 DROP: 20; 5 SOLUTION/ DROPS OPHTHALMIC at 05:35

## 2021-01-07 RX ADMIN — AMITRIPTYLINE HYDROCHLORIDE 25 MG: 10 TABLET, FILM COATED ORAL at 20:01

## 2021-01-07 RX ADMIN — LEVOTHYROXINE SODIUM 75 MCG: 0.07 TABLET ORAL at 05:23

## 2021-01-07 RX ADMIN — ASPIRIN 81 MG: 81 TABLET, COATED ORAL at 17:49

## 2021-01-07 RX ADMIN — DORZOLAMIDE HYDROCHLORIDE AND TIMOLOL MALEATE 1 DROP: 20; 5 SOLUTION/ DROPS OPHTHALMIC at 18:03

## 2021-01-07 RX ADMIN — LATANOPROST 1 DROP: 50 SOLUTION OPHTHALMIC at 18:03

## 2021-01-07 RX ADMIN — TAMSULOSIN HYDROCHLORIDE 0.4 MG: 0.4 CAPSULE ORAL at 08:32

## 2021-01-07 RX ADMIN — FENOFIBRATE 134 MG: 134 CAPSULE ORAL at 17:49

## 2021-01-07 RX ADMIN — MIRTAZAPINE 15 MG: 15 TABLET, FILM COATED ORAL at 17:55

## 2021-01-07 RX ADMIN — GABAPENTIN 600 MG: 300 CAPSULE ORAL at 17:52

## 2021-01-07 RX ADMIN — LORAZEPAM 0.5 MG: 0.5 TABLET ORAL at 20:02

## 2021-01-07 RX ADMIN — DOCUSATE SODIUM 50 MG AND SENNOSIDES 8.6 MG 2 TABLET: 8.6; 5 TABLET, FILM COATED ORAL at 05:25

## 2021-01-07 RX ADMIN — FLUOXETINE 20 MG: 20 CAPSULE ORAL at 05:22

## 2021-01-07 RX ADMIN — LOSARTAN POTASSIUM 50 MG: 50 TABLET, FILM COATED ORAL at 05:23

## 2021-01-07 RX ADMIN — FINASTERIDE 5 MG: 5 TABLET, FILM COATED ORAL at 18:03

## 2021-01-07 ASSESSMENT — ENCOUNTER SYMPTOMS
TREMORS: 1
COUGH: 0
SHORTNESS OF BREATH: 0
SPEECH CHANGE: 1
VOMITING: 0
NERVOUS/ANXIOUS: 1
MEMORY LOSS: 1
WHEEZING: 0
CHILLS: 0
SEIZURES: 0
INSOMNIA: 1
DOUBLE VISION: 0
PALPITATIONS: 0
FALLS: 0
NECK PAIN: 0
NAUSEA: 0
DEPRESSION: 1
BLURRED VISION: 0
WEAKNESS: 1
FEVER: 0

## 2021-01-07 NOTE — ASSESSMENT & PLAN NOTE
Management per psychiatry.  Remeron was replaced by Ativan.  Apparently, he took Ativan this morning and felt really drowsy.

## 2021-01-07 NOTE — ED NOTES
Patient calm and in room at this time. Respirations even and unlabored. Pt denies any and all pain. VS stable. No behavioral issues noted from patient since assuming care. 1:1 maintained for patient safety. Pt to be admitted. Awaiting bed placement

## 2021-01-07 NOTE — ED NOTES
Pt urinated in his pants, assisted in changing into a gown.  Clothes/shoes placed in a belongings bag placed in locker #3.  Pt's father updated with the plan of care. Pt's dad wanting to talk to his son but pt told RN that he doesn't want to talk to his dad and doesn't want to go home.

## 2021-01-07 NOTE — PROGRESS NOTES
· 2 RN skin check complete with DRISS Whelan.   · Devices in place PIV.  · Skin assessed under devices yes.  · Confirmed pressure ulcers found on N/A.  · New potential pressure ulcers noted on N/A. Wound consult placed? N/A. Photo uploaded? N/A.   · The following interventions are in place linen change as needed, moisturizer cream when needed, encourage oral hydration and hygiene.

## 2021-01-07 NOTE — ASSESSMENT & PLAN NOTE
-A1c 6.3 December 2020  Continue insulin sliding scale with hypoglycemia protocol.  Held metformin.

## 2021-01-07 NOTE — DISCHARGE PLANNING
Alert Team  I contacted pt's wife and updated her that pt being admitted to medical floor.  I will put signed KOMAL for Pina Salinas in paper chart and have advised her to call main hospital number tomorrow to see what room he ends up in/get updates.

## 2021-01-07 NOTE — ED NOTES
Patient remains calm and in room at this time. Respirations even and unlabored. No s/s of pain or distress noted. No behavioral issues noted from patient since assuming care. 1:1 maintained for patient safety. Pt to be admitted. Awaiting bed placement
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
## 2021-01-07 NOTE — DISCHARGE PLANNING
[facility] and stated [facility] has [facility].   They feel Pt behavioral issues are medically-Dementia related and Pt will be better treated with Medical intervention versus Behavioral.     BELEN has faxed referral to [facility]     BELEN has updated alert team RN.

## 2021-01-07 NOTE — ASSESSMENT & PLAN NOTE
Urine drug screen is only positive for benzos and he takes Ativan as needed   following regarding placement to [facility]

## 2021-01-07 NOTE — ED NOTES
0700  Received report from Ken NAQVI.  Sitter outside the room watching pt on 1:1 d/t HIGH risk on suicide scale and per life skills recommendation.  Respiration unlabored. Waiting for bed assignment.

## 2021-01-07 NOTE — ED NOTES
Patient calm and in room at this time. Respirations even and unlabored. 1:1 maintained for patient safety

## 2021-01-07 NOTE — ASSESSMENT & PLAN NOTE
There is no focal neuro deficit.  Brain MRI shows intermittent changes but nothing acute.  Patient has LP twice now with CSF studies sent out for additional study per request of inpatient neurology.  He follows with Dr. Wallis in neurology clinic in the work-up for this particular condition is ongoing.  He is no longer suicidal.  His legal hold was lifted by psychiatry on January 11, 2021.  However, he can be impulsive and can actually talk himself into a panic attack during my evaluation.  Therefore, continue one-to-one sitter.
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
## 2021-01-07 NOTE — DISCHARGE PLANNING
"Alert Team  Stopped in to update pt.  Pt found to be talking to himself, laying and rocking in the bed and limbs moving erratically.  Sitter said it had just begun a few moments before I arrived.  Pt stuttering, shaking head back and forth, crying out \"I need help I need help what's happening to me?\"  Pt difficult to console, unable to get verbalize what he was feeling and appeared much worse off than when I met with him earlier today.   I notified bedside RN that he may need a PRN or MD to see.    "

## 2021-01-07 NOTE — ED NOTES
Patient continues to remain calm and in room at this time. Respirations even and unlabored. No s/s of pain or distress noted. N1:1 maintained for patient safety. Pt to be admitted. He continues to await bed placement

## 2021-01-07 NOTE — ED NOTES
Received report, pt ambs to restroom with steady gait.  Placed back on monitors, no needs at this time   Consent 2/Introductory Paragraph: Mohs surgery was explained to the patient and consent was obtained. The risks, benefits and alternatives to therapy were discussed in detail. Specifically, the risks of infection, scarring, bleeding, prolonged wound healing, incomplete removal, allergy to anesthesia, nerve injury and recurrence were addressed. Prior to the procedure, the treatment site was clearly identified and confirmed by the patient. All components of Universal Protocol/PAUSE Rule completed.

## 2021-01-07 NOTE — PROGRESS NOTES
Report received. Assumed care, assessment complete. Pt is A & O x 3.  Pt denies having any pain at this time. Fall precautions and appropriate signs in place. Pt oriented to unit routine, call light/phone system and CNA and RN extension number provided. Pt educated regarding fall precautions. 1:1 sitter in place, room stripped of all items. Pt denies an additional needs at this time.

## 2021-01-07 NOTE — ED NOTES
Patient calm and in room at this time. He appears to be resting. Respirations even and unlabored. No s/s of pain or distress. Patient continues to await bed placement. 1:1 maintained for patient safety

## 2021-01-07 NOTE — CONSULTS
Neurology Initial Consult H&P  Neurohospitalist Service, [facility]    Referring Physician: DYLLAN Kiser    Chief Complaint   Patient presents with   • Altered Mental Status     HPI: Lico Salinas is a 70 y.o. male with history of dementia, DM, HTN, hypothyroidism, BPH, presenting to the hospital for altered mental status with agitation and behavioral changes and consulted for same.     Patient reports that he has been feeling crazy with thoughts for the past several weeks, feeling suicidal and homicidal on and off. Reports auditory hallucinations, no visual or tactile. Has resting tremors. Denies any weakness/numbness/dizziness/LOC/Seizures, fever, or any other symptoms. Denies using alcohol or illicit drugs.  Behavioral health assessed the patient and started him on 1:1 observation.     Review of systems: In addition to what is detailed in the HPI above, (and scanned into the chart if and when applicable), all other systems reviewed and are negative.    Past Medical History:    has a past medical history of Anxiety, Diabetes (HCC) (07-10-15), Glaucoma, High cholesterol (07-10-15), Hypertension (07-10-15), Hypothyroidism, Neuropathy, peripheral, Skin cancer, Snoring, and Thyroid disease.    FHx:  family history includes Alcohol abuse in his mother; Heart Disease in his father and mother; Lung Disease in his father.    SHx:   reports that he quit smoking about 41 years ago. His smoking use included cigarettes. He has a 20.00 pack-year smoking history. He has never used smokeless tobacco. He reports previous alcohol use of about 0.5 oz of alcohol per week. He reports that he does not use drugs.    Allergies:  Allergies   Allergen Reactions   • Pcn [Penicillins] Unspecified     As a child, unknown reaction       Medications:    Current Facility-Administered Medications:   •  insulin regular (HumuLIN R,NovoLIN R) injection, 1-6 Units, Subcutaneous, 4X/DAY Veda ESCOTO  A.P.RMamtaNMamta  •  Notify, , , Once **AND** dextrose 50% (D50W) injection 50 mL, 50 mL, Intravenous, Q15 MIN PRN, DANIEL KiserRILANA  •  amitriptyline (ELAVIL) tablet 25 mg, 25 mg, Oral, QHS, Avery Montano M.D., 25 mg at 01/06/21 2135  •  aspirin EC (ECOTRIN) tablet 81 mg, 81 mg, Oral, Q EVENING, Avery Montano M.D., 81 mg at 01/06/21 1818  •  dorzolamide-timolol (COSOPT) 22.3-6.8 MG/ML ophthalmic drops 1 Drop, 1 Drop, Both Eyes, BID, Avery Montano M.D., 1 Drop at 01/07/21 0535  •  finasteride (PROSCAR) tablet 5 mg, 5 mg, Oral, Q EVENING, Avery Montano M.D., 5 mg at 01/06/21 1818  •  FLUoxetine (PROZAC) capsule 20 mg, 20 mg, Oral, QAM, Avery Montano M.D., 20 mg at 01/07/21 0522  •  gabapentin (NEURONTIN) capsule 600 mg, 600 mg, Oral, Q EVENING, Avery Montano M.D., 600 mg at 01/06/21 1817  •  latanoprost (XALATAN) 0.005 % ophthalmic solution 1 Drop, 1 Drop, Both Eyes, Q EVENING, Avery Montano M.D., 1 Drop at 01/06/21 1818  •  levothyroxine (SYNTHROID) tablet 75 mcg, 75 mcg, Oral, AM ES, Avery Montano M.D., 75 mcg at 01/07/21 0523  •  LORazepam (ATIVAN) tablet 0.5 mg, 0.5 mg, Oral, QHS PRN, Avery Montano M.D.  •  losartan (COZAAR) tablet 50 mg, 50 mg, Oral, Q DAY, Avery Montano M.D., 50 mg at 01/07/21 0523  •  mirtazapine (Remeron) tablet 15 mg, 15 mg, Oral, Q EVENING, Avery Montano M.D., 15 mg at 01/06/21 1818  •  omeprazole (PRILOSEC) capsule 20 mg, 20 mg, Oral, QDAY PRN, Avery Montano M.D.  •  senna-docusate (PERICOLACE or SENOKOT S) 8.6-50 MG per tablet 2 Tab, 2 Tab, Oral, BID, 2 Tab at 01/07/21 0525 **AND** polyethylene glycol/lytes (MIRALAX) PACKET 1 Packet, 1 Packet, Oral, QDAY PRN **AND** magnesium hydroxide (MILK OF MAGNESIA) suspension 30 mL, 30 mL, Oral, QDAY PRN **AND** bisacodyl (DULCOLAX) suppository 10 mg, 10 mg, Rectal, QDAY PRN, Avery Montano M.D.  •  enoxaparin (LOVENOX) inj 40 mg, 40 mg, Subcutaneous, DAILY, Avery Montano M.D., 40 mg at 01/06/21 1647  •  tamsulosin (FLOMAX) capsule 0.4 mg, 0.4 mg, Oral, AFTER BREAKFAST, Avery JOY  VINOD Montano, 0.4 mg at 01/07/21 0832  •  fenofibrate micronized (LOFIBRA) capsule 134 mg, 134 mg, Oral, Q EVENING, Avery Montano M.D., 134 mg at 01/06/21 1818    Current Outpatient Medications:   •  FLUoxetine (PROZAC) 20 MG Cap, Take 20 mg by mouth every morning. Every morning at 0900., Disp: , Rfl:   •  LORazepam (ATIVAN) 0.5 MG Tab, Take 0.5 mg by mouth at bedtime as needed (sleep, anxiety)., Disp: , Rfl:   •  dorzolamide-timolol (COSOPT) 22.3-6.8 MG/ML Solution, Administer 1 Drop into both eyes 2 times a day., Disp: , Rfl:   •  finasteride (PROSCAR) 5 MG Tab, Take 5 mg by mouth every evening., Disp: , Rfl:   •  amitriptyline (ELAVIL) 25 MG Tab, Take 1 Tab by mouth every bedtime., Disp: 30 Tab, Rfl: 1  •  mirtazapine (REMERON) 15 MG Tab, Take 15 mg by mouth every evening., Disp: , Rfl:   •  alfuzosin (UROXATRAL) 10 MG SR tablet, Take 10 mg by mouth every morning., Disp: , Rfl:   •  omeprazole (PRILOSEC) 20 MG delayed-release capsule, Take 1 Cap by mouth 1 time a day as needed (heartburn, GERD)., Disp: , Rfl:   •  gabapentin (NEURONTIN) 300 MG Cap, TAKE 2 CAPSULES BY MOUTH AT BEDTIME (Patient taking differently: Take 600 mg by mouth every bedtime. 2 capsules = 600 mg.), Disp: 200 Cap, Rfl: 1  •  metFORMIN ER (GLUCOPHAGE XR) 500 MG TABLET SR 24 HR, Take 2 Tabs by mouth 2 times a day. (Patient taking differently: Take 1,000 mg by mouth 2 times a day. STOPPED by patient.), Disp: 360 Tab, Rfl: 1  •  losartan (COZAAR) 50 MG Tab, Take 1 tablet by mouth once daily (Patient taking differently: Take 50 mg by mouth every morning.), Disp: 100 Tab, Rfl: 4  •  Glucos-Chondroit-Hyaluron-MSM (GLUCOSAMINE CHONDROITIN JOINT) Tab, Take 1 Tab by mouth every day., Disp: , Rfl:   •  fenofibrate (TRICOR) 145 MG Tab, Take 1 Tab by mouth every day. (Patient taking differently: Take 145 mg by mouth every evening.), Disp: 90 Tab, Rfl: 4  •  levothyroxine (SYNTHROID) 75 MCG Tab, TAKE 1 TABLET BY MOUTH IN THE MORNING ON AN EMPTY STOMACH (Patient  "taking differently: Take 75 mcg by mouth Every morning on an empty stomach.), Disp: 90 Tab, Rfl: 3  •  vitamin D3, cholecalciferol, 1000 UNIT Tab, Take 1,000 Units by mouth every morning., Disp: , Rfl:   •  latanoprost (XALATAN) 0.005 % Solution, Administer 1 Drop into both eyes every evening., Disp: , Rfl:   •  Magnesium 200 MG Tab, Take 200 mg by mouth every evening., Disp: , Rfl:   •  Coenzyme Q10 200 MG Cap, Take 200 mg by mouth every evening., Disp: , Rfl:   •  aspirin EC (ECOTRIN) 81 MG Tablet Delayed Response, Take 81 mg by mouth every evening., Disp: , Rfl:   •  Omega 3-6-9 Fatty Acids (OMEGA 3-6-9 COMPLEX) Cap, Take 1 Cap by mouth 2 (two) times a day., Disp: , Rfl:   •  Non Formulary Request, Take 1 Tab by mouth every evening. \"Prostate Plus\", Disp: , Rfl:   •  Multiple Vitamin (MULTI VITAMIN MENS PO), Take 1 Tab by mouth every morning., Disp: , Rfl:     Physical Examination:     Vitals:    01/07/21 0222 01/07/21 0523 01/07/21 0624 01/07/21 0731   BP: 105/61 149/66 147/76 153/72   Pulse: 65  70 72   Resp: 16  16 14   Temp:       TempSrc:       SpO2: 96%  97% 96%   Weight:       Height:         General: Patient is awake and in no acute distress. 1:1 sitter at bedside  HENT: Normocephalic, atraumatic, oropharynx is pink and moist.   Eyes: examination of optic disks not indicated at this time  CV: RRR  Lungs: Clear bilaterally  Abdomen: Soft, non-tender, non-distended, no palpable masses  Psych: Mood -disturbed, suicidal and homicidal ideations on & off    NEUROLOGICAL EXAM:     Mental status: Awake, alert and fully oriented, follows commands  Speech and language: speech is clear and fluent. The patient is able to name and repeat.  Cranial nerve exam: Pupils are equal, round and reactive to light bilaterally. Visual fields are full. Extraocular muscles are intact. Sensation in the face is intact to light touch. Face is symmetric. Hearing to finger rub equal. Palate elevates symmetrically. Shoulder shrug is " full. Tongue is midline.  Motor exam: Strength is 5/5 in all extremities both distally and proximally. Tone is increased. Resting temors +. Sensory exam: No sensory deficits identified   Deep tendon reflexes:  2+ and symmetric. Toes down-going bilaterally.  Coordination: no ataxia   Gait: Taking many steps to turn, no shuffling    Objective Data:    Labs:  Lab Results   Component Value Date/Time    PROTHROMBTM 14.0 11/03/2020 09:57 AM    INR 1.05 11/03/2020 09:57 AM      Lab Results   Component Value Date/Time    WBC 10.4 01/07/2021 05:25 AM    RBC 4.65 (L) 01/07/2021 05:25 AM    HEMOGLOBIN 14.1 01/07/2021 05:25 AM    HEMATOCRIT 43.0 01/07/2021 05:25 AM    MCV 92.5 01/07/2021 05:25 AM    MCH 30.3 01/07/2021 05:25 AM    MCHC 32.8 (L) 01/07/2021 05:25 AM    MPV 10.1 01/07/2021 05:25 AM    NEUTSPOLYS 51.90 01/06/2021 01:46 AM    LYMPHOCYTES 41.70 (H) 01/06/2021 01:46 AM    MONOCYTES 3.90 01/06/2021 01:46 AM    EOSINOPHILS 1.90 01/06/2021 01:46 AM    BASOPHILS 0.40 01/06/2021 01:46 AM      Lab Results   Component Value Date/Time    SODIUM 136 01/07/2021 05:25 AM    POTASSIUM 4.1 01/07/2021 05:25 AM    CHLORIDE 102 01/07/2021 05:25 AM    CO2 25 01/07/2021 05:25 AM    GLUCOSE 128 (H) 01/07/2021 05:25 AM    BUN 19 01/07/2021 05:25 AM    CREATININE 1.04 01/07/2021 05:25 AM      Lab Results   Component Value Date/Time    CHOLSTRLTOT 178 04/10/2020 09:23 AM     (H) 04/10/2020 09:23 AM    HDL 36 (A) 04/10/2020 09:23 AM    TRIGLYCERIDE 200 (H) 04/10/2020 09:23 AM       Lab Results   Component Value Date/Time    ALKPHOSPHAT 46 01/07/2021 05:25 AM    ASTSGOT 24 01/07/2021 05:25 AM    ALTSGPT 17 01/07/2021 05:25 AM    TBILIRUBIN 0.3 01/07/2021 05:25 AM        Imaging/Testing:  CT-HEAD W/O   Final Result      No noncontrast CT evidence of acute intracranial hemorrhage.      Mild white matter hypodensity is present.  This is a nonspecific finding which usually is found to represent chronic microvascular disease in patient's  of this demographic.  Demyelination, age indeterminant ischemia and gliosis are also common    possibilities. A chronic left basal ganglia lacunar infarction is again seen.      Nonobstructive right maxillary mucosal retention cyst      Age-appropriate atrophy         DX-CHEST-PORTABLE (1 VIEW)   Final Result      No radiographic evidence of acute cardiopulmonary process.      MR-BRAIN-WITH & W/O    (Results Pending)         Assessment and Plan:    Lico Salinas is a 70 y.o. male with history of dementia, DM, HTN, hypothyroidism, BPH, presenting to the hospital for altered mental status with agitation and behavioral changes and consulted for same.   -MMSE is 25, no cognitive impairment  -Has auditory hallucinations. Has h/o dementia with behavioral problems and having suicidal and homicidal ideations. 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

## 2021-01-08 LAB
GLUCOSE BLD-MCNC: 102 MG/DL (ref 65–99)
GLUCOSE BLD-MCNC: 104 MG/DL (ref 65–99)
GLUCOSE BLD-MCNC: 109 MG/DL (ref 65–99)
GLUCOSE BLD-MCNC: 110 MG/DL (ref 65–99)

## 2021-01-08 PROCEDURE — 99215 OFFICE O/P EST HI 40 MIN: CPT | Performed by: PSYCHIATRY & NEUROLOGY

## 2021-01-08 PROCEDURE — 700102 HCHG RX REV CODE 250 W/ 637 OVERRIDE(OP): Performed by: INTERNAL MEDICINE

## 2021-01-08 PROCEDURE — G0378 HOSPITAL OBSERVATION PER HR: HCPCS

## 2021-01-08 PROCEDURE — 96372 THER/PROPH/DIAG INJ SC/IM: CPT

## 2021-01-08 PROCEDURE — A9270 NON-COVERED ITEM OR SERVICE: HCPCS | Performed by: INTERNAL MEDICINE

## 2021-01-08 PROCEDURE — 99214 OFFICE O/P EST MOD 30 MIN: CPT | Performed by: STUDENT IN AN ORGANIZED HEALTH CARE EDUCATION/TRAINING PROGRAM

## 2021-01-08 PROCEDURE — 82962 GLUCOSE BLOOD TEST: CPT | Mod: 91

## 2021-01-08 PROCEDURE — 99226 PR SUBSEQUENT OBSERVATION CARE,LEVEL III: CPT | Performed by: INTERNAL MEDICINE

## 2021-01-08 PROCEDURE — 700111 HCHG RX REV CODE 636 W/ 250 OVERRIDE (IP): Performed by: INTERNAL MEDICINE

## 2021-01-08 RX ORDER — LORAZEPAM 2 MG/ML
1 INJECTION INTRAMUSCULAR
Status: DISCONTINUED | OUTPATIENT
Start: 2021-01-08 | End: 2021-01-13 | Stop reason: HOSPADM

## 2021-01-08 RX ORDER — LORAZEPAM 0.5 MG/1
0.5 TABLET ORAL ONCE
Status: COMPLETED | OUTPATIENT
Start: 2021-01-08 | End: 2021-01-08

## 2021-01-08 RX ADMIN — LOSARTAN POTASSIUM 50 MG: 50 TABLET, FILM COATED ORAL at 06:24

## 2021-01-08 RX ADMIN — LATANOPROST 1 DROP: 50 SOLUTION OPHTHALMIC at 18:04

## 2021-01-08 RX ADMIN — MIRTAZAPINE 15 MG: 15 TABLET, FILM COATED ORAL at 18:06

## 2021-01-08 RX ADMIN — FINASTERIDE 5 MG: 5 TABLET, FILM COATED ORAL at 18:03

## 2021-01-08 RX ADMIN — TAMSULOSIN HYDROCHLORIDE 0.4 MG: 0.4 CAPSULE ORAL at 09:01

## 2021-01-08 RX ADMIN — ASPIRIN 81 MG: 81 TABLET, COATED ORAL at 18:03

## 2021-01-08 RX ADMIN — GABAPENTIN 600 MG: 300 CAPSULE ORAL at 18:03

## 2021-01-08 RX ADMIN — LORAZEPAM 0.5 MG: 0.5 TABLET ORAL at 13:00

## 2021-01-08 RX ADMIN — DOCUSATE SODIUM 50 MG AND SENNOSIDES 8.6 MG 2 TABLET: 8.6; 5 TABLET, FILM COATED ORAL at 06:24

## 2021-01-08 RX ADMIN — DORZOLAMIDE HYDROCHLORIDE AND TIMOLOL MALEATE 1 DROP: 20; 5 SOLUTION/ DROPS OPHTHALMIC at 18:03

## 2021-01-08 RX ADMIN — FENOFIBRATE 134 MG: 134 CAPSULE ORAL at 18:03

## 2021-01-08 RX ADMIN — LEVOTHYROXINE SODIUM 75 MCG: 0.07 TABLET ORAL at 06:24

## 2021-01-08 RX ADMIN — DORZOLAMIDE HYDROCHLORIDE AND TIMOLOL MALEATE 1 DROP: 20; 5 SOLUTION/ DROPS OPHTHALMIC at 06:25

## 2021-01-08 RX ADMIN — ENOXAPARIN SODIUM 40 MG: 40 INJECTION SUBCUTANEOUS at 06:00

## 2021-01-08 RX ADMIN — AMITRIPTYLINE HYDROCHLORIDE 25 MG: 10 TABLET, FILM COATED ORAL at 20:12

## 2021-01-08 RX ADMIN — FLUOXETINE 20 MG: 20 CAPSULE ORAL at 06:24

## 2021-01-08 ASSESSMENT — ENCOUNTER SYMPTOMS
SPUTUM PRODUCTION: 0
SENSORY CHANGE: 0
ORTHOPNEA: 0
SHORTNESS OF BREATH: 0
TINGLING: 0
HALLUCINATIONS: 0
COUGH: 0
EYE PAIN: 0
BACK PAIN: 0
CHILLS: 0
NECK PAIN: 0
BLURRED VISION: 0
TREMORS: 1
SPEECH CHANGE: 0
NAUSEA: 0
NERVOUS/ANXIOUS: 1
DIARRHEA: 0
DOUBLE VISION: 0
CONSTIPATION: 0
WEIGHT LOSS: 0
DIZZINESS: 0
FEVER: 0
VOMITING: 0
ABDOMINAL PAIN: 0
FOCAL WEAKNESS: 0
PALPITATIONS: 0
PHOTOPHOBIA: 0
MYALGIAS: 0
HEADACHES: 0

## 2021-01-08 ASSESSMENT — LIFESTYLE VARIABLES: SUBSTANCE_ABUSE: 0

## 2021-01-08 NOTE — PROGRESS NOTES
NPN: Pt a&o x4, SBA to bathroom steady gait. VSS, denies pain. Continent of bowel and bladder. Skin intact. Pt behavioral appropriate and compliant overnight. 1:1 Sitter at bedside and legal hold in place. Admits to SI with no intent or plan, denies HI.  Pt resting comfortably in bed.

## 2021-01-08 NOTE — CARE PLAN
Problem: Communication  Goal: The ability to communicate needs accurately and effectively will improve  Outcome: PROGRESSING SLOWER THAN EXPECTED  Pt has not called for the nursing staff as of yet, but appears comfortable. Pt encouraged to voice his needs.     Problem: Psychosocial Needs:  Goal: Level of anxiety will decrease  Outcome: PROGRESSING SLOWER THAN EXPECTED   Pt is on suicide precautions and has a 1 to 1 sitter in place. Pt encouraged to voice feelings.

## 2021-01-08 NOTE — PROCEDURES
VIDEO ELECTROENCEPHALOGRAM REPORT      Referring provider: Dr. Blevins    DOS: 01/07/21 (0 hours and 24 minutes of total recording time).     INDICATION:  Lico Salinas 70 y.o. male presenting with altered mental status.    CURRENT ANTIEPILEPTIC AND/OR SEDATING REGIMEN: Neurontin. Other: Elavil, Prozac, Remeron,     TECHNIQUE: 30 channel video electroencephalogram (EEG) was performed in accordance with the international 10-20 system. The study was reviewed in bipolar and referential montages. The recording examined the patient in the awake and drowsy state(s).     DESCRIPTION OF THE RECORD:  During wakefulness, the background was continuous and showed a 9-10 Hz posterior dominant rhythm.  There was reactivity to eye closure/opening.  A normal anterior-posterior gradient was noted with faster beta frequencies seen anteriorly.  During drowsiness, theta/delta frequencies were seen.    Sleep was captured and was characterized by diffuse background delta/theta activity with a loss of myogenic artifact.  N2 sleep transients in the form of sleep spindles and vertex waves were seen in the leads over the central regions.     ACTIVATION PROCEDURES:   Intermittent Photic stimulation was performed in a stepwise fashion from 1 to 30 Hz, and did not elicit any abnormal responses.      ICTAL AND INTERICTAL FINDINGS:   No focal or generalized epileptiform activity noted.     No regional slowing was seen during this routine study.      No clinical events or seizures were reported or recorded during the study.     EKG: sampling of the EKG recording did not demonstrate any abnormalities    EVENTS:  None    INTERPRETATION:  Normal video EEG recording in the awake and drowsy state(s):  - No persistent focal asymmetries seen.  - No epileptiform discharges seen   - No seizures. Clinical correlation is recommended.      Note: A normal EEG does not rule out epilepsy.  If the clinical suspicion remains high for seizures, a prolonged  recording to capture clinical or subclinical events may be helpful.        Rinku Diego MD  Epilepsy and General Neurology  Department of Neurology  Instructor of Clinical Neurology [facility].   Office: [phone]  Fax: [phone]

## 2021-01-08 NOTE — PROGRESS NOTES
Assumed care of pt at 0700 from the night shift nurse. Pt is A&Ox4, he denies pain or discomfort at this time. He currently has a 1 to 1 sitter, all potentially dangerous material has been removed from the room, and close observation assessments have been completed.

## 2021-01-08 NOTE — CARE PLAN
Problem: Safety  Goal: Will remain free from falls  Outcome: PROGRESSING AS EXPECTED  Note: Patients bed in lowest and locked position, call light and side table within reach. Non-skid socks on and educated to use call light when he would like to get up. Verbalized understanding.       Problem: Infection  Goal: Will remain free from infection  Outcome: PROGRESSING AS EXPECTED
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
## 2021-01-08 NOTE — PROGRESS NOTES
Neurology Progress Note  Neurohospitalist Service, [facility]    Referring Physician: DENNY Walton*    Chief Complaint   Patient presents with   • Altered Mental Status     HPI: Refer to initial documented Neurology H&P, as detailed in the patient's chart.    Interval History:  1/8/2021: No acute events overnight. Reports insomnia. No new symptoms. Denies suicidal or homicidal ideations.    Review of systems: In addition to what is detailed in the HPI and/or updated in the interval history, all other systems reviewed and are negative.    Past Medical History:    has a past medical history of Anxiety, Diabetes (HCC) (07-10-15), Glaucoma, High cholesterol (07-10-15), Hypertension (07-10-15), Hypothyroidism, Neuropathy, peripheral, Skin cancer, Snoring, and Thyroid disease.    FHx:  family history includes Alcohol abuse in his mother; Heart Disease in his father and mother; Lung Disease in his father.    SHx:   reports that he quit smoking about 41 years ago. His smoking use included cigarettes. He has a 20.00 pack-year smoking history. He has never used smokeless tobacco. He reports previous alcohol use of about 0.5 oz of alcohol per week. He reports that he does not use drugs.    Medications:    Current Facility-Administered Medications:   •  insulin regular (HumuLIN R,NovoLIN R) injection, 1-6 Units, Subcutaneous, 4X/DAY ACHS, Veda Sánchez, A.P.R.N., Stopped at 01/07/21 1700  •  Notify, , , Once **AND** dextrose 50% (D50W) injection 50 mL, 50 mL, Intravenous, Q15 MIN PRN, Veda Sánchez, A.P.R.N.  •  amitriptyline (ELAVIL) tablet 25 mg, 25 mg, Oral, QHS, Avery Montano M.D., 25 mg at 01/07/21 2001  •  aspirin EC (ECOTRIN) tablet 81 mg, 81 mg, Oral, Q EVENING, Avery Montano M.D., 81 mg at 01/07/21 1749  •  dorzolamide-timolol (COSOPT) 22.3-6.8 MG/ML ophthalmic drops 1 Drop, 1 Drop, Both Eyes, BID, Avery Montano M.D., 1 Drop at 01/08/21 0625  •  finasteride (PROSCAR) tablet 5 mg, 5 mg,  Oral, Q EVENING, Avery Montano M.D., 5 mg at 01/07/21 1803  •  FLUoxetine (PROZAC) capsule 20 mg, 20 mg, Oral, QAM, Avery Montano M.D., 20 mg at 01/08/21 0624  •  gabapentin (NEURONTIN) capsule 600 mg, 600 mg, Oral, Q EVENING, Avery Montano M.D., 600 mg at 01/07/21 1752  •  latanoprost (XALATAN) 0.005 % ophthalmic solution 1 Drop, 1 Drop, Both Eyes, Q EVENING, Avery Montano M.D., 1 Drop at 01/07/21 1803  •  levothyroxine (SYNTHROID) tablet 75 mcg, 75 mcg, Oral, AM ES, Avery Montano M.D., 75 mcg at 01/08/21 0624  •  LORazepam (ATIVAN) tablet 0.5 mg, 0.5 mg, Oral, QHS PRN, Avery Montano M.D., 0.5 mg at 01/07/21 2002  •  losartan (COZAAR) tablet 50 mg, 50 mg, Oral, Q DAY, Avery Montano M.D., 50 mg at 01/08/21 0624  •  mirtazapine (Remeron) tablet 15 mg, 15 mg, Oral, Q EVENING, Avery Montano M.D., 15 mg at 01/07/21 1755  •  omeprazole (PRILOSEC) capsule 20 mg, 20 mg, Oral, QDAY PRN, Avery Montano M.D.  •  senna-docusate (PERICOLACE or SENOKOT S) 8.6-50 MG per tablet 2 Tab, 2 Tab, Oral, BID, 2 Tab at 01/08/21 0624 **AND** polyethylene glycol/lytes (MIRALAX) PACKET 1 Packet, 1 Packet, Oral, QDAY PRN **AND** magnesium hydroxide (MILK OF MAGNESIA) suspension 30 mL, 30 mL, Oral, QDAY PRN **AND** bisacodyl (DULCOLAX) suppository 10 mg, 10 mg, Rectal, QDAY PRN, Avery Montano M.D.  •  [Held by provider] enoxaparin (LOVENOX) inj 40 mg, 40 mg, Subcutaneous, DAILY, Avery Montano M.D., 40 mg at 01/08/21 0600  •  tamsulosin (FLOMAX) capsule 0.4 mg, 0.4 mg, Oral, AFTER BREAKFAST, Avery Montano M.D., 0.4 mg at 01/08/21 0901  •  fenofibrate micronized (LOFIBRA) capsule 134 mg, 134 mg, Oral, Q EVENING, Avery Montano M.D., 134 mg at 01/07/21 1749    Physical Examination:     Vitals:    01/07/21 0731 01/07/21 2000 01/08/21 0600 01/08/21 0757   BP: 153/72 115/56 100/64 127/66   Pulse: 72 65 70 62   Resp: 14 18 18 16   Temp:  36.7 °C (98 °F) 36.7 °C (98 °F) 36.1 °C (97 °F)   TempSrc:  Temporal Temporal Temporal   SpO2: 96% 97% 98% 92%   Weight:       Height:            General: Patient is awake and in no acute distress. 1:1 sitter at bedside  HENT: Normocephalic, atraumatic, oropharynx is pink and moist.   Eyes: examination of optic disks not indicated at this time  CV: RRR  Lungs: Clear bilaterally  Abdomen: Soft, non-tender, non-distended, no palpable masses  Psych: Mood -disturbed, suicidal and homicidal ideations on & off     NEUROLOGICAL EXAM:      Mental status: Awake, alert and fully oriented, follows commands  Speech and language: speech is clear and fluent. The patient is able to name and repeat.  Cranial nerve exam: Pupils are equal, round and reactive to light bilaterally. Visual fields are full. Extraocular muscles are intact. Sensation in the face is intact to light touch. Face is symmetric. Hearing to finger rub equal. Palate elevates symmetrically. Shoulder shrug is full. Tongue is midline.  Motor exam: Strength is 5/5 in all extremities both distally and proximally. Tone is increased. Resting temors & postural temors, R>L +. Sensory exam: No sensory deficits identified   Deep tendon reflexes:  2+ and symmetric. Toes down-going bilaterally.  Coordination: no ataxia   Gait: Taking many steps to turn, no shuffling gait    Objective Data:    Labs:  Lab Results   Component Value Date/Time    PROTHROMBTM 14.0 11/03/2020 09:57 AM    INR 1.05 11/03/2020 09:57 AM      Lab Results   Component Value Date/Time    WBC 10.4 01/07/2021 05:25 AM    RBC 4.65 (L) 01/07/2021 05:25 AM    HEMOGLOBIN 14.1 01/07/2021 05:25 AM    HEMATOCRIT 43.0 01/07/2021 05:25 AM    MCV 92.5 01/07/2021 05:25 AM    MCH 30.3 01/07/2021 05:25 AM    MCHC 32.8 (L) 01/07/2021 05:25 AM    MPV 10.1 01/07/2021 05:25 AM    NEUTSPOLYS 51.90 01/06/2021 01:46 AM    LYMPHOCYTES 41.70 (H) 01/06/2021 01:46 AM    MONOCYTES 3.90 01/06/2021 01:46 AM    EOSINOPHILS 1.90 01/06/2021 01:46 AM    BASOPHILS 0.40 01/06/2021 01:46 AM      Lab Results   Component Value Date/Time    SODIUM 136 01/07/2021 05:25 AM    POTASSIUM  4.1 01/07/2021 05:25 AM    CHLORIDE 102 01/07/2021 05:25 AM    CO2 25 01/07/2021 05:25 AM    GLUCOSE 128 (H) 01/07/2021 05:25 AM    BUN 19 01/07/2021 05:25 AM    CREATININE 1.04 01/07/2021 05:25 AM      Lab Results   Component Value Date/Time    CHOLSTRLTOT 178 04/10/2020 09:23 AM     (H) 04/10/2020 09:23 AM    HDL 36 (A) 04/10/2020 09:23 AM    TRIGLYCERIDE 200 (H) 04/10/2020 09:23 AM       Lab Results   Component Value Date/Time    ALKPHOSPHAT 46 01/07/2021 05:25 AM    ASTSGOT 24 01/07/2021 05:25 AM    ALTSGPT 17 01/07/2021 05:25 AM    TBILIRUBIN 0.3 01/07/2021 05:25 AM        Imaging/Testing:  CT-HEAD W/O   Final Result      No noncontrast CT evidence of acute intracranial hemorrhage.      Mild white matter hypodensity is present.  This is a nonspecific finding which usually is found to represent chronic microvascular disease in patient's of this demographic.  Demyelination, age indeterminant ischemia and gliosis are also common    possibilities. A chronic left basal ganglia lacunar infarction is again seen.      Nonobstructive right maxillary mucosal retention cyst      Age-appropriate atrophy         DX-CHEST-PORTABLE (1 VIEW)   Final Result      No radiographic evidence of acute cardiopulmonary process.      MR-BRAIN-WITH & W/O    (Results Pending)     Assessment and Plan:    Lico Salinas is a 70 y.o. male with history of dementia, DM, HTN, hypothyroidism, BPH, presenting to the hospital for altered mental status with agitation and behavioral changes and consulted for same.   -MMSE is 25, no cognitive impairment  -Has auditory hallucinations. Has h/o dementia with behavioral problems and having suicidal and homicidal ideations.  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 All tests have been ordered, but pending LP.  -Please  call us again when the results are back  -Supportive management for now

## 2021-01-09 LAB
ANION GAP SERPL CALC-SCNC: 10 MMOL/L (ref 7–16)
BUN SERPL-MCNC: 19 MG/DL (ref 8–22)
BURR CELLS/RBC NFR CSF MANUAL: <1 %
CALCIUM SERPL-MCNC: 9.2 MG/DL (ref 8.5–10.5)
CHLORIDE SERPL-SCNC: 99 MMOL/L (ref 96–112)
CLARITY CSF: CLEAR
CO2 SERPL-SCNC: 22 MMOL/L (ref 20–33)
COLOR CSF: COLORLESS
COLOR SPUN CSF: COLORLESS
CREAT SERPL-MCNC: 1.04 MG/DL (ref 0.5–1.4)
CRYPTOC AG CSF QL LA: NEGATIVE
CSF COMMENTS 1658: NORMAL
ERYTHROCYTE [DISTWIDTH] IN BLOOD BY AUTOMATED COUNT: 42.6 FL (ref 35.9–50)
GLUCOSE BLD-MCNC: 105 MG/DL (ref 65–99)
GLUCOSE BLD-MCNC: 124 MG/DL (ref 65–99)
GLUCOSE BLD-MCNC: 126 MG/DL (ref 65–99)
GLUCOSE BLD-MCNC: 99 MG/DL (ref 65–99)
GLUCOSE CSF-MCNC: 64 MG/DL (ref 40–80)
GLUCOSE SERPL-MCNC: 104 MG/DL (ref 65–99)
GRAM STN SPEC: NORMAL
HCT VFR BLD AUTO: 41.3 % (ref 42–52)
HGB BLD-MCNC: 13.7 G/DL (ref 14–18)
LYMPHOCYTES NFR CSF: 50 %
MCH RBC QN AUTO: 30 PG (ref 27–33)
MCHC RBC AUTO-ENTMCNC: 33.2 G/DL (ref 33.7–35.3)
MCV RBC AUTO: 90.6 FL (ref 81.4–97.8)
MONONUC CELLS NFR CSF: 47 %
NEUTROPHILS NFR CSF: 3 %
PLATELET # BLD AUTO: 212 K/UL (ref 164–446)
PMV BLD AUTO: 9.9 FL (ref 9–12.9)
POTASSIUM SERPL-SCNC: 3.6 MMOL/L (ref 3.6–5.5)
PROT CSF-MCNC: 186 MG/DL (ref 15–45)
RBC # BLD AUTO: 4.56 M/UL (ref 4.7–6.1)
RBC # CSF: 3 CELLS/UL
SIGNIFICANT IND 70042: NORMAL
SITE SITE: NORMAL
SODIUM SERPL-SCNC: 131 MMOL/L (ref 135–145)
SOURCE SOURCE: NORMAL
SPECIMEN VOL CSF: 5 ML
TUBE # CSF: 3
TUBE # CSF: 4
WBC # BLD AUTO: 11.3 K/UL (ref 4.8–10.8)
WBC # CSF: <1 CELLS/UL (ref 0–10)

## 2021-01-09 PROCEDURE — 87070 CULTURE OTHR SPECIMN AEROBIC: CPT

## 2021-01-09 PROCEDURE — 85027 COMPLETE CBC AUTOMATED: CPT

## 2021-01-09 PROCEDURE — 86403 PARTICLE AGGLUT ANTBDY SCRN: CPT

## 2021-01-09 PROCEDURE — 62270 DX LMBR SPI PNXR: CPT | Performed by: HOSPITALIST

## 2021-01-09 PROCEDURE — 62270 DX LMBR SPI PNXR: CPT

## 2021-01-09 PROCEDURE — 80048 BASIC METABOLIC PNL TOTAL CA: CPT

## 2021-01-09 PROCEDURE — 700102 HCHG RX REV CODE 250 W/ 637 OVERRIDE(OP): Performed by: INTERNAL MEDICINE

## 2021-01-09 PROCEDURE — 82962 GLUCOSE BLOOD TEST: CPT

## 2021-01-09 PROCEDURE — 96376 TX/PRO/DX INJ SAME DRUG ADON: CPT | Mod: XU

## 2021-01-09 PROCEDURE — 700101 HCHG RX REV CODE 250: Performed by: HOSPITALIST

## 2021-01-09 PROCEDURE — A9270 NON-COVERED ITEM OR SERVICE: HCPCS | Performed by: INTERNAL MEDICINE

## 2021-01-09 PROCEDURE — 36415 COLL VENOUS BLD VENIPUNCTURE: CPT

## 2021-01-09 PROCEDURE — 87205 SMEAR GRAM STAIN: CPT

## 2021-01-09 PROCEDURE — 89051 BODY FLUID CELL COUNT: CPT

## 2021-01-09 PROCEDURE — 700111 HCHG RX REV CODE 636 W/ 250 OVERRIDE (IP): Performed by: INTERNAL MEDICINE

## 2021-01-09 PROCEDURE — 99225 PR SUBSEQUENT OBSERVATION CARE,LEVEL II: CPT | Performed by: INTERNAL MEDICINE

## 2021-01-09 PROCEDURE — G0378 HOSPITAL OBSERVATION PER HR: HCPCS

## 2021-01-09 RX ORDER — LORAZEPAM 2 MG/ML
0.5 INJECTION INTRAMUSCULAR
Status: COMPLETED | OUTPATIENT
Start: 2021-01-09 | End: 2021-01-09

## 2021-01-09 RX ADMIN — ASPIRIN 81 MG: 81 TABLET, COATED ORAL at 17:02

## 2021-01-09 RX ADMIN — FINASTERIDE 5 MG: 5 TABLET, FILM COATED ORAL at 17:02

## 2021-01-09 RX ADMIN — MIRTAZAPINE 15 MG: 15 TABLET, FILM COATED ORAL at 17:02

## 2021-01-09 RX ADMIN — LORAZEPAM 0.5 MG: 2 INJECTION INTRAMUSCULAR; INTRAVENOUS at 11:24

## 2021-01-09 RX ADMIN — DORZOLAMIDE HYDROCHLORIDE AND TIMOLOL MALEATE 1 DROP: 20; 5 SOLUTION/ DROPS OPHTHALMIC at 05:29

## 2021-01-09 RX ADMIN — LIDOCAINE HYDROCHLORIDE 20 ML: 10 INJECTION, SOLUTION INFILTRATION; PERINEURAL at 12:45

## 2021-01-09 RX ADMIN — DORZOLAMIDE HYDROCHLORIDE AND TIMOLOL MALEATE 1 DROP: 20; 5 SOLUTION/ DROPS OPHTHALMIC at 17:02

## 2021-01-09 RX ADMIN — LATANOPROST 1 DROP: 50 SOLUTION OPHTHALMIC at 17:02

## 2021-01-09 RX ADMIN — FLUOXETINE 20 MG: 20 CAPSULE ORAL at 05:29

## 2021-01-09 RX ADMIN — FENOFIBRATE 134 MG: 134 CAPSULE ORAL at 17:02

## 2021-01-09 RX ADMIN — GABAPENTIN 600 MG: 300 CAPSULE ORAL at 17:02

## 2021-01-09 RX ADMIN — LOSARTAN POTASSIUM 50 MG: 50 TABLET, FILM COATED ORAL at 05:29

## 2021-01-09 RX ADMIN — DOCUSATE SODIUM 50 MG AND SENNOSIDES 8.6 MG 2 TABLET: 8.6; 5 TABLET, FILM COATED ORAL at 05:29

## 2021-01-09 RX ADMIN — TAMSULOSIN HYDROCHLORIDE 0.4 MG: 0.4 CAPSULE ORAL at 07:20

## 2021-01-09 RX ADMIN — LEVOTHYROXINE SODIUM 75 MCG: 0.07 TABLET ORAL at 05:29

## 2021-01-09 RX ADMIN — AMITRIPTYLINE HYDROCHLORIDE 25 MG: 10 TABLET, FILM COATED ORAL at 21:13

## 2021-01-09 ASSESSMENT — ENCOUNTER SYMPTOMS
ORTHOPNEA: 0
HEADACHES: 0
HALLUCINATIONS: 0
NAUSEA: 0
MUSCULOSKELETAL NEGATIVE: 1
SPUTUM PRODUCTION: 0
CHILLS: 0
NECK PAIN: 0
SHORTNESS OF BREATH: 0
BACK PAIN: 0
MYALGIAS: 0
FEVER: 0
COUGH: 0
CARDIOVASCULAR NEGATIVE: 1
ABDOMINAL PAIN: 0
SPEECH CHANGE: 0
WEIGHT LOSS: 0
PALPITATIONS: 0
DIARRHEA: 0
TINGLING: 0
SENSORY CHANGE: 0
CONSTIPATION: 0
BLURRED VISION: 0
RESPIRATORY NEGATIVE: 1
EYE PAIN: 0
GASTROINTESTINAL NEGATIVE: 1
NERVOUS/ANXIOUS: 1
PHOTOPHOBIA: 0
DOUBLE VISION: 0
DIZZINESS: 0
TREMORS: 1
VOMITING: 0
FOCAL WEAKNESS: 0

## 2021-01-09 ASSESSMENT — PAIN DESCRIPTION - PAIN TYPE: TYPE: ACUTE PAIN

## 2021-01-09 ASSESSMENT — LIFESTYLE VARIABLES: SUBSTANCE_ABUSE: 0

## 2021-01-09 NOTE — CONSULTS
"PSYCHIATRIC INTAKE EVALUATION    *Reason for admission:an episode of agitation at home.  The patient's wife, who called EMS, noted that the patient apparently rolled over onto her in bed and would not get off.  He became very combative and agitated....     EMS and police arrived on scene and patient had to be sedated with Haldol and Versed and placed in four-point restraints.The patient states that he remembers this happening because \"something slipped in my mind\" and I \"just went crazy.\"  He notes feelings of despair regarding his dementia diagnosis  ...         *Reason for consult: suicidal/homicidal thoughts and attempted to harm wife.     *Requesting Physician/APN:  DENNY Harmon MD         Legal Hold status:  on legal hold         *Chief Complaint:: I dont know whats going on      *HPI (includes Psychiatric ROS):  71 yo male who is a poor hx. According to pt he has never had any psychiatric issues, never hospitalized, no meds, no SAs, no psychosis etc. Then recently over the past month he has had sudden \"thoughts\" in his head and acts on them like trying to hurt his wife. He doesn't know what prompted it, if something was said, etc but he is very disturbed by it. It appeared and left suddenly. He does not have it now. Pt is [facility]     Depression: not particularly, he is much more anxious and this is the predominate mood because he doesn't know what will happen, why he is here (has an idea), what can be done about it. Denies SI.    Anxiety: very anxious by his word and says the the tremors that I see on all 4 limbs and the stuttering with whole words to the degree that it interferes with the interview (he can't stop) is all due to anxiety. I left the room and came back to do a basic exam of rigidity, eye movement  etc (all normal) and these symptoms were much improved.    Psychosis: denies    Notes: neurology:...MMSE is 25, no cognitive impairment.-Has " "auditory hallucinations. Has h/o dementia with behavioral problems and having suicidal and homicidal ideations (pt denied these in that he experiences them as ego dystonic, not himself,  though he acted on the \"thoughts\" that popped into his head.).... Most likely lewy body dementia.        Dr Wallis 11/2020: generalized weakness, did not note any parkinsonian symptoms, behavioral symptoms or memory problems (this presentation appears to be very rapid in onset)    *Medical Review Of Symptoms (not dx conditions):   Review of Systems   HENT: Positive for hearing loss.    Eyes:        Needs glasses   Respiratory: Negative.    Cardiovascular: Negative.    Gastrointestinal: Negative.    Genitourinary: Negative.    Musculoskeletal: Negative.    Skin: Negative.    Neurological: Positive for tremors.        Denies falls, loss of balance   Psychiatric/Behavioral: The patient is nervous/anxious.       *Psychiatric Examination:   Vitals:   Vitals:    01/08/21 0600 01/08/21 0757 01/08/21 1636 01/08/21 1931   BP: 100/64 127/66 143/76 125/59   Pulse: 70 62 68 64   Resp: 18 16 18 17   Temp: 36.7 °C (98 °F) 36.1 °C (97 °F) 36.7 °C (98 °F) 36.8 °C (98.2 °F)   TempSrc: Temporal Temporal Temporal Temporal   SpO2: 98% 92% 97% 94%   Weight:       Height:         General Appearance:  normal habitus and poor eye contact, not able to provide much information  Abnormal Movements: tremors : there is a resting tremor noted within the more gross tremors.  Gait and Posture: not observed  Speech: stuttering and pressured: has a hard time stopping the repitition  Thought Process: normal rate  Associations:   linear  Abnormal or Psychotic Thoughts: none  Judgement and Insight: limited   Orientation: grossly intact  Recent and Remote Memory: grossly intact  Attention Span and Concentration: distracted  Language:fluent  Fund of Knowledge: unable to assess  Mood and Affect: anxious  SI/HI:  suicidal - no and homicidal - no    *PAST " "MEDICAL/PSYCH/FAMILY/SOCIAL(as reported by patient):       *medical hx:           Past Medical History:   Diagnosis Date   • Anxiety    • Diabetes (HCC) 07-10-15   • Glaucoma     OU   • High cholesterol 07-10-15    hx of, took self off simvastin   • Hypertension 07-10-15    hx of, took himself off lisinopril and HTCZ   • Hypothyroidism    • Neuropathy, peripheral    • Skin cancer    • Snoring    • Thyroid disease      Past Surgical History:   Procedure Laterality Date   • CARPAL TUNNEL ENDOSCOPIC Left 9/5/2017    Procedure: CARPAL TUNNEL ENDOSCOPIC VERSUS;  Surgeon: Frank Alvarado M.D.;  Location: SURGERY [facility];  Service: Orthopedics   • LYMPH NODE EXCISION Left 7/21/2015    Procedure: LYMPH NODE EXCISION DEEP CERVICAL;  Surgeon: Michele Gan M.D.;  Location: SURGERY [facility] DAY [facility];  Service:    • LUMBAR DISC REPLACEMENT          *psychiatric hx:    12/16/2020:oriented x 4; calm; cooperative; pleasant; with organized thoughts and behaviors; no delusions, paranoia, hallucinations noted; insight, judgment adequate; currently denies SI, HI, or self-harm ideation; states he has had \"passing thought, maybe doing some pills, a couple days ago, I talked myself out of it\" and \"took a pillow, tried to smother myself last night but stopped\"; states \"thoughts started three weeks ago\"....I have a prostate problem, medical bills keep coming, it is eating into finances\"  1/6/2021: Patient stuttered throughout the interview, hand tremors noted, word recall challenges and some memory deficits also noted.... \"I think I went crazy. I started doing some wild stuff. I don't know why I did it\". Patient reports previously feeling both suicidal and homicidal yesterdayhas experienced suicidal and homicidal ideations over the past month but prior to this he has never considered harming himself or anyone...states he is \"triggered by everything, what someone says or what my own head says to me\". Patient is unable to " identify a specific  trigger for these explosive behaviors nor is he able to control them when they occur. Patient reports hearing voices but is unsure if he is talking to himself or if the voices are int  ernal stimuli.....    *family Psych hx:  sister OD'd and  on heroin. Mom alcohol .     *social hx: retired from manufacturing work. Resides with his wife of over 30 years. No children    Alcohol: denies   Drugs:denies     *MEDICAL HX: labs, MARS, medications, etc were reviewed. Only those findings of potential interest to psychiatry are noted below:    *Current Medical issues:   see below     *Allergies:  Allergies   Allergen Reactions   • Pcn [Penicillins] Unspecified     As a child, unknown reaction      *Current Medications:    Current Facility-Administered Medications:   •  LORazepam (ATIVAN) injection 1 mg, 1 mg, Intravenous, Once PRN, Christiana Harmon M.D.  •  insulin regular (HumuLIN R,NovoLIN R) injection, 1-6 Units, Subcutaneous, 4X/DAY ACHS, Veda Sánchez, A.P.R.N., Stopped at 21 1700  •  Notify, , , Once **AND** dextrose 50% (D50W) injection 50 mL, 50 mL, Intravenous, Q15 MIN PRN, Veda Sánchez, A.P.R.N.  •  amitriptyline (ELAVIL) tablet 25 mg, 25 mg, Oral, QHS, Avery Montano M.D., 25 mg at 21  •  aspirin EC (ECOTRIN) tablet 81 mg, 81 mg, Oral, Q EVENING, Avery Montano M.D., 81 mg at 21  •  dorzolamide-timolol (COSOPT) 22.3-6.8 MG/ML ophthalmic drops 1 Drop, 1 Drop, Both Eyes, BID, Avery Montano M.D., 1 Drop at 21  •  finasteride (PROSCAR) tablet 5 mg, 5 mg, Oral, Q EVENING, Avery Montano M.D., 5 mg at 21  •  FLUoxetine (PROZAC) capsule 20 mg, 20 mg, Oral, QAM, Avery Montano M.D., 20 mg at 21 0624  •  gabapentin (NEURONTIN) capsule 600 mg, 600 mg, Oral, Q EVENING, Avery Montano M.D., 600 mg at 21 1803  •  latanoprost (XALATAN) 0.005 % ophthalmic solution 1 Drop, 1 Drop, Both Eyes, Q EVENING, Avery Montano M.D., 1 Drop at 21 1804  •   levothyroxine (SYNTHROID) tablet 75 mcg, 75 mcg, Oral, AM ES, Avery Montano M.D., 75 mcg at 01/08/21 0624  •  LORazepam (ATIVAN) tablet 0.5 mg, 0.5 mg, Oral, QHS PRN, vAery Montano M.D., 0.5 mg at 01/07/21 2002  •  losartan (COZAAR) tablet 50 mg, 50 mg, Oral, Q DAY, Avery Montano M.D., 50 mg at 01/08/21 0624  •  mirtazapine (Remeron) tablet 15 mg, 15 mg, Oral, Q EVENING, Avery Montano M.D., 15 mg at 01/08/21 1806  •  omeprazole (PRILOSEC) capsule 20 mg, 20 mg, Oral, QDAY PRN, Avery Montano M.D.  •  senna-docusate (PERICOLACE or SENOKOT S) 8.6-50 MG per tablet 2 Tab, 2 Tab, Oral, BID, 2 Tab at 01/08/21 0624 **AND** polyethylene glycol/lytes (MIRALAX) PACKET 1 Packet, 1 Packet, Oral, QDAY PRN **AND** magnesium hydroxide (MILK OF MAGNESIA) suspension 30 mL, 30 mL, Oral, QDAY PRN **AND** bisacodyl (DULCOLAX) suppository 10 mg, 10 mg, Rectal, QDAY PRN, Avery Montano M.D.  •  [Held by provider] enoxaparin (LOVENOX) inj 40 mg, 40 mg, Subcutaneous, DAILY, Avery Montano M.D., 40 mg at 01/08/21 0600  •  tamsulosin (FLOMAX) capsule 0.4 mg, 0.4 mg, Oral, AFTER BREAKFAST, Avery Montano M.D., 0.4 mg at 01/08/21 0901  •  fenofibrate micronized (LOFIBRA) capsule 134 mg, 134 mg, Oral, Q EVENING, Avery Montano M.D., 134 mg at 01/08/21 1803  *ECG: personally reviewed QTc  511  *Cranial Imaging: personally reviewed CT old L basal ganglia infarct   EEG: wnl       *Labs:  Recent Labs     01/06/21  0146 01/07/21  0525   WBC 10.5 10.4   RBC 4.26* 4.65*   HEMOGLOBIN 12.7* 14.1   HEMATOCRIT 38.4* 43.0   MCV 90.1 92.5   MCH 29.8 30.3   RDW 43.0 44.8   PLATELETCT 216 216   MPV 10.7 10.1   NEUTSPOLYS 51.90  --    LYMPHOCYTES 41.70*  --    MONOCYTES 3.90  --    EOSINOPHILS 1.90  --    BASOPHILS 0.40  --      Lab Results   Component Value Date/Time    SODIUM 136 01/07/2021 05:25 AM    POTASSIUM 4.1 01/07/2021 05:25 AM    CHLORIDE 102 01/07/2021 05:25 AM    CO2 25 01/07/2021 05:25 AM    GLUCOSE 128 (H) 01/07/2021 05:25 AM    BUN 19 01/07/2021 05:25 AM    CREATININE  "1.04 01/07/2021 05:25 AM         Lab Results   Component Value Date/Time    BREATHALIZER 0.000 12/16/2020 1243     No components found for: BLOODALCOHOL   Lab Results   Component Value Date/Time    AMPHUR Negative 01/06/2021 0400    BARBSURINE Negative 01/06/2021 0400    BENZODIAZU Positive (A) 01/06/2021 0400    COCAINEMET Negative 01/06/2021 0400    METHADONE Negative 01/06/2021 0400    OPIATES Negative 01/06/2021 0400    OXYCODN Negative 01/06/2021 0400    PCPURINE Negative 01/06/2021 0400    PROPOXY Negative 01/06/2021 0400    CANNABINOID Negative 01/06/2021 0400     Lab Results   Component Value Date/Time    FREET4 1.18 01/07/2021 0550         *ASSESSMENT/RECOMENDATIONS:    1. Adjustment disorder with depression, anxiety  -emotions seem related to distress over not understanding what is happening to him.  -see below for meds    2. Psychotic disorder unspc  -seems to be secondary to a neurocognitive disorder    3. R/O neurocognitive disorder  - reportedly dx with Parkinson's or Pick's dementia  -per neurology:\"MMSE is 25, no cognitive impairment \"... Most likely Lewy Body dementia (AH)  -LP recommended by neuro, HIV,  covid, [place].  RPR, B12 wnl  -pimavanserin is recommended but not on formulary. Second choice is low dose Clozaril in Lewy body however will defer to neurology for now.       4.  Depressive disorder unspc by hx    -elavil 25 mg hs    -prozac 20 mg : can increase elavil level  -gabapentin 600 mg hs   -remeron 15 mg     -continue the above: although, elavil can cause stuttering, rarely. However he does not appear to stutter consistently per notes.   -consider speech eval for a more thorough cog eval    5. Anxiety disorder unsp  -as noted    6. Medical:  -DMII, neuropathy  -glaucoma  -HTN  -hypothyroidism  -noted pt to have significant tremors in both UE and LE, word stuttering . Pt says this is secondary to anxiety and he has had it for 1 month. Resting tremors, speech clear and " fluent.  -prolonged QTc 511      Legal hold: on legal hold: until it is clarified if he has a primary mental disorder: the SI   and distress may be related to feeling out of control secondary to medical issues.  Observation status: 1:1 sitter  Privileges (while on legal hold): per nursing discretion     Will follow in case medical causes and dementia ruled out  Thank you for the consult

## 2021-01-09 NOTE — PROGRESS NOTES
Bedside report received.  Assessment complete.  A&O x 4. Patient calls appropriately.  Patient ambulates with stand by assist.   Patient has 0/10 pain.  Denies N&V. Tolerating Diabetic diet.  + void, + flatus, + BM.  Patient denies SOB.  Patient pleasant but extremely lethargic tonight. Wants to get rest. Questioned his Amitriptyline, patient was educated.   Review plan with of care with patient. Call light and personal belongings with in reach. Hourly rounding in place. All needs met at this time. 1:1 sitter at bedside

## 2021-01-09 NOTE — PROGRESS NOTES
Hospital Medicine Daily Progress Note    Date of Service  1/9/2021    Chief Complaint  Altered mental status    Hospital Course  Mr. Salinas is a 70-year-old male with PMH significant for hypothyroidism, DM 2 on Metformin, anxiety and depression, HTN and dementia who presented 1/6/2021 with altered mental status.  Per report, patient woke up agitated and altered and rolled over onto his wife and would not get off of her. Wife became frightened and called the police.  Upon arrival of PD patient became combative requiring Versed and Haldol IM.  He arrived to the ED in four-point restraints and was placed on a legal hold after stating he wanted to die and one of the officers to shoot him.  He was evaluated by psychiatry who recommended one-to-one supervision and [facility] for [facility].  Per chart review, he was seen by Dr. Wallis in November for weakness. MRI at that time unremarkable. Had none of the symptoms he presents with now.  Neurology evaluated him and recommended to obtain lumbar puncture.    Interval Problem Update  I evaluated and examined him at the bedside.  White blood cell count slightly trended up to 11.3.  Most likely reactionary.  He remains afebrile and his vitals sign did not show signs of infection.   Found to have slight hyponatremia with sodium of 131  Plan is to perform lumbar puncture today.  He was very anxious this morning regarding MRI due to claustrophobia.  I discussed with neurologist and plan is to hold on MRI as he recently had MRI 2 months ago.    I called patient's wife and discussed plan of care with her and I answered all of her questions.    Consultants/Specialty  Psychiatry  Neurology  Code Status  Full Code    Disposition  TBD    Review of Systems  Review of Systems   Constitutional: Negative for chills, fever and weight loss.   HENT: Negative for hearing loss and tinnitus.    Eyes: Negative for blurred vision, double vision, photophobia and pain.    Respiratory: Negative for cough, sputum production and shortness of breath.    Cardiovascular: Negative for chest pain, palpitations, orthopnea and leg swelling.   Gastrointestinal: Negative for abdominal pain, constipation, diarrhea, nausea and vomiting.   Genitourinary: Negative for dysuria, frequency and urgency.   Musculoskeletal: Negative for back pain, joint pain, myalgias and neck pain.   Skin: Negative for rash.   Neurological: Positive for tremors. Negative for dizziness, tingling, sensory change, speech change, focal weakness and headaches.   Psychiatric/Behavioral: Negative for hallucinations, substance abuse and suicidal ideas. The patient is nervous/anxious.    All other systems reviewed and are negative.       Physical Exam  Temp:  [36.2 °C (97.1 °F)-36.8 °C (98.2 °F)] 36.2 °C (97.1 °F)  Pulse:  [63-68] 67  Resp:  [16-18] 17  BP: (106-143)/(53-76) 133/73  SpO2:  [94 %-98 %] 98 %    Physical Exam  Vitals signs reviewed.   Constitutional:       General: He is not in acute distress.  HENT:      Head: Normocephalic and atraumatic. No contusion.      Right Ear: External ear normal.      Left Ear: External ear normal.      Nose: Nose normal.      Mouth/Throat:      Pharynx: No oropharyngeal exudate.   Eyes:      General:         Right eye: No discharge.         Left eye: No discharge.      Pupils: Pupils are equal, round, and reactive to light.   Neck:      Musculoskeletal: No neck rigidity or muscular tenderness.   Cardiovascular:      Rate and Rhythm: Normal rate and regular rhythm.      Heart sounds: No murmur. No friction rub. No gallop.    Pulmonary:      Effort: Pulmonary effort is normal.      Breath sounds: No wheezing or rhonchi.   Abdominal:      General: Bowel sounds are normal. There is no distension.      Palpations: Abdomen is soft.      Tenderness: There is no abdominal tenderness. There is no rebound.   Musculoskeletal: Normal range of motion.         General: No swelling or tenderness.    Skin:     General: Skin is warm and dry.      Coloration: Skin is not jaundiced.   Neurological:      General: No focal deficit present.      Mental Status: He is alert and oriented to person, place, and time.      Cranial Nerves: No cranial nerve deficit.      Sensory: No sensory deficit.      Comments: Tremors in the upper bilateral extremities  He is alert and oriented able able to answer my questions.  He is moving all extremities.   Psychiatric:         Mood and Affect: Mood normal.       I performed physical exam on January 9, 2021 and it remains similar.  Fluids    Intake/Output Summary (Last 24 hours) at 1/9/2021 0822  Last data filed at 1/8/2021 2000  Gross per 24 hour   Intake 460 ml   Output --   Net 460 ml       Laboratory  Recent Labs     01/07/21  0525 01/09/21  0140   WBC 10.4 11.3*   RBC 4.65* 4.56*   HEMOGLOBIN 14.1 13.7*   HEMATOCRIT 43.0 41.3*   MCV 92.5 90.6   MCH 30.3 30.0   MCHC 32.8* 33.2*   RDW 44.8 42.6   PLATELETCT 216 212   MPV 10.1 9.9     Recent Labs     01/07/21  0525 01/09/21  0140   SODIUM 136 131*   POTASSIUM 4.1 3.6   CHLORIDE 102 99   CO2 25 22   GLUCOSE 128* 104*   BUN 19 19   CREATININE 1.04 1.04   CALCIUM 9.0 9.2                   Imaging  CT-HEAD W/O   Final Result      No noncontrast CT evidence of acute intracranial hemorrhage.      Mild white matter hypodensity is present.  This is a nonspecific finding which usually is found to represent chronic microvascular disease in patient's of this demographic.  Demyelination, age indeterminant ischemia and gliosis are also common    possibilities. A chronic left basal ganglia lacunar infarction is again seen.      Nonobstructive right maxillary mucosal retention cyst      Age-appropriate atrophy         DX-CHEST-PORTABLE (1 VIEW)   Final Result      No radiographic evidence of acute cardiopulmonary process.      MR-BRAIN-WITH & W/O    (Results Pending)        Assessment/Plan  Chronic prescription benzodiazepine use  Assessment &  Plan  -UDS (+) benzos on admit  -Ativan PRN HS insomnia    Essential hypertension  Assessment & Plan  Continue losartan    Anxiety and depression- (present on admission)  Assessment & Plan  -on chronic Benzodiazepines PRN for sleep  -continue home amitriptyline  -continue home prozac  -continue home mirtazapine  Continue to monitor closely.      Cognitive impairment- (present on admission)  Assessment & Plan  -he was seen by Dr. Wallis Nov 2020 for generalized weakness. Workup still in progress. Per Dr. Rose's notes, he had no Parkinsonian symptoms, behavioral disturbances or memory loss. MRI brain was recommended.  -brain MRI Nov 2020 showed age-related changes, otherwise unremarkable   -I've ordered MRI with and without contrast.  -CT brain this hospitalization showed mild white matter hypodensity, and chronic left basal ganglia lacunar infarction.  -PT OT  -CM working on placement at  psych facility    He was alert and oriented and able to answer my questions this morning.  He denies suicidal and homicidal ideation.  Neurology evaluated him and made recommendations for  Plan is to perform LP today.    Diabetes mellitus type 2, noninsulin dependent (HCC)- (present on admission)  Assessment & Plan  -A1c 6.3 December 2020  Continue insulin sliding scale with hypoglycemia protocol.  Held metformin.    Hypothyroidism- (present on admission)  Assessment & Plan  -TSH and T4 WNL this admission  -continue home levothyroxine     I called patient's wife and discussed plan of care with her and I answered all of her questions.    I discussed with neurology regarding MRI and plan is to hold MRI.    VTE prophylaxis: Enoxaparin (hold due to potential lumbar puncture today)      Christiana Harmon M.D.

## 2021-01-09 NOTE — PROCEDURES
Lumbar Puncture    Indication:    Altered Mental Status  Attending:    Lore Boswell     A time-out was completed verifying correct patient, procedure, site, positioning, and special equipment if applicable. The patient was placed in the LEFT lateral decubitus position in a semi-fetal position with help from the nursing staff. The area was cleansed and draped in usual sterile fashion. 1% lidocaine was used anesthetize the surrounding skin area. A 20-gauge 3.5-inch spinal needle was placed in the L3-L4 interspace. Clear cerebral spinal fluid was obtained and the opening pressure was noted to be 12 cm or mm. Four tubes were filled each with 1 mL of CSF. These were sent for the usual tests, including 1 tube to be held for further analysis if needed.     Estimated Blood Loss: 0.125 mL   The patient tolerated the procedure well and there were no complications.

## 2021-01-09 NOTE — CARE PLAN
Problem: Discharge Barriers/Planning  Goal: Patient's continuum of care needs will be met  Outcome: PROGRESSING AS EXPECTED     Problem: Psychosocial Needs:  Goal: Level of anxiety will decrease  Outcome: PROGRESSING AS EXPECTED   1:1 sitter in place. Per case management recs, legal hold will extend through the weekend. Alert and oriented, very anxious. SBA to bathroom. Await lumbar puncture this afternoon.

## 2021-01-10 LAB
ANION GAP SERPL CALC-SCNC: 8 MMOL/L (ref 7–16)
BUN SERPL-MCNC: 20 MG/DL (ref 8–22)
CALCIUM SERPL-MCNC: 9.5 MG/DL (ref 8.5–10.5)
CHLORIDE SERPL-SCNC: 105 MMOL/L (ref 96–112)
CO2 SERPL-SCNC: 25 MMOL/L (ref 20–33)
CREAT SERPL-MCNC: 1.08 MG/DL (ref 0.5–1.4)
ERYTHROCYTE [DISTWIDTH] IN BLOOD BY AUTOMATED COUNT: 42.2 FL (ref 35.9–50)
GLUCOSE BLD-MCNC: 101 MG/DL (ref 65–99)
GLUCOSE BLD-MCNC: 115 MG/DL (ref 65–99)
GLUCOSE BLD-MCNC: 124 MG/DL (ref 65–99)
GLUCOSE BLD-MCNC: 160 MG/DL (ref 65–99)
GLUCOSE SERPL-MCNC: 101 MG/DL (ref 65–99)
HCT VFR BLD AUTO: 42.4 % (ref 42–52)
HGB BLD-MCNC: 13.8 G/DL (ref 14–18)
MCH RBC QN AUTO: 29.4 PG (ref 27–33)
MCHC RBC AUTO-ENTMCNC: 32.5 G/DL (ref 33.7–35.3)
MCV RBC AUTO: 90.4 FL (ref 81.4–97.8)
PLATELET # BLD AUTO: 224 K/UL (ref 164–446)
PMV BLD AUTO: 10.3 FL (ref 9–12.9)
POTASSIUM SERPL-SCNC: 4.1 MMOL/L (ref 3.6–5.5)
RBC # BLD AUTO: 4.69 M/UL (ref 4.7–6.1)
SODIUM SERPL-SCNC: 138 MMOL/L (ref 135–145)
WBC # BLD AUTO: 11.7 K/UL (ref 4.8–10.8)

## 2021-01-10 PROCEDURE — 36415 COLL VENOUS BLD VENIPUNCTURE: CPT

## 2021-01-10 PROCEDURE — 700102 HCHG RX REV CODE 250 W/ 637 OVERRIDE(OP): Performed by: INTERNAL MEDICINE

## 2021-01-10 PROCEDURE — 96376 TX/PRO/DX INJ SAME DRUG ADON: CPT

## 2021-01-10 PROCEDURE — 700101 HCHG RX REV CODE 250: Performed by: HOSPITALIST

## 2021-01-10 PROCEDURE — 82962 GLUCOSE BLOOD TEST: CPT | Mod: 91

## 2021-01-10 PROCEDURE — G0378 HOSPITAL OBSERVATION PER HR: HCPCS

## 2021-01-10 PROCEDURE — 96372 THER/PROPH/DIAG INJ SC/IM: CPT

## 2021-01-10 PROCEDURE — A9270 NON-COVERED ITEM OR SERVICE: HCPCS | Performed by: INTERNAL MEDICINE

## 2021-01-10 PROCEDURE — 85027 COMPLETE CBC AUTOMATED: CPT

## 2021-01-10 PROCEDURE — 62270 DX LMBR SPI PNXR: CPT | Performed by: HOSPITALIST

## 2021-01-10 PROCEDURE — 700111 HCHG RX REV CODE 636 W/ 250 OVERRIDE (IP): Performed by: INTERNAL MEDICINE

## 2021-01-10 PROCEDURE — 99225 PR SUBSEQUENT OBSERVATION CARE,LEVEL II: CPT | Performed by: INTERNAL MEDICINE

## 2021-01-10 PROCEDURE — 80048 BASIC METABOLIC PNL TOTAL CA: CPT

## 2021-01-10 RX ORDER — LORAZEPAM 2 MG/ML
0.5 INJECTION INTRAMUSCULAR ONCE
Status: COMPLETED | OUTPATIENT
Start: 2021-01-10 | End: 2021-01-10

## 2021-01-10 RX ADMIN — FINASTERIDE 5 MG: 5 TABLET, FILM COATED ORAL at 17:24

## 2021-01-10 RX ADMIN — LEVOTHYROXINE SODIUM 75 MCG: 0.07 TABLET ORAL at 06:18

## 2021-01-10 RX ADMIN — FLUOXETINE 20 MG: 20 CAPSULE ORAL at 06:18

## 2021-01-10 RX ADMIN — DOCUSATE SODIUM 50 MG AND SENNOSIDES 8.6 MG 2 TABLET: 8.6; 5 TABLET, FILM COATED ORAL at 06:18

## 2021-01-10 RX ADMIN — INSULIN HUMAN 1 UNITS: 100 INJECTION, SOLUTION PARENTERAL at 20:12

## 2021-01-10 RX ADMIN — TAMSULOSIN HYDROCHLORIDE 0.4 MG: 0.4 CAPSULE ORAL at 08:18

## 2021-01-10 RX ADMIN — MIRTAZAPINE 15 MG: 15 TABLET, FILM COATED ORAL at 17:24

## 2021-01-10 RX ADMIN — LOSARTAN POTASSIUM 50 MG: 50 TABLET, FILM COATED ORAL at 06:18

## 2021-01-10 RX ADMIN — DORZOLAMIDE HYDROCHLORIDE AND TIMOLOL MALEATE 1 DROP: 20; 5 SOLUTION/ DROPS OPHTHALMIC at 17:24

## 2021-01-10 RX ADMIN — LATANOPROST 1 DROP: 50 SOLUTION OPHTHALMIC at 17:24

## 2021-01-10 RX ADMIN — GABAPENTIN 600 MG: 300 CAPSULE ORAL at 17:23

## 2021-01-10 RX ADMIN — AMITRIPTYLINE HYDROCHLORIDE 25 MG: 10 TABLET, FILM COATED ORAL at 20:11

## 2021-01-10 RX ADMIN — LORAZEPAM 0.5 MG: 2 INJECTION INTRAMUSCULAR; INTRAVENOUS at 14:27

## 2021-01-10 RX ADMIN — DORZOLAMIDE HYDROCHLORIDE AND TIMOLOL MALEATE 1 DROP: 20; 5 SOLUTION/ DROPS OPHTHALMIC at 06:18

## 2021-01-10 RX ADMIN — LIDOCAINE HYDROCHLORIDE 20 ML: 10 INJECTION, SOLUTION INFILTRATION; PERINEURAL at 14:44

## 2021-01-10 RX ADMIN — DOCUSATE SODIUM 50 MG AND SENNOSIDES 8.6 MG 2 TABLET: 8.6; 5 TABLET, FILM COATED ORAL at 17:23

## 2021-01-10 RX ADMIN — ASPIRIN 81 MG: 81 TABLET, COATED ORAL at 17:24

## 2021-01-10 RX ADMIN — FENOFIBRATE 134 MG: 134 CAPSULE ORAL at 17:23

## 2021-01-10 ASSESSMENT — ENCOUNTER SYMPTOMS
CONSTIPATION: 0
NECK PAIN: 0
DIZZINESS: 0
DIARRHEA: 0
MYALGIAS: 0
NERVOUS/ANXIOUS: 1
FOCAL WEAKNESS: 0
HALLUCINATIONS: 0
COUGH: 0
FEVER: 0
ABDOMINAL PAIN: 0
TREMORS: 1
VOMITING: 0
BLURRED VISION: 0
SPEECH CHANGE: 0
PHOTOPHOBIA: 0
HEADACHES: 0
BACK PAIN: 0
DOUBLE VISION: 0
CHILLS: 0
EYE PAIN: 0
SENSORY CHANGE: 0
WEIGHT LOSS: 0
TINGLING: 0
SPUTUM PRODUCTION: 0
ORTHOPNEA: 0
PALPITATIONS: 0
NAUSEA: 0
SHORTNESS OF BREATH: 0

## 2021-01-10 ASSESSMENT — LIFESTYLE VARIABLES: SUBSTANCE_ABUSE: 0

## 2021-01-10 NOTE — PROGRESS NOTES
Report received on pt from dayshift RN. Pt has sitter at bedside. All personal belongings/dangerous objects removed from room. Pt is AOx4. States at this moment he does not feel urge to hurt himself or others. Assisted to restroom and in getting a drink of water. Denies pain. Communicated plan of care for the evening. Will continue to monitor.

## 2021-01-10 NOTE — CARE PLAN
Problem: Safety  Goal: Will remain free from injury  Outcome: PROGRESSING AS EXPECTED  Note: Pt has sitter at bedside. Dangerous objects removed from room  Goal: Will remain free from falls  Outcome: PROGRESSING AS EXPECTED  Note: Pt requests assistance to ambulate. Ambulating without falls thus far

## 2021-01-10 NOTE — PROCEDURES
Lumbar Puncture    Indication:    Altered Mental Status  Attending:    Lore Boswell      A time-out was completed verifying correct patient, procedure, site, positioning, and special equipment if applicable. The patient was placed in the LEFT lateral decubitus position in a semi-fetal position with help from the nursing staff. The area was cleansed and draped in usual sterile fashion. 1% lidocaine was used anesthetize the surrounding skin area. I attempted to place a 20-gauge 3.5-inch spinal needle into L3-L4 interspace, but unfortunately this was unsuccessful.      Estimated Blood Loss: 0.5 mL   The patient tolerated the attempted procedure well and there were no immediate complications.

## 2021-01-10 NOTE — PROGRESS NOTES
Hospital Medicine Daily Progress Note    Date of Service  1/10/2021    Chief Complaint  Altered mental status    Hospital Course  Mr. Salinas is a 70-year-old male with PMH significant for hypothyroidism, DM 2 on Metformin, anxiety and depression, HTN and dementia who presented 1/6/2021 with altered mental status.  Per report, patient woke up agitated and altered and rolled over onto his wife and would not get off of her. Wife became frightened and called the police.  Upon arrival of PD patient became combative requiring Versed and Haldol IM.  He arrived to the ED in four-point restraints and was placed on a legal hold after stating he wanted to die and one of the officers to shoot him.  He was evaluated by psychiatry who recommended one-to-one supervision and [facility].  Per chart review, he was seen by Dr. Wallis in November for weakness. MRI at that time unremarkable. Had none of the symptoms he presents with now.  Neurology evaluated him and recommended to obtain lumbar puncture.    Interval Problem Update    I evaluated and examined him at the bedside.  CSF analysis showed less than 1 white blood cell count total RBC count 3.  I discussed with lab yesterday and the suggested that they would need more CSF to run more tests.  I discussed plan of care with patient.  I discussed with him this morning regarding LP again and he is in the agreement to get another LP this afternoon after he will receive anxiety medication.  BMP did not show any acute abnormalities.  White blood cell count slightly elevated 11.7 most likely reactionary no signs of active infection.  He remains afebrile.    Consultants/Specialty  Psychiatry  Neurology  Code Status  Full Code    Disposition  TBD    Review of Systems  Review of Systems   Constitutional: Negative for chills, fever and weight loss.   HENT: Negative for hearing loss and tinnitus.    Eyes: Negative for blurred vision, double  vision, photophobia and pain.   Respiratory: Negative for cough, sputum production and shortness of breath.    Cardiovascular: Negative for chest pain, palpitations, orthopnea and leg swelling.   Gastrointestinal: Negative for abdominal pain, constipation, diarrhea, nausea and vomiting.   Genitourinary: Negative for dysuria, frequency and urgency.   Musculoskeletal: Negative for back pain, joint pain, myalgias and neck pain.   Skin: Negative for rash.   Neurological: Positive for tremors. Negative for dizziness, tingling, sensory change, speech change, focal weakness and headaches.   Psychiatric/Behavioral: Negative for hallucinations, substance abuse and suicidal ideas. The patient is nervous/anxious.    All other systems reviewed and are negative.       Physical Exam  Temp:  [36.1 °C (97 °F)-36.9 °C (98.4 °F)] 36.2 °C (97.1 °F)  Pulse:  [56-81] 60  Resp:  [16-17] 16  BP: (111-126)/(63-71) 126/71  SpO2:  [93 %-98 %] 98 %    Physical Exam  Vitals signs reviewed.   Constitutional:       General: He is not in acute distress.  HENT:      Head: Normocephalic and atraumatic. No contusion.      Right Ear: External ear normal.      Left Ear: External ear normal.      Nose: Nose normal.      Mouth/Throat:      Pharynx: No oropharyngeal exudate.   Eyes:      General:         Right eye: No discharge.         Left eye: No discharge.      Pupils: Pupils are equal, round, and reactive to light.   Neck:      Musculoskeletal: No neck rigidity or muscular tenderness.   Cardiovascular:      Rate and Rhythm: Normal rate and regular rhythm.      Heart sounds: No murmur. No friction rub. No gallop.    Pulmonary:      Effort: Pulmonary effort is normal.      Breath sounds: No wheezing or rhonchi.   Abdominal:      General: Bowel sounds are normal. There is no distension.      Palpations: Abdomen is soft.      Tenderness: There is no abdominal tenderness. There is no rebound.   Musculoskeletal: Normal range of motion.         General: No  swelling or tenderness.   Skin:     General: Skin is warm and dry.      Coloration: Skin is not jaundiced.   Neurological:      General: No focal deficit present.      Mental Status: He is alert and oriented to person, place, and time.      Cranial Nerves: No cranial nerve deficit.      Sensory: No sensory deficit.      Comments: Tremors in the upper bilateral extremities  He is alert and oriented able able to answer my questions.  He is moving all extremities.   Psychiatric:         Mood and Affect: Mood normal.       I performed physical exam on January 10, 2021 and it remains similar.  Fluids    Intake/Output Summary (Last 24 hours) at 1/10/2021 0812  Last data filed at 1/10/2021 0400  Gross per 24 hour   Intake 120 ml   Output --   Net 120 ml       Laboratory  Recent Labs     01/09/21  0140 01/10/21  0127   WBC 11.3* 11.7*   RBC 4.56* 4.69*   HEMOGLOBIN 13.7* 13.8*   HEMATOCRIT 41.3* 42.4   MCV 90.6 90.4   MCH 30.0 29.4   MCHC 33.2* 32.5*   RDW 42.6 42.2   PLATELETCT 212 224   MPV 9.9 10.3     Recent Labs     01/09/21  0140 01/10/21  0127   SODIUM 131* 138   POTASSIUM 3.6 4.1   CHLORIDE 99 105   CO2 22 25   GLUCOSE 104* 101*   BUN 19 20   CREATININE 1.04 1.08   CALCIUM 9.2 9.5                   Imaging  CT-HEAD W/O   Final Result      No noncontrast CT evidence of acute intracranial hemorrhage.      Mild white matter hypodensity is present.  This is a nonspecific finding which usually is found to represent chronic microvascular disease in patient's of this demographic.  Demyelination, age indeterminant ischemia and gliosis are also common    possibilities. A chronic left basal ganglia lacunar infarction is again seen.      Nonobstructive right maxillary mucosal retention cyst      Age-appropriate atrophy         DX-CHEST-PORTABLE (1 VIEW)   Final Result      No radiographic evidence of acute cardiopulmonary process.           Assessment/Plan  Chronic prescription benzodiazepine use  Assessment & Plan  -UDS (+)  benzos on admit  -Ativan PRN HS insomnia    Essential hypertension  Assessment & Plan  Continue losartan    Anxiety and depression- (present on admission)  Assessment & Plan  -on chronic Benzodiazepines PRN for sleep  -continue home amitriptyline  -continue home prozac  -continue home mirtazapine  Continue to monitor closely.      Cognitive impairment- (present on admission)  Assessment & Plan  -he was seen by Dr. Wallis Nov 2020 for generalized weakness. Workup still in progress. Per Dr. Rose's notes, he had no Parkinsonian symptoms, behavioral disturbances or memory loss. MRI brain was recommended.  -brain MRI Nov 2020 showed age-related changes, otherwise unremarkable   -I've ordered MRI with and without contrast.  -CT brain this hospitalization showed mild white matter hypodensity, and chronic left basal ganglia lacunar infarction.  -PT OT  -CM working on placement at  psych facility    He was alert and oriented and able to answer my questions this morning.  He denies suicidal and homicidal ideation.  Neurology evaluated him and made recommendations for  He underwent LP on January 9, 2021.  I discussed with lab and they would like to have more CSF to send lab outside.  I discussed with patient and he expressed that he is willing to get another LP but he would like to receive anxiety medication.    Diabetes mellitus type 2, noninsulin dependent (HCC)- (present on admission)  Assessment & Plan  -A1c 6.3 December 2020  Continue insulin sliding scale with hypoglycemia protocol.  Held metformin.    Hypothyroidism- (present on admission)  Assessment & Plan  -TSH and T4 WNL this admission  -continue home levothyroxine     I discussed plan of care with bedside RN.  VTE prophylaxis: Enoxaparin (hold due to potential lumbar puncture today)      Christiana Harmon M.D.

## 2021-01-11 ENCOUNTER — APPOINTMENT (OUTPATIENT)
Dept: RADIOLOGY | Facility: MEDICAL CENTER | Age: 71
End: 2021-01-11
Attending: INTERNAL MEDICINE
Payer: MEDICARE

## 2021-01-11 LAB
ERYTHROCYTE [DISTWIDTH] IN BLOOD BY AUTOMATED COUNT: 42.9 FL (ref 35.9–50)
GLUCOSE BLD-MCNC: 102 MG/DL (ref 65–99)
GLUCOSE BLD-MCNC: 110 MG/DL (ref 65–99)
GLUCOSE BLD-MCNC: 131 MG/DL (ref 65–99)
GLUCOSE BLD-MCNC: 92 MG/DL (ref 65–99)
HCT VFR BLD AUTO: 41.4 % (ref 42–52)
HGB BLD-MCNC: 13.5 G/DL (ref 14–18)
IGG CSF-MCNC: 14.6 MG/DL (ref 0–6)
INR PPP: 1.16 (ref 0.87–1.13)
MCH RBC QN AUTO: 29.6 PG (ref 27–33)
MCHC RBC AUTO-ENTMCNC: 32.6 G/DL (ref 33.7–35.3)
MCV RBC AUTO: 90.8 FL (ref 81.4–97.8)
PLATELET # BLD AUTO: 232 K/UL (ref 164–446)
PMV BLD AUTO: 10.7 FL (ref 9–12.9)
PROTHROMBIN TIME: 15.1 SEC (ref 12–14.6)
RBC # BLD AUTO: 4.56 M/UL (ref 4.7–6.1)
WBC # BLD AUTO: 11.3 K/UL (ref 4.8–10.8)

## 2021-01-11 PROCEDURE — A9270 NON-COVERED ITEM OR SERVICE: HCPCS | Performed by: INTERNAL MEDICINE

## 2021-01-11 PROCEDURE — 36415 COLL VENOUS BLD VENIPUNCTURE: CPT

## 2021-01-11 PROCEDURE — 700102 HCHG RX REV CODE 250 W/ 637 OVERRIDE(OP): Performed by: PSYCHIATRY & NEUROLOGY

## 2021-01-11 PROCEDURE — 700102 HCHG RX REV CODE 250 W/ 637 OVERRIDE(OP): Performed by: INTERNAL MEDICINE

## 2021-01-11 PROCEDURE — 86255 FLUORESCENT ANTIBODY SCREEN: CPT | Mod: 91

## 2021-01-11 PROCEDURE — 85027 COMPLETE CBC AUTOMATED: CPT

## 2021-01-11 PROCEDURE — 62270 DX LMBR SPI PNXR: CPT

## 2021-01-11 PROCEDURE — 85610 PROTHROMBIN TIME: CPT

## 2021-01-11 PROCEDURE — 99225 PR SUBSEQUENT OBSERVATION CARE,LEVEL II: CPT | Performed by: INTERNAL MEDICINE

## 2021-01-11 PROCEDURE — A9270 NON-COVERED ITEM OR SERVICE: HCPCS | Performed by: PSYCHIATRY & NEUROLOGY

## 2021-01-11 PROCEDURE — 99215 OFFICE O/P EST HI 40 MIN: CPT | Performed by: PSYCHIATRY & NEUROLOGY

## 2021-01-11 PROCEDURE — 62328 DX LMBR SPI PNXR W/FLUOR/CT: CPT

## 2021-01-11 PROCEDURE — G0378 HOSPITAL OBSERVATION PER HR: HCPCS

## 2021-01-11 PROCEDURE — 82962 GLUCOSE BLOOD TEST: CPT | Mod: 91

## 2021-01-11 PROCEDURE — 306637 DX-LUMBAR PUNCTURE FOR DIAGNOSIS

## 2021-01-11 PROCEDURE — 86635 COCCIDIOIDES ANTIBODY: CPT | Mod: 91

## 2021-01-11 PROCEDURE — 86341 ISLET CELL ANTIBODY: CPT

## 2021-01-11 RX ORDER — LORAZEPAM 0.5 MG/1
0.25 TABLET ORAL 3 TIMES DAILY
Status: DISCONTINUED | OUTPATIENT
Start: 2021-01-11 | End: 2021-01-13 | Stop reason: HOSPADM

## 2021-01-11 RX ADMIN — FLUOXETINE 20 MG: 20 CAPSULE ORAL at 04:54

## 2021-01-11 RX ADMIN — DORZOLAMIDE HYDROCHLORIDE AND TIMOLOL MALEATE 1 DROP: 20; 5 SOLUTION/ DROPS OPHTHALMIC at 17:23

## 2021-01-11 RX ADMIN — DOCUSATE SODIUM 50 MG AND SENNOSIDES 8.6 MG 2 TABLET: 8.6; 5 TABLET, FILM COATED ORAL at 04:54

## 2021-01-11 RX ADMIN — LEVOTHYROXINE SODIUM 75 MCG: 0.07 TABLET ORAL at 04:54

## 2021-01-11 RX ADMIN — TAMSULOSIN HYDROCHLORIDE 0.4 MG: 0.4 CAPSULE ORAL at 08:25

## 2021-01-11 RX ADMIN — LORAZEPAM 0.25 MG: 0.5 TABLET ORAL at 17:17

## 2021-01-11 RX ADMIN — AMITRIPTYLINE HYDROCHLORIDE 25 MG: 10 TABLET, FILM COATED ORAL at 20:07

## 2021-01-11 RX ADMIN — FENOFIBRATE 134 MG: 134 CAPSULE ORAL at 17:15

## 2021-01-11 RX ADMIN — GABAPENTIN 600 MG: 300 CAPSULE ORAL at 17:19

## 2021-01-11 RX ADMIN — LOSARTAN POTASSIUM 50 MG: 50 TABLET, FILM COATED ORAL at 04:54

## 2021-01-11 RX ADMIN — FINASTERIDE 5 MG: 5 TABLET, FILM COATED ORAL at 17:15

## 2021-01-11 RX ADMIN — DORZOLAMIDE HYDROCHLORIDE AND TIMOLOL MALEATE 1 DROP: 20; 5 SOLUTION/ DROPS OPHTHALMIC at 04:54

## 2021-01-11 RX ADMIN — LATANOPROST 1 DROP: 50 SOLUTION OPHTHALMIC at 17:23

## 2021-01-11 RX ADMIN — ASPIRIN 81 MG: 81 TABLET, COATED ORAL at 17:15

## 2021-01-11 ASSESSMENT — ENCOUNTER SYMPTOMS
COUGH: 0
DIARRHEA: 0
VOMITING: 0
SHORTNESS OF BREATH: 0
BLURRED VISION: 0
NERVOUS/ANXIOUS: 1
HALLUCINATIONS: 0
FEVER: 0
DIZZINESS: 0
HEADACHES: 0
SENSORY CHANGE: 0
TREMORS: 1
SPEECH CHANGE: 0
CHILLS: 0
EYE PAIN: 0
PALPITATIONS: 0
BACK PAIN: 0
CONSTIPATION: 0
DOUBLE VISION: 0
FOCAL WEAKNESS: 0
TINGLING: 0
WEIGHT LOSS: 0
NECK PAIN: 0
NAUSEA: 0
SPUTUM PRODUCTION: 0
MYALGIAS: 0
PHOTOPHOBIA: 0
ORTHOPNEA: 0
ABDOMINAL PAIN: 0

## 2021-01-11 ASSESSMENT — LIFESTYLE VARIABLES: SUBSTANCE_ABUSE: 0

## 2021-01-11 ASSESSMENT — PAIN DESCRIPTION - PAIN TYPE: TYPE: ACUTE PAIN

## 2021-01-11 NOTE — CARE PLAN
Problem: Communication  Goal: The ability to communicate needs accurately and effectively will improve  Outcome: PROGRESSING AS EXPECTED  Note: PT able to make needs known     Problem: Safety  Goal: Will remain free from injury  Outcome: PROGRESSING AS EXPECTED  Note: Safety sitter at bedisde. Dangerous objects removed from room. Pt under 1:1 supervision

## 2021-01-11 NOTE — PROGRESS NOTES
"Report received from dayshift RN. Pt is AOx4. Not agitated or combative at this time. When asked if he had thoughts of hurting himself or others today pt replied, \"of course, I thought for a while about jumping out that window, but then I realized I shouldn't\". No immediate plans to hurt himself. Sitter at bedside. Safety checklist implemented. Blood sugar checked and insulin coverage given as ordered.   "

## 2021-01-11 NOTE — PROGRESS NOTES
Pt A&Ox4.  No longer on legal hold but continues to have private room w/ 1:1 sitter for patient safety, as this morning pt expressed SI to this RN. Pt anxious at times, but otherwise calm and agreeable in room.  VSS on RA. POC discussed with patient; all questions and concerns addressed.  Had LP this morning, remained supine for 2 hours after. LP site CDI w/ band aide. Pt spoke with wife via telephone today, had pleasant exchange.  Denies pain. Tolerating regular diet, denies nausea. Monitoring BG ACHS, WNL today. Urinating without issue. BMx1 today.  Pt free from falls. Turning self in bed. Ambulating in room assist x1 w/ FWW.  Bed alarm on, call light within reach, bed in lowest locked position, hourly rounding in place.
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
## 2021-01-11 NOTE — PROGRESS NOTES
Bedside report received.  Assessment complete. 1:1 sitter in place  A&O x 4.   Patient ambulates with stand by assist.    Patient has 0/10 pain.   Denies N&V. Tolerating diabetic diet.  + void, + flatus, + BM.  Patient denies SOB.  SCD's refused.  Patient resting comfortably.  Review plan with of care with patient. Call light and personal belongings with in reach. Hourly rounding in place. All needs met at this time.

## 2021-01-11 NOTE — PROGRESS NOTES
Legal hold . Discussed pt's continued SI w/ Dr. Obando, who came to see pt at bedside. Per psychiatrist, legal hold will not be renewed. Pt currently A&Ox4, calm in room, sitter at beside. MD recommends continuing 1:1 sitter and private room for pt safety d/t pt unpredictability.

## 2021-01-12 LAB
ALBUMIN CSF ELPH-MCNC: 106.5 MG/DL (ref 8.4–34.2)
ALPHA1 GLOB CSF ELPH-MCNC: 6.8 MG/DL (ref 0–3.1)
ALPHA2 GLOB CSF ELPH-MCNC: 15.2 MG/DL (ref 0–5.4)
B-GLOBULIN CSF ELPH-MCNC: 23.8 MG/DL (ref 0–8.1)
BACTERIA CSF CULT: NORMAL
ERYTHROCYTE [DISTWIDTH] IN BLOOD BY AUTOMATED COUNT: 43.4 FL (ref 35.9–50)
GAMMA GLOB CSF ELPH-MCNC: 16.1 MG/DL (ref 0–5.4)
GLUCOSE BLD-MCNC: 112 MG/DL (ref 65–99)
GLUCOSE BLD-MCNC: 119 MG/DL (ref 65–99)
GLUCOSE BLD-MCNC: 151 MG/DL (ref 65–99)
GLUCOSE BLD-MCNC: 95 MG/DL (ref 65–99)
GRAM STN SPEC: NORMAL
HCT VFR BLD AUTO: 44.7 % (ref 42–52)
HGB BLD-MCNC: 14.4 G/DL (ref 14–18)
HSV1+2 IGM CSF-ACNC: 0.31 IV
MCH RBC QN AUTO: 29.4 PG (ref 27–33)
MCHC RBC AUTO-ENTMCNC: 32.2 G/DL (ref 33.7–35.3)
MCV RBC AUTO: 91.2 FL (ref 81.4–97.8)
PLATELET # BLD AUTO: 235 K/UL (ref 164–446)
PMV BLD AUTO: 10.7 FL (ref 9–12.9)
PREALB CSF ELPH-MCNC: 2.6 MG/DL (ref 0–3.1)
PROT CSF-MCNC: 171 MG/DL (ref 15–45)
RBC # BLD AUTO: 4.9 M/UL (ref 4.7–6.1)
SIGNIFICANT IND 70042: NORMAL
SITE SITE: NORMAL
SOURCE SOURCE: NORMAL
SPECIMEN SOURCE: NORMAL
VZV DNA SPEC QL NAA+PROBE: NOT DETECTED
WBC # BLD AUTO: 11.8 K/UL (ref 4.8–10.8)
WNV IGG CSF IA-ACNC: 0.33 IV
WNV IGM CSF IA-ACNC: 0 IV

## 2021-01-12 PROCEDURE — G0378 HOSPITAL OBSERVATION PER HR: HCPCS

## 2021-01-12 PROCEDURE — A9270 NON-COVERED ITEM OR SERVICE: HCPCS | Performed by: PSYCHIATRY & NEUROLOGY

## 2021-01-12 PROCEDURE — A9270 NON-COVERED ITEM OR SERVICE: HCPCS | Performed by: INTERNAL MEDICINE

## 2021-01-12 PROCEDURE — 700102 HCHG RX REV CODE 250 W/ 637 OVERRIDE(OP): Performed by: PSYCHIATRY & NEUROLOGY

## 2021-01-12 PROCEDURE — 700102 HCHG RX REV CODE 250 W/ 637 OVERRIDE(OP): Performed by: INTERNAL MEDICINE

## 2021-01-12 PROCEDURE — 97161 PT EVAL LOW COMPLEX 20 MIN: CPT

## 2021-01-12 PROCEDURE — 97165 OT EVAL LOW COMPLEX 30 MIN: CPT

## 2021-01-12 PROCEDURE — 82962 GLUCOSE BLOOD TEST: CPT | Mod: 91

## 2021-01-12 PROCEDURE — 85027 COMPLETE CBC AUTOMATED: CPT

## 2021-01-12 PROCEDURE — 36415 COLL VENOUS BLD VENIPUNCTURE: CPT

## 2021-01-12 PROCEDURE — 99225 PR SUBSEQUENT OBSERVATION CARE,LEVEL II: CPT | Performed by: INTERNAL MEDICINE

## 2021-01-12 PROCEDURE — 96372 THER/PROPH/DIAG INJ SC/IM: CPT | Mod: XU

## 2021-01-12 RX ADMIN — DORZOLAMIDE HYDROCHLORIDE AND TIMOLOL MALEATE 1 DROP: 20; 5 SOLUTION/ DROPS OPHTHALMIC at 05:59

## 2021-01-12 RX ADMIN — DORZOLAMIDE HYDROCHLORIDE AND TIMOLOL MALEATE 1 DROP: 20; 5 SOLUTION/ DROPS OPHTHALMIC at 17:18

## 2021-01-12 RX ADMIN — TAMSULOSIN HYDROCHLORIDE 0.4 MG: 0.4 CAPSULE ORAL at 08:25

## 2021-01-12 RX ADMIN — INSULIN HUMAN 1 UNITS: 100 INJECTION, SOLUTION PARENTERAL at 20:38

## 2021-01-12 RX ADMIN — ASPIRIN 81 MG: 81 TABLET, COATED ORAL at 17:18

## 2021-01-12 RX ADMIN — FLUOXETINE 20 MG: 20 CAPSULE ORAL at 05:54

## 2021-01-12 RX ADMIN — AMITRIPTYLINE HYDROCHLORIDE 25 MG: 10 TABLET, FILM COATED ORAL at 20:27

## 2021-01-12 RX ADMIN — LORAZEPAM 0.25 MG: 0.5 TABLET ORAL at 17:17

## 2021-01-12 RX ADMIN — DOCUSATE SODIUM 50 MG AND SENNOSIDES 8.6 MG 2 TABLET: 8.6; 5 TABLET, FILM COATED ORAL at 05:54

## 2021-01-12 RX ADMIN — GABAPENTIN 600 MG: 300 CAPSULE ORAL at 17:18

## 2021-01-12 RX ADMIN — LEVOTHYROXINE SODIUM 75 MCG: 0.07 TABLET ORAL at 05:54

## 2021-01-12 RX ADMIN — LORAZEPAM 0.25 MG: 0.5 TABLET ORAL at 12:25

## 2021-01-12 RX ADMIN — FINASTERIDE 5 MG: 5 TABLET, FILM COATED ORAL at 17:18

## 2021-01-12 RX ADMIN — LOSARTAN POTASSIUM 50 MG: 50 TABLET, FILM COATED ORAL at 05:54

## 2021-01-12 RX ADMIN — FENOFIBRATE 134 MG: 134 CAPSULE ORAL at 17:18

## 2021-01-12 RX ADMIN — LORAZEPAM 0.25 MG: 0.5 TABLET ORAL at 05:54

## 2021-01-12 RX ADMIN — LATANOPROST 1 DROP: 50 SOLUTION OPHTHALMIC at 17:18

## 2021-01-12 ASSESSMENT — COGNITIVE AND FUNCTIONAL STATUS - GENERAL
SUGGESTED CMS G CODE MODIFIER MOBILITY: CJ
DAILY ACTIVITIY SCORE: 24
CLIMB 3 TO 5 STEPS WITH RAILING: A LITTLE
MOBILITY SCORE: 22
MOVING FROM LYING ON BACK TO SITTING ON SIDE OF FLAT BED: A LITTLE
SUGGESTED CMS G CODE MODIFIER DAILY ACTIVITY: CH

## 2021-01-12 ASSESSMENT — LIFESTYLE VARIABLES: SUBSTANCE_ABUSE: 0

## 2021-01-12 ASSESSMENT — GAIT ASSESSMENTS
DISTANCE (FEET): 200
DEVIATION: NO DEVIATION
GAIT LEVEL OF ASSIST: SUPERVISED

## 2021-01-12 ASSESSMENT — ACTIVITIES OF DAILY LIVING (ADL): TOILETING: INDEPENDENT

## 2021-01-12 ASSESSMENT — PAIN DESCRIPTION - PAIN TYPE: TYPE: OTHER (COMMENT)

## 2021-01-12 ASSESSMENT — ENCOUNTER SYMPTOMS
HALLUCINATIONS: 0
NERVOUS/ANXIOUS: 1

## 2021-01-12 NOTE — THERAPY
Occupational Therapy   Initial Evaluation     Patient Name: Lico Salinas  Age:  70 y.o., Sex:  male  Medical Record #: [id number]  Today's Date: 1/12/2021          Assessment  Patient is 70 y.o. male with a diagnosis of AMS. Pt is at or near his/her functional baseline. Pt with no further skilled OT needs in the acute care setting at this time.     Plan     Occupational Therapy for Evaluation only        Discharge Recommendations: (P) Anticipate that the patient will have no further occupational therapy needs after discharge from the hospital        01/12/21 1055   Prior Living Situation   Prior Services None   Housing / Facility 1 Story House   Equipment Owned None   Lives with - Patient's Self Care Capacity Spouse   Prior Level of ADL Function   Self Feeding Independent   Grooming / Hygiene Independent   Bathing Independent   Dressing Independent   Toileting Independent   ADL Assessment   Grooming Supervision   Upper Body Dressing Supervision   Lower Body Dressing Supervision   Toileting Supervision   Functional Mobility   Sit to Stand Supervised   Bed, Chair, Wheelchair Transfer Supervised   Toilet Transfers Supervised

## 2021-01-12 NOTE — CARE PLAN
Problem: Communication  Goal: The ability to communicate needs accurately and effectively will improve  Outcome: PROGRESSING AS EXPECTED  Note: Pt alerting staff to needs appropriately      Problem: Psychosocial Needs:  Goal: Level of anxiety will decrease  Outcome: PROGRESSING AS EXPECTED  Note: Pt havig decreased level of anxiety today and denies any SI or HI thoughts. Pt able to discuss his problems openly and calmly.

## 2021-01-12 NOTE — PROGRESS NOTES
Assume care of pt at 0700. Report received from NOC RN. Pt is A/O x4. Pt denies pain. Pt is resting in bed. Bed in lowest and locked position, call light within reach, hourly rounding in place. Labs reviewed. Communication board updated. Will continue to monitor. Pt expressed that he was feeling groggy this morning and that his head was cloudy due to medication. He did not express any need for SI or wanting to hurt others. 1:1 sitter for safety. Pt will have tele sitter shortly for decreased SI needs.     No other complaints at this time.

## 2021-01-12 NOTE — PROGRESS NOTES
Received bedside report from day RN. Assumed care at 1900. Patient is awake/alert, oriented x4. Assessment completed. Pt on room air. PIV flushed and saline locked. Bed locked in lowest position. Call light within reach. Whiteboard updates with nurse's and CNA's numbers. Hourly rounding in place. 1x1 sitter in place. Pt denies any pain. Pt denies any suicidal thoughts,or any plans on hurting himself or others. Dangerous and sharp items removed from pt's room.

## 2021-01-12 NOTE — CARE PLAN
Problem: Safety  Goal: Will remain free from injury  Outcome: PROGRESSING AS EXPECTED  Note: Bed in locked and lowest position. Three side rails up. Call light within reach. Patient instructed on use of call bell and how to call for assistance. Threaded socks in use. Objects in room cleared for ambulation. Sharp items removed. 1x1 sitter present.      Problem: Psychosocial Needs:  Goal: Level of anxiety will decrease  Outcome: PROGRESSING AS EXPECTED  Note: RN listened to pt needs and concerns. RN offered relaxing options to decrease anxiety such as music and ambulation. Pt also has PRN anxiety medications.

## 2021-01-12 NOTE — PROGRESS NOTES
Hospital Medicine Daily Progress Note    Date of Service  1/12/2021    Chief Complaint  Altered mental status    Hospital Course  Mr. Salinas is a 70-year-old male with PMH significant for hypothyroidism, DM 2 on Metformin, anxiety and depression, HTN and dementia who presented 1/6/2021 with altered mental status.  Per report, patient woke up agitated and altered and rolled over onto his wife and would not get off of her. Wife became frightened and called the police.  Upon arrival of PD patient became combative requiring Versed and Haldol IM.  He arrived to the ED in four-point restraints and was placed on a legal hold after stating he wanted to die and one of the officers to shoot him.  He was evaluated by psychiatry who recommended one-to-one supervision and [facility].  Per chart review, he was seen by Dr. Wallis in November for weakness. MRI at that time unremarkable. Had none of the symptoms he presents with now.  Neurology evaluated him and recommended to obtain lumbar puncture.  He underwent lumbar puncture on January 9, 2021.  CSF amount was not sufficient to send out labs.  Attempted lumbar puncture on January 10, 2021 and it was unsuccessful.  I ordered IR guided lumbar puncture.  I discussed plan of care with patient's wife over phone on January 9, 2021.  Psychiatry is also following him for legal hold.  His legal discontinued by psychiatry on January 11, 2021.    Interval Problem Update  No event overnight.  Afebrile.    Consultants/Specialty  Psychiatry  Neurology  Code Status  Full Code    Disposition  TBD    Review of Systems  Review of Systems   Psychiatric/Behavioral: Negative for hallucinations, substance abuse and suicidal ideas. The patient is nervous/anxious.         Physical Exam  Temp:  [36.1 °C (97 °F)-37.1 °C (98.7 °F)] 36.1 °C (97 °F)  Pulse:  [60-65] 60  Resp:  [16-18] 16  BP: (103-122)/(54-74) 103/65  SpO2:  [93 %-98 %] 96 %    Physical  Exam  Vitals signs and nursing note reviewed.   Constitutional:       General: He is not in acute distress.     Appearance: Normal appearance.   HENT:      Head: Normocephalic and atraumatic.   Eyes:      General: No scleral icterus.  Cardiovascular:      Heart sounds: No murmur.   Neurological:      Mental Status: He is alert.   Psychiatric:         Thought Content: Thought content normal.       I performed physical exam on January 11, 2021 and it remains similar.  Fluids    Intake/Output Summary (Last 24 hours) at 1/12/2021 1321  Last data filed at 1/11/2021 1700  Gross per 24 hour   Intake 360 ml   Output --   Net 360 ml       Laboratory  Recent Labs     01/10/21  0127 01/11/21  0110 01/12/21  0754   WBC 11.7* 11.3* 11.8*   RBC 4.69* 4.56* 4.90   HEMOGLOBIN 13.8* 13.5* 14.4   HEMATOCRIT 42.4 41.4* 44.7   MCV 90.4 90.8 91.2   MCH 29.4 29.6 29.4   MCHC 32.5* 32.6* 32.2*   RDW 42.2 42.9 43.4   PLATELETCT 224 232 235   MPV 10.3 10.7 10.7     Recent Labs     01/10/21  0127   SODIUM 138   POTASSIUM 4.1   CHLORIDE 105   CO2 25   GLUCOSE 101*   BUN 20   CREATININE 1.08   CALCIUM 9.5     Recent Labs     01/11/21  0110   INR 1.16*               Imaging  DX-LUMBAR PUNCTURE FOR DIAGNOSIS   Final Result         FLUOROSCOPIC-GUIDED LUMBAR PUNCTURE AS DESCRIBED ABOVE.      CT-HEAD W/O   Final Result      No noncontrast CT evidence of acute intracranial hemorrhage.      Mild white matter hypodensity is present.  This is a nonspecific finding which usually is found to represent chronic microvascular disease in patient's of this demographic.  Demyelination, age indeterminant ischemia and gliosis are also common    possibilities. A chronic left basal ganglia lacunar infarction is again seen.      Nonobstructive right maxillary mucosal retention cyst      Age-appropriate atrophy         DX-CHEST-PORTABLE (1 VIEW)   Final Result      No radiographic evidence of acute cardiopulmonary process.           Assessment/Plan  * Cognitive  impairment- (present on admission)  Assessment & Plan  There is no focal neuro deficit.  Brain MRI shows intermittent changes but nothing acute.  Patient has LP twice now with CSF studies sent out for additional study per request of inpatient neurology.  He follows with Dr. Wallis in neurology clinic in the work-up for this particular condition is ongoing.  He is no longer suicidal.  His legal hold was lifted by psychiatry on January 11, 2021.  However, he can be impulsive and can actually talk himself into a panic attack during my evaluation.  Therefore, continue one-to-one sitter.    Chronic prescription benzodiazepine use  Assessment & Plan  -UDS (+) benzos on admit  -Ativan PRN HS insomnia    Essential hypertension  Assessment & Plan  Continue losartan    Anxiety and depression- (present on admission)  Assessment & Plan  Management per psychiatry.  Remeron was replaced by Ativan.  Apparently, he took Ativan this morning and felt really drowsy.      Diabetes mellitus type 2, noninsulin dependent (HCC)- (present on admission)  Assessment & Plan  -A1c 6.3 December 2020  Continue insulin sliding scale with hypoglycemia protocol.  Held metformin.    Hypothyroidism- (present on admission)  Assessment & Plan  Continue Synthroid.       VTE prophylaxis: Restart Lovenox starting tomorrow.

## 2021-01-13 VITALS
WEIGHT: 235 LBS | DIASTOLIC BLOOD PRESSURE: 59 MMHG | HEART RATE: 57 BPM | HEIGHT: 70 IN | BODY MASS INDEX: 33.64 KG/M2 | OXYGEN SATURATION: 100 % | RESPIRATION RATE: 16 BRPM | TEMPERATURE: 97.6 F | SYSTOLIC BLOOD PRESSURE: 103 MMHG

## 2021-01-13 LAB
GLUCOSE BLD-MCNC: 108 MG/DL (ref 65–99)
GLUCOSE BLD-MCNC: 123 MG/DL (ref 65–99)

## 2021-01-13 PROCEDURE — A9270 NON-COVERED ITEM OR SERVICE: HCPCS | Performed by: INTERNAL MEDICINE

## 2021-01-13 PROCEDURE — 700102 HCHG RX REV CODE 250 W/ 637 OVERRIDE(OP): Performed by: PSYCHIATRY & NEUROLOGY

## 2021-01-13 PROCEDURE — A9270 NON-COVERED ITEM OR SERVICE: HCPCS | Performed by: PSYCHIATRY & NEUROLOGY

## 2021-01-13 PROCEDURE — 700102 HCHG RX REV CODE 250 W/ 637 OVERRIDE(OP): Performed by: INTERNAL MEDICINE

## 2021-01-13 PROCEDURE — 99217 PR OBSERVATION CARE DISCHARGE: CPT | Performed by: INTERNAL MEDICINE

## 2021-01-13 PROCEDURE — G0378 HOSPITAL OBSERVATION PER HR: HCPCS

## 2021-01-13 PROCEDURE — 82962 GLUCOSE BLOOD TEST: CPT | Mod: 91

## 2021-01-13 RX ORDER — LORAZEPAM 0.5 MG/1
0.25 TABLET ORAL 3 TIMES DAILY PRN
Qty: 46 TAB | Refills: 0 | Status: SHIPPED | OUTPATIENT
Start: 2021-01-13 | End: 2021-02-03 | Stop reason: DRUGHIGH

## 2021-01-13 RX ADMIN — LORAZEPAM 0.25 MG: 0.5 TABLET ORAL at 11:49

## 2021-01-13 RX ADMIN — DORZOLAMIDE HYDROCHLORIDE AND TIMOLOL MALEATE 1 DROP: 20; 5 SOLUTION/ DROPS OPHTHALMIC at 06:05

## 2021-01-13 RX ADMIN — LORAZEPAM 0.25 MG: 0.5 TABLET ORAL at 06:04

## 2021-01-13 RX ADMIN — LEVOTHYROXINE SODIUM 75 MCG: 0.07 TABLET ORAL at 06:05

## 2021-01-13 RX ADMIN — TAMSULOSIN HYDROCHLORIDE 0.4 MG: 0.4 CAPSULE ORAL at 07:57

## 2021-01-13 RX ADMIN — FLUOXETINE 20 MG: 20 CAPSULE ORAL at 06:06

## 2021-01-13 RX ADMIN — LOSARTAN POTASSIUM 50 MG: 50 TABLET, FILM COATED ORAL at 06:05

## 2021-01-13 ASSESSMENT — PAIN DESCRIPTION - PAIN TYPE: TYPE: ACUTE PAIN

## 2021-01-13 NOTE — PROGRESS NOTES
Pts wife at bedside.  Pt has glasses, hearing aids and hearing aids case still at bedside. Pts wife was given all patient belongings from family closet to take home.

## 2021-01-13 NOTE — THERAPY
Physical Therapy   Initial Evaluation     Patient Name: Lico Salinas  Age:  70 y.o., Sex:  male  Medical Record #: [id number]  Today's Date: 1/12/2021          Assessment  Patient is 70 y.o. male admitted with AMS. Pt appears close to his functional baseline ambulating with no AD or assistance. No strength or balance deficits noted. No further acute PT needs at this time.     Plan    Recommend Physical Therapy for Evaluation only    DC Equipment Recommendations: None  Discharge Recommendations: Anticipate that the patient will have no further physical therapy needs after discharge from the hospital          01/12/21 1406   Prior Living Situation   Prior Services None   Housing / Facility 1 Story House   Steps Into Home 2   Steps In Home 0   Equipment Owned Single Point Cane;Front-Wheel Walker   Lives with - Patient's Self Care Capacity Spouse   Comments no other family around to assist, wife works as a book keeper   Prior Level of Functional Mobility   Bed Mobility Independent   Transfer Status Independent   Ambulation Independent   Distance Ambulation (Feet)   (community)   Assistive Devices Used None   Stairs Independent   Comments independent prior    Cognition    Level of Consciousness Alert   Comments cooperative and appropriate   Gait Analysis   Gait Level Of Assist Supervised   Assistive Device None   Distance (Feet) 200   # of Times Distance was Traveled 1   Deviation No deviation   # of Stairs Climbed 0   Weight Bearing Status no restrictions   Comments no LOB, pt reproted feeling weaker than normal when ambulating   Bed Mobility    Supine to Sit Modified Independent   Sit to Supine Modified Independent   Scooting Modified Independent   Rolling Modified Independent   Functional Mobility   Sit to Stand Supervised   Mobility in hallway with no AD   Anticipated Discharge Equipment and Recommendations   DC Equipment Recommendations None   Discharge Recommendations Anticipate that the patient will have no  further physical therapy needs after discharge from the hospital

## 2021-01-13 NOTE — PROGRESS NOTES
Assumed care at 0645, report received from night DRISS Hurst.  Pt A/Ox4, VSS, and satting in mid 90's on RA.  Pt denies SI or HI.  Pt reports feeling a little anxious but much better than he has recently.  Discharge education provided.  Pt discharged home with wife at 1615 with follow up appointments in place.

## 2021-01-13 NOTE — CARE PLAN
Problem: Safety  Goal: Will remain free from injury  Outcome: PROGRESSING AS EXPECTED  Pt with no new falls or injuries this shift       Problem: Discharge Barriers/Planning  Goal: Patient's continuum of care needs will be met  Outcome: PROGRESSING AS EXPECTED  Pt discharging home with wife.      Problem: Psychosocial Needs:  Goal: Level of anxiety will decrease  Outcome: PROGRESSING AS EXPECTED  Pt reports decreased anxiety.

## 2021-01-13 NOTE — DISCHARGE SUMMARY
HOSPITAL MEDICINE DISCHARGE SUMMARY    DATE OF ADMISSION:  1/6/2021    DATE OF DISCHARGE:  1/13/2021    CHIEF COMPLAINT ON ADMISSION  Chief Complaint   Patient presents with   • Altered Mental Status       CODE STATUS  Full Code    Allergies   Allergen Reactions   • Pcn [Penicillins] Unspecified     As a child, unknown reaction       DISCHARGE PROBLEM LIST  Principal Problem:    Cognitive impairment POA: Yes  Active Problems:    Hypothyroidism POA: Yes    Diabetes mellitus type 2, noninsulin dependent (HCC) POA: Yes    Anxiety and depression POA: Yes    Essential hypertension POA: Unknown  Resolved Problems:    * No resolved hospital problems. *      MEDICATIONS ON DISCHARGE     Medication List      CHANGE how you take these medications      Instructions   fenofibrate 145 MG Tabs  What changed: when to take this  Commonly known as: TRICOR   Take 1 Tab by mouth every day.  Dose: 145 mg     gabapentin 300 MG Caps  What changed:   · when to take this  · additional instructions  Commonly known as: NEURONTIN   TAKE 2 CAPSULES BY MOUTH AT BEDTIME     levothyroxine 75 MCG Tabs  What changed: how much to take  Commonly known as: SYNTHROID   TAKE 1 TABLET BY MOUTH IN THE MORNING ON AN EMPTY STOMACH     LORazepam 0.5 MG Tabs  What changed:   · how much to take  · when to take this  · reasons to take this  Commonly known as: ATIVAN   Take 0.5 Tabs by mouth 3 times a day as needed for Anxiety for up to 30 days.  Dose: 0.25 mg     losartan 50 MG Tabs  What changed: when to take this  Commonly known as: COZAAR   Take 1 tablet by mouth once daily     metFORMIN  MG Tb24  What changed: additional instructions  Commonly known as: GLUCOPHAGE XR   Take 2 Tabs by mouth 2 times a day.  Dose: 1,000 mg        CONTINUE taking these medications      Instructions   alfuzosin 10 MG SR tablet  Commonly known as: UROXATRAL   Take 10 mg by mouth every morning.  Dose: 10 mg     amitriptyline 25 MG Tabs  Commonly known as: ELAVIL   Take 1 Tab  "by mouth every bedtime.  Dose: 25 mg     aspirin EC 81 MG Tbec  Commonly known as: ECOTRIN   Take 81 mg by mouth every evening.  Dose: 81 mg     Coenzyme Q10 200 MG Caps   Take 200 mg by mouth every evening.  Dose: 200 mg     dorzolamide-timolol 22.3-6.8 MG/ML Soln  Commonly known as: COSOPT   Administer 1 Drop into both eyes 2 times a day.  Dose: 1 Drop     finasteride 5 MG Tabs  Commonly known as: PROSCAR   Take 5 mg by mouth every evening.  Dose: 5 mg     FLUoxetine 20 MG Caps  Commonly known as: PROZAC   Take 20 mg by mouth every morning. Every morning at 0900.  Dose: 20 mg     Glucosamine Chondroitin Joint Tabs   Take 1 Tab by mouth every day.  Dose: 1 Tab     latanoprost 0.005 % Soln  Commonly known as: XALATAN   Administer 1 Drop into both eyes every evening.  Dose: 1 Drop     Magnesium 200 MG Tabs   Take 200 mg by mouth every evening.  Dose: 200 mg     MULTI VITAMIN MENS PO   Take 1 Tab by mouth every morning.  Dose: 1 Tab     Non Formulary Request   Take 1 Tab by mouth every evening. \"Prostate Plus\"  Dose: 1 Tab     Omega 3-6-9 Complex Caps   Take 1 Cap by mouth 2 (two) times a day.  Dose: 1 Cap     omeprazole 20 MG delayed-release capsule  Commonly known as: PRILOSEC   Take 1 Cap by mouth 1 time a day as needed (heartburn, GERD).  Dose: 1 Cap     vitamin D 1000 Unit Tabs  Commonly known as: Cholecalciferol   Take 1,000 Units by mouth every morning.  Dose: 1,000 Units        STOP taking these medications    mirtazapine 15 MG Tabs  Commonly known as: Remeron            CONSULTATIONS  Neurology  Psychiatry    HPI & HOSPITAL COURSE  71 yo man without previous significant neurological history presented with CC of ALOC that cleared up after admission. He is being work up outpt for possible dementia and is followed by Dr. Rose.  He was brought to the ER after waking up from sleep, being combative, required sedation in the ER.  Psychiatry saw him and rec-ed inpatient psychiatric admission.  Neurology saw him " and recommended obtaining CSF for analysis.  CSF did not shows pathology.  A second LP was done and the CSF was sent out by Dr. Salazar for further studies, which are pending at this time.  Cultures of CSF so far negative.  CT head shows age related changes, nothing acute.  Looking back at his records shows an MRI done 11/2020 that was unremarkable with age appropriate changes.      While the neurologic work up is on-going and will be completed outpatient with his Neurologist, it is likely that his ALOC was due to a panic attack.  Psychiatry switched him to ativan from Remeron.  He has a tendency to focus on multiple things at once and induce a panic attack.  I have witnessed this at bedside.  However, he is not enthrall with benzodiazepine and would prefer to take the ativan as recommended by Psychiatry on an as needed basis.  I prescribed ativan 0.25mg tab PO TID PRN (Psychiatry wanted him to take it TID standing).  At 0.5 mg, ativan makes him so drowsy that he dislikes the effect.      Otherwise, he is back to his baseline.  He is aware of his situation, has insight, and plan to follow up with his doctors.  Therefore, he is discharged in good and stable condition to home with close outpatient follow-up.    The patient met 2-midnight criteria for an inpatient stay at the time of discharge.    SPECIFIC OUTPATIENT FOLLOW-UP RECOMMENDATIONS  Follow CSF cultures, studies, flow cytometry, etc.  Psychiatry referral for on-going management of anxiety    FOLLOW UP  Adonis Rose M.D.  [address]  [address]  [place] [address] [phone]    Schedule an appointment as soon as possible for a visit in 2 weeks  Neurocognitive evaluation, lab results    Mynor Teresa M.D.  [address]  [address]  [place] [address] [phone]    Schedule an appointment as soon as possible for a visit in 2 weeks  Routine follow up post discharge.  Psychiatry ferral.        DIET  Resume home diet previous to  admission    ACTIVITY  Do not operate heavy machineries or drive while taking narcotic pain medications.    PROCEDURES  None    PERTINENT RESULTS  Recent Labs     01/11/21  0110 01/12/21  0754   WBC 11.3* 11.8*   RBC 4.56* 4.90   HEMOGLOBIN 13.5* 14.4   HEMATOCRIT 41.4* 44.7   MCV 90.8 91.2   MCH 29.6 29.4   MCHC 32.6* 32.2*   RDW 42.9 43.4   PLATELETCT 232 235   MPV 10.7 10.7    and       Total time of the discharge process exceeds 40 minutes.

## 2021-01-13 NOTE — DISCHARGE INSTRUCTIONS
Lorazepam tablets  What is this medicine?  LORAZEPAM (pedro A ze dyana) is a benzodiazepine. It is used to treat anxiety.  This medicine may be used for other purposes; ask your health care provider or pharmacist if you have questions.  COMMON BRAND NAME(S): Ativan  What should I tell my health care provider before I take this medicine?  They need to know if you have any of these conditions:  · glaucoma  · history of drug or alcohol abuse problem  · kidney disease  · liver disease  · lung or breathing disease, like asthma  · mental illness  · myasthenia gravis  · Parkinson's disease  · suicidal thoughts, plans, or attempt; a previous suicide attempt by you or a family member  · an unusual or allergic reaction to lorazepam, other medicines, foods, dyes, or preservatives  · pregnant or trying to get pregnant  · breast-feeding  How should I use this medicine?  Take this medicine by mouth with a glass of water. Follow the directions on the prescription label. Take your medicine at regular intervals. Do not take it more often than directed. Do not stop taking except on your doctor's advice.  A special MedGuide will be given to you by the pharmacist with each prescription and refill. Be sure to read this information carefully each time.  Talk to your pediatrician regarding the use of this medicine in children. While this drug may be used in children as young as 12 years for selected conditions, precautions do apply.  Overdosage: If you think you have taken too much of this medicine contact a poison control center or emergency room at once.  NOTE: This medicine is only for you. Do not share this medicine with others.  What if I miss a dose?  If you miss a dose, take it as soon as you can. If it is almost time for your next dose, take only that dose. Do not take double or extra doses.  What may interact with this medicine?  Do not take this medicine with any of the following medications:  · narcotic medicines for  cough  · sodium oxybate  This medicine may also interact with the following medications:  · alcohol  · antihistamines for allergy, cough and cold  · certain medicines for anxiety or sleep  · certain medicines for depression, like amitriptyline, fluoxetine, sertraline  · certain medicines for seizures like carbamazepine, phenobarbital, phenytoin, primidone  · general anesthetics like lidocaine, pramoxine, tetracaine  · MAOIs like Carbex, Eldepryl, Marplan, Nardil, and Parnate  · medicines that relax muscles for surgery  · narcotic medicines for pain  · phenothiazines like chlorpromazine, mesoridazine, prochlorperazine, thioridazine  This list may not describe all possible interactions. Give your health care provider a list of all the medicines, herbs, non-prescription drugs, or dietary supplements you use. Also tell them if you smoke, drink alcohol, or use illegal drugs. Some items may interact with your medicine.  What should I watch for while using this medicine?  Tell your doctor or health care professional if your symptoms do not start to get better or if they get worse.  Do not stop taking except on your doctor's advice. You may develop a severe reaction. Your doctor will tell you how much medicine to take.  You may get drowsy or dizzy. Do not drive, use machinery, or do anything that needs mental alertness until you know how this medicine affects you. To reduce the risk of dizzy and fainting spells, do not stand or sit up quickly, especially if you are an older patient. Alcohol may increase dizziness and drowsiness. Avoid alcoholic drinks.  If you are taking another medicine that also causes drowsiness, you may have more side effects. Give your health care provider a list of all medicines you use. Your doctor will tell you how much medicine to take. Do not take more medicine than directed. Call emergency for help if you have problems breathing or unusual sleepiness.  What side effects may I notice from  receiving this medicine?  Side effects that you should report to your doctor or health care professional as soon as possible:  · allergic reactions like skin rash, itching or hives, swelling of the face, lips, or tongue  · breathing problems  · confusion  · loss of balance or coordination  · signs and symptoms of low blood pressure like dizziness; feeling faint or lightheaded, falls; unusually weak or tired  · suicidal thoughts or other mood changes  Side effects that usually do not require medical attention (report to your doctor or health care professional if they continue or are bothersome):  · dizziness  · headache  · nausea, vomiting  · tiredness  This list may not describe all possible side effects. Call your doctor for medical advice about side effects. You may report side effects to FDA at 4-327-DQE-6626.  Where should I keep my medicine?  Keep out of the reach of children. This medicine can be abused. Keep your medicine in a safe place to protect it from theft. Do not share this medicine with anyone. Selling or giving away this medicine is dangerous and against the law.  This medicine may cause accidental overdose and death if taken by other adults, children, or pets. Mix any unused medicine with a substance like cat litter or coffee grounds. Then throw the medicine away in a sealed container like a sealed bag or a coffee can with a lid. Do not use the medicine after the expiration date.  Store at room temperature between 20 and 25 degrees C (68 and 77 degrees F). Protect from light. Keep container tightly closed.  NOTE: This sheet is a summary. It may not cover all possible information. If you have questions about this medicine, talk to your doctor, pharmacist, or health care provider.  © 2020 [employer]/Gold Standard (2016-09-15 15:54:27)  Discharge Instructions    Discharged to home by car with relative. Discharged via wheelchair, hospital escort: Yes.  Special equipment needed: Not Applicable    Be sure  to schedule a follow-up appointment with your primary care doctor or any specialists as instructed.     Discharge Plan:   Diet Plan: Discussed  Activity Level: Discussed  Confirmed Follow up Appointment: Appointment Scheduled  Confirmed Symptoms Management: Discussed  Medication Reconciliation Updated: Yes    I understand that a diet low in cholesterol, fat, and sodium is recommended for good health. Unless I have been given specific instructions below for another diet, I accept this instruction as my diet prescription.   Other diet: Carb managed    Special Instructions: None    · Is patient discharged on Warfarin / Coumadin?   No     Depression / Suicide Risk    As you are discharged from this [facility] facility, it is important to learn how to keep safe from harming yourself.    Recognize the warning signs:  · Abrupt changes in personality, positive or negative- including increase in energy   · Giving away possessions  · Change in eating patterns- significant weight changes-  positive or negative  · Change in sleeping patterns- unable to sleep or sleeping all the time   · Unwillingness or inability to communicate  · Depression  · Unusual sadness, discouragement and loneliness  · Talk of wanting to die  · Neglect of personal appearance   · Rebelliousness- reckless behavior  · Withdrawal from people/activities they love  · Confusion- inability to concentrate     If you or a loved one observes any of these behaviors or has concerns about self-harm, here's what you can do:  · Talk about it- your feelings and reasons for harming yourself  · Remove any means that you might use to hurt yourself (examples: pills, rope, extension cords, firearm)  · Get professional help from the community (Mental Health, Substance Abuse, psychological counseling)  · Do not be alone:Call your Safe Contact- someone whom you trust who will be there for you.  · Call your local CRISIS HOTLINE 871-6793 or 147-967-8893  · Call your local  Children's Mobile Crisis Response Team [place] (067) 648-6130 or www.[employer]  · Call the toll free National Suicide Prevention Hotlines   · National Suicide Prevention Lifeline 576-294-ZGJG (8133)  · [employer] 800-SUICIDE (107-9098)    Dr. Sheets's discharge instructions:    DIET: Resume home diet previous to admission    ACTIVITY: Do not operate heavy machineries or drive while taking benzodiazepine (Ativan) medications.    DIAGNOSIS: Anxiety    Return to ER if fever greater than 38 degree Celsius or 100.4 degree Fahrenheit, worsening pain, chest pain at rest or associated with exertion, worsening shortness of breath, bloody coughs, bloody bowel movement, severe unrelenting abdominal pain, focal weakness.    Isma Sheets M.D.

## 2021-01-13 NOTE — DISCHARGE PLANNING
Care Transition Team Assessment    Pt is independent in his home and has no issues with I/ADLs. Pt states he has no concerns about meeting his needs now but asked LSW for a list of food resources in case he has trouble affording groceries in the future. Pt had no other needs. He will discharge home without and DME or HH.     Information Source  Orientation : Oriented x 4  Information Given By: Patient  Who is responsible for making decisions for patient? : Patient    Readmission Evaluation  Is this a readmission?: No    Elopement Risk  Legal Hold: No  Ambulatory or Self Mobile in Wheelchair: Yes  Disoriented: No  Psychiatric Symptoms: None  History of Wandering: No  Elopement this Admit: No  Vocalizing Wanting to Leave: No  Displays Behaviors, Body Language Wanting to Leave: No-Not at Risk for Elopement  Time of Legal Hold: 1127  Date of Legal Hold: 01/06/21  End Time of Legal Hold - SW to complete: 1428  End Date of Legal Hold - SW to complete: 01/11/21  Elopement Risk: Not at Risk for Elopement  Wanderguard On: Unavailable  Personal Belongings: Hospital Clothing Only  Environmental Precautions: Sharp or Dangerous Items Removed    Interdisciplinary Discharge Planning  Primary Care Physician: Dr. Snyder  Lives with - Patient's Self Care Capacity: Spouse  Support Systems: Spouse / Significant Other  Housing / Facility: [address]  Do You Take your Prescribed Medications Regularly: Yes  Able to Return to Previous ADL's: Yes  Mobility Issues: No  Prior Services: None  Patient Prefers to be Discharged to:: Home  Assistance Needed: No  Durable Medical Equipment: Not Applicable    Discharge Preparedness  What is your plan after discharge?: Home with help  What are your discharge supports?: Spouse  Prior Functional Level: Independent with Activities of Daily Living, Independent with Medication Management, Ambulatory, Drives Self  Difficulity with ADLs: None  Difficulity with IADLs: None    Functional Assesment  Prior  Functional Level: Independent with Activities of Daily Living, Independent with Medication Management, Ambulatory, Drives Self    Finances  Financial Barriers to Discharge: No  Prescription Coverage: Yes              Advance Directive  Advance Directive?: DPOA for Health Care  Durable Power of  Name and Contact : Pina Salinas [phone]    Domestic Abuse  Have you ever been the victim of abuse or violence?: No    Psychological Assessment  History of Substance Abuse: None  History of Psychiatric Problems: No  Non-compliant with Treatment: No  Newly Diagnosed Illness: No    Discharge Risks or Barriers  Discharge risks or barriers?: No    Anticipated Discharge Information  Discharge Disposition: Discharged to home/self care (01)  Discharge Address: [address], [place]  Discharge Contact Phone Number: [phone]

## 2021-01-13 NOTE — PROGRESS NOTES
Received bedside report from day RN, Rodrigue Ellis. Assumed care at 1900. Patient is awake/alert, oriented x4. Assessment completed. Pt on room air. PIV flushed and saline locked. Bed locked in lowest position. Call light within reach. Whiteboard updates with nurse's and CNA's numbers. Hourly rounding in place. Tele sitter in place. Pt denies any suicidal or homicidal thoughts. Crisis charting utilized during this shift.

## 2021-01-15 DIAGNOSIS — Z23 NEED FOR VACCINATION: ICD-10-CM

## 2021-01-16 LAB
C IMMITIS AB CSF QL ID: NORMAL
C IMMITIS AB TITR CSF CF: NORMAL {TITER}
C IMMITIS IGG CSF-ACNC: 0.2 IV
C IMMITIS IGM CSF-ACNC: 0 IV

## 2021-01-17 LAB — TEST NAME 95000: NORMAL

## 2021-01-20 ENCOUNTER — OFFICE VISIT (OUTPATIENT)
Dept: NEUROLOGY | Facility: MEDICAL CENTER | Age: 71
End: 2021-01-20
Attending: PSYCHIATRY & NEUROLOGY
Payer: MEDICARE

## 2021-01-20 VITALS
WEIGHT: 187.61 LBS | TEMPERATURE: 98.3 F | HEART RATE: 75 BPM | DIASTOLIC BLOOD PRESSURE: 58 MMHG | OXYGEN SATURATION: 97 % | BODY MASS INDEX: 26.92 KG/M2 | SYSTOLIC BLOOD PRESSURE: 110 MMHG | RESPIRATION RATE: 16 BRPM

## 2021-01-20 DIAGNOSIS — R41.0 TRANSIENT CONFUSION: ICD-10-CM

## 2021-01-20 DIAGNOSIS — E11.42 DIABETIC POLYNEUROPATHY ASSOCIATED WITH TYPE 2 DIABETES MELLITUS (HCC): Primary | ICD-10-CM

## 2021-01-20 PROCEDURE — 99215 OFFICE O/P EST HI 40 MIN: CPT | Performed by: PSYCHIATRY & NEUROLOGY

## 2021-01-20 PROCEDURE — 99211 OFF/OP EST MAY X REQ PHY/QHP: CPT | Performed by: PSYCHIATRY & NEUROLOGY

## 2021-01-20 ASSESSMENT — FIBROSIS 4 INDEX: FIB4 SCORE: 1.78
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
## 2021-01-22 ENCOUNTER — OFFICE VISIT (OUTPATIENT)
Dept: MEDICAL GROUP | Facility: MEDICAL CENTER | Age: 71
End: 2021-01-22
Payer: MEDICARE

## 2021-01-22 VITALS
OXYGEN SATURATION: 97 % | DIASTOLIC BLOOD PRESSURE: 64 MMHG | RESPIRATION RATE: 18 BRPM | SYSTOLIC BLOOD PRESSURE: 124 MMHG | WEIGHT: 188 LBS | BODY MASS INDEX: 26.92 KG/M2 | HEART RATE: 68 BPM | HEIGHT: 70 IN | TEMPERATURE: 96.9 F

## 2021-01-22 DIAGNOSIS — D64.9 ANEMIA, UNSPECIFIED TYPE: ICD-10-CM

## 2021-01-22 DIAGNOSIS — F32.A ANXIETY AND DEPRESSION: ICD-10-CM

## 2021-01-22 DIAGNOSIS — R63.5 ABNORMAL WEIGHT GAIN: ICD-10-CM

## 2021-01-22 DIAGNOSIS — R63.4 UNINTENDED WEIGHT LOSS: ICD-10-CM

## 2021-01-22 DIAGNOSIS — E11.9 DIABETES MELLITUS TYPE 2, NONINSULIN DEPENDENT (HCC): ICD-10-CM

## 2021-01-22 DIAGNOSIS — F41.9 ANXIETY AND DEPRESSION: ICD-10-CM

## 2021-01-22 PROCEDURE — 99214 OFFICE O/P EST MOD 30 MIN: CPT | Performed by: FAMILY MEDICINE

## 2021-01-22 RX ORDER — LOSARTAN POTASSIUM 50 MG/1
25 TABLET ORAL DAILY
Qty: 30 TAB | Refills: 0
Start: 2021-01-22 | End: 2021-02-19

## 2021-01-22 RX ORDER — METFORMIN HYDROCHLORIDE 500 MG/1
1000 TABLET, EXTENDED RELEASE ORAL DAILY
Qty: 30 TAB | Refills: 0
Start: 2021-01-22 | End: 2021-04-01

## 2021-01-22 ASSESSMENT — FIBROSIS 4 INDEX: FIB4 SCORE: 1.78

## 2021-01-22 NOTE — PROGRESS NOTES
[facility]  Progress Note  Established Patient    Subjective:   Lico Salinas is a 70 y.o. male here today with a chief complaint of anxiety. The patient is accompanied by his wife. I later met his brother as well. All of us ar emasked.     Unintended weight loss  In August the patient first came to see me for 4 months of unintended weight loss.  In April 2019 he weighed 238 pounds, today he weighs 188 pounds.  He has had an extensive laboratory and imaging work-up including an MRI of the brain and a CT scan of the chest, abdomen and pelvis.  He did have a slightly elevated PSA which has improved and the patient is now following with urology. During the work-up, it was also discovered that the patient had a breast mass which was biopsied and determined to be benign.  He did follow-up with the breast surgeon who is currently monitoring this.  Patient has had a colonoscopy demonstrating diverticulosis and an EGD showing erosive gastritis.  At the last visit I referred him to oncology. Dr. Elizondo (oncology) and I spoke. Dr. Elizondo thought a PET scan would be reasonable with continued weight loss, though the patient appears to have stabilized.    Diabetes mellitus type 2, noninsulin dependent (HCC)  A1c under better control with weight loss.    Anemia  Resolved on last check. Some leukocytosis in the hospital.     Anxiety and depression  Ever since the patient started losing weight he started to develop progressively worsening anxiety and depression.  I have tried him on Effexor but he was not sure if that was helping.  I subsequently switched him to amitriptyline to get some benefit in terms of anxiety and depression, as well as neuropathy and insomnia.  This was helping with insomnia but didn't seem to he helping with his anxiety and depression too much. We have checked his TSH and it is normal. He progressively decompensated to the point where he was hospitalized at [facility] on two  separate occasions. He states he thinks this made his symptoms worse. Most recently he was hospitalized at [facility] for about a week. He had a thorough evaluation including CSF studies. This showed elevated protein and he is currently following with neurology outpatient. He was also seen by psychiatry in the hospital who suggested an adjustment disorder along with anxiety, depression and psychosis thought to be secondary to a neurocognitive disorder. He was continued on elavil, prozac and gabapentin. Remeron was ultimately stopped on discharge from the hospital. Today, the patient continues to report rather significant anxiety with ruminations along with insomnia. He is scheduled to see an outpatient psychiatrist in a little over a week. In the past he was referred to endocrinology and sleep medicine but hasn't yet made appointments to see these specialists. Since our last visit, he has also started talking to himself with restlessness and tremulousness. Ativan does seem to help. His wife thinks the medications might be playing a role in his symptoms and has started to reduce the doses on her own. The patient is now only taking 300 mg gabapentin hs, 12.5 mg elavil hs.       Current Outpatient Medications on File Prior to Visit   Medication Sig Dispense Refill   • LORazepam (ATIVAN) 0.5 MG Tab Take 0.5 Tabs by mouth 3 times a day as needed for Anxiety for up to 30 days. 46 Tab 0   • dorzolamide-timolol (COSOPT) 22.3-6.8 MG/ML Solution Administer 1 Drop into both eyes 2 times a day.     • finasteride (PROSCAR) 5 MG Tab Take 5 mg by mouth every evening.     • FLUoxetine (PROZAC) 20 MG Cap Take 20 mg by mouth every morning. Every morning at 0900.     • alfuzosin (UROXATRAL) 10 MG SR tablet Take 10 mg by mouth every morning.     • omeprazole (PRILOSEC) 20 MG delayed-release capsule Take 1 Cap by mouth 1 time a day as needed (heartburn, GERD).     • gabapentin (NEURONTIN) 300 MG Cap TAKE 2 CAPSULES BY MOUTH AT BEDTIME  "(Patient taking differently: Take 300 mg by mouth every bedtime. 2 capsules = 600 mg.) 200 Cap 1   • fenofibrate (TRICOR) 145 MG Tab Take 1 Tab by mouth every day. (Patient taking differently: Take 145 mg by mouth every evening.) 90 Tab 4   • levothyroxine (SYNTHROID) 75 MCG Tab TAKE 1 TABLET BY MOUTH IN THE MORNING ON AN EMPTY STOMACH (Patient taking differently: Take 75 mcg by mouth Every morning on an empty stomach.) 90 Tab 3   • vitamin D3, cholecalciferol, 1000 UNIT Tab Take 1,000 Units by mouth every morning.     • latanoprost (XALATAN) 0.005 % Solution Administer 1 Drop into both eyes every evening.     • Magnesium 200 MG Tab Take 200 mg by mouth every evening.     • Coenzyme Q10 200 MG Cap Take 200 mg by mouth every evening.     • aspirin EC (ECOTRIN) 81 MG Tablet Delayed Response Take 81 mg by mouth every evening.     • Omega 3-6-9 Fatty Acids (OMEGA 3-6-9 COMPLEX) Cap Take 1 Cap by mouth 2 (two) times a day.     • Multiple Vitamin (MULTI VITAMIN MENS PO) Take 1 Tab by mouth every morning.       No current facility-administered medications on file prior to visit.           Objective:     Vitals:    01/22/21 0950   BP: 124/64   BP Location: Left arm   Patient Position: Sitting   BP Cuff Size: Adult long   Pulse: 68   Resp: 18   Temp: 36.1 °C (96.9 °F)   TempSrc: Temporal   SpO2: 97%   Weight: 85.3 kg (188 lb)   Height: 1.778 m (5' 10\")       Physical Exam:  General/Psych: the patient is restless and agitated. He appears quite anxious and exhibits palilalia.       Assessment and Plan:     1. Unintended weight loss  Stabilized. Any recommendation on any further workup or treatment seems to exacerbate the patient's anxiety so I will hold off on recommending the PET at this time, especially considering our other reassuring workup and the stabilization of the weight at the current time.     2. Anemia, unspecified type  Improved, some leukocytosis in the hospital. Will hold off on repeating CBC at the current time " for reasons stated above.     3. Diabetes mellitus type 2, noninsulin dependent (HCC)  - reduce metformin to 1,000 mg q am.   - reduce losartan to 25 mg q am (BP very well controlled, patient reports occasional orthostatic dizziness).     4. Anxiety and depression  Patient's condition has abruptly changed, even since our last visit in December.  His behaviors are very concerning and he is quite agitated and anxious, though the weight loss seems to have stabilized.  Considering the weight loss and the development of all of these unusual symptoms, I think we should aggressively continue to look for an organic cause and recommended that the patient and his wife make an appointment to see endocrinology and sleep medicine.  Their phone numbers were provided to them to call to schedule.  Patient will continue to follow with neurology.  I am inclined to think that much of this may be psychiatric in nature.  The patient is extraordinarily anxious and I think this might be driving many of these unusual behaviors at this point. Differential diagnosis does include catatonia. Akathisia or extrapyramidal symptoms are also consideration, which can be side effects from medications.  I absolutely agree with the patient seeing the psychiatrist and I will send a message out to her to see if she can fit him in sooner.  In the interim, he will use Ativan on an as-needed basis but in the interest of reducing polypharmacy, he will cut his Metformin dose in half, his losartan dose in half and stop the amitriptyline which can cause extrapyramidal symptoms.  He will continue the Prozac.  I informed him that he can use a single dose of Ativan before bed to help him sleep, now that we're stopping the elavil.  I recommended that the patient also walk every day to get some fresh air, exercise and sunlight.  His brother is in town and I am happy to see that as I think the social interaction is good for the patient.      Followup: Return in about  4 weeks (around 2/19/2021), or if symptoms worsen or fail to improve.

## 2021-01-22 NOTE — ASSESSMENT & PLAN NOTE
Ever since the patient started losing weight he started to develop progressively worsening anxiety and depression.  I have tried him on Effexor but he was not sure if that was helping.  I subsequently switched him to amitriptyline to get some benefit in terms of anxiety and depression, as well as neuropathy and insomnia.  This was helping with insomnia but didn't seem to he helping with his anxiety and depression too much. We have checked his TSH and it is normal. He progressively decompensated to the point where he was hospitalized at [facility] on two separate occasions. He states he thinks this made his symptoms worse. Most recently he was hospitalized at [facility] for about a week. He had a thorough evaluation including CSF studies. This showed elevated protein and he is currently following with neurology outpatient. He was also seen by psychiatry in the hospital who suggested an adjustment disorder along with anxiety, depression and psychosis thought to be secondary to a neurocognitive disorder. He was continued on elavil, prozac and gabapentin. Remeron was ultimately stopped on discharge from the hospital. Today, the patient continues to report rather significant anxiety with ruminations along with insomnia. He is scheduled to see an outpatient psychiatrist in a little over a week. In the past he was referred to endocrinology and sleep medicine but hasn't yet made appointments to see these specialists. Since our last visit, he has also started talking to himself with restlessness and tremulousness. Ativan does seem to help. His wife thinks the medications might be playing a role in his symptoms and has started to reduce the doses on her own. The patient is now only taking 300 mg gabapentin hs, 12.5 mg elavil hs.

## 2021-01-22 NOTE — ASSESSMENT & PLAN NOTE
In August the patient first came to see me for 4 months of unintended weight loss.  In April 2019 he weighed 238 pounds, today he weighs 188 pounds.  He has had an extensive laboratory and imaging work-up including an MRI of the brain and a CT scan of the chest, abdomen and pelvis.  He did have a slightly elevated PSA which has improved and the patient is now following with urology. During the work-up, it was also discovered that the patient had a breast mass which was biopsied and determined to be benign.  He did follow-up with the breast surgeon who is currently monitoring this.  Patient has had a colonoscopy demonstrating diverticulosis and an EGD showing erosive gastritis.  At the last visit I referred him to oncology. Dr. Elizondo (oncology) and I spoke. Dr. Elizondo thought a PET scan would be reasonable with continued weight loss, though the patient appears to have stabilized.

## 2021-01-24 LAB — TEST NAME 95000: NORMAL

## 2021-01-27 LAB — TEST NAME 95000: NORMAL

## 2021-01-29 ENCOUNTER — TELEPHONE (OUTPATIENT)
Dept: ENDOCRINOLOGY | Facility: MEDICAL CENTER | Age: 71
End: 2021-01-29

## 2021-01-29 NOTE — TELEPHONE ENCOUNTER
Patients wife Pina called stating Lico has been trying to schedule his referral appointment for over a month, he does have an active ready to schedule referral in [facility]. Patient was just released from the hospital without a diagnosis and his wife states he is wasting away and wont make it until June to be seen. Please advise

## 2021-02-03 ENCOUNTER — TELEMEDICINE (OUTPATIENT)
Dept: BEHAVIORAL HEALTH | Facility: CLINIC | Age: 71
End: 2021-02-03
Payer: MEDICARE

## 2021-02-03 VITALS — WEIGHT: 184.8 LBS | HEIGHT: 70 IN | BODY MASS INDEX: 26.45 KG/M2

## 2021-02-03 DIAGNOSIS — G47.00 INSOMNIA, UNSPECIFIED TYPE: ICD-10-CM

## 2021-02-03 DIAGNOSIS — F43.23 ADJUSTMENT DISORDER WITH MIXED ANXIETY AND DEPRESSED MOOD: ICD-10-CM

## 2021-02-03 PROBLEM — F32.A ANXIETY AND DEPRESSION: Status: RESOLVED | Noted: 2020-11-23 | Resolved: 2021-02-03

## 2021-02-03 PROBLEM — F41.9 ANXIETY AND DEPRESSION: Status: RESOLVED | Noted: 2020-11-23 | Resolved: 2021-02-03

## 2021-02-03 PROCEDURE — 99214 OFFICE O/P EST MOD 30 MIN: CPT | Mod: 95,CR | Performed by: PSYCHIATRY & NEUROLOGY

## 2021-02-03 PROCEDURE — 90833 PSYTX W PT W E/M 30 MIN: CPT | Mod: 95,CR | Performed by: PSYCHIATRY & NEUROLOGY

## 2021-02-03 RX ORDER — LORAZEPAM 0.5 MG/1
0.5 TABLET ORAL 3 TIMES DAILY
Qty: 90 TAB | Refills: 0 | Status: SHIPPED | OUTPATIENT
Start: 2021-02-03 | End: 2021-03-05

## 2021-02-03 ASSESSMENT — FIBROSIS 4 INDEX: FIB4 SCORE: 1.78

## 2021-02-03 NOTE — PROGRESS NOTES
INITIAL PSYCHIATRY VIRTUAL VISIT EVALUATION      Chief Complaint   Patient presents with   • Anxiety   • Depression       This evaluation was conducted via Zoom using secure and encrypted videoconferencing technology. The patient was in a private location in the [facility] [place].    The patient's identity was confirmed and verbal consent was obtained for this virtual visit.    History Of Present Illness:  Lico Salinas is a 70 y.o. male with history of hypothyroidism, dyslipidemia, type 2 diabetes mellitus, neuropathy, glaucoma, hypertension comes in today for evaluation of anxiety and depression.  I saw him along with his wife who provided a lot of the history.  He was intermittently anxious and restless which was worse when he was not talking directly to me.  He mentions that for the last several months he has been struggling with intense anxiety, depression and sleep problems.  He states that the issue started when he is slowly started to lose weight unintentionally.  His wife thinks that unintentional weight loss started about a year ago in January 2020 but his first appointment with PCP to discuss this happened in August 2021.  He has had an extensive work-up with no results so far.  He is worried about his physical health and not being able to handle the weakness and weight loss and being worried about his finances as well.  His wife mentions that he has never struggled with his mental health prior to this.  He has gone from being an independent person who enjoyed traveling and cooking and had a lot of friends to the point where he is unable to drive and is not eating a whole lot.  He endorses insomnia but his wife thinks that he is sleeping more than what he feels he is.  He does spend a lot of time in bed even during daytime.  He is no longer driving as he does not feel safe because of it.  His wife has not noticed any concerns regarding his memory in the last few months.  She is concerned about his  balance because when he gets restless and has tremors in all of his body but so far he has not had any falls.  He has had intermittent episodes of agitation but none since he was discharged from the hospital.  His wife started noticing by December that he was not enjoying his life which had her concerned a lot more.  He is eating a little bit better and she mentions that his weight has stabilized around 185 pounds.  However he is still eating small portions.  He is avoiding talking to anybody and is always worried about his health.  He denies any active or passive thoughts of wanting to hurt himself or others.    Current psychiatric medications - Ativan 0.25 mg every 4-8 hours     Past Psychiatric History:  He denies a prior history of anxiety or depression.  He denies a history of suicide attempt but recently has had some struggles with suicidal and homicidal thoughts.  He had one hospitalization in December 2020 at [facility] for worsening depression and suicidal ideations.  Previous medication trials - Prozac 20 mg, Effexor 37.5 mg, Elavil 25 mg, Remeron 15 mg    Safety Assessment-Self:  Does patient acknowledge current or past symptoms of dangerousness to self? yes  Does parent/significant other report patient has current or past symptoms of dangerousness to self? no  Does presenting problem suggest symptoms of dangerousness to self? Yes:     Past Current    Suicidal Thoughts: [x]  []    Suicidal Plans: [x]  []    Suicidal Intent: []  []    Suicide Attempts: []  []    Self-Injury []  []      For any boxes checked above, provide detail: recent suicidal thougths    History of suicide by family member: yes - half sister over dosed on heroin  History of suicide by friend/significant other: no  Recent change in frequency/specificity/intensity of suicidal thoughts or self-harm behavior? No, denies any current active or passive thoughts of wanting to hurt herself.  Protective factors present:  Moral  objection to suicide, Spiritual beliefs/practices, Strong family connections, Actively engaged in treatment and Willing to address in treatment    Safety Assessment-Others:  Does patient acknowledge current or past symptoms of aggressive behavior or risk to others? yes  Does parent/significant other report patient has current or past symptoms of aggressive behavior or risk to others?  no  Does presenting problem suggest symptoms of dangerousness to others? Yes:    History Current   Thoughts of injuring others? [x]  []    Threats to injure others? []  []    Plan to injure others? []  []    Intent to injure others? []  []    Has injured others? []  []    Thoughts of killing others? [x]  []    Threats to kill others? []  []    Plans to kill others? []  []    Intent to kill others? []  []    Has killed others? []  []    Perpetrator of sexual assault? []  []    Family history of homicide? []  []      For any boxes checked above, please provide detail: recently had homicidal thoughts against his life    Recent change in frequency/specificity/intensity of thoughts or threats to harm others? No, denies any recent thoughts of wanting to hurt his wife or anybody else  Protective factors present: Moral/spiritual prohibition, Stable relationships, Actively engaged in treatment and Willing to address in treatment  Based on information provided during the current assessment, is a mandated “duty to warn” being exercised? No     Current Safety/Relapse Assessment::       Suicidal: Low       Homicidal: Low       Self-Harm: Low       Relapse: Not applicable       Crisis Safety Plan: Not Indicated    Past Medical/Surgical History:  Past Medical History:   Diagnosis Date   • Anxiety    • Diabetes (HCC) 07-10-15   • Glaucoma     OU   • High cholesterol 07-10-15    hx of, took self off simvastin   • Hypertension 07-10-15    hx of, took himself off lisinopril and HTCZ   • Hypothyroidism    • Neuropathy, peripheral    • Skin cancer    •  Snoring    • Thyroid disease      Past Surgical History:   Procedure Laterality Date   • CARPAL TUNNEL ENDOSCOPIC Left 2017    Procedure: CARPAL TUNNEL ENDOSCOPIC VERSUS;  Surgeon: Frank Alvarado M.D.;  Location: SURGERY [facility];  Service: Orthopedics   • LYMPH NODE EXCISION Left 2015    Procedure: LYMPH NODE EXCISION DEEP CERVICAL;  Surgeon: Michele Gan M.D.;  Location: SURGERY SAME DAY [facility];  Service:    • LUMBAR DISC REPLACEMENT         Family Psychiatric History:  Half sister () - addiction problems, heroin overdose  Mother () - alcohol addiction    Substance Use/Addiction History:  Alcohol - Denies any alcohol use in the last 4-5 months, prior to that he used to be drink infrequently, may be a beer once a while  Nicotine - Denies  Cannabis - Denies  Illicit drugs - Denies    Social History:  He is , no kids, lives with wife in [place], served in [employer].    Allergies:  Pcn [penicillins]    Medications:  Current Outpatient Medications   Medication Sig Dispense Refill   • Glucosamine-Chondroitin (GLUCOSAMINE CHONDR COMPLEX PO) Take  by mouth.     • losartan (COZAAR) 50 MG Tab Take 0.5 Tabs by mouth every day. 30 Tab 0   • LORazepam (ATIVAN) 0.5 MG Tab Take 0.5 Tabs by mouth 3 times a day as needed for Anxiety for up to 30 days. 46 Tab 0   • dorzolamide-timolol (COSOPT) 22.3-6.8 MG/ML Solution Administer 1 Drop into both eyes 2 times a day.     • finasteride (PROSCAR) 5 MG Tab Take 5 mg by mouth every evening.     • alfuzosin (UROXATRAL) 10 MG SR tablet Take 10 mg by mouth every morning.     • omeprazole (PRILOSEC) 20 MG delayed-release capsule Take 1 Cap by mouth 1 time a day as needed (heartburn, GERD).     • gabapentin (NEURONTIN) 300 MG Cap TAKE 2 CAPSULES BY MOUTH AT BEDTIME (Patient taking differently: Take 300 mg by mouth every bedtime. 2 capsules = 600 mg.) 200 Cap 1   • fenofibrate (TRICOR) 145 MG Tab Take 1 Tab by mouth every day. (Patient taking  "differently: Take 145 mg by mouth every evening.) 90 Tab 4   • levothyroxine (SYNTHROID) 75 MCG Tab TAKE 1 TABLET BY MOUTH IN THE MORNING ON AN EMPTY STOMACH (Patient taking differently: Take 75 mcg by mouth Every morning on an empty stomach.) 90 Tab 3   • vitamin D3, cholecalciferol, 1000 UNIT Tab Take 1,000 Units by mouth every morning.     • latanoprost (XALATAN) 0.005 % Solution Administer 1 Drop into both eyes every evening.     • Magnesium 200 MG Tab Take 200 mg by mouth every evening.     • Coenzyme Q10 200 MG Cap Take 200 mg by mouth every evening.     • aspirin EC (ECOTRIN) 81 MG Tablet Delayed Response Take 81 mg by mouth every evening.     • Omega 3-6-9 Fatty Acids (OMEGA 3-6-9 COMPLEX) Cap Take 1 Cap by mouth 2 (two) times a day.     • Multiple Vitamin (MULTI VITAMIN MENS PO) Take 1 Tab by mouth every morning.     • metFORMIN ER (GLUCOPHAGE XR) 500 MG TABLET SR 24 HR Take 2 Tabs by mouth every day. 30 Tab 0   • FLUoxetine (PROZAC) 20 MG Cap Take 20 mg by mouth every morning. Every morning at 0900.       No current facility-administered medications for this visit.        Review of Symptoms:  Constitutional - Positive for fatigue  Psychiatric - Positive for anxiety, depression, poor sleep    Physical Examination:  Vital signs: Ht 1.778 m (5' 10\")   Wt 83.8 kg (184 lb 12.8 oz)   BMI 26.52 kg/m²     Musculoskeletal:  No abnormal movements.     Mental Status Evaluation:   General: Elderly white male, dressed in casual attire, good grooming and hygiene, in no apparent distress, calm and cooperative, good eye contact, psychomotor agitation - restless   Orientation: Alert and oriented to person, place and time  Recent and remote memory: Intact  Attention span and concentration: Intact  Speech: Spontaneous, normal rate, rhythm and tone  Thought Process: Linear, logical and goal directed  Thought Content: Denies suicidal or homicidal ideations, intent or plan  Perception: Denies auditory or visual " "hallucinations. No delusions noted  Associations: Intact  Language: Appropriate  Fund of knowledge and vocabulary: Adequate  Mood: \"not good\"  Affect: Dysphoric and anxious, mood congruent  Insight: Good  Judgment: Good    Depression screening:  Depression Screen (PHQ-2/PHQ-9) 12/20/2019 4/20/2020 12/16/2020   PHQ-2 Total Score - - 5   PHQ-2 Total Score - - -   PHQ-2 Total Score 0 0 -   PHQ-9 Total Score - - 22   PHQ-9 Total Score 0 - -     Interpretation of PHQ-9 Total Score   Score Severity   1-4 No Depression   5-9 Mild Depression   10-14 Moderate Depression   15-19 Moderately Severe Depression   20-27 Severe Depression    Anxiety screening:  SHARONDA 7 12/16/2020   SHARONDA-7 Total Score 20     Interpretation of SHARONDA 7 Total Score   Score Severity:  0-4 No Anxiety   5-9 Mild Anxiety  10-14 Moderate Anxiety  15-21 Severe Anxiety    Medical Records/Labs/Diagnostic Tests Reviewed:  [place] PDMP records - appropriate refills, no abuse suspected     8/2020 - saw PCP to discuss ongoing intentional weight loss.  In the next few visits he was trialed on Effexor XR 37.5 mg, Elavil 25 mg.  12/2020 - ED visit for worsening depression and suicidal ideation, wife taken to [facility] for inpatient psychiatric hospitalization, started on Remeron.  [facility]2021 - [facility]  [facility]  1/2021 - ED visit for altered mental status, agitation and combativeness at home, asked his wife to call 911 and was placed on legal hold by the police after he made some suicidal statements.  He was hospitalized to [facility] mainly for placement but ended up having a lumbar puncture, neurology and psychiatry consult.  Neurology note mention probable Lewy body dementia, Remeron discontinued during the hospitalization.  1/2021 - saw PCP, Elavil discontinued    Impression:  Elderly white male with no prior psychiatric history who has been struggling with unintentional weight loss resulting in anxiety and depression since August 2020.  It appears " that he is in a vicious cycle, his unintentional weight loss and not having a diagnosis is making him anxious and depressed which is further exacerbating his lack of appetite and possibly contributing to weight loss.  Given no prior history of anxiety or depression or heavy alcohol or illicit drug use his presentation is most likely related to his physical health.    1.  Adjustment disorder with anxiety and depression (unintentional weight loss, possible physical health illness)  2.  Insomnia    Plan:  1.  I had an extensive discussion with both the patient and his wife regarding his anxiety, depression and insomnia.  He has been trialed on several different medications since August 2020 but none of them seem to have helped him.  I would like to avoid trial of another new medication as an SSRI or an SNRI medication as it can take 4 to 6 weeks for efficacy.  He is primarily endorsing anxiety and insomnia, he appeared to be really restless throughout the appointment and a tremor was appreciable as well.  He is also scheduled for further work-up and seeing some other specialties and hopefully he can get a diagnosis and plan for treatment.  He is currently on Ativan and I am increasing his dose to 0.5 mg and scheduling it to 3 times daily for better coverage for his anxiety and insomnia.  I let the wife know if sedation or balance issues becomes a concern at the higher dose dose to let me know.  2.  He would also benefit from seeing a therapist to work on some nonpharmacological techniques to help his anxious thoughts.  Referral placed to start psychotherapy.  3.  Discontinue over-the-counter prostate and memory supplement, I looked up this to specific supplements and they both have herbal ingredients.  4.  Discussed nonpharmacological techniques to help with his anxiety including having a daily routine, taking the 20 to 30-minute walk if the weather permits, talking to at least a friend or family member once a day,  eating 2-3 healthy meals with healthy snacks in between, no naps during daytime, going to bed not before 8 or 9 PM.  He is not maintaining a good personal hygiene and was encouraged to brush his teeth twice a day and take a warm shower or bath every day as well.  5.  I did talk to him about intensive outpatient program but he appears to anxious to be in a group setting at this point and he would do better in a one-to-one setting with an individual therapist.  6.  Provided supportive psychotherapy (> 16 minutes) in regards to his anxiety, depression and concerns regarding his physical health.  Discussed things that are out of his control at this time.  Encouraged to look at various distractions and focusing on positive thinking.    Return to clinic in 4 weeks or sooner if symptoms worsen    The proposed treatment plan was discussed with the patient who was provided the opportunity to ask questions and make suggestions regarding alternative treatment. Patient verbalized understanding and expressed agreement with the plan.     Thank you for allowing me to participate in the care of this patient.    Chery Cohen M.D.  02/03/21    CC:   Mynor Teresa M.D.  Adonis Rose M.D.    This note was created using voice recognition software (Dragon). The accuracy of the dictation is limited by the abilities of the software. I have reviewed the note prior to signing, however some errors in grammar and context are still possible. If you have any questions related to this note please do not hesitate to contact our office.

## 2021-02-04 ENCOUNTER — PRE-ADMISSION TESTING (OUTPATIENT)
Dept: ADMISSIONS | Facility: MEDICAL CENTER | Age: 71
End: 2021-02-04
Attending: PSYCHIATRY & NEUROLOGY
Payer: MEDICARE

## 2021-02-04 DIAGNOSIS — Z01.812 PRE-OPERATIVE LABORATORY EXAMINATION: ICD-10-CM

## 2021-02-04 LAB
COVID ORDER STATUS COVID19: NORMAL
SARS-COV-2 RNA RESP QL NAA+PROBE: NOTDETECTED
SPECIMEN SOURCE: NORMAL

## 2021-02-04 PROCEDURE — C9803 HOPD COVID-19 SPEC COLLECT: HCPCS

## 2021-02-04 PROCEDURE — U0003 INFECTIOUS AGENT DETECTION BY NUCLEIC ACID (DNA OR RNA); SEVERE ACUTE RESPIRATORY SYNDROME CORONAVIRUS 2 (SARS-COV-2) (CORONAVIRUS DISEASE [COVID-19]), AMPLIFIED PROBE TECHNIQUE, MAKING USE OF HIGH THROUGHPUT TECHNOLOGIES AS DESCRIBED BY CMS-2020-01-R: HCPCS

## 2021-02-04 PROCEDURE — U0005 INFEC AGEN DETEC AMPLI PROBE: HCPCS

## 2021-02-05 ENCOUNTER — TELEMEDICINE (OUTPATIENT)
Dept: ENDOCRINOLOGY | Facility: MEDICAL CENTER | Age: 71
End: 2021-02-05
Attending: NURSE PRACTITIONER
Payer: MEDICARE

## 2021-02-05 DIAGNOSIS — E03.9 HYPOTHYROIDISM (ACQUIRED): ICD-10-CM

## 2021-02-05 DIAGNOSIS — E11.40 TYPE 2 DIABETES MELLITUS WITH DIABETIC NEUROPATHY, WITHOUT LONG-TERM CURRENT USE OF INSULIN (HCC): ICD-10-CM

## 2021-02-05 DIAGNOSIS — R53.83 FATIGUE, UNSPECIFIED TYPE: ICD-10-CM

## 2021-02-05 PROCEDURE — 99214 OFFICE O/P EST MOD 30 MIN: CPT | Performed by: NURSE PRACTITIONER

## 2021-02-05 PROCEDURE — 999999 HB NO CHARGE: Performed by: NURSE PRACTITIONER

## 2021-02-05 NOTE — PROGRESS NOTES
Chief Complaint:  Consult requested by Mynor Teresa M.D. for initial evaluation of Type 2 Diabetes Mellitus    HPI:   Lico Salinas is a 70 y.o. male with for initial evaluation & treatment of the followin. Type 2 Diabetes Mellitus      Metformin 500mg x 2 QD.     Pt.  hospitalizations for DKA in the past.  Pt monitors blood glucose NO times per  Blood glucose values range: Unknown.      Pt denies hypoglycemic episodes ever occurring .    Pt is has hypoglycemic unawareness.      He has attended diabetes education classes in the past. Pt used to do all of the shopping and cooking at home up until 5 months ago.  Diet: 1 protein, 1 CHO, 1 Veggie.    Diabetes Complications   Pt. denies history of retinopathy.    Pt. denies laser eye surgery. Last eye exam: 2020  Pt. history of peripheral sensory neuropathy.   Pt states he has neuropathy in the right foot. Takes Neurontin 300mg QD.    Pt history of foot sores.   Pt denies history of kidney damage.    Pt taking Cozaar 50mg QD.   Pt denies history of coronary artery disease.    Pt denies history of stroke and denies hx of TIA.   Denies history of PAD.  Denies history of hyperlipidemia.    Weight loss over 70 pounds in last 8 months.      3. Hypothyroidism  Currently taking Levothyroxine 75mcg QD.      Ref. Range 2021 05:50   TSH Latest Ref Range: 0.380 - 5.330 uIU/mL 3.160   Free T-4 Latest Ref Range: 0.93 - 1.70 ng/dL 1.18       Pt has undergone extensive workup in last 4 months secondary to increased anxiety, depression, spastic movements, unable to completed daily activities of driving, shopping, fluid conversations.   All testing has been negative to this point per medical records that have been reviewed and per patient and spouse report.   Pt has seen Psychiatry, Urology, PCP & Neurology.    Pt is actively seeing Psychiatrist who prescribed Ativan .5mg-1mg QD for increased anxiety and sleeplessness.   Brain MRI scheduled for 02/10/2021 for  Neurology.    ROS:     CONS:     No fever, no chills, no weight loss, no fatigue   EYES:      No diplopia, no blurry vision, no redness of eyes, no swelling of eyelids   ENT:    No hearing loss, No ear pain, No sore throat, no dysphagia, no neck swelling   CV:     No chest pain, no palpitations, no claudication, no orthopnea, no PND   PULM:    No SOB, no cough, no hemoptysis, no wheezing    GI:   No nausea, no vomiting, no diarrhea, no constipation, no bloody stools   :  Passing urine well, no dysuria, no hematuria   ENDO:   No polyuria, no polydipsia, no heat intolerance, no cold intolerance   NEURO: No headaches, no dizziness, no convulsions, no tremors   MUSC:  No joint swellings, no arthralgias, no myalgias, no weakness   SKIN:   No rash, no ulcers, no dry skin   PSYCH:   No depression, no anxiety, no difficulty sleeping       Past Medical History:  Patient Active Problem List    Diagnosis Date Noted   • Adjustment disorder with mixed anxiety and depressed mood 02/03/2021   • Transient confusion 01/20/2021   • Chronic prescription benzodiazepine use 01/07/2021   • Essential hypertension 01/06/2021   • Insomnia 12/02/2020   • Somnolence, daytime 12/02/2020   • Breast mass 11/23/2020   • Erosive gastritis 11/23/2020   • Hilar lymphadenopathy 11/23/2020   • Kidney cyst, acquired 11/23/2020   • Anemia 11/23/2020   • Cognitive impairment 10/21/2020   • Allergic rhinitis 10/05/2020   • Chronic left shoulder pain 08/25/2020   • Unintended weight loss 08/25/2020   • Urinary frequency 08/25/2020   • Healthcare maintenance 12/20/2019   • Subcutaneous nodule 04/17/2019   • Right buttock pain 10/17/2018   • Diabetic polyneuropathy associated with type 2 diabetes mellitus (HCC) 04/03/2018   • Right shoulder pain 04/03/2018   • Hypothyroidism 11/11/2016   • Diabetes mellitus type 2, noninsulin dependent (HCC) 11/11/2016   • Elevated CPK 11/11/2016   • Dyslipidemia 11/11/2016       Past Surgical History:  Past Surgical  History:   Procedure Laterality Date   • CARPAL TUNNEL ENDOSCOPIC Left 9/5/2017    Procedure: CARPAL TUNNEL ENDOSCOPIC VERSUS;  Surgeon: Frank Alvarado M.D.;  Location: [facility]GERY [facility];  Service: Orthopedics   • LYMPH NODE EXCISION Left 7/21/2015    Procedure: LYMPH NODE EXCISION DEEP CERVICAL;  Surgeon: Michele Gan M.D.;  Location: SURGERY SAME DAY [facility];  Service:    • LUMBAR DISC REPLACEMENT          Allergies:  PCN    Current Medications:    Current Outpatient Medications:   •  Glucosamine-Chondroitin (GLUCOSAMINE CHONDR COMPLEX PO), Take  by mouth., Disp: , Rfl:   •  LORazepam (ATIVAN) 0.5 MG Tab, Take 1 Tab by mouth 3 times a day for 30 days., Disp: 90 Tab, Rfl: 0  •  metFORMIN ER (GLUCOPHAGE XR) 500 MG TABLET SR 24 HR, Take 2 Tabs by mouth every day., Disp: 30 Tab, Rfl: 0  •  losartan (COZAAR) 50 MG Tab, Take 0.5 Tabs by mouth every day., Disp: 30 Tab, Rfl: 0  •  dorzolamide-timolol (COSOPT) 22.3-6.8 MG/ML Solution, Administer 1 Drop into both eyes 2 times a day., Disp: , Rfl:   •  finasteride (PROSCAR) 5 MG Tab, Take 5 mg by mouth every evening., Disp: , Rfl:   •  alfuzosin (UROXATRAL) 10 MG SR tablet, Take 10 mg by mouth every morning., Disp: , Rfl:   •  omeprazole (PRILOSEC) 20 MG delayed-release capsule, Take 1 Cap by mouth 1 time a day as needed (heartburn, GERD)., Disp: , Rfl:   •  gabapentin (NEURONTIN) 300 MG Cap, TAKE 2 CAPSULES BY MOUTH AT BEDTIME (Patient taking differently: Take 300 mg by mouth every bedtime. 2 capsules = 600 mg.), Disp: 200 Cap, Rfl: 1  •  fenofibrate (TRICOR) 145 MG Tab, Take 1 Tab by mouth every day. (Patient taking differently: Take 145 mg by mouth every evening.), Disp: 90 Tab, Rfl: 4  •  levothyroxine (SYNTHROID) 75 MCG Tab, TAKE 1 TABLET BY MOUTH IN THE MORNING ON AN EMPTY STOMACH (Patient taking differently: Take 75 mcg by mouth Every morning on an empty stomach.), Disp: 90 Tab, Rfl: 3  •  vitamin D3, cholecalciferol, 1000 UNIT Tab, Take 1,000 Units by  mouth every morning., Disp: , Rfl:   •  latanoprost (XALATAN) 0.005 % Solution, Administer 1 Drop into both eyes every evening., Disp: , Rfl:   •  Magnesium 200 MG Tab, Take 200 mg by mouth every evening., Disp: , Rfl:   •  Coenzyme Q10 200 MG Cap, Take 200 mg by mouth every evening., Disp: , Rfl:   •  aspirin EC (ECOTRIN) 81 MG Tablet Delayed Response, Take 81 mg by mouth every evening., Disp: , Rfl:   •  Omega 3-6-9 Fatty Acids (OMEGA 3-6-9 COMPLEX) Cap, Take 1 Cap by mouth 2 (two) times a day., Disp: , Rfl:   •  Multiple Vitamin (MULTI VITAMIN MENS PO), Take 1 Tab by mouth every morning., Disp: , Rfl:     Social History:  Social History     Socioeconomic History   • Marital status:      Spouse name: Not on file   • Number of children: Not on file   • Years of education: Not on file   • Highest education level: Not on file   Occupational History   • Not on file   Social Needs   • Financial resource strain: Not on file   • Food insecurity     Worry: Never true     Inability: Never true   • Transportation needs     Medical: No     Non-medical: No   Tobacco Use   • Smoking status: Former Smoker     Packs/day: 1.00     Years: 20.00     Pack years: 20.00     Types: Cigarettes     Quit date: 1980     Years since quittin.1   • Smokeless tobacco: Never Used   Substance and Sexual Activity   • Alcohol use: Not Currently     Alcohol/week: 0.5 oz     Types: 1 Cans of beer per week     Comment: has not been drinking lately    • Drug use: No   • Sexual activity: Yes     Partners: Female   Lifestyle   • Physical activity     Days per week: Not on file     Minutes per session: Not on file   • Stress: Not on file   Relationships   • Social connections     Talks on phone: Not on file     Gets together: Not on file     Attends Shinto service: Not on file     Active member of club or organization: Not on file     Attends meetings of clubs or organizations: Not on file     Relationship status: Not on file   •  Intimate partner violence     Fear of current or ex partner: Not on file     Emotionally abused: Not on file     Physically abused: Not on file     Forced sexual activity: Not on file   Other Topics Concern   • Not on file   Social History Narrative   • Not on file        Family History:   Family History   Problem Relation Age of Onset   • Heart Disease Mother    • Alcohol abuse Mother    • Lung Disease Father    • Heart Disease Father    • Drug abuse Sister        PHYSICAL EXAM:   Vital signs: Virtual Visit.   GENERAL: Well-developed, well-nourished  in no apparent distress.   EYE: No ocular and eyelid asymmetry, Anicteric sclerae,  PERRL, No exophthalmos or lidlag  HENT: Hearing grossly intact, Normocephalic, atraumatic. Pink, moist mucous membranes, No exudate  NECK: Supple. Trachea midline.   CARDIOVASCULAR: Regular rate and rhythm. No murmurs, rubs, or gallops.   LUNGS: Clear to auscultation bilaterally   ABDOMEN: Soft, nontender with positive bowel sounds.   EXTREMITIES: No clubbing, cyanosis, or edema.   NEUROLOGICAL: Cranial nerves II-XII are grossly intact   Symmetric reflexes at the patella no proximal muscle weakness, No visible tremor with both outstretched hands  LYMPH: No cervical, supraclavicular,  adenopathy seen.   SKIN: No rashes, lesions. Turgor is normal.  FOOT: Normal sensation to monofilament testing, normal pulses, no ulcers.  Normal Vibration quantitative sensation test.      ASSESSMENT/PLAN:     1. Type 2 diabetes mellitus with diabetic neuropathy, without long-term current use of insulin (HCC)  Stable  Continue Metformin 500mg x 2 QD.  Recommend daily foot exams.   Encouraged to consume 2L-3L of water daily.   Encouraged to exercise 150 minutes per week.       2. Hypothyroidism (acquired)  Stable  Continue Levothyroxine 75mcg QD.     Per patient and family member request, will check Adrenal function.     Repeat labs 1 week prior to follow up appointment.   Follow up appointment in 2 months.        Thank you kindly for allowing me to participate in the diabetes care plan for this patient.    Marybeth Stevens, APRN  02/05/21    CC:   Mynor Teresa M.D.

## 2021-02-10 ENCOUNTER — HOSPITAL ENCOUNTER (OUTPATIENT)
Dept: RADIOLOGY | Facility: MEDICAL CENTER | Age: 71
End: 2021-02-10
Attending: PSYCHIATRY & NEUROLOGY
Payer: MEDICARE

## 2021-02-10 ENCOUNTER — ANESTHESIA (OUTPATIENT)
Dept: RADIOLOGY | Facility: MEDICAL CENTER | Age: 71
End: 2021-02-10
Payer: MEDICARE

## 2021-02-10 ENCOUNTER — ANESTHESIA EVENT (OUTPATIENT)
Dept: RADIOLOGY | Facility: MEDICAL CENTER | Age: 71
End: 2021-02-10
Payer: MEDICARE

## 2021-02-10 ENCOUNTER — TELEPHONE (OUTPATIENT)
Dept: NEUROLOGY | Facility: MEDICAL CENTER | Age: 71
End: 2021-02-10

## 2021-02-10 VITALS
TEMPERATURE: 97 F | HEIGHT: 70 IN | HEART RATE: 60 BPM | OXYGEN SATURATION: 96 % | SYSTOLIC BLOOD PRESSURE: 142 MMHG | BODY MASS INDEX: 26.16 KG/M2 | RESPIRATION RATE: 16 BRPM | DIASTOLIC BLOOD PRESSURE: 66 MMHG | WEIGHT: 182.76 LBS

## 2021-02-10 DIAGNOSIS — G04.81 AUTOIMMUNE ENCEPHALITIS: ICD-10-CM

## 2021-02-10 DIAGNOSIS — E11.42 DIABETIC POLYNEUROPATHY ASSOCIATED WITH TYPE 2 DIABETES MELLITUS (HCC): ICD-10-CM

## 2021-02-10 LAB
BUN BLD-MCNC: 24 MG/DL (ref 8–22)
CA-I BLD ISE-SCNC: 1.29 MMOL/L (ref 1.1–1.3)
CHLORIDE BLD-SCNC: 102 MMOL/L (ref 96–112)
CO2 BLD-SCNC: 25 MMOL/L (ref 20–33)
CREAT BLD-MCNC: 1 MG/DL (ref 0.5–1.4)
GLUCOSE BLD-MCNC: 114 MG/DL (ref 65–99)
POTASSIUM BLD-SCNC: 4.1 MMOL/L (ref 3.6–5.5)
SODIUM BLD-SCNC: 138 MMOL/L (ref 135–145)

## 2021-02-10 PROCEDURE — 80047 BASIC METABLC PNL IONIZED CA: CPT

## 2021-02-10 PROCEDURE — 700105 HCHG RX REV CODE 258: Performed by: ANESTHESIOLOGY

## 2021-02-10 PROCEDURE — A9576 INJ PROHANCE MULTIPACK: HCPCS | Performed by: PSYCHIATRY & NEUROLOGY

## 2021-02-10 PROCEDURE — 700117 HCHG RX CONTRAST REV CODE 255: Performed by: PSYCHIATRY & NEUROLOGY

## 2021-02-10 PROCEDURE — 700101 HCHG RX REV CODE 250: Performed by: ANESTHESIOLOGY

## 2021-02-10 PROCEDURE — 4410588 MR-BRAIN-WITH & W/O

## 2021-02-10 PROCEDURE — 700111 HCHG RX REV CODE 636 W/ 250 OVERRIDE (IP): Performed by: ANESTHESIOLOGY

## 2021-02-10 RX ORDER — SODIUM CHLORIDE 9 MG/ML
INJECTION, SOLUTION INTRAVENOUS CONTINUOUS
Status: CANCELLED | OUTPATIENT
Start: 2021-02-10

## 2021-02-10 RX ORDER — METHYLPREDNISOLONE SODIUM SUCCINATE 125 MG/2ML
125 INJECTION, POWDER, LYOPHILIZED, FOR SOLUTION INTRAMUSCULAR; INTRAVENOUS PRN
Status: CANCELLED | OUTPATIENT
Start: 2021-02-10

## 2021-02-10 RX ORDER — SODIUM CHLORIDE, SODIUM LACTATE, POTASSIUM CHLORIDE, CALCIUM CHLORIDE 600; 310; 30; 20 MG/100ML; MG/100ML; MG/100ML; MG/100ML
INJECTION, SOLUTION INTRAVENOUS
Status: DISCONTINUED | OUTPATIENT
Start: 2021-02-10 | End: 2021-02-10 | Stop reason: SURG

## 2021-02-10 RX ORDER — DIPHENHYDRAMINE HYDROCHLORIDE 50 MG/ML
50 INJECTION INTRAMUSCULAR; INTRAVENOUS PRN
Status: CANCELLED | OUTPATIENT
Start: 2021-02-10

## 2021-02-10 RX ORDER — DIPHENHYDRAMINE HCL 25 MG
25 TABLET ORAL ONCE
Status: CANCELLED | OUTPATIENT
Start: 2021-02-10 | End: 2021-02-10

## 2021-02-10 RX ORDER — METHYLPREDNISOLONE SODIUM SUCCINATE 125 MG/2ML
100 INJECTION, POWDER, LYOPHILIZED, FOR SOLUTION INTRAMUSCULAR; INTRAVENOUS ONCE
Status: CANCELLED | OUTPATIENT
Start: 2021-02-10 | End: 2021-02-10

## 2021-02-10 RX ORDER — 0.9 % SODIUM CHLORIDE 0.9 %
10 VIAL (ML) INJECTION PRN
Status: CANCELLED | OUTPATIENT
Start: 2021-02-10

## 2021-02-10 RX ORDER — ONDANSETRON 2 MG/ML
INJECTION INTRAMUSCULAR; INTRAVENOUS PRN
Status: DISCONTINUED | OUTPATIENT
Start: 2021-02-10 | End: 2021-02-10 | Stop reason: SURG

## 2021-02-10 RX ORDER — HYDRALAZINE HYDROCHLORIDE 20 MG/ML
5 INJECTION INTRAMUSCULAR; INTRAVENOUS
Status: DISCONTINUED | OUTPATIENT
Start: 2021-02-10 | End: 2021-02-11 | Stop reason: HOSPADM

## 2021-02-10 RX ORDER — ACETAMINOPHEN 325 MG/1
650 TABLET ORAL ONCE
Status: CANCELLED | OUTPATIENT
Start: 2021-02-10 | End: 2021-02-10

## 2021-02-10 RX ORDER — HEPARIN SODIUM (PORCINE) LOCK FLUSH IV SOLN 100 UNIT/ML 100 UNIT/ML
500 SOLUTION INTRAVENOUS PRN
Status: CANCELLED | OUTPATIENT
Start: 2021-02-10

## 2021-02-10 RX ORDER — EPINEPHRINE 1 MG/ML(1)
0.5 AMPUL (ML) INJECTION PRN
Status: CANCELLED | OUTPATIENT
Start: 2021-02-10

## 2021-02-10 RX ORDER — 0.9 % SODIUM CHLORIDE 0.9 %
3 VIAL (ML) INJECTION PRN
Status: CANCELLED | OUTPATIENT
Start: 2021-02-10

## 2021-02-10 RX ORDER — SODIUM CHLORIDE, SODIUM LACTATE, POTASSIUM CHLORIDE, CALCIUM CHLORIDE 600; 310; 30; 20 MG/100ML; MG/100ML; MG/100ML; MG/100ML
INJECTION, SOLUTION INTRAVENOUS CONTINUOUS
Status: DISCONTINUED | OUTPATIENT
Start: 2021-02-10 | End: 2021-02-11 | Stop reason: HOSPADM

## 2021-02-10 RX ORDER — 0.9 % SODIUM CHLORIDE 0.9 %
VIAL (ML) INJECTION PRN
Status: CANCELLED | OUTPATIENT
Start: 2021-02-10

## 2021-02-10 RX ORDER — DEXAMETHASONE SODIUM PHOSPHATE 4 MG/ML
INJECTION, SOLUTION INTRA-ARTICULAR; INTRALESIONAL; INTRAMUSCULAR; INTRAVENOUS; SOFT TISSUE PRN
Status: DISCONTINUED | OUTPATIENT
Start: 2021-02-10 | End: 2021-02-10 | Stop reason: SURG

## 2021-02-10 RX ORDER — HALOPERIDOL 5 MG/ML
1 INJECTION INTRAMUSCULAR
Status: DISCONTINUED | OUTPATIENT
Start: 2021-02-10 | End: 2021-02-11 | Stop reason: HOSPADM

## 2021-02-10 RX ORDER — ONDANSETRON 2 MG/ML
4 INJECTION INTRAMUSCULAR; INTRAVENOUS
Status: DISCONTINUED | OUTPATIENT
Start: 2021-02-10 | End: 2021-02-11 | Stop reason: HOSPADM

## 2021-02-10 RX ADMIN — GADOTERIDOL 18 ML: 279.3 INJECTION, SOLUTION INTRAVENOUS at 11:45

## 2021-02-10 RX ADMIN — ONDANSETRON 4 MG: 2 INJECTION INTRAMUSCULAR; INTRAVENOUS at 11:35

## 2021-02-10 RX ADMIN — SODIUM CHLORIDE, POTASSIUM CHLORIDE, SODIUM LACTATE AND CALCIUM CHLORIDE: 600; 310; 30; 20 INJECTION, SOLUTION INTRAVENOUS at 10:44

## 2021-02-10 RX ADMIN — PROPOFOL 150 MG: 10 INJECTION, EMULSION INTRAVENOUS at 10:49

## 2021-02-10 RX ADMIN — PROPOFOL 50 MG: 10 INJECTION, EMULSION INTRAVENOUS at 11:24

## 2021-02-10 RX ADMIN — DEXAMETHASONE SODIUM PHOSPHATE 4 MG: 4 INJECTION, SOLUTION INTRA-ARTICULAR; INTRALESIONAL; INTRAMUSCULAR; INTRAVENOUS; SOFT TISSUE at 10:49

## 2021-02-10 RX ADMIN — EPHEDRINE SULFATE 10 MG: 50 INJECTION INTRAMUSCULAR; INTRAVENOUS; SUBCUTANEOUS at 11:10

## 2021-02-10 ASSESSMENT — FIBROSIS 4 INDEX: FIB4 SCORE: 1.78

## 2021-02-10 NOTE — ANESTHESIA PROCEDURE NOTES
Airway    Date/Time: 2/10/2021 10:50 AM  Performed by: Glae Leroy M.D.  Authorized by: Gale Leroy M.D.     Location:  OR  Urgency:  Elective  Indications for Airway Management:  Anesthesia      Spontaneous Ventilation: absent    Sedation Level:  Deep  Preoxygenated: Yes    Mask Difficulty Assessment:  1 - vent by mask  Final Airway Type:  Supraglottic airway  Final Supraglottic Airway:  Standard LMA    SGA Size:  4  Number of Attempts at Approach:  1

## 2021-02-10 NOTE — PROGRESS NOTES
Patient to MRI Outpatient Dept.  Patient informed process and plan of care during this visit.  Anesthesiologist, Dr Leroy spoke with patient and discussed provider's plan of care.  MRI brain with and without contrast completed.  Patient tolerated diet and activities once awake and appropriate.  DC instructions discussed, all questions answered.  Patient discharged in stable condition with responsible adult.

## 2021-02-10 NOTE — ANESTHESIA POSTPROCEDURE EVALUATION
Patient: Lico Salinas    Procedure Summary     Date: 02/10/21 Room / Location: [facility] MRI  75 [address]    Anesthesia Start: 1044 Anesthesia Stop: 1146    Procedure: MR-BRAIN-WITH & W/O Diagnosis: Diabetic polyneuropathy associated with type 2 diabetes mellitus (HCC)    Scheduled Providers:  Responsible Provider: Gale Leroy M.D.    Anesthesia Type: general ASA Status: 2          Final Anesthesia Type: general  Last vitals  BP   Blood Pressure : 129/72    Temp   36.1 °C (97 °F)    Pulse   66   Resp   16    SpO2   98 %      Anesthesia Post Evaluation    Patient location during evaluation: PACU  Patient participation: complete - patient participated  Level of consciousness: awake and alert    Airway patency: patent  Anesthetic complications: no  Cardiovascular status: hemodynamically stable  Respiratory status: acceptable  Hydration status: euvolemic    PONV: none          No complications documented.     Nurse Pain Score: 0 (NPRS)

## 2021-02-10 NOTE — PROGRESS NOTES
Pt to imaging department for MRI of brain with and without contrast under general anesthesia.  POC discussed with patient.  Dr Leroy spoke with patient and discussed plan.  MRI completed.  Pt able to tolerate water in recovery once awake. VSS.  Discharge instructions provided to patient and his wife.  Pt discharged in

## 2021-02-10 NOTE — ANESTHESIA POSTPROCEDURE EVALUATION
Patient: Lico Salinas    Procedure Summary     Date: 02/10/21 Room / Location: [facility] MRI  75 [address]    Anesthesia Start: 1044 Anesthesia Stop: 1146    Procedure: MR-BRAIN-WITH & W/O Diagnosis: Diabetic polyneuropathy associated with type 2 diabetes mellitus (HCC)    Scheduled Providers:  Responsible Provider: Gale Leroy M.D.    Anesthesia Type: general ASA Status: 2          Final Anesthesia Type: general  Last vitals  BP   Blood Pressure : 129/72    Temp   36.1 °C (97 °F)    Pulse   66   Resp   16    SpO2   98 %      Anesthesia Post Evaluation    Patient location during evaluation: PACU  Patient participation: complete - patient participated  Level of consciousness: awake and alert    Airway patency: patent  Anesthetic complications: no  Cardiovascular status: hemodynamically stable  Respiratory status: acceptable  Hydration status: euvolemic    PONV: none          No complications documented.     Nurse Pain Score: 0 (NPRS)

## 2021-02-10 NOTE — ANESTHESIA PREPROCEDURE EVALUATION
69 y/o male with recent h/o anxiety, memory loss, getting work up for dementia or other process (Lewy body dementia?), no problems with anesthesia in the past.    Relevant Problems   CARDIAC   (+) Essential hypertension      GI   (+) Erosive gastritis         (+) Kidney cyst, acquired      ENDO   (+) Diabetes mellitus type 2, noninsulin dependent (HCC)   (+) Hypothyroidism      Other   (+) Hilar lymphadenopathy       Physical Exam    Airway   Mallampati: II  TM distance: >3 FB  Neck ROM: full       Cardiovascular - normal exam  Rhythm: regular  Rate: normal  (-) murmur     Dental - normal exam           Pulmonary - normal exam  Breath sounds clear to auscultation     Abdominal    Neurological - normal exam                 Anesthesia Plan    ASA 2       Plan - general       Airway plan will be LMA          Induction: intravenous      Pertinent diagnostic labs and testing reviewed    Informed Consent:    Anesthetic plan and risks discussed with patient.

## 2021-02-10 NOTE — ANESTHESIA TIME REPORT
Anesthesia Start and Stop Event Times     Date Time Event    2/10/2021 1044 Ready for Procedure     1044 Anesthesia Start     1146 Anesthesia Stop        Responsible Staff  02/10/21    Name Role Begin End    Gale Leroy M.D. Anesth 1044 1146        Preop Diagnosis (Free Text):  Pre-op Diagnosis             Preop Diagnosis (Codes):    Post op Diagnosis  Anxiety and depression      Premium Reason  Non-Premium    Comments:

## 2021-02-10 NOTE — DISCHARGE INSTRUCTIONS
MRI ADULT DISCHARGE INSTRUCTIONS    You have been medicated today for your scan. Please follow the instructions below to ensure your safe recovery. If you have any questions or problems, feel free to call us at [phone] or [phone].     1.   Have someone stay with you to assist you as needed.    2.   Do not drive or operate any mechanical devices.    3.   Do not perform any activity that requires concentration. Make no major decisions over the next 24 hours.     4.   Be careful changing positions from laying down to sitting or standing, as you may become dizzy.     5.   Do not drink alcohol for 48 hours.    6.   There are no restrictions for eating your normal meals. Drink fluids.    7.   You may continue your usual medications for pain, tranquilizers, muscle relaxants or sedatives when awake.     8.   Tomorrow, you may continue your normal daily activities.     9.   Pressure dressing on 10 - 15 minutes. If swelling or bleeding occurs when removed, continue placing direct pressure on injection site for another 5 minutes, or until bleeding stops.     I have been informed of and understand the above discharge instructions.

## 2021-02-19 ENCOUNTER — TELEMEDICINE (OUTPATIENT)
Dept: BEHAVIORAL HEALTH | Facility: CLINIC | Age: 71
End: 2021-02-19
Payer: MEDICARE

## 2021-02-19 ENCOUNTER — OFFICE VISIT (OUTPATIENT)
Dept: MEDICAL GROUP | Facility: MEDICAL CENTER | Age: 71
End: 2021-02-19
Payer: MEDICARE

## 2021-02-19 VITALS
BODY MASS INDEX: 26.54 KG/M2 | WEIGHT: 185.4 LBS | SYSTOLIC BLOOD PRESSURE: 104 MMHG | HEIGHT: 70 IN | OXYGEN SATURATION: 96 % | DIASTOLIC BLOOD PRESSURE: 60 MMHG | HEART RATE: 76 BPM | TEMPERATURE: 97 F | RESPIRATION RATE: 18 BRPM

## 2021-02-19 DIAGNOSIS — R63.4 UNINTENDED WEIGHT LOSS: ICD-10-CM

## 2021-02-19 DIAGNOSIS — F43.23 ADJUSTMENT DISORDER WITH MIXED ANXIETY AND DEPRESSED MOOD: ICD-10-CM

## 2021-02-19 DIAGNOSIS — E11.9 DIABETES MELLITUS TYPE 2, NONINSULIN DEPENDENT (HCC): ICD-10-CM

## 2021-02-19 DIAGNOSIS — R59.0 HILAR LYMPHADENOPATHY: ICD-10-CM

## 2021-02-19 DIAGNOSIS — I10 ESSENTIAL HYPERTENSION: ICD-10-CM

## 2021-02-19 DIAGNOSIS — D64.9 ANEMIA, UNSPECIFIED TYPE: ICD-10-CM

## 2021-02-19 DIAGNOSIS — R91.8 OTHER NONSPECIFIC ABNORMAL FINDING OF LUNG FIELD: ICD-10-CM

## 2021-02-19 DIAGNOSIS — E11.42 DIABETIC POLYNEUROPATHY ASSOCIATED WITH TYPE 2 DIABETES MELLITUS (HCC): ICD-10-CM

## 2021-02-19 PROCEDURE — 99215 OFFICE O/P EST HI 40 MIN: CPT | Performed by: FAMILY MEDICINE

## 2021-02-19 PROCEDURE — 90834 PSYTX W PT 45 MINUTES: CPT | Performed by: MARRIAGE & FAMILY THERAPIST

## 2021-02-19 ASSESSMENT — FIBROSIS 4 INDEX: FIB4 SCORE: 1.78

## 2021-02-19 NOTE — ASSESSMENT & PLAN NOTE
Patient continues to describe significant anxiety and depression but denies suicidal ideation.  He is currently on Ativan and working with a psychiatrist and counselor.  Overall, he states he does not feel much better than he did a month ago.  He is still reports tremulousness and ruminations but no significant memory loss.  He is now seeing a neurologist and endocrinologist for some of these issues.

## 2021-02-19 NOTE — PATIENT INSTRUCTIONS
"- Continue Individual therapy  - \"Homework\" recommendation: \"Watch make your bed\" Video, Use journal as a place to store random thoughts and concerns.  - Call to schedule follow up in one week.  "

## 2021-02-19 NOTE — BH THERAPY
" [facility]  Therapy Progress Note      This evaluation was conducted via Zoom using secure and encrypted videoconferencing technology. The patient was in a private location in the [facility].    The patient's identity was confirmed and verbal consent was obtained for this virtual visit.       Patient Name: Lico Salinas  Patient MRN: [id number]  Today's Date: 2/19/2021     Type of session:Individual psychotherapy  Length of session: 45 minutes  Persons in attendance:Patient and Spouse/Partner    Subjective/New Info: Reviewed mental health issues and worked on building rapport.    Objective/Observations:   Participation: Limited verbal participation, Guarded, Defensive and Figity   Grooming: Casual   Cognition: Confused   Eye contact: Limited   Mood: Depressed and Irritable   Affect: Blunted and Labile   Thought process: Flight of ideas and Loose associations   Speech: Soft, Pressured and Latency of response       Diagnoses: No diagnosis found.     Current risk:   SUICIDE: Low   Homicide: Low   Self-harm: Low   Relapse: Low   Safety Plan reviewed? Not Indicated    Therapeutic Intervention(s): Develop/modify treatment plan, Establish rapport, Goal-setting and Self-care skills    Treatment Goal(s)/Objective(s) addressed: Worked on developing treatment goals. Patient stated that he wanted to be \"comfortable\" when asked  What he wanted out of treatment.     Progress toward Treatment Goals: No change    Plan:  - Continue Individual therapy  - \"Homework\" recommendation: \"Watch make your bed\" Video, Use journal as a place to store random thoughts and concerns.  - Patient is in agreement with the above plan:  UNDECIDED    The proposed treatment plan was discussed with the patient who was provided the opportunity to ask questions and make suggestions regarding alternative treatments. Patient verbalized understanding and expressed agreement with the plan.     Lavon Obrien, " KAMRAN  2/19/2021

## 2021-02-19 NOTE — PATIENT INSTRUCTIONS
Today, your Healthcare Provider may have discussed the following recommendations:    1. Exercise and Physical Activity  According to the American Heart Association, it is recommended to engage in physical activity regularly and to aim for 150 minutes of moderate-intensity aerobic activity per week.  Your Healthcare Provider may have recommended taking the stairs instead of the elevator, starting or maintaining a walking program or strength-training program.    2. Emotional Well-being  Mental and emotional well-being is essential to overall health.  Your Healthcare Provider may have encouraged you to build strong, positive relationships with family and friends, become more involved in your community (by volunteering or joining a spiritual community), or focus on self-care.    3. Fall and Injury Prevention  To prevent falls and injuries and also improve your balance, your Healthcare Provider may have suggested that you use a cane or walker, start an exercise of physical therapy program, or have your vision and/or hearing tested.    4. Urinary Leakage (Urinary Incontinence)  To control or manage the leakage of urine, your Healthcare Provider may have recommended you start bladder training exercises (such as Kegel exercises), a trial of a medication or a referral to see a specialist to discuss surgical options.      PROXY [phone]  Call to get access to patient's my chart.

## 2021-02-19 NOTE — PROGRESS NOTES
Subjective:     Lico Salinas is a 70 y.o. male here today for weight loss and Annual Health Assessment.    Adjustment disorder with mixed anxiety and depressed mood  Patient continues to describe significant anxiety and depression but denies suicidal ideation.  He is currently on Ativan and working with a psychiatrist and counselor.  Overall, he states he does not feel much better than he did a month ago.  He is still reports tremulousness and ruminations but no significant memory loss.  He is now seeing a neurologist and endocrinologist for some of these issues.    Anemia  Noted previously in the setting of erosive gastritis.    Essential hypertension  Very well controlled despite very low-dose losartan use.    Hilar lymphadenopathy  And mild hilar lymphadenopathy was noted on recent CT scan.    Unintended weight loss  In August the patient first came to see me for 4 months of unintended weight loss.  In April 2019 he weighed 238 pounds, today he weighs 185 pounds.  He has had an extensive laboratory and imaging work-up including an MRI of the brain and a CT scan of the chest, abdomen and pelvis.  He did have a slightly elevated PSA which has improved and the patient is now following with urology. During the work-up, it was also discovered that the patient had a breast mass which was biopsied and determined to be benign.  He did follow-up with the breast surgeon who is currently monitoring this.  Patient has had a colonoscopy demonstrating diverticulosis and an EGD showing erosive gastritis. Today, the patient states the weight loss has stopped.  In fact, I see objective evidence of demonstrated stability.    Diabetes mellitus type 2, noninsulin dependent (HCC)  Following with endo.     Diabetic polyneuropathy associated with type 2 diabetes mellitus (HCC)  Following with neuro.        Health Maintenance Summary                COVID-19 Vaccine Overdue 6/19/1964     IMM HEP B VACCINE Overdue 6/19/1967     IMM  ZOSTER VACCINES Overdue 11/30/2020      Done 10/5/2020 Imm Admin: Zoster Vaccine Recombinant (RZV) (SHINGRIX)     Patient has more history with this topic...    Annual Wellness Visit Overdue 12/20/2020      Done 12/20/2019 SUBSEQUENT ANNUAL WELLNESS VISIT-INCLUDES PPPS ()    FASTING LIPID PROFILE Next Due 4/10/2021      Done 4/10/2020 LIPID PROFILE     Patient has more history with this topic...    URINE ACR / MICROALBUMIN Next Due 4/10/2021      Done 4/10/2020 MICROALBUMIN CREAT RATIO URINE     Patient has more history with this topic...    DIABETES MONOFILAMENT / LE EXAM Next Due 4/20/2021      Done 4/20/2020 AMB DIABETIC MONOFILAMENT LOWER EXTREMITY EXAM     Patient has more history with this topic...    A1C SCREENING Next Due 6/10/2021      Done 12/10/2020 HEMOGLOBIN A1C     Patient has more history with this topic...    RETINAL SCREENING Next Due 8/26/2021      Done 8/26/2020 REFERRAL FOR RETINAL SCREENING EXAM     Patient has more history with this topic...    SERUM CREATININE Next Due 1/10/2022      Done 1/10/2021 BASIC METABOLIC PANEL     Patient has more history with this topic...    IMM DTaP/Tdap/Td Vaccine Next Due 10/31/2024      Done 10/31/2014 Imm Admin: Tdap Vaccine     Patient has more history with this topic...    COLONOSCOPY Next Due 10/22/2030      Done 10/22/2020 REFERRAL TO GI FOR COLONOSCOPY     Patient has more history with this topic...           Annual Health Assessment Questions:     1.  Are you currently engaging in any exercise or physical activity? Yes    2.  How would you describe your mood or emotional well-being today? Depressed and anxius    3.  Have you had any falls in the last year? No    4.  Have you noticed any problems with your balance or had difficulty walking? No    5.  In the last six months have you experienced any leakage of urine? No    6. DPA/Advanced Directive: Patient has Advanced Directive on file.     Current medicines (including changes today)  Current  Outpatient Medications   Medication Sig Dispense Refill   • Glucosamine-Chondroitin (GLUCOSAMINE CHONDR COMPLEX PO) Take  by mouth.     • LORazepam (ATIVAN) 0.5 MG Tab Take 1 Tab by mouth 3 times a day for 30 days. 90 Tab 0   • metFORMIN ER (GLUCOPHAGE XR) 500 MG TABLET SR 24 HR Take 2 Tabs by mouth every day. 30 Tab 0   • dorzolamide-timolol (COSOPT) 22.3-6.8 MG/ML Solution Administer 1 Drop into both eyes 2 times a day.     • finasteride (PROSCAR) 5 MG Tab Take 5 mg by mouth every evening.     • alfuzosin (UROXATRAL) 10 MG SR tablet Take 10 mg by mouth every morning.     • omeprazole (PRILOSEC) 20 MG delayed-release capsule Take 1 Cap by mouth 1 time a day as needed (heartburn, GERD).     • gabapentin (NEURONTIN) 300 MG Cap TAKE 2 CAPSULES BY MOUTH AT BEDTIME (Patient taking differently: Take 300 mg by mouth every bedtime. 2 capsules = 600 mg.) 200 Cap 1   • fenofibrate (TRICOR) 145 MG Tab Take 1 Tab by mouth every day. (Patient taking differently: Take 145 mg by mouth every evening.) 90 Tab 4   • levothyroxine (SYNTHROID) 75 MCG Tab TAKE 1 TABLET BY MOUTH IN THE MORNING ON AN EMPTY STOMACH (Patient taking differently: Take 75 mcg by mouth Every morning on an empty stomach.) 90 Tab 3   • vitamin D3, cholecalciferol, 1000 UNIT Tab Take 1,000 Units by mouth every morning.     • latanoprost (XALATAN) 0.005 % Solution Administer 1 Drop into both eyes every evening.     • Magnesium 200 MG Tab Take 200 mg by mouth every evening.     • Coenzyme Q10 200 MG Cap Take 200 mg by mouth every evening.     • Multiple Vitamin (MULTI VITAMIN MENS PO) Take 1 Tab by mouth every morning.       No current facility-administered medications for this visit.       He  has a past medical history of Anxiety, Diabetes (HCC) (07-10-15), Glaucoma, High cholesterol (07-10-15), Hypertension (07-10-15), Hypothyroidism, Neuropathy, peripheral, Skin cancer, Snoring, and Thyroid disease.    Pcn [penicillins]    He  reports that he quit smoking about  "41 years ago. His smoking use included cigarettes. He has a 20.00 pack-year smoking history. He has never used smokeless tobacco. He reports previous alcohol use of about 0.5 oz of alcohol per week. He reports that he does not use drugs.  Counseling given: Not Answered      ROS no fever, chills.     Objective:     Physical Exam:  /60 (BP Location: Right arm, Patient Position: Sitting, BP Cuff Size: Large adult)   Pulse 76   Temp 36.1 °C (97 °F) (Temporal)   Resp 18   Ht 1.778 m (5' 10\")   Wt 84.1 kg (185 lb 6.4 oz)   SpO2 96%  Body mass index is 26.6 kg/m².   Constitutional: Alert.  Psych: Anxious but significantly improved from my last visit with greatly diminished restlessness and no palilalia.    Assessment and Plan:     1. Adjustment disorder with mixed anxiety and depressed mood  Increasingly I am wondering if the patient's neuropsychiatric symptoms are a product of stress and uncertainty of not knowing what initially caused the unintended weight loss.  I encouraged him to continue to follow-up with psychiatry, psychology and neurology.  I am also happy to see that he is seeing endocrinology and will be seeing sleep medicine soon.  I gave the patient encouragement.  We will continue to work on reducing polypharmacy.    2. Anemia, unspecified type  - CBC WITH DIFFERENTIAL; Future    3. Essential hypertension  -Stop losartan.  - Comp Metabolic Panel; Future    4. Unintended weight loss  Patient's weight continues to demonstrate stability.  This is very reassuring and he is not complaining of any other B symptoms today.  In order to round out the work-up I offered the patient a PET scan and ordered this for him.  He will continue to follow with endocrinology.   - [id number] BASE TO MID-THIGH; Future    5. Hilar lymphadenopathy  - [id number] BASE TO MID-THIGH; Future    6. Other nonspecific abnormal finding of lung field   Hilar lymphadenopathy noted above.  - [id number] BASE TO MID-THIGH; " Future    Total 53 minutes face-to-face time spent with patient, with greater than 50% of the total time discussing patient's issues and symptoms as listed above in assessment and plan, as well as managing coordination of care for future evaluation and treatment.    Discussion today about general wellness and lifestyle habits:    · Engage in regular physical activity and social activities.  · Prevent falls and reduce trip hazards; using ambulatory aides, hearing and vision testing if appropriate.  · Steps to improve urinary incontinence.  · Advanced care planning.    Follow-Up: Return in about 6 weeks (around 4/2/2021), or if symptoms worsen or fail to improve.         PLEASE NOTE: This dictation was created using voice recognition software. I have made every reasonable attempt to correct obvious errors, but I expect that there are errors of grammar and possibly content that I did not discover before finalizing the note.

## 2021-02-19 NOTE — ASSESSMENT & PLAN NOTE
In August the patient first came to see me for 4 months of unintended weight loss.  In April 2019 he weighed 238 pounds, today he weighs 185 pounds.  He has had an extensive laboratory and imaging work-up including an MRI of the brain and a CT scan of the chest, abdomen and pelvis.  He did have a slightly elevated PSA which has improved and the patient is now following with urology. During the work-up, it was also discovered that the patient had a breast mass which was biopsied and determined to be benign.  He did follow-up with the breast surgeon who is currently monitoring this.  Patient has had a colonoscopy demonstrating diverticulosis and an EGD showing erosive gastritis. Today, the patient states the weight loss has stopped.  In fact, I see objective evidence of demonstrated stability.

## 2021-02-25 ENCOUNTER — TELEMEDICINE (OUTPATIENT)
Dept: BEHAVIORAL HEALTH | Facility: CLINIC | Age: 71
End: 2021-02-25
Payer: MEDICARE

## 2021-02-25 DIAGNOSIS — F43.23 ADJUSTMENT DISORDER WITH MIXED ANXIETY AND DEPRESSED MOOD: ICD-10-CM

## 2021-02-25 PROCEDURE — 90834 PSYTX W PT 45 MINUTES: CPT | Mod: 95,CR | Performed by: MARRIAGE & FAMILY THERAPIST

## 2021-02-25 NOTE — BH THERAPY
[facility]  Therapy Progress Note      This evaluation was conducted via Zoom using secure and encrypted videoconferencing technology. The patient was in a private location in the [facility].    The patient's identity was confirmed and verbal consent was obtained for this virtual visit.       Patient Name: Lico Salinas  Patient MRN: [id number]  Today's Date: 2/25/2021     Type of session:Individual psychotherapy  Length of session: 45 minutes  Persons in attendance:Patient    Subjective/New Info: Met with the clients and his wife initially. She reported that she left a message for his doctor that the Antivan is not working for the patient. She also discussed her own dufficulties in supporting and dealing with the patient.      Objective/Observations:   Participation: Limited verbal participation, Guarded and Defensive   Grooming: Casual   Cognition: Drowsy/Somnolent and Confused   Eye contact: Limited   Mood: Depressed and Anxious   Affect: Congruent with content, Sad and Anxious   Thought process: Tangential and Flight of ideas   Speech: Volume within normal limits and Pressured       Diagnoses: No diagnosis found.     Current risk:   SUICIDE: Low   Homicide: Low   Self-harm: Low   Relapse: Low   Safety Plan reviewed? Not Indicated    Therapeutic Intervention(s): Clarify:  Clarify feelings and Clarify thoughts, Distress tolerance skills, Establish rapport and Problem-solving    Treatment Goal(s)/Objective(s) addressed: Worked with client on his history of completing hard tasks, drawing on those accomplishments and skills.    Progress toward Treatment Goals: Mild improvement    Plan:  - Continue Individual therapy    The proposed treatment plan was discussed with the patient who was provided the opportunity to ask questions and make suggestions regarding alternative treatments. Patient verbalized understanding and expressed agreement with the plan.     Lavon Obrien,  KAMRAN  2/25/2021

## 2021-03-05 ENCOUNTER — TELEMEDICINE (OUTPATIENT)
Dept: BEHAVIORAL HEALTH | Facility: CLINIC | Age: 71
End: 2021-03-05
Payer: MEDICARE

## 2021-03-05 VITALS — BODY MASS INDEX: 25.05 KG/M2 | HEIGHT: 70 IN | WEIGHT: 175 LBS

## 2021-03-05 DIAGNOSIS — G47.00 INSOMNIA, UNSPECIFIED TYPE: ICD-10-CM

## 2021-03-05 DIAGNOSIS — F43.23 ADJUSTMENT DISORDER WITH MIXED ANXIETY AND DEPRESSED MOOD: ICD-10-CM

## 2021-03-05 PROBLEM — M46.1 SACROILIITIS, NOT ELSEWHERE CLASSIFIED (HCC): Status: ACTIVE | Noted: 2021-01-11

## 2021-03-05 PROCEDURE — 90833 PSYTX W PT W E/M 30 MIN: CPT | Mod: 95,CR | Performed by: PSYCHIATRY & NEUROLOGY

## 2021-03-05 PROCEDURE — 99214 OFFICE O/P EST MOD 30 MIN: CPT | Mod: 95,CR | Performed by: PSYCHIATRY & NEUROLOGY

## 2021-03-05 RX ORDER — MIRTAZAPINE 7.5 MG/1
7.5 TABLET, FILM COATED ORAL
Qty: 30 TABLET | Refills: 0 | Status: SHIPPED | OUTPATIENT
Start: 2021-03-05 | End: 2021-03-29

## 2021-03-05 RX ORDER — FLUOXETINE HYDROCHLORIDE 20 MG/1
20 CAPSULE ORAL DAILY
Qty: 30 CAPSULE | Refills: 0 | Status: SHIPPED | OUTPATIENT
Start: 2021-03-05 | End: 2021-03-29

## 2021-03-05 RX ORDER — LORAZEPAM 0.5 MG/1
0.5 TABLET ORAL 3 TIMES DAILY
Qty: 90 TABLET | Refills: 0 | Status: SHIPPED | OUTPATIENT
Start: 2021-03-05 | End: 2021-04-01

## 2021-03-05 ASSESSMENT — FIBROSIS 4 INDEX: FIB4 SCORE: 1.78

## 2021-03-05 NOTE — PROGRESS NOTES
PSYCHIATRY VIRTUAL VISIT FOLLOW-UP NOTE      Chief Complaint   Patient presents with   • Follow-Up     Anxiety, depression, insomnia       This evaluation was conducted via Zoom using secure and encrypted videoconferencing technology. The patient was in a private location in the [facility].    The patient's identity was confirmed and verbal consent was obtained for this virtual visit.    History Of Present Illness:  Lico Salinas is a 70 y.o. male with adjustment disorder, hypothyroidism, dyslipidemia, type 2 diabetes mellitus, neuropathy, glaucoma, hypertension comes in today for follow up, was last seen 4 weeks ago.  I was able to see him along with his wife.  He talked about crumbling and feeling in his brain and not cooperating with him.  He and his wife mentioned about 5 pound weight loss since the last saw Dr. Teresa.  He is unable to tell what would help him at this time.  He is feeling hopeless and does not think that anything can make him feel better.  His wife mentioned that he continues to obsess over little things in his life like finances even though there are no financial difficulties they are going through.  He tends to get upset at times which really affects her.  He has raised a fist in anger but denies hitting her.  His wife mentions that she is not worried about her safety at this time.  He denies having thoughts of wanting to hurt himself or others.  He is not sleeping well which his wife has noticed as well.  She notices that he moans in his sleep and talks to himself on and off throughout the day.  I did notice him mumbling softly and when asked he said he is talking to himself and not to anybody else.  He is eating small portions on a regular basis.  He has been taking his medications as prescribed.  He has been scheduled to start IVIG infusion therapy next week but his wife is having concerned if he is going to be able to do that or not.    Social History:   He is , no kids,  lives with wife in [place], served in [employer].    Substance Use:  Alcohol - Denies  Nicotine - Denies  Cannabis - Denies  Illicit drugs - Denies     Past Medication Trials:  Prozac 20 mg, Effexor 37.5 mg, Elavil 25 mg, Remeron 15 mg    Medications:  Current Outpatient Medications   Medication Sig Dispense Refill   • levothyroxine (SYNTHROID) 75 MCG Tab Take 1 tablet by mouth Every morning on an empty stomach. 100 tablet 1   • Glucosamine-Chondroitin (GLUCOSAMINE CHONDR COMPLEX PO) Take  by mouth.     • LORazepam (ATIVAN) 0.5 MG Tab Take 1 Tab by mouth 3 times a day for 30 days. 90 Tab 0   • metFORMIN ER (GLUCOPHAGE XR) 500 MG TABLET SR 24 HR Take 2 Tabs by mouth every day. 30 Tab 0   • dorzolamide-timolol (COSOPT) 22.3-6.8 MG/ML Solution Administer 1 Drop into both eyes 2 times a day.     • finasteride (PROSCAR) 5 MG Tab Take 5 mg by mouth every evening.     • alfuzosin (UROXATRAL) 10 MG SR tablet Take 10 mg by mouth every morning.     • omeprazole (PRILOSEC) 20 MG delayed-release capsule Take 1 Cap by mouth 1 time a day as needed (heartburn, GERD).     • gabapentin (NEURONTIN) 300 MG Cap TAKE 2 CAPSULES BY MOUTH AT BEDTIME (Patient taking differently: Take 300 mg by mouth every bedtime. 2 capsules = 600 mg.) 200 Cap 1   • fenofibrate (TRICOR) 145 MG Tab Take 1 Tab by mouth every day. (Patient taking differently: Take 145 mg by mouth every evening.) 90 Tab 4   • vitamin D3, cholecalciferol, 1000 UNIT Tab Take 1,000 Units by mouth every morning.     • latanoprost (XALATAN) 0.005 % Solution Administer 1 Drop into both eyes every evening.     • Magnesium 200 MG Tab Take 200 mg by mouth every evening.     • Coenzyme Q10 200 MG Cap Take 200 mg by mouth every evening.     • Multiple Vitamin (MULTI VITAMIN MENS PO) Take 1 Tab by mouth every morning.       No current facility-administered medications for this visit.       Review Of Systems:    Constitutional - Positive for fatigue  Psychiatric - Positive for anxiety,  "depression, poor sleep    Physical Examination:  Vital signs: Ht 1.778 m (5' 10\")   Wt 79.4 kg (175 lb)   BMI 25.11 kg/m²     Musculoskeletal: No abnormal movements.     Mental Status Evaluation:   General: Elderly white male, dressed in casual attire, good grooming and hygiene, in no apparent distress, cooperative, poor eye contact, psychomotor agitation - rocking side to side   Orientation: Alert and oriented to person, place and time  Recent and remote memory: Grossly intact  Attention span and concentration: Grossly intact  Speech: Stuttering occasionally, slow rate and soft tone  Thought Process: Linear, logical and goal directed  Thought Content: Denies suicidal or homicidal ideations, intent or plan  Perception: Denies auditory or visual hallucinations. No delusions noted  Associations: Intact  Language: Appropriate  Fund of knowledge and vocabulary: Grossly adequate  Mood: \"fine\"  Affect: Dysphoric, mood congruent  Insight: Fair  Judgment: Good    Depression screening:  Depression Screen (PHQ-2/PHQ-9) 12/20/2019 4/20/2020 12/16/2020   PHQ-2 Total Score - - 5   PHQ-2 Total Score - - -   PHQ-2 Total Score 0 0 -   PHQ-9 Total Score - - 22   PHQ-9 Total Score 0 - -     Interpretation of PHQ-9 Total Score   Score Severity   1-4 No Depression   5-9 Mild Depression   10-14 Moderate Depression   15-19 Moderately Severe Depression   20-27 Severe Depression    Anxiety screening:  SHARONDA 7 12/16/2020   SHARONDA-7 Total Score 20     Interpretation of SHARONDA 7 Total Score   Score Severity:  0-4 No Anxiety   5-9 Mild Anxiety  10-14 Moderate Anxiety  15-21 Severe Anxiety    Medical Records/Labs/Diagnostic Tests Reviewed:  [place] PDMP records - appropriate refills, no abuse suspected       Impression:  1.  Adjustment disorder with anxiety and depression (unintentional weight loss, possible physical health illness) - worsening  2.  Insomnia - worsening     Plan:  1.  Continue Prozac 20 mg daily for anxiety and depression  2.  Start " Remeron 7.5 mg at bedtime for sleep, appetite stimulation, anxiety and depression  3.  Continue Ativan 0.5 mg 3 times daily for anxiety.  E prescribed for 30 days.  Will continue him on Ativan until his infusion and will consider switching him to Klonopin at his next appointment.  4.  Continue individual psychotherapy with WESLY Blake  5.  Provided supportive psychotherapy (> 16 minutes) in regards to his physical health, anxiety and depression.  Encouraged him to focus on the positives and how to move forward from this.  Encouraged having a good routine.  Advised his wife to get help from family and friends as much as possible and to try and get their granddaughter move in with them by June.   6.  I am concerned about caregiver fatigue and his wife as she has been taking care of him for about a year now.  She would really benefit from seeing a therapist and having some breaks in care from family and friends.    Return to clinic in 3-4 weeks or sooner if symptoms worsen    The proposed treatment plan was discussed with the patient who was provided the opportunity to ask questions and make suggestions regarding alternative treatment. Patient verbalized understanding and expressed agreement with the plan.     Chery Cohen M.D.  03/05/21    This note was created using voice recognition software (Dragon). The accuracy of the dictation is limited by the abilities of the software. I have reviewed the note prior to signing, however some errors in grammar and context are still possible. If you have any questions related to this note please do not hesitate to contact our office.

## 2021-03-10 ENCOUNTER — OUTPATIENT INFUSION SERVICES (OUTPATIENT)
Dept: ONCOLOGY | Facility: MEDICAL CENTER | Age: 71
End: 2021-03-10
Attending: PSYCHIATRY & NEUROLOGY
Payer: MEDICARE

## 2021-03-10 VITALS
WEIGHT: 184.3 LBS | OXYGEN SATURATION: 100 % | DIASTOLIC BLOOD PRESSURE: 63 MMHG | BODY MASS INDEX: 27.93 KG/M2 | SYSTOLIC BLOOD PRESSURE: 129 MMHG | HEIGHT: 68 IN | TEMPERATURE: 98 F | RESPIRATION RATE: 18 BRPM | HEART RATE: 69 BPM

## 2021-03-10 DIAGNOSIS — G04.81 AUTOIMMUNE ENCEPHALITIS: ICD-10-CM

## 2021-03-10 DIAGNOSIS — R41.89 COGNITIVE IMPAIRMENT: ICD-10-CM

## 2021-03-10 PROCEDURE — 96366 THER/PROPH/DIAG IV INF ADDON: CPT | Performed by: CLINICAL NURSE SPECIALIST

## 2021-03-10 PROCEDURE — 700105 HCHG RX REV CODE 258: Performed by: PSYCHIATRY & NEUROLOGY

## 2021-03-10 PROCEDURE — A9270 NON-COVERED ITEM OR SERVICE: HCPCS | Performed by: PSYCHIATRY & NEUROLOGY

## 2021-03-10 PROCEDURE — 700111 HCHG RX REV CODE 636 W/ 250 OVERRIDE (IP): Performed by: PSYCHIATRY & NEUROLOGY

## 2021-03-10 PROCEDURE — 96365 THER/PROPH/DIAG IV INF INIT: CPT | Performed by: CLINICAL NURSE SPECIALIST

## 2021-03-10 PROCEDURE — 96375 TX/PRO/DX INJ NEW DRUG ADDON: CPT | Performed by: CLINICAL NURSE SPECIALIST

## 2021-03-10 PROCEDURE — 700102 HCHG RX REV CODE 250 W/ 637 OVERRIDE(OP): Performed by: PSYCHIATRY & NEUROLOGY

## 2021-03-10 RX ORDER — ACETAMINOPHEN 325 MG/1
650 TABLET ORAL ONCE
Status: CANCELLED | OUTPATIENT
Start: 2021-03-10 | End: 2021-03-10

## 2021-03-10 RX ORDER — METHYLPREDNISOLONE SODIUM SUCCINATE 125 MG/2ML
125 INJECTION, POWDER, LYOPHILIZED, FOR SOLUTION INTRAMUSCULAR; INTRAVENOUS PRN
Status: CANCELLED | OUTPATIENT
Start: 2021-03-10

## 2021-03-10 RX ORDER — 0.9 % SODIUM CHLORIDE 0.9 %
VIAL (ML) INJECTION PRN
Status: CANCELLED | OUTPATIENT
Start: 2021-03-10

## 2021-03-10 RX ORDER — IMMUNE GLOBULIN INFUSION (HUMAN) 100 MG/ML
5 INJECTION, SOLUTION INTRAVENOUS; SUBCUTANEOUS ONCE
Status: COMPLETED | OUTPATIENT
Start: 2021-03-10 | End: 2021-03-10

## 2021-03-10 RX ORDER — EPINEPHRINE 1 MG/ML(1)
0.5 AMPUL (ML) INJECTION PRN
Status: CANCELLED | OUTPATIENT
Start: 2021-03-10

## 2021-03-10 RX ORDER — HEPARIN SODIUM (PORCINE) LOCK FLUSH IV SOLN 100 UNIT/ML 100 UNIT/ML
500 SOLUTION INTRAVENOUS PRN
Status: CANCELLED | OUTPATIENT
Start: 2021-03-10

## 2021-03-10 RX ORDER — ACETAMINOPHEN 325 MG/1
650 TABLET ORAL ONCE
Status: COMPLETED | OUTPATIENT
Start: 2021-03-10 | End: 2021-03-10

## 2021-03-10 RX ORDER — 0.9 % SODIUM CHLORIDE 0.9 %
10 VIAL (ML) INJECTION PRN
Status: CANCELLED | OUTPATIENT
Start: 2021-03-10

## 2021-03-10 RX ORDER — IMMUNE GLOBULIN INFUSION (HUMAN) 100 MG/ML
20 INJECTION, SOLUTION INTRAVENOUS; SUBCUTANEOUS ONCE
Status: CANCELLED
Start: 2021-03-10 | End: 2021-03-10

## 2021-03-10 RX ORDER — SODIUM CHLORIDE 9 MG/ML
INJECTION, SOLUTION INTRAVENOUS CONTINUOUS
Status: CANCELLED | OUTPATIENT
Start: 2021-03-10

## 2021-03-10 RX ORDER — METHYLPREDNISOLONE SODIUM SUCCINATE 125 MG/2ML
100 INJECTION, POWDER, LYOPHILIZED, FOR SOLUTION INTRAMUSCULAR; INTRAVENOUS ONCE
Status: COMPLETED | OUTPATIENT
Start: 2021-03-10 | End: 2021-03-10

## 2021-03-10 RX ORDER — METHYLPREDNISOLONE SODIUM SUCCINATE 125 MG/2ML
100 INJECTION, POWDER, LYOPHILIZED, FOR SOLUTION INTRAMUSCULAR; INTRAVENOUS ONCE
Status: CANCELLED | OUTPATIENT
Start: 2021-03-10 | End: 2021-03-10

## 2021-03-10 RX ORDER — 0.9 % SODIUM CHLORIDE 0.9 %
3 VIAL (ML) INJECTION PRN
Status: CANCELLED | OUTPATIENT
Start: 2021-03-10

## 2021-03-10 RX ORDER — IMMUNE GLOBULIN INFUSION (HUMAN) 100 MG/ML
20 INJECTION, SOLUTION INTRAVENOUS; SUBCUTANEOUS ONCE
Status: COMPLETED | OUTPATIENT
Start: 2021-03-10 | End: 2021-03-10

## 2021-03-10 RX ORDER — DIPHENHYDRAMINE HCL 25 MG
25 TABLET ORAL ONCE
Status: CANCELLED | OUTPATIENT
Start: 2021-03-10 | End: 2021-03-10

## 2021-03-10 RX ORDER — DIPHENHYDRAMINE HYDROCHLORIDE 50 MG/ML
50 INJECTION INTRAMUSCULAR; INTRAVENOUS PRN
Status: CANCELLED | OUTPATIENT
Start: 2021-03-10

## 2021-03-10 RX ORDER — SODIUM CHLORIDE 9 MG/ML
INJECTION, SOLUTION INTRAVENOUS CONTINUOUS
Status: DISCONTINUED | OUTPATIENT
Start: 2021-03-10 | End: 2021-03-10 | Stop reason: HOSPADM

## 2021-03-10 RX ORDER — IMMUNE GLOBULIN INFUSION (HUMAN) 100 MG/ML
5 INJECTION, SOLUTION INTRAVENOUS; SUBCUTANEOUS ONCE
Status: CANCELLED
Start: 2021-03-10 | End: 2021-03-10

## 2021-03-10 RX ORDER — DIPHENHYDRAMINE HCL 25 MG
25 TABLET ORAL ONCE
Status: COMPLETED | OUTPATIENT
Start: 2021-03-10 | End: 2021-03-10

## 2021-03-10 RX ADMIN — DIPHENHYDRAMINE HYDROCHLORIDE 25 MG: 25 TABLET ORAL at 08:33

## 2021-03-10 RX ADMIN — SODIUM CHLORIDE: 9 INJECTION, SOLUTION INTRAVENOUS at 08:34

## 2021-03-10 RX ADMIN — IMMUNE GLOBULIN INFUSION (HUMAN) 5 G: 100 INJECTION, SOLUTION INTRAVENOUS; SUBCUTANEOUS at 11:19

## 2021-03-10 RX ADMIN — IMMUNE GLOBULIN INFUSION (HUMAN) 20 G: 100 INJECTION, SOLUTION INTRAVENOUS; SUBCUTANEOUS at 09:20

## 2021-03-10 RX ADMIN — METHYLPREDNISOLONE SODIUM SUCCINATE 100 MG: 125 INJECTION, POWDER, FOR SOLUTION INTRAMUSCULAR; INTRAVENOUS at 08:35

## 2021-03-10 RX ADMIN — ACETAMINOPHEN 650 MG: 325 TABLET ORAL at 08:33

## 2021-03-10 ASSESSMENT — FIBROSIS 4 INDEX: FIB4 SCORE: 1.78

## 2021-03-10 NOTE — PROGRESS NOTES
Pt to infusion for first IVIG for autoimmune encephalopathy. He is anxious and has a noted tremor in his right hand which he states worsens when he is anxious.  He is also hard of hearing and uses hearing aids. He is A&Ox4.  Pt was given medication handout and educated on IVIG including side effects such as allergic reaction.  PIV started to right wrist with brisk blood return.  Premedicated as ordered. IVIG titrated per weight based chart.  Tolerated well. PIV removed.  Next appt reviewed and pt discharged ambulatory to home in stable condition.

## 2021-03-11 ENCOUNTER — OUTPATIENT INFUSION SERVICES (OUTPATIENT)
Dept: ONCOLOGY | Facility: MEDICAL CENTER | Age: 71
End: 2021-03-11
Attending: PSYCHIATRY & NEUROLOGY
Payer: MEDICARE

## 2021-03-11 VITALS
HEART RATE: 62 BPM | OXYGEN SATURATION: 98 % | RESPIRATION RATE: 18 BRPM | SYSTOLIC BLOOD PRESSURE: 140 MMHG | BODY MASS INDEX: 28.96 KG/M2 | WEIGHT: 184.53 LBS | HEIGHT: 67 IN | TEMPERATURE: 97.2 F | DIASTOLIC BLOOD PRESSURE: 71 MMHG

## 2021-03-11 DIAGNOSIS — R41.89 COGNITIVE IMPAIRMENT: ICD-10-CM

## 2021-03-11 DIAGNOSIS — G04.81 AUTOIMMUNE ENCEPHALITIS: ICD-10-CM

## 2021-03-11 PROCEDURE — 700102 HCHG RX REV CODE 250 W/ 637 OVERRIDE(OP): Performed by: PSYCHIATRY & NEUROLOGY

## 2021-03-11 PROCEDURE — 96375 TX/PRO/DX INJ NEW DRUG ADDON: CPT

## 2021-03-11 PROCEDURE — A9270 NON-COVERED ITEM OR SERVICE: HCPCS | Performed by: PSYCHIATRY & NEUROLOGY

## 2021-03-11 PROCEDURE — 700111 HCHG RX REV CODE 636 W/ 250 OVERRIDE (IP): Mod: JG | Performed by: PSYCHIATRY & NEUROLOGY

## 2021-03-11 PROCEDURE — 96366 THER/PROPH/DIAG IV INF ADDON: CPT

## 2021-03-11 PROCEDURE — 96365 THER/PROPH/DIAG IV INF INIT: CPT

## 2021-03-11 PROCEDURE — 700105 HCHG RX REV CODE 258: Performed by: PSYCHIATRY & NEUROLOGY

## 2021-03-11 RX ORDER — EPINEPHRINE 1 MG/ML(1)
0.5 AMPUL (ML) INJECTION PRN
Status: CANCELLED | OUTPATIENT
Start: 2021-03-11

## 2021-03-11 RX ORDER — IMMUNE GLOBULIN INFUSION (HUMAN) 100 MG/ML
20 INJECTION, SOLUTION INTRAVENOUS; SUBCUTANEOUS ONCE
Status: COMPLETED | OUTPATIENT
Start: 2021-03-11 | End: 2021-03-11

## 2021-03-11 RX ORDER — IMMUNE GLOBULIN INFUSION (HUMAN) 100 MG/ML
5 INJECTION, SOLUTION INTRAVENOUS; SUBCUTANEOUS ONCE
Status: COMPLETED | OUTPATIENT
Start: 2021-03-11 | End: 2021-03-11

## 2021-03-11 RX ORDER — 0.9 % SODIUM CHLORIDE 0.9 %
3 VIAL (ML) INJECTION PRN
Status: CANCELLED | OUTPATIENT
Start: 2021-03-11

## 2021-03-11 RX ORDER — ACETAMINOPHEN 325 MG/1
650 TABLET ORAL ONCE
Status: COMPLETED | OUTPATIENT
Start: 2021-03-11 | End: 2021-03-11

## 2021-03-11 RX ORDER — SODIUM CHLORIDE 9 MG/ML
INJECTION, SOLUTION INTRAVENOUS CONTINUOUS
Status: CANCELLED | OUTPATIENT
Start: 2021-03-11

## 2021-03-11 RX ORDER — SODIUM CHLORIDE 9 MG/ML
INJECTION, SOLUTION INTRAVENOUS CONTINUOUS
Status: DISCONTINUED | OUTPATIENT
Start: 2021-03-11 | End: 2021-03-11 | Stop reason: HOSPADM

## 2021-03-11 RX ORDER — 0.9 % SODIUM CHLORIDE 0.9 %
10 VIAL (ML) INJECTION PRN
Status: CANCELLED | OUTPATIENT
Start: 2021-03-11

## 2021-03-11 RX ORDER — DIPHENHYDRAMINE HYDROCHLORIDE 50 MG/ML
50 INJECTION INTRAMUSCULAR; INTRAVENOUS PRN
Status: CANCELLED | OUTPATIENT
Start: 2021-03-11

## 2021-03-11 RX ORDER — ACETAMINOPHEN 325 MG/1
650 TABLET ORAL ONCE
Status: CANCELLED | OUTPATIENT
Start: 2021-03-11 | End: 2021-03-11

## 2021-03-11 RX ORDER — DIPHENHYDRAMINE HCL 25 MG
25 TABLET ORAL ONCE
Status: CANCELLED | OUTPATIENT
Start: 2021-03-11 | End: 2021-03-11

## 2021-03-11 RX ORDER — DIPHENHYDRAMINE HCL 25 MG
25 TABLET ORAL ONCE
Status: COMPLETED | OUTPATIENT
Start: 2021-03-11 | End: 2021-03-11

## 2021-03-11 RX ORDER — METHYLPREDNISOLONE SODIUM SUCCINATE 125 MG/2ML
125 INJECTION, POWDER, LYOPHILIZED, FOR SOLUTION INTRAMUSCULAR; INTRAVENOUS PRN
Status: CANCELLED | OUTPATIENT
Start: 2021-03-11

## 2021-03-11 RX ORDER — IMMUNE GLOBULIN INFUSION (HUMAN) 100 MG/ML
20 INJECTION, SOLUTION INTRAVENOUS; SUBCUTANEOUS ONCE
Status: CANCELLED
Start: 2021-03-11 | End: 2021-03-11

## 2021-03-11 RX ORDER — METHYLPREDNISOLONE SODIUM SUCCINATE 125 MG/2ML
100 INJECTION, POWDER, LYOPHILIZED, FOR SOLUTION INTRAMUSCULAR; INTRAVENOUS ONCE
Status: COMPLETED | OUTPATIENT
Start: 2021-03-11 | End: 2021-03-11

## 2021-03-11 RX ORDER — 0.9 % SODIUM CHLORIDE 0.9 %
VIAL (ML) INJECTION PRN
Status: CANCELLED | OUTPATIENT
Start: 2021-03-11

## 2021-03-11 RX ORDER — HEPARIN SODIUM (PORCINE) LOCK FLUSH IV SOLN 100 UNIT/ML 100 UNIT/ML
500 SOLUTION INTRAVENOUS PRN
Status: CANCELLED | OUTPATIENT
Start: 2021-03-11

## 2021-03-11 RX ORDER — IMMUNE GLOBULIN INFUSION (HUMAN) 100 MG/ML
5 INJECTION, SOLUTION INTRAVENOUS; SUBCUTANEOUS ONCE
Status: CANCELLED
Start: 2021-03-11 | End: 2021-03-11

## 2021-03-11 RX ORDER — METHYLPREDNISOLONE SODIUM SUCCINATE 125 MG/2ML
100 INJECTION, POWDER, LYOPHILIZED, FOR SOLUTION INTRAMUSCULAR; INTRAVENOUS ONCE
Status: CANCELLED | OUTPATIENT
Start: 2021-03-11 | End: 2021-03-11

## 2021-03-11 RX ADMIN — IMMUNE GLOBULIN INFUSION (HUMAN) 20 G: 100 INJECTION, SOLUTION INTRAVENOUS; SUBCUTANEOUS at 11:18

## 2021-03-11 RX ADMIN — ACETAMINOPHEN 650 MG: 325 TABLET ORAL at 11:05

## 2021-03-11 RX ADMIN — SODIUM CHLORIDE: 9 INJECTION, SOLUTION INTRAVENOUS at 11:11

## 2021-03-11 RX ADMIN — IMMUNE GLOBULIN INFUSION (HUMAN) 5 G: 100 INJECTION, SOLUTION INTRAVENOUS; SUBCUTANEOUS at 13:15

## 2021-03-11 RX ADMIN — METHYLPREDNISOLONE SODIUM SUCCINATE 100 MG: 125 INJECTION, POWDER, FOR SOLUTION INTRAMUSCULAR; INTRAVENOUS at 11:05

## 2021-03-11 RX ADMIN — DIPHENHYDRAMINE HYDROCHLORIDE 25 MG: 25 TABLET ORAL at 11:05

## 2021-03-11 ASSESSMENT — FIBROSIS 4 INDEX: FIB4 SCORE: 1.78

## 2021-03-11 NOTE — PROGRESS NOTES
Pt arrived to [facility] ambulatory, here for D 2 of 5 of IVIG for autoimmune encephalitis. IV access established in RAC. IVIG infused using rate calculator in pt's chart. Max rate to 160 ml/hr. Infusion completed w/ no adverse reactions. PIV flushed and removed, gauze and coban dressing applied.  Pt left on foot in NAD. Pt's next appt made prior to d/c.

## 2021-03-12 ENCOUNTER — OUTPATIENT INFUSION SERVICES (OUTPATIENT)
Dept: ONCOLOGY | Facility: MEDICAL CENTER | Age: 71
End: 2021-03-12
Attending: PSYCHIATRY & NEUROLOGY
Payer: MEDICARE

## 2021-03-12 VITALS
OXYGEN SATURATION: 96 % | SYSTOLIC BLOOD PRESSURE: 128 MMHG | BODY MASS INDEX: 29.27 KG/M2 | WEIGHT: 186.51 LBS | TEMPERATURE: 97 F | DIASTOLIC BLOOD PRESSURE: 63 MMHG | RESPIRATION RATE: 18 BRPM | HEART RATE: 80 BPM | HEIGHT: 67 IN

## 2021-03-12 DIAGNOSIS — G04.81 AUTOIMMUNE ENCEPHALITIS: ICD-10-CM

## 2021-03-12 DIAGNOSIS — R41.89 COGNITIVE IMPAIRMENT: ICD-10-CM

## 2021-03-12 PROCEDURE — 700102 HCHG RX REV CODE 250 W/ 637 OVERRIDE(OP): Performed by: PSYCHIATRY & NEUROLOGY

## 2021-03-12 PROCEDURE — 700105 HCHG RX REV CODE 258: Performed by: PSYCHIATRY & NEUROLOGY

## 2021-03-12 PROCEDURE — 96366 THER/PROPH/DIAG IV INF ADDON: CPT

## 2021-03-12 PROCEDURE — 96375 TX/PRO/DX INJ NEW DRUG ADDON: CPT

## 2021-03-12 PROCEDURE — 96365 THER/PROPH/DIAG IV INF INIT: CPT

## 2021-03-12 PROCEDURE — 700111 HCHG RX REV CODE 636 W/ 250 OVERRIDE (IP): Mod: JG | Performed by: PSYCHIATRY & NEUROLOGY

## 2021-03-12 PROCEDURE — A9270 NON-COVERED ITEM OR SERVICE: HCPCS | Performed by: PSYCHIATRY & NEUROLOGY

## 2021-03-12 RX ORDER — 0.9 % SODIUM CHLORIDE 0.9 %
3 VIAL (ML) INJECTION PRN
Status: CANCELLED | OUTPATIENT
Start: 2021-03-12

## 2021-03-12 RX ORDER — 0.9 % SODIUM CHLORIDE 0.9 %
10 VIAL (ML) INJECTION PRN
Status: CANCELLED | OUTPATIENT
Start: 2021-03-12

## 2021-03-12 RX ORDER — SODIUM CHLORIDE 9 MG/ML
INJECTION, SOLUTION INTRAVENOUS CONTINUOUS
Status: CANCELLED | OUTPATIENT
Start: 2021-03-12

## 2021-03-12 RX ORDER — HEPARIN SODIUM (PORCINE) LOCK FLUSH IV SOLN 100 UNIT/ML 100 UNIT/ML
500 SOLUTION INTRAVENOUS PRN
Status: CANCELLED | OUTPATIENT
Start: 2021-03-12

## 2021-03-12 RX ORDER — METHYLPREDNISOLONE SODIUM SUCCINATE 125 MG/2ML
100 INJECTION, POWDER, LYOPHILIZED, FOR SOLUTION INTRAMUSCULAR; INTRAVENOUS ONCE
Status: CANCELLED | OUTPATIENT
Start: 2021-03-12 | End: 2021-03-12

## 2021-03-12 RX ORDER — SODIUM CHLORIDE 9 MG/ML
INJECTION, SOLUTION INTRAVENOUS CONTINUOUS
Status: DISCONTINUED | OUTPATIENT
Start: 2021-03-12 | End: 2021-03-12 | Stop reason: HOSPADM

## 2021-03-12 RX ORDER — METHYLPREDNISOLONE SODIUM SUCCINATE 125 MG/2ML
100 INJECTION, POWDER, LYOPHILIZED, FOR SOLUTION INTRAMUSCULAR; INTRAVENOUS ONCE
Status: COMPLETED | OUTPATIENT
Start: 2021-03-12 | End: 2021-03-12

## 2021-03-12 RX ORDER — IMMUNE GLOBULIN INFUSION (HUMAN) 100 MG/ML
20 INJECTION, SOLUTION INTRAVENOUS; SUBCUTANEOUS ONCE
Status: CANCELLED
Start: 2021-03-12 | End: 2021-03-12

## 2021-03-12 RX ORDER — DIPHENHYDRAMINE HCL 25 MG
25 TABLET ORAL ONCE
Status: COMPLETED | OUTPATIENT
Start: 2021-03-12 | End: 2021-03-12

## 2021-03-12 RX ORDER — IMMUNE GLOBULIN INFUSION (HUMAN) 100 MG/ML
5 INJECTION, SOLUTION INTRAVENOUS; SUBCUTANEOUS ONCE
Status: CANCELLED
Start: 2021-03-12 | End: 2021-03-12

## 2021-03-12 RX ORDER — EPINEPHRINE 1 MG/ML(1)
0.5 AMPUL (ML) INJECTION PRN
Status: CANCELLED | OUTPATIENT
Start: 2021-03-12

## 2021-03-12 RX ORDER — ACETAMINOPHEN 325 MG/1
650 TABLET ORAL ONCE
Status: CANCELLED | OUTPATIENT
Start: 2021-03-12 | End: 2021-03-12

## 2021-03-12 RX ORDER — METHYLPREDNISOLONE SODIUM SUCCINATE 125 MG/2ML
125 INJECTION, POWDER, LYOPHILIZED, FOR SOLUTION INTRAMUSCULAR; INTRAVENOUS PRN
Status: CANCELLED | OUTPATIENT
Start: 2021-03-12

## 2021-03-12 RX ORDER — IMMUNE GLOBULIN INFUSION (HUMAN) 100 MG/ML
20 INJECTION, SOLUTION INTRAVENOUS; SUBCUTANEOUS ONCE
Status: COMPLETED | OUTPATIENT
Start: 2021-03-12 | End: 2021-03-12

## 2021-03-12 RX ORDER — DIPHENHYDRAMINE HCL 25 MG
25 TABLET ORAL ONCE
Status: CANCELLED | OUTPATIENT
Start: 2021-03-12 | End: 2021-03-12

## 2021-03-12 RX ORDER — ACETAMINOPHEN 325 MG/1
650 TABLET ORAL ONCE
Status: COMPLETED | OUTPATIENT
Start: 2021-03-12 | End: 2021-03-12

## 2021-03-12 RX ORDER — 0.9 % SODIUM CHLORIDE 0.9 %
VIAL (ML) INJECTION PRN
Status: CANCELLED | OUTPATIENT
Start: 2021-03-12

## 2021-03-12 RX ORDER — IMMUNE GLOBULIN INFUSION (HUMAN) 100 MG/ML
5 INJECTION, SOLUTION INTRAVENOUS; SUBCUTANEOUS ONCE
Status: COMPLETED | OUTPATIENT
Start: 2021-03-12 | End: 2021-03-12

## 2021-03-12 RX ORDER — DIPHENHYDRAMINE HYDROCHLORIDE 50 MG/ML
50 INJECTION INTRAMUSCULAR; INTRAVENOUS PRN
Status: CANCELLED | OUTPATIENT
Start: 2021-03-12

## 2021-03-12 RX ADMIN — IMMUNE GLOBULIN INFUSION (HUMAN) 20 G: 100 INJECTION, SOLUTION INTRAVENOUS; SUBCUTANEOUS at 10:33

## 2021-03-12 RX ADMIN — IMMUNE GLOBULIN INFUSION (HUMAN) 5 G: 100 INJECTION, SOLUTION INTRAVENOUS; SUBCUTANEOUS at 12:39

## 2021-03-12 RX ADMIN — METHYLPREDNISOLONE SODIUM SUCCINATE 100 MG: 125 INJECTION, POWDER, FOR SOLUTION INTRAMUSCULAR; INTRAVENOUS at 10:18

## 2021-03-12 RX ADMIN — ACETAMINOPHEN 650 MG: 325 TABLET ORAL at 10:18

## 2021-03-12 RX ADMIN — DIPHENHYDRAMINE HYDROCHLORIDE 25 MG: 25 TABLET ORAL at 10:18

## 2021-03-12 RX ADMIN — SODIUM CHLORIDE: 9 INJECTION, SOLUTION INTRAVENOUS at 10:21

## 2021-03-12 ASSESSMENT — FIBROSIS 4 INDEX: FIB4 SCORE: 1.78

## 2021-03-12 NOTE — PROGRESS NOTES
Pt arrived ambulatory to [facility] for D3/5 IVIG for autoimmune encephalitis.  Pt displayed s/s anxiety, and verbalized feeling quite anxious today.  Pt has previous diagnosis of adjustment disorder with mixed anxiety/ depression.  Pt verbalized taking anti anxiety Rx from home (lorazepam).    POC discussed with patient and pt verbalizes agreement.  PIV placed, brisk blood return observed.  Pre-medications administered.  IVIG administered per MAR/ per rate calculator to max rate 160/hr.  Pt tolerated well.  PIV flushed and removed, gauze and coban to site.  PT discharged to wife in NAD, tomorrows appointment confirmed.

## 2021-03-13 ENCOUNTER — OUTPATIENT INFUSION SERVICES (OUTPATIENT)
Dept: ONCOLOGY | Facility: MEDICAL CENTER | Age: 71
End: 2021-03-13
Attending: PSYCHIATRY & NEUROLOGY
Payer: MEDICARE

## 2021-03-13 VITALS
OXYGEN SATURATION: 96 % | HEART RATE: 62 BPM | RESPIRATION RATE: 18 BRPM | WEIGHT: 184.97 LBS | SYSTOLIC BLOOD PRESSURE: 134 MMHG | BODY MASS INDEX: 28.03 KG/M2 | TEMPERATURE: 97 F | DIASTOLIC BLOOD PRESSURE: 65 MMHG | HEIGHT: 68 IN

## 2021-03-13 DIAGNOSIS — R41.89 COGNITIVE IMPAIRMENT: ICD-10-CM

## 2021-03-13 DIAGNOSIS — G04.81 AUTOIMMUNE ENCEPHALITIS: ICD-10-CM

## 2021-03-13 PROCEDURE — A9270 NON-COVERED ITEM OR SERVICE: HCPCS | Performed by: PSYCHIATRY & NEUROLOGY

## 2021-03-13 PROCEDURE — 700102 HCHG RX REV CODE 250 W/ 637 OVERRIDE(OP): Performed by: PSYCHIATRY & NEUROLOGY

## 2021-03-13 PROCEDURE — 96365 THER/PROPH/DIAG IV INF INIT: CPT

## 2021-03-13 PROCEDURE — 96375 TX/PRO/DX INJ NEW DRUG ADDON: CPT

## 2021-03-13 PROCEDURE — 96366 THER/PROPH/DIAG IV INF ADDON: CPT

## 2021-03-13 PROCEDURE — 700111 HCHG RX REV CODE 636 W/ 250 OVERRIDE (IP): Performed by: PSYCHIATRY & NEUROLOGY

## 2021-03-13 RX ORDER — DIPHENHYDRAMINE HYDROCHLORIDE 50 MG/ML
50 INJECTION INTRAMUSCULAR; INTRAVENOUS PRN
Status: CANCELLED | OUTPATIENT
Start: 2021-03-13

## 2021-03-13 RX ORDER — IMMUNE GLOBULIN INFUSION (HUMAN) 100 MG/ML
5 INJECTION, SOLUTION INTRAVENOUS; SUBCUTANEOUS ONCE
Status: CANCELLED
Start: 2021-03-13 | End: 2021-03-13

## 2021-03-13 RX ORDER — ACETAMINOPHEN 325 MG/1
650 TABLET ORAL ONCE
Status: COMPLETED | OUTPATIENT
Start: 2021-03-13 | End: 2021-03-13

## 2021-03-13 RX ORDER — METHYLPREDNISOLONE SODIUM SUCCINATE 125 MG/2ML
125 INJECTION, POWDER, LYOPHILIZED, FOR SOLUTION INTRAMUSCULAR; INTRAVENOUS PRN
Status: CANCELLED | OUTPATIENT
Start: 2021-03-13

## 2021-03-13 RX ORDER — IMMUNE GLOBULIN INFUSION (HUMAN) 100 MG/ML
20 INJECTION, SOLUTION INTRAVENOUS; SUBCUTANEOUS ONCE
Status: COMPLETED | OUTPATIENT
Start: 2021-03-13 | End: 2021-03-13

## 2021-03-13 RX ORDER — HEPARIN SODIUM (PORCINE) LOCK FLUSH IV SOLN 100 UNIT/ML 100 UNIT/ML
500 SOLUTION INTRAVENOUS PRN
Status: CANCELLED | OUTPATIENT
Start: 2021-03-13

## 2021-03-13 RX ORDER — METHYLPREDNISOLONE SODIUM SUCCINATE 125 MG/2ML
100 INJECTION, POWDER, LYOPHILIZED, FOR SOLUTION INTRAMUSCULAR; INTRAVENOUS ONCE
Status: CANCELLED | OUTPATIENT
Start: 2021-03-13 | End: 2021-03-13

## 2021-03-13 RX ORDER — DIPHENHYDRAMINE HCL 25 MG
25 TABLET ORAL ONCE
Status: COMPLETED | OUTPATIENT
Start: 2021-03-13 | End: 2021-03-13

## 2021-03-13 RX ORDER — 0.9 % SODIUM CHLORIDE 0.9 %
10 VIAL (ML) INJECTION PRN
Status: CANCELLED | OUTPATIENT
Start: 2021-03-13

## 2021-03-13 RX ORDER — ACETAMINOPHEN 325 MG/1
650 TABLET ORAL ONCE
Status: CANCELLED | OUTPATIENT
Start: 2021-03-13 | End: 2021-03-13

## 2021-03-13 RX ORDER — SODIUM CHLORIDE 9 MG/ML
INJECTION, SOLUTION INTRAVENOUS CONTINUOUS
Status: CANCELLED | OUTPATIENT
Start: 2021-03-13

## 2021-03-13 RX ORDER — EPINEPHRINE 1 MG/ML(1)
0.5 AMPUL (ML) INJECTION PRN
Status: CANCELLED | OUTPATIENT
Start: 2021-03-13

## 2021-03-13 RX ORDER — IMMUNE GLOBULIN INFUSION (HUMAN) 100 MG/ML
20 INJECTION, SOLUTION INTRAVENOUS; SUBCUTANEOUS ONCE
Status: CANCELLED
Start: 2021-03-13 | End: 2021-03-13

## 2021-03-13 RX ORDER — 0.9 % SODIUM CHLORIDE 0.9 %
VIAL (ML) INJECTION PRN
Status: CANCELLED | OUTPATIENT
Start: 2021-03-13

## 2021-03-13 RX ORDER — IMMUNE GLOBULIN INFUSION (HUMAN) 100 MG/ML
5 INJECTION, SOLUTION INTRAVENOUS; SUBCUTANEOUS ONCE
Status: COMPLETED | OUTPATIENT
Start: 2021-03-13 | End: 2021-03-13

## 2021-03-13 RX ORDER — 0.9 % SODIUM CHLORIDE 0.9 %
3 VIAL (ML) INJECTION PRN
Status: CANCELLED | OUTPATIENT
Start: 2021-03-13

## 2021-03-13 RX ORDER — METHYLPREDNISOLONE SODIUM SUCCINATE 125 MG/2ML
100 INJECTION, POWDER, LYOPHILIZED, FOR SOLUTION INTRAMUSCULAR; INTRAVENOUS ONCE
Status: COMPLETED | OUTPATIENT
Start: 2021-03-13 | End: 2021-03-13

## 2021-03-13 RX ORDER — DIPHENHYDRAMINE HCL 25 MG
25 TABLET ORAL ONCE
Status: CANCELLED | OUTPATIENT
Start: 2021-03-13 | End: 2021-03-13

## 2021-03-13 RX ADMIN — ACETAMINOPHEN 650 MG: 325 TABLET ORAL at 11:09

## 2021-03-13 RX ADMIN — IMMUNE GLOBULIN INFUSION (HUMAN) 20 G: 100 INJECTION, SOLUTION INTRAVENOUS; SUBCUTANEOUS at 11:22

## 2021-03-13 RX ADMIN — METHYLPREDNISOLONE SODIUM SUCCINATE 100 MG: 125 INJECTION, POWDER, FOR SOLUTION INTRAMUSCULAR; INTRAVENOUS at 11:09

## 2021-03-13 RX ADMIN — IMMUNE GLOBULIN INFUSION (HUMAN) 5 G: 100 INJECTION, SOLUTION INTRAVENOUS; SUBCUTANEOUS at 13:15

## 2021-03-13 RX ADMIN — DIPHENHYDRAMINE HYDROCHLORIDE 25 MG: 25 TABLET ORAL at 11:09

## 2021-03-13 ASSESSMENT — FIBROSIS 4 INDEX: FIB4 SCORE: 1.78

## 2021-03-13 NOTE — PROGRESS NOTES
Pt presents to infusion center for Day 4 of 5 for IVIG. PIV started, brisk blood return observed. IVIG infused and titrated per [employer] rate calculator to max rate of 160 ml/hr. Pt tolerated well with no s/s of adverse reaction. PIV flushed and removed, gauze and coban dressing placed. Has next appt, discharged home to self care.

## 2021-03-14 ENCOUNTER — OUTPATIENT INFUSION SERVICES (OUTPATIENT)
Dept: ONCOLOGY | Facility: MEDICAL CENTER | Age: 71
End: 2021-03-14
Attending: PSYCHIATRY & NEUROLOGY
Payer: MEDICARE

## 2021-03-14 VITALS
WEIGHT: 185.19 LBS | TEMPERATURE: 97 F | HEIGHT: 68 IN | RESPIRATION RATE: 18 BRPM | SYSTOLIC BLOOD PRESSURE: 167 MMHG | DIASTOLIC BLOOD PRESSURE: 76 MMHG | HEART RATE: 68 BPM | BODY MASS INDEX: 28.07 KG/M2

## 2021-03-14 DIAGNOSIS — G04.81 AUTOIMMUNE ENCEPHALITIS: ICD-10-CM

## 2021-03-14 DIAGNOSIS — R41.89 COGNITIVE IMPAIRMENT: ICD-10-CM

## 2021-03-14 PROCEDURE — 96375 TX/PRO/DX INJ NEW DRUG ADDON: CPT

## 2021-03-14 PROCEDURE — 700102 HCHG RX REV CODE 250 W/ 637 OVERRIDE(OP): Performed by: PSYCHIATRY & NEUROLOGY

## 2021-03-14 PROCEDURE — 700111 HCHG RX REV CODE 636 W/ 250 OVERRIDE (IP): Mod: JG | Performed by: PSYCHIATRY & NEUROLOGY

## 2021-03-14 PROCEDURE — A9270 NON-COVERED ITEM OR SERVICE: HCPCS | Performed by: PSYCHIATRY & NEUROLOGY

## 2021-03-14 PROCEDURE — 96365 THER/PROPH/DIAG IV INF INIT: CPT

## 2021-03-14 PROCEDURE — 96366 THER/PROPH/DIAG IV INF ADDON: CPT

## 2021-03-14 PROCEDURE — 700105 HCHG RX REV CODE 258: Performed by: PSYCHIATRY & NEUROLOGY

## 2021-03-14 RX ORDER — IMMUNE GLOBULIN INFUSION (HUMAN) 100 MG/ML
20 INJECTION, SOLUTION INTRAVENOUS; SUBCUTANEOUS ONCE
Start: 2021-03-14 | End: 2021-03-14

## 2021-03-14 RX ORDER — HEPARIN SODIUM (PORCINE) LOCK FLUSH IV SOLN 100 UNIT/ML 100 UNIT/ML
500 SOLUTION INTRAVENOUS PRN
OUTPATIENT
Start: 2021-03-14

## 2021-03-14 RX ORDER — DIPHENHYDRAMINE HYDROCHLORIDE 50 MG/ML
50 INJECTION INTRAMUSCULAR; INTRAVENOUS PRN
OUTPATIENT
Start: 2021-03-14

## 2021-03-14 RX ORDER — ACETAMINOPHEN 325 MG/1
650 TABLET ORAL ONCE
OUTPATIENT
Start: 2021-03-14 | End: 2021-03-14

## 2021-03-14 RX ORDER — METHYLPREDNISOLONE SODIUM SUCCINATE 125 MG/2ML
100 INJECTION, POWDER, LYOPHILIZED, FOR SOLUTION INTRAMUSCULAR; INTRAVENOUS ONCE
Status: COMPLETED | OUTPATIENT
Start: 2021-03-14 | End: 2021-03-14

## 2021-03-14 RX ORDER — IMMUNE GLOBULIN INFUSION (HUMAN) 100 MG/ML
20 INJECTION, SOLUTION INTRAVENOUS; SUBCUTANEOUS ONCE
Status: COMPLETED | OUTPATIENT
Start: 2021-03-14 | End: 2021-03-14

## 2021-03-14 RX ORDER — DIPHENHYDRAMINE HCL 25 MG
25 TABLET ORAL ONCE
OUTPATIENT
Start: 2021-03-14 | End: 2021-03-14

## 2021-03-14 RX ORDER — EPINEPHRINE 1 MG/ML(1)
0.5 AMPUL (ML) INJECTION PRN
OUTPATIENT
Start: 2021-03-14

## 2021-03-14 RX ORDER — IMMUNE GLOBULIN INFUSION (HUMAN) 100 MG/ML
5 INJECTION, SOLUTION INTRAVENOUS; SUBCUTANEOUS ONCE
Status: COMPLETED | OUTPATIENT
Start: 2021-03-14 | End: 2021-03-14

## 2021-03-14 RX ORDER — 0.9 % SODIUM CHLORIDE 0.9 %
10 VIAL (ML) INJECTION PRN
OUTPATIENT
Start: 2021-03-14

## 2021-03-14 RX ORDER — METHYLPREDNISOLONE SODIUM SUCCINATE 125 MG/2ML
125 INJECTION, POWDER, LYOPHILIZED, FOR SOLUTION INTRAMUSCULAR; INTRAVENOUS PRN
OUTPATIENT
Start: 2021-03-14

## 2021-03-14 RX ORDER — 0.9 % SODIUM CHLORIDE 0.9 %
3 VIAL (ML) INJECTION PRN
OUTPATIENT
Start: 2021-03-14

## 2021-03-14 RX ORDER — SODIUM CHLORIDE 9 MG/ML
INJECTION, SOLUTION INTRAVENOUS CONTINUOUS
Status: DISCONTINUED | OUTPATIENT
Start: 2021-03-14 | End: 2021-03-14 | Stop reason: HOSPADM

## 2021-03-14 RX ORDER — 0.9 % SODIUM CHLORIDE 0.9 %
VIAL (ML) INJECTION PRN
OUTPATIENT
Start: 2021-03-14

## 2021-03-14 RX ORDER — DIPHENHYDRAMINE HCL 25 MG
25 TABLET ORAL ONCE
Status: COMPLETED | OUTPATIENT
Start: 2021-03-14 | End: 2021-03-14

## 2021-03-14 RX ORDER — IMMUNE GLOBULIN INFUSION (HUMAN) 100 MG/ML
5 INJECTION, SOLUTION INTRAVENOUS; SUBCUTANEOUS ONCE
Start: 2021-03-14 | End: 2021-03-14

## 2021-03-14 RX ORDER — ACETAMINOPHEN 325 MG/1
650 TABLET ORAL ONCE
Status: COMPLETED | OUTPATIENT
Start: 2021-03-14 | End: 2021-03-14

## 2021-03-14 RX ORDER — SODIUM CHLORIDE 9 MG/ML
INJECTION, SOLUTION INTRAVENOUS CONTINUOUS
OUTPATIENT
Start: 2021-03-14

## 2021-03-14 RX ORDER — METHYLPREDNISOLONE SODIUM SUCCINATE 125 MG/2ML
100 INJECTION, POWDER, LYOPHILIZED, FOR SOLUTION INTRAMUSCULAR; INTRAVENOUS ONCE
OUTPATIENT
Start: 2021-03-14 | End: 2021-03-14

## 2021-03-14 RX ADMIN — ACETAMINOPHEN 650 MG: 325 TABLET ORAL at 09:09

## 2021-03-14 RX ADMIN — SODIUM CHLORIDE: 9 INJECTION, SOLUTION INTRAVENOUS at 09:10

## 2021-03-14 RX ADMIN — IMMUNE GLOBULIN INFUSION (HUMAN) 5 G: 100 INJECTION, SOLUTION INTRAVENOUS; SUBCUTANEOUS at 10:55

## 2021-03-14 RX ADMIN — METHYLPREDNISOLONE SODIUM SUCCINATE 100 MG: 125 INJECTION, POWDER, FOR SOLUTION INTRAMUSCULAR; INTRAVENOUS at 09:10

## 2021-03-14 RX ADMIN — DIPHENHYDRAMINE HYDROCHLORIDE 25 MG: 25 TABLET ORAL at 09:09

## 2021-03-14 RX ADMIN — IMMUNE GLOBULIN INFUSION (HUMAN) 20 G: 100 INJECTION, SOLUTION INTRAVENOUS; SUBCUTANEOUS at 09:17

## 2021-03-14 ASSESSMENT — FIBROSIS 4 INDEX: FIB4 SCORE: 1.78

## 2021-03-14 NOTE — PROGRESS NOTES
"Pt returns for D 5/5 IVIG for autoimmune encephalitis. IV access established. Discussed with pt option to begin IVIG at rate he completed at yesterday. Pt verbalizing he would prefer to start at half-rate as a compromise. Premeds given and IVIG started at 80 ml/hr and max rate of 160 ml/hr achieved. Attempted to discuss with pt future appts but pt became anxious verbalizing feeling overwhelmed. Printout of future appts provided and encouraged pt to share with his wife the future appts. Attempted to  pt on taking \"one day at a time\", pt remained silent. Pt confirms he is taking his Prozac as prescribed but feels it has not been long enough for it to take effect. Pt also taking home anxiety medication, Lorazepam, as prescribed but pt reporting he does not feel much benefit. IVIG completed. Line flushed clear. IV flushed and removed. Pressure dressing applied. Pt discharged home under self care in no apparent distress.   "

## 2021-03-22 ENCOUNTER — HOSPITAL ENCOUNTER (EMERGENCY)
Facility: MEDICAL CENTER | Age: 71
End: 2021-03-23
Attending: EMERGENCY MEDICINE
Payer: MEDICARE

## 2021-03-22 ENCOUNTER — TELEMEDICINE (OUTPATIENT)
Dept: BEHAVIORAL HEALTH | Facility: CLINIC | Age: 71
End: 2021-03-22
Payer: MEDICARE

## 2021-03-22 DIAGNOSIS — F43.23 ADJUSTMENT DISORDER WITH MIXED ANXIETY AND DEPRESSED MOOD: ICD-10-CM

## 2021-03-22 DIAGNOSIS — R45.851 SUICIDAL IDEATION: ICD-10-CM

## 2021-03-22 DIAGNOSIS — R45.850 HOMICIDAL IDEATION: ICD-10-CM

## 2021-03-22 LAB
ALBUMIN SERPL BCP-MCNC: 3.7 G/DL (ref 3.2–4.9)
ALBUMIN/GLOB SERPL: 1.1 G/DL
ALP SERPL-CCNC: 40 U/L (ref 30–99)
ALT SERPL-CCNC: 17 U/L (ref 2–50)
AMPHET UR QL SCN: NEGATIVE
ANION GAP SERPL CALC-SCNC: 11 MMOL/L (ref 7–16)
AST SERPL-CCNC: 28 U/L (ref 12–45)
BARBITURATES UR QL SCN: NEGATIVE
BASOPHILS # BLD AUTO: 0.3 % (ref 0–1.8)
BASOPHILS # BLD: 0.03 K/UL (ref 0–0.12)
BENZODIAZ UR QL SCN: NEGATIVE
BILIRUB SERPL-MCNC: 0.3 MG/DL (ref 0.1–1.5)
BUN SERPL-MCNC: 26 MG/DL (ref 8–22)
BZE UR QL SCN: NEGATIVE
CALCIUM SERPL-MCNC: 9.4 MG/DL (ref 8.5–10.5)
CANNABINOIDS UR QL SCN: NEGATIVE
CHLORIDE SERPL-SCNC: 106 MMOL/L (ref 96–112)
CO2 SERPL-SCNC: 21 MMOL/L (ref 20–33)
CREAT SERPL-MCNC: 1.06 MG/DL (ref 0.5–1.4)
EOSINOPHIL # BLD AUTO: 0.07 K/UL (ref 0–0.51)
EOSINOPHIL NFR BLD: 0.8 % (ref 0–6.9)
ERYTHROCYTE [DISTWIDTH] IN BLOOD BY AUTOMATED COUNT: 45.1 FL (ref 35.9–50)
FLUAV RNA SPEC QL NAA+PROBE: NEGATIVE
FLUBV RNA SPEC QL NAA+PROBE: NEGATIVE
GLOBULIN SER CALC-MCNC: 3.4 G/DL (ref 1.9–3.5)
GLUCOSE SERPL-MCNC: 129 MG/DL (ref 65–99)
HCT VFR BLD AUTO: 35.6 % (ref 42–52)
HGB BLD-MCNC: 11.9 G/DL (ref 14–18)
IMM GRANULOCYTES # BLD AUTO: 0.03 K/UL (ref 0–0.11)
IMM GRANULOCYTES NFR BLD AUTO: 0.3 % (ref 0–0.9)
LYMPHOCYTES # BLD AUTO: 4.32 K/UL (ref 1–4.8)
LYMPHOCYTES NFR BLD: 46.4 % (ref 22–41)
MCH RBC QN AUTO: 29.8 PG (ref 27–33)
MCHC RBC AUTO-ENTMCNC: 33.4 G/DL (ref 33.7–35.3)
MCV RBC AUTO: 89.2 FL (ref 81.4–97.8)
METHADONE UR QL SCN: NEGATIVE
MONOCYTES # BLD AUTO: 0.5 K/UL (ref 0–0.85)
MONOCYTES NFR BLD AUTO: 5.4 % (ref 0–13.4)
NEUTROPHILS # BLD AUTO: 4.36 K/UL (ref 1.82–7.42)
NEUTROPHILS NFR BLD: 46.8 % (ref 44–72)
NRBC # BLD AUTO: 0 K/UL
NRBC BLD-RTO: 0 /100 WBC
OPIATES UR QL SCN: NEGATIVE
OXYCODONE UR QL SCN: NEGATIVE
PCP UR QL SCN: NEGATIVE
PLATELET # BLD AUTO: 171 K/UL (ref 164–446)
PMV BLD AUTO: 10.7 FL (ref 9–12.9)
POC BREATHALIZER: 0 PERCENT (ref 0–0.01)
POTASSIUM SERPL-SCNC: 3.7 MMOL/L (ref 3.6–5.5)
PROPOXYPH UR QL SCN: NEGATIVE
PROT SERPL-MCNC: 7.1 G/DL (ref 6–8.2)
RBC # BLD AUTO: 3.99 M/UL (ref 4.7–6.1)
RSV RNA SPEC QL NAA+PROBE: NEGATIVE
SARS-COV-2 RNA RESP QL NAA+PROBE: NOTDETECTED
SODIUM SERPL-SCNC: 138 MMOL/L (ref 135–145)
SPECIMEN SOURCE: NORMAL
WBC # BLD AUTO: 9.3 K/UL (ref 4.8–10.8)

## 2021-03-22 PROCEDURE — 80307 DRUG TEST PRSMV CHEM ANLYZR: CPT

## 2021-03-22 PROCEDURE — 80053 COMPREHEN METABOLIC PANEL: CPT

## 2021-03-22 PROCEDURE — 85025 COMPLETE CBC W/AUTO DIFF WBC: CPT

## 2021-03-22 PROCEDURE — 0241U HCHG SARS-COV-2 COVID-19 NFCT DS RESP RNA 4 TRGT MIC: CPT

## 2021-03-22 PROCEDURE — 90834 PSYTX W PT 45 MINUTES: CPT | Mod: 95,CR | Performed by: MARRIAGE & FAMILY THERAPIST

## 2021-03-22 PROCEDURE — C9803 HOPD COVID-19 SPEC COLLECT: HCPCS | Performed by: EMERGENCY MEDICINE

## 2021-03-22 PROCEDURE — 99285 EMERGENCY DEPT VISIT HI MDM: CPT

## 2021-03-22 PROCEDURE — 302970 POC BREATHALIZER: Performed by: EMERGENCY MEDICINE

## 2021-03-22 ASSESSMENT — FIBROSIS 4 INDEX: FIB4 SCORE: 1.78

## 2021-03-22 NOTE — BH THERAPY
[facility]  Therapy Progress Note      This evaluation was conducted via Zoom using secure and encrypted videoconferencing technology. The patient was in a private location in the [facility].    The patient's identity was confirmed and verbal consent was obtained for this virtual visit.     This provider informed the patient that their medical records are completely confidential except for the use by other providers involved in their care, or if the patient signs a Release of Information, or to report instances of child or elder abuse, or if it is determined they are an immediate risk of harm to themselves or others.     Identifying Information:  Patient Name: Lico Salinas  Patient MRN: [id number]  Today's Date: 3/22/2021     Type of session:Family therapy  Length of session: 45 minutes  Persons in attendance:Patient and Spouse/Partner    History of present illness:  The patient has a history of   1. Adjustment disorder with mixed anxiety and depressed mood    The patient continues to have marked distress regarding continued weight loss, inability to complete tasks and manage affairs, increased cognitive delays, and physical symptoms.    Subjective/New Info: Lico Salinas is a 70 y.o. male with adjustment disorder, hypothyroidism, dyslipidemia, type 2 diabetes mellitus, neuropathy, glaucoma, hypertension comes in today for follow up, was last seen 4 weeks ago.  His wife accompanied him on today’s visit.  She stated that he was reluctant to get on the call.  Patients stated that he was feeling that his brain was falling apart.  The patient reportedly continues to exhibit unrealistic worries regarding things like the stove no longer working, and what would they do if that happened.  In the past four weeks however he was able to replace batteries in a smoke detector that was beeping.  The patient could not relate the thought of having repaired one item, it would be possible to repair  another.  His wife stated that he is not bathing or shaving on regular basis without considerable encouragement.  She stated that he generally refuses to eat however he will eat and generally swallows down his food.  His wife is concerned that he will choke.  The patient was able to speak more clearly and a good eye contact when he was speaking directly to this clinician.  When his wife was speaking, he displayed spasmodic movements and became highly agitated.  The patient was lucid at times however generally spoke facing down with his shoulders hunched over.  He often discussed wanting this to end, and to just die.  The patient does not appear to have the means, ability or opportunity to die by suicide. He denies having thoughts of wanting to hurt himself or others.   Discussed safety precautions with his wife.  The patient appears hopeless and confused regarding treatment options.  It is reported that he continues to have very poor sleep and for her own self-care, his wife has had him sleep in a different room.  The patient reportedly completed the IVIG infusion therapy, however it did not provide any positive results.    Objective/Observations:   Participation: Limited verbal participation, Defensive and Resistant   Grooming: Casual   Cognition: Alert and Confused   Eye contact: Poor   Mood: Anxious   Affect: Constricted, Flat, Congruent with content and Anxious   Thought process: Tangential, Flight of ideas and Loose associations   Speech: Pressured       Diagnoses:   1. Adjustment disorder with mixed anxiety and depressed mood         Current risk:   SUICIDE: Low   Homicide: Not applicable   Self-harm: Not applicable   Relapse: Not applicable   Safety Plan reviewed? No    Therapeutic Intervention(s): Communication skills, Goal-setting, Interpersonal effectiveness skills and Problem-solving    Treatment Goal(s)/Objective(s) addressed:  · Excessive and /or unrealistic worry that is difficult to control     · Focused on developing a level of trust with client; provide support and empathy to encourage the client to feel safe.     Progress toward Treatment Goals: Moderate decline    Plan:  - Continue Individual therapy  - Next appointment scheduled:  4/5/2021  - Patient is in agreement with the above plan:  UNDECIDED    The proposed treatment plan was discussed with the patient who was provided the opportunity to ask questions and make suggestions regarding alternative treatments. Patient verbalized understanding and expressed agreement with the plan.     KAMRAN Garcia  3/22/2021    This dictation has been created using voice recognition software and/or scribes. The accuracy of the dictation is limited by the abilities of the software and the expertise of the scribes. I expect there may be some errors of grammar and possibly content. I made every attempt to manually correct the errors within my dictation. However, errors related to voice recognition software and/or scribes may still exist and should be interpreted within the appropriate context.

## 2021-03-23 VITALS
TEMPERATURE: 98.4 F | HEIGHT: 68 IN | BODY MASS INDEX: 28.07 KG/M2 | WEIGHT: 185.19 LBS | RESPIRATION RATE: 18 BRPM | SYSTOLIC BLOOD PRESSURE: 113 MMHG | DIASTOLIC BLOOD PRESSURE: 84 MMHG | HEART RATE: 84 BPM | OXYGEN SATURATION: 97 %

## 2021-03-23 PROCEDURE — 96372 THER/PROPH/DIAG INJ SC/IM: CPT | Mod: XU

## 2021-03-23 PROCEDURE — A9270 NON-COVERED ITEM OR SERVICE: HCPCS | Performed by: PSYCHIATRY & NEUROLOGY

## 2021-03-23 PROCEDURE — 96374 THER/PROPH/DIAG INJ IV PUSH: CPT

## 2021-03-23 PROCEDURE — 700111 HCHG RX REV CODE 636 W/ 250 OVERRIDE (IP): Performed by: EMERGENCY MEDICINE

## 2021-03-23 PROCEDURE — 90791 PSYCH DIAGNOSTIC EVALUATION: CPT

## 2021-03-23 PROCEDURE — 700102 HCHG RX REV CODE 250 W/ 637 OVERRIDE(OP): Performed by: PSYCHIATRY & NEUROLOGY

## 2021-03-23 PROCEDURE — 99285 EMERGENCY DEPT VISIT HI MDM: CPT | Performed by: PSYCHIATRY & NEUROLOGY

## 2021-03-23 RX ORDER — ZIPRASIDONE HYDROCHLORIDE 20 MG/1
20 CAPSULE ORAL ONCE
Status: DISCONTINUED | OUTPATIENT
Start: 2021-03-23 | End: 2021-03-23

## 2021-03-23 RX ORDER — LORAZEPAM 2 MG/ML
0.5 INJECTION INTRAMUSCULAR ONCE
Status: COMPLETED | OUTPATIENT
Start: 2021-03-23 | End: 2021-03-23

## 2021-03-23 RX ORDER — ZIPRASIDONE MESYLATE 20 MG/ML
20 INJECTION, POWDER, LYOPHILIZED, FOR SOLUTION INTRAMUSCULAR ONCE
Status: COMPLETED | OUTPATIENT
Start: 2021-03-23 | End: 2021-03-23

## 2021-03-23 RX ORDER — LORAZEPAM 1 MG/1
0.5 TABLET ORAL EVERY 8 HOURS PRN
Status: DISCONTINUED | OUTPATIENT
Start: 2021-03-23 | End: 2021-03-23 | Stop reason: HOSPADM

## 2021-03-23 RX ORDER — MIRTAZAPINE 15 MG/1
15 TABLET, FILM COATED ORAL
Status: DISCONTINUED | OUTPATIENT
Start: 2021-03-23 | End: 2021-03-23 | Stop reason: HOSPADM

## 2021-03-23 RX ORDER — LORAZEPAM 1 MG/1
0.5 TABLET ORAL EVERY 8 HOURS PRN
Status: DISCONTINUED | OUTPATIENT
Start: 2021-03-23 | End: 2021-03-23

## 2021-03-23 RX ADMIN — LORAZEPAM 0.5 MG: 1 TABLET ORAL at 18:47

## 2021-03-23 RX ADMIN — LORAZEPAM 0.5 MG: 2 INJECTION INTRAMUSCULAR; INTRAVENOUS at 05:35

## 2021-03-23 RX ADMIN — ZIPRASIDONE MESYLATE 20 MG: 20 INJECTION, POWDER, LYOPHILIZED, FOR SOLUTION INTRAMUSCULAR at 20:02

## 2021-03-23 RX ADMIN — ZIPRASIDONE MESYLATE 20 MG: 20 INJECTION, POWDER, LYOPHILIZED, FOR SOLUTION INTRAMUSCULAR at 04:55

## 2021-03-23 NOTE — ED NOTES
Decision by ERP and alert team to make pt a L2K; pt placed in hospital gown, belongings removed, security called to search belongings; sitter in direct view of pt

## 2021-03-23 NOTE — DISCHARGE PLANNING
ISAURAW spoke to Rahel at  who reports she has put in a call to pt's wife but has not heard back.    ISAURAW called Pina [phone] and provided her with Rahel's phone number to give her a call.

## 2021-03-23 NOTE — ED NOTES
Dwayne informed RN that pt started to have panic attack, in room pt rocking back and forth on bed mumbling that he cant take it, pt hitting bed with hands. ERP notified and at bedside, alert team Tigist at bedside, orders placed, pt medicated per erp orders.

## 2021-03-23 NOTE — DISCHARGE PLANNING
VM left with [facility] to follow up on  referral sent to them early this morning.     Sw waiting for call back.

## 2021-03-23 NOTE — CONSULTS
"[facility]   TRIAGE ASSESSMENT    Name: Lico Salinas  MRN: [id number]  : 1950  Age: 70 y.o.  Date of assessment: 3/22/2021  PCP: Mynor Teresa M.D.  Persons in attendance: Patient    CHIEF COMPLAINT/PRESENTING ISSUE (as stated by Patient, Wife-Pina Salinas, ER RN, ERP):     Chief Complaint   Patient presents with   • Manic Behavior     pt BIBA w c/o manic episode which began today when a family member heard him on a zoom call asking for help; pt was dx w pandemic-induced depression; pt has perseverating speech but is able to focus on the topic at hand; pt states he is having \"a conniption fit\" when asked why he was brought into the ER; pt is shaking and unable to hold still; pt is A&Ox4      Patient is a 69 y/o male BIB EMS after patient requested his wife call 911 for help. Patient vocalizing, \"I don't know what's really happening. Stuffs started happening and snowballing\" over the past year; \"crazy thoughts\" and depression, increasing anxiety attacks, hopelessness, \"feelings are overwhelming and over the top\" from concerns about financial issues, perseverating on the safety of certain appliances in the home to the ever changing on-line banking passwords, \"they're blurry and unclear.\" Collateral information obtained from wife reports patient's complete personality change to his increased acuity has last week when vocalizing thoughts of killing her then killing himself. Wife reports patient is now sleeping in a separate room  And locking her bedroom door for safety. Patient admits to disclosing this to his wife last week and continued thoughts of \"taking a knife to both of us\" while gesturing with his hands to cut his neck. Patient at risk for harm to self and others and would benefit further psychiatric stabilization and treatment at this time.       CURRENT LIVING SITUATION/SOCIAL SUPPORT: Patient is retired from manufacturing work. Resides with his wife of over 36 " "years.    BEHAVIORAL HEALTH TREATMENT HISTORY  Does patient/parent report a history of prior behavioral health treatment for patient?   Yes:    Dates Level of Care Facilty/Provider Diagnosis/  Problem Medications   current Psychiatry [facility]  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    12/2020 8/2020 [facility] SI, Psychosis                SAFETY ASSESSMENT - SELF  Does patient acknowledge current or past symptoms of dangerousness to self? yes  Does parent/significant other report patient has current or past symptoms of dangerousness to self? yes  Does presenting problem suggest symptoms of dangerousness to self? Patient admits to thoughts of suicide; patient reports thoughts of \"taking a knife to both of us\" and gestures with his hands to cut his neck.    SAFETY ASSESSMENT - OTHERS  Does patient acknowledge current or past symptoms of aggressive behavior or risk to others? yes  Does parent/significant other report patient has current or past symptoms of aggressive behavior or risk to others?  yes  Does presenting problem suggest symptoms of dangerousness to others? Patient admits to telling his wife he has thoughts of wanting to harm the both of them last week. Patient vocalized to thi writer having continued thoughts of HI,\"taking a knife to both of us\" and gestures with his hands to cut his neck. Patient hospitalized a [facility] 1/6/2021 for similar presenting.    Duty to Warn: Spoke with wife over the phone and she reports patient had vocalized thoughts of charming her alongside SI last week. Informed wife he admitted to intermittent thoughts of SI/HI without intent; placed on legal hold and POC.     Crisis Safety Plan completed and copy given to patient? N\A    ABUSE/NEGLECT SCREENING  Does patient report feeling “unsafe” in his/her home, or afraid of anyone?  no  Does patient report any history of physical, sexual, or " emotional abuse?  no  Does parent or significant other report any of the above? no  Is there evidence of neglect by self?  no  Is there evidence of neglect by a caregiver? no  Does the patient/parent report any history of CPS/APS/police involvement related to suspected abuse/neglect or domestic violence? no  Based on the information provided during the current assessment, is a mandated report of suspected abuse/neglect being made?  No    SUBSTANCE USE SCREENING  Yes:  Chuy all substances used in the past 30 days: denies substance and alcohol use.     UDS results: negative  Breathalyzer results: 0.00    What consequences does the patient associate with any of the above substance use and or addictive behaviors? None    MENTAL STATUS   Participation: Active verbal participation and Resistant  Grooming: Casual  Orientation: Alert and Evidence of delusions present  Behavior: Calm, Hyperactive and Unusual behaviors noted  Eye contact: Limited  Mood: Depressed, Anxious and Manic  Affect: Labile, Anxious and Tearful  Thought process: Tangential and Flight of ideas  Thought content: Paranoia  Speech: Pressured  Perception: Within normal limits  Memory:  Poor memory for chronology of events  Insight: Poor  Judgment:  Poor  Other:    Collateral information:   Source:  [] Significant other present in person:   [x] Significant other by telephone: Pina Salinas [phone]  [] [facility]   [x] [facility]  [x] [facility]  [x] Other: ERP    [] Unable to complete full assessment due to:  [] Acute intoxication  [] Patient declined to participate/engage  [] Patient verbally unresponsive  [] Significant cognitive deficits  [] Significant perceptual distortions or behavioral disorganization  [x] Other: N/A     CLINICAL IMPRESSIONS:  Primary:  SI/HI  Secondary: 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     IDENTIFIED NEEDS/PLAN:  [Trigger DISPOSITION list for any items marked]    [x]  Imminent  safety risk - self [x] Imminent safety risk - others   []  Acute substance withdrawal [x]  Psychosis/Impaired reality testing   [x]  Mood/anxiety []  Substance use/Addictive behavior   [x]  Maladaptive behaviro []  Parent/child conflict   []  Family/Couples conflict []  Biomedical   []  Housing []  Financial   []   Legal  Occupational/Educational   []  Domestic violence []  Other:     Recommended Plan of Care:  Actively being addressed by Legal Hold, [facility] and [facility] and 1:1 Observation due to SI and elopement risk; low risk for HI-no security sitter necessary.     Does patient express agreement with the above plan? yes    Referral appointment(s) scheduled? N\A    Alert team only: Patient endorsing SI and HI toward wife with a knife; racing thoughts and paranoia, increasing anxiety and depression. Patient would benefit further psychiatric stabilization and treatment at this time. Pt to return to prior living situation upon discharge.  I have discussed findings and recommendations with Dr. Martines who is in agreement with these recommendations.     Referral information sent to the following community providers : [facility]    If applicable : Referred  to :  for legal hold follow up once certified.      Tigist Melton, R.N.  3/22/2021
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
## 2021-03-23 NOTE — ED PROVIDER NOTES
ED PROVIDER NOTE    Scribed for Leon Hou M.D. by Faiza Lujan. 3/23/2021, 7:13 AM.    This is an addendum to the note on Lico Salinas. For further details and full chart entry, see the previously signed ED Provider Note written by Dr. Martines (ERP).      7:13 AM - I discussed the patient's case with Dr. Tompkins (ERP) who will transfer care of the patient to me at this time.        7:13 - The patient's continuing management included reassessment. Vitals are stable at this time, and patient is not in any acute distress. Patient is eating and drinking adequately. Patient's heart and lungs were checked. Heart is of regular rate and rhythm, lungs are clear.      FINAL IMPRESSION      Faiza WOOD (Scribe), am scribing for, and in the presence of, Leon Hou M.D..    Electronically signed by: Faiza Lujan (Scribe), 3/23/2021    ILeon M.D. personally performed the services described in this documentation, as scribed by Faiza Lujan in my presence, and it is both accurate and complete.    The note accurately reflects work and decisions made by me.  Leon Hou M.D.  3/23/2021  1:43 PM

## 2021-03-23 NOTE — ED NOTES
Report received from DRISS Hwang. Assumed care of pt, pt resting in bed, 1:1 sitter in direct view of pt.
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
## 2021-03-23 NOTE — DISCHARGE PLANNING
Medical Social Work    Referral: Legal Hold    Intervention: Legal Hold Paperwork given to SW by [employer] RN: Tigist    Legal Hold Initiated: Date: 03/22/2021  Time: 2106    Legal Hold faxed: Date: 03/23/2021  Time: 0314    Patient’s Insurance Listed on Face Sheet: [employer]    Referrals sent to: [facility]    Plan: Patient will transfer to mental health facility once acceptance is obtained.

## 2021-03-24 NOTE — DISCHARGE PLANNING
Medical Social Work    Referral: Legal hold Transfer to Mental Health Facility    Intervention: Tigist muniz/ [employer] Team reports she spoke to [facility] stating that they have accepted the patient for admission.     Pt's accepting physcian is Dr. Duque.     BELEN arranged for transportation to be set up through [facility]    The pt will be picked up at 2130.     BELEN notified the RN of the departure time as well as accepting facility.     BELEN created transfer packet and placed on chart.     Plan: Pt will transfer back to [facility] at 2130.

## 2021-03-24 NOTE — ED NOTES
"Pt began shaking and hyperventilating. Pt states \"Its happening again!\" Attempted to  pt to slow his breathing without success.   "

## 2021-04-01 ENCOUNTER — APPOINTMENT (OUTPATIENT)
Dept: BEHAVIORAL HEALTH | Facility: MEDICAL CENTER | Age: 71
End: 2021-04-01
Attending: PSYCHIATRY & NEUROLOGY
Payer: MEDICARE

## 2021-04-01 ENCOUNTER — OFFICE VISIT (OUTPATIENT)
Dept: MEDICAL GROUP | Facility: MEDICAL CENTER | Age: 71
End: 2021-04-01
Payer: MEDICARE

## 2021-04-01 VITALS
HEIGHT: 67 IN | HEART RATE: 66 BPM | RESPIRATION RATE: 20 BRPM | BODY MASS INDEX: 26.87 KG/M2 | TEMPERATURE: 97.9 F | DIASTOLIC BLOOD PRESSURE: 50 MMHG | SYSTOLIC BLOOD PRESSURE: 84 MMHG | OXYGEN SATURATION: 97 % | WEIGHT: 171.2 LBS

## 2021-04-01 DIAGNOSIS — R35.0 URINARY FREQUENCY: ICD-10-CM

## 2021-04-01 DIAGNOSIS — R41.89 COGNITIVE IMPAIRMENT: ICD-10-CM

## 2021-04-01 DIAGNOSIS — F43.23 ADJUSTMENT DISORDER WITH MIXED ANXIETY AND DEPRESSED MOOD: ICD-10-CM

## 2021-04-01 DIAGNOSIS — E11.9 DIABETES MELLITUS TYPE 2, NONINSULIN DEPENDENT (HCC): ICD-10-CM

## 2021-04-01 DIAGNOSIS — R59.0 HILAR LYMPHADENOPATHY: ICD-10-CM

## 2021-04-01 DIAGNOSIS — I10 ESSENTIAL HYPERTENSION: ICD-10-CM

## 2021-04-01 DIAGNOSIS — D64.9 ANEMIA, UNSPECIFIED TYPE: ICD-10-CM

## 2021-04-01 DIAGNOSIS — R63.4 UNINTENDED WEIGHT LOSS: ICD-10-CM

## 2021-04-01 DIAGNOSIS — E11.42 DIABETIC POLYNEUROPATHY ASSOCIATED WITH TYPE 2 DIABETES MELLITUS (HCC): ICD-10-CM

## 2021-04-01 PROBLEM — M46.1 SACROILIITIS, NOT ELSEWHERE CLASSIFIED (HCC): Status: RESOLVED | Noted: 2021-01-11 | Resolved: 2021-04-01

## 2021-04-01 PROBLEM — M79.18 RIGHT BUTTOCK PAIN: Status: RESOLVED | Noted: 2018-10-17 | Resolved: 2021-04-01

## 2021-04-01 PROBLEM — R41.0 TRANSIENT CONFUSION: Status: RESOLVED | Noted: 2021-01-20 | Resolved: 2021-04-01

## 2021-04-01 PROBLEM — Z79.899 CHRONIC PRESCRIPTION BENZODIAZEPINE USE: Status: RESOLVED | Noted: 2021-01-07 | Resolved: 2021-04-01

## 2021-04-01 PROBLEM — M25.512 CHRONIC LEFT SHOULDER PAIN: Status: RESOLVED | Noted: 2020-08-25 | Resolved: 2021-04-01

## 2021-04-01 PROBLEM — G89.29 CHRONIC LEFT SHOULDER PAIN: Status: RESOLVED | Noted: 2020-08-25 | Resolved: 2021-04-01

## 2021-04-01 PROBLEM — M25.511 RIGHT SHOULDER PAIN: Status: RESOLVED | Noted: 2018-04-03 | Resolved: 2021-04-01

## 2021-04-01 LAB
HBA1C MFR BLD: 5.8 % (ref 0–5.6)
INT CON NEG: NEGATIVE
INT CON POS: POSITIVE

## 2021-04-01 PROCEDURE — 83036 HEMOGLOBIN GLYCOSYLATED A1C: CPT | Mod: QW | Performed by: FAMILY MEDICINE

## 2021-04-01 PROCEDURE — 99215 OFFICE O/P EST HI 40 MIN: CPT | Performed by: FAMILY MEDICINE

## 2021-04-01 RX ORDER — GABAPENTIN 100 MG/1
CAPSULE ORAL
Qty: 42 CAPSULE | Refills: 0 | Status: SHIPPED | OUTPATIENT
Start: 2021-04-01 | End: 2022-02-28

## 2021-04-01 RX ORDER — QUETIAPINE FUMARATE 50 MG/1
50 TABLET, FILM COATED ORAL DAILY
COMMUNITY
Start: 2021-03-30 | End: 2021-04-02 | Stop reason: SDUPTHER

## 2021-04-01 ASSESSMENT — FIBROSIS 4 INDEX: FIB4 SCORE: 2.86

## 2021-04-01 NOTE — PROGRESS NOTES
Subjective:     Lico Salinas is a 70 y.o. male here today for weight loss and Annual Health Assessment.    Diabetes mellitus type 2, noninsulin dependent (HCC)  Patient's A1c is 5.8 today.    Diabetic polyneuropathy associated with type 2 diabetes mellitus (HCC)  Patient's diabetic neuropathy is not particularly troublesome to him.  He has been reducing his gabapentin intake overall.  He is currently taking gabapentin 300 mg at night.    Unintended weight loss  In August the patient first came to see me for 4 months of unintended weight loss.  In April 2019 he weighed 238 pounds, today he weighs 171 pounds.  He has had an extensive laboratory and imaging work-up including an MRI of the brain and a CT scan of the chest, abdomen and pelvis.  He did have a slightly elevated PSA which has improved and the patient is now following with urology. During the work-up, it was also discovered that the patient had a breast mass which was biopsied and determined to be benign.  He did follow-up with the breast surgeon who is currently monitoring this.  Patient has had a colonoscopy demonstrating diverticulosis and an EGD showing erosive gastritis.  At our last visit, the patient's weight loss had stabilized.  Since our last visit, he was seen in the emergency department.  I see that in the emergency department the weight was measured at 185 pounds, which also demonstrated stability.  The patient was subsequently admitted to a psychiatric facility.  I see mention in those notes of a weight of 90 kg which actually is an increase; however, today his weight is down again to 171 pounds.  He has had a very difficult few weeks (discussed elsewhere).  In the interest of thoroughness, I did order a PET scan but the patient did not get this done.  He does follow with endocrinology.    Urinary frequency  Patient has rather severe urinary frequency treated with alfuzosin and finasteride.  He states that he used Flomax in the past but  was changed to alfuzosin.  He follows with urology.  They were considering doing prostate surgery but elected to hold off considering everything else going on.    Cognitive impairment  The patient has had a rather abrupt neurologic change including severe anxiety and depression along with tremulousness, grimacing, echolalia and shuffling.  He has had a rather thorough work-up including an MRI of the brain and a lumbar puncture.    MRI brain revealed:   IMPRESSION:  1.  Small arachnoid cyst occupying the left middle cranial fossa. No change from prior exam. Doubtful clinical significance.  2.  Minimal supratentorial white matter disease most consistent with microvascular ischemic change. Common findings for the patient's age.  3.  No evidence of acute or subacute infarction, hemorrhage, or enhancing mass lesion.  4.  Overall, no significant change from 11/9/2020.    Among the findings on the lumbar puncture were those listed below.    Results for LOLITA ALSTON (MRN [id number]) as of 4/1/2021 17:06   Ref. Range 1/7/2021 13:17   Glucose CSF Latest Ref Range: 40 - 80 mg/dL 64   Total Protein, CSF Latest Ref Range: 15 - 45 mg/dL 186 (H)   Pre Albumin CSF Latest Ref Range: 0.0 - 3.1 mg/dL 2.6   Alpha-1 Globulin Latest Ref Range: 0.0 - 3.1 mg/dL 6.8 (H)   Alpha-2 Globulin Latest Ref Range: 0.0 - 5.4 mg/dL 15.2 (H)   Beta Globulin Latest Ref Range: 0.0 - 8.1 mg/dL 23.8 (H)   Gamma Globulin Latest Ref Range: 0.0 - 5.4 mg/dL 16.1 (H)   IgG CSF Latest Ref Range: 0.0 - 6.0 mg/dL 14.6 (H)   HSV 1/2 IgM, CSF Latest Ref Range: <=0.89 IV 0.31   West Nile IgG, CSF Latest Ref Range: <=1.29 IV 0.33   West Nile IgM, CSF Latest Ref Range: <=0.89 IV 0.00     The patient has been following with Dr. Rose who at one point suggested the possibility of autoimmune encephalitis and ordered IVIG.  The patient completed several of these treatments without improvement.  The patient and his wife would be interested in getting a second  neurology opinion.    Hilar lymphadenopathy  And mild hilar lymphadenopathy was noted on recent CT scan.    Anemia  Noted previously in the setting of erosive gastritis.    Essential hypertension  Patient's blood pressure has normalized with weight loss.  I stopped the losartan but the blood pressure remains low.  He does describe some orthostasis.    Adjustment disorder with mixed anxiety and depressed mood  Patient continues to exhibit significant anxiety and depression.  In fact, he was recently discharged after a psychiatric hospitalization.  He routinely follows with his counselor and psychiatrist.  The psychiatric symptoms have come on rather abruptly and have quickly worsened.         Annual Health Assessment Questions:     1.  Are you currently engaging in any exercise or physical activity? Yes    2.  How would you describe your mood or emotional well-being today? depressed    3.  Have you had any falls in the last year? No    4.  Have you noticed any problems with your balance or had difficulty walking? Yes    5.  In the last six months have you experienced any leakage of urine? Yes    6. DPA/Advanced Directive: Patient has Advanced Directive on file.     Current medicines (including changes today)  Current Outpatient Medications   Medication Sig Dispense Refill   • sertraline (ZOLOFT) 50 MG Tab      • QUEtiapine (SEROQUEL) 50 MG tablet Take 50 mg by mouth every day.     • gabapentin (NEURONTIN) 100 MG Cap Take 2 tablets PO hs x 2 weeks then 1 tablet PO hs x 2 weeks then stop the medicine. 42 capsule 0   • Cyanocobalamin (VITAMIN B12 PO) Take 1 tablet by mouth every day.     • levothyroxine (SYNTHROID) 75 MCG Tab Take 1 tablet by mouth Every morning on an empty stomach. 100 tablet 1   • Glucosamine-Chondroitin (GLUCOSAMINE CHONDR COMPLEX PO) Take 1 tablet by mouth every day.     • dorzolamide-timolol (COSOPT) 22.3-6.8 MG/ML Solution Administer 1 Drop into both eyes 2 times a day.     • finasteride (PROSCAR)  "5 MG Tab Take 5 mg by mouth every day.     • alfuzosin (UROXATRAL) 10 MG SR tablet Take 10 mg by mouth every morning.     • omeprazole (PRILOSEC) 20 MG delayed-release capsule Take 20 mg by mouth every day.     • fenofibrate (TRICOR) 145 MG Tab Take 1 Tab by mouth every day. 90 Tab 4   • vitamin D3, cholecalciferol, 1000 UNIT Tab Take 1,000 Units by mouth every day.     • latanoprost (XALATAN) 0.005 % Solution Administer 1 Drop into both eyes every evening.     • Coenzyme Q10 200 MG Cap Take 200 mg by mouth every evening.     • Multiple Vitamin (MULTI VITAMIN MENS PO) Take 1 Tab by mouth every morning.       No current facility-administered medications for this visit.       He  has a past medical history of Anxiety, Diabetes (HCC) (07-10-15), Glaucoma, High cholesterol (07-10-15), Hypertension (07-10-15), Hypothyroidism, Neuropathy, peripheral, Skin cancer, Snoring, and Thyroid disease.    Pcn [penicillins]    He  reports that he quit smoking about 41 years ago. His smoking use included cigarettes. He has a 20.00 pack-year smoking history. He has never used smokeless tobacco. He reports previous alcohol use of about 0.5 oz of alcohol per week. He reports that he does not use drugs.  Counseling given: Not Answered      ROS no fever.      Objective:     Physical Exam:  BP (!) 84/50 (BP Location: Right arm, Patient Position: Sitting, BP Cuff Size: Adult long)   Pulse 66   Temp 36.6 °C (97.9 °F) (Temporal)   Resp 20   Ht 1.702 m (5' 7\")   Wt 77.7 kg (171 lb 3.2 oz)   SpO2 97%  Body mass index is 26.81 kg/m².   Constitutional: Alert, oriented to person and place only.   Respiratory: Unlabored respiratory effort, lungs clear to auscultation, no wheezes, no rhonchi.  Cardiovascular: Normal S1, S2, no murmur, no edema.  Psych: anxious, tremulous, shuffling gait, echolalia.   MSK: using walker.       Assessment and Plan:     1. Diabetes mellitus type 2, noninsulin dependent (HCC)  A1c 5.8 today.  Patient will stop " Metformin.  - POCT Hemoglobin A1C    2. Diabetic polyneuropathy associated with type 2 diabetes mellitus (HCC)  -In the interest of further reducing polypharmacy, patient will reduce gabapentin to 200 mg hs x 2 weeks then 100 mg hs x 2 weeks then stop.     3. Unintended weight loss  It appears that the patient was stable until his recent psychiatric hospitalization.  He will continue to follow with endocrinology and I asked that he get the PET scan ordered.  I will also refer him to the [facility] for comprehensive geriatric assessment.  - REFERRAL TO OTHER    4. Hilar lymphadenopathy  - reminded pt and wife to get PET.     5. Anemia, unspecified type  - labs in future.     6. Essential hypertension  -Resolved.  I asked the patient and his wife to call his urologist to discuss an alternative to alfuzosin because of his low blood pressure.  Encouraged hydration.    7. Adjustment disorder with mixed anxiety and depressed mood  Patient has severe anxiety and depression.  He will continue to follow with his psychiatrist and counselor.    8. Cognitive impairment  The patient symptoms began with unintended weight loss and quickly progressed to neurologic symptoms.  I believe that the patient's symptoms could all be related to severe depression and anxiety, however, I would also wonder about the possibility of dementia.  Normal pressure hydrocephalus is also a consideration, though the MRI did not suggest this.  The patient is interested in obtaining a second neurology opinion so I will place an urgent referral because the patient is decompensating rather quickly and I am quite concerned about him.  I will also arrange for him to get a comprehensive geriatric assessment with the [facility].   - REFERRAL TO NEUROLOGY  - REFERRAL TO OTHER    Note: paperwork for group home filled out with patient and his wife. Scanned into media, patient to provide to group home if he wishes.  I informed the  patient and his wife that if there is any trouble getting into the group home or this is not the appropriate setting for the patient, they should seek [facility] as [facility] can be very helpful.    Total 52 minutes face-to-face time spent with patient, with greater than 50% of the total time discussing patient's issues and symptoms as listed above in assessment and plan, as well as managing coordination of care for future evaluation and treatment.      Discussion today about general wellness and lifestyle habits:    · Engage in regular physical activity and social activities.  · Prevent falls and reduce trip hazards; using ambulatory aides, hearing and vision testing if appropriate.  · Steps to improve urinary incontinence.  · Advanced care planning.    Follow-Up: Return in about 4 weeks (around 4/29/2021), or if symptoms worsen or fail to improve.

## 2021-04-01 NOTE — PATIENT INSTRUCTIONS
1. Please get the PET Scan.     2. Stop the metformin.     3. Taper the gabapentin as instructed.    4. Contact your urologist about changing the alfuzosin. Your BP is a bit low.     5. I'm referring you to the [facility] and a neurologist to get a second opinion.     Today, your Healthcare Provider may have discussed the following recommendations:    1. Exercise and Physical Activity  According to the American Heart Association, it is recommended to engage in physical activity regularly and to aim for 150 minutes of moderate-intensity aerobic activity per week.  Your Healthcare Provider may have recommended taking the stairs instead of the elevator, starting or maintaining a walking program or strength-training program.    2. Emotional Well-being  Mental and emotional well-being is essential to overall health.  Your Healthcare Provider may have encouraged you to build strong, positive relationships with family and friends, become more involved in your community (by volunteering or joining a spiritual community), or focus on self-care.    3. Fall and Injury Prevention  To prevent falls and injuries and also improve your balance, your Healthcare Provider may have suggested that you use a cane or walker, start an exercise of physical therapy program, or have your vision and/or hearing tested.    4. Urinary Leakage (Urinary Incontinence)  To control or manage the leakage of urine, your Healthcare Provider may have recommended you start bladder training exercises (such as Kegel exercises), a trial of a medication or a referral to see a specialist to discuss surgical options.

## 2021-04-01 NOTE — ASSESSMENT & PLAN NOTE
Patient's diabetic neuropathy is not particularly troublesome to him.  He has been reducing his gabapentin intake overall.  He is currently taking gabapentin 300 mg at night.

## 2021-04-02 DIAGNOSIS — E78.5 DYSLIPIDEMIA: ICD-10-CM

## 2021-04-02 DIAGNOSIS — E03.9 HYPOTHYROIDISM, UNSPECIFIED TYPE: ICD-10-CM

## 2021-04-02 RX ORDER — ALFUZOSIN HYDROCHLORIDE 10 MG/1
10 TABLET, EXTENDED RELEASE ORAL EVERY MORNING
Qty: 100 TABLET | Refills: 4 | Status: ON HOLD | OUTPATIENT
Start: 2021-04-02 | End: 2022-03-09

## 2021-04-02 RX ORDER — QUETIAPINE FUMARATE 50 MG/1
50 TABLET, FILM COATED ORAL DAILY
Qty: 100 TABLET | Refills: 1 | Status: SHIPPED | OUTPATIENT
Start: 2021-04-02 | End: 2022-02-28

## 2021-04-02 RX ORDER — LEVOTHYROXINE SODIUM 0.07 MG/1
75 TABLET ORAL
Qty: 100 TABLET | Refills: 4 | Status: ON HOLD | OUTPATIENT
Start: 2021-04-02 | End: 2022-03-09 | Stop reason: SDUPTHER

## 2021-04-02 RX ORDER — OMEPRAZOLE 20 MG/1
20 CAPSULE, DELAYED RELEASE ORAL DAILY
Qty: 100 CAPSULE | Refills: 4 | Status: ON HOLD | OUTPATIENT
Start: 2021-04-02 | End: 2022-03-09

## 2021-04-02 RX ORDER — FENOFIBRATE 145 MG/1
145 TABLET, COATED ORAL DAILY
Qty: 100 TABLET | Refills: 4 | Status: ON HOLD | OUTPATIENT
Start: 2021-04-02 | End: 2022-03-09

## 2021-04-02 RX ORDER — FINASTERIDE 5 MG/1
5 TABLET, FILM COATED ORAL DAILY
Qty: 100 TABLET | Refills: 4 | Status: ON HOLD | OUTPATIENT
Start: 2021-04-02 | End: 2022-03-09

## 2021-04-02 NOTE — ASSESSMENT & PLAN NOTE
Patient continues to exhibit significant anxiety and depression.  In fact, he was recently discharged after a psychiatric hospitalization.  He routinely follows with his counselor and psychiatrist.  The psychiatric symptoms have come on rather abruptly and have quickly worsened.

## 2021-04-02 NOTE — ASSESSMENT & PLAN NOTE
In August the patient first came to see me for 4 months of unintended weight loss.  In April 2019 he weighed 238 pounds, today he weighs 171 pounds.  He has had an extensive laboratory and imaging work-up including an MRI of the brain and a CT scan of the chest, abdomen and pelvis.  He did have a slightly elevated PSA which has improved and the patient is now following with urology. During the work-up, it was also discovered that the patient had a breast mass which was biopsied and determined to be benign.  He did follow-up with the breast surgeon who is currently monitoring this.  Patient has had a colonoscopy demonstrating diverticulosis and an EGD showing erosive gastritis.  At our last visit, the patient's weight loss had stabilized.  Since our last visit, he was seen in the emergency department.  I see that in the emergency department the weight was measured at 185 pounds, which also demonstrated stability.  The patient was subsequently admitted to a psychiatric facility.  I see mention in those notes of a weight of 90 kg which actually is an increase; however, today his weight is down again to 171 pounds.  He has had a very difficult few weeks (discussed elsewhere).  In the interest of thoroughness, I did order a PET scan but the patient did not get this done.  He does follow with endocrinology.

## 2021-04-02 NOTE — ASSESSMENT & PLAN NOTE
The patient has had a rather abrupt neurologic change including severe anxiety and depression along with tremulousness, grimacing, echolalia and shuffling.  He has had a rather thorough work-up including an MRI of the brain and a lumbar puncture.    MRI brain revealed:   IMPRESSION:  1.  Small arachnoid cyst occupying the left middle cranial fossa. No change from prior exam. Doubtful clinical significance.  2.  Minimal supratentorial white matter disease most consistent with microvascular ischemic change. Common findings for the patient's age.  3.  No evidence of acute or subacute infarction, hemorrhage, or enhancing mass lesion.  4.  Overall, no significant change from 11/9/2020.    Among the findings on the lumbar puncture were those listed below.    Results for LOLITA ALSTON (MRN [id number]) as of 4/1/2021 17:06   Ref. Range 1/7/2021 13:17   Glucose CSF Latest Ref Range: 40 - 80 mg/dL 64   Total Protein, CSF Latest Ref Range: 15 - 45 mg/dL 186 (H)   Pre Albumin CSF Latest Ref Range: 0.0 - 3.1 mg/dL 2.6   Alpha-1 Globulin Latest Ref Range: 0.0 - 3.1 mg/dL 6.8 (H)   Alpha-2 Globulin Latest Ref Range: 0.0 - 5.4 mg/dL 15.2 (H)   Beta Globulin Latest Ref Range: 0.0 - 8.1 mg/dL 23.8 (H)   Gamma Globulin Latest Ref Range: 0.0 - 5.4 mg/dL 16.1 (H)   IgG CSF Latest Ref Range: 0.0 - 6.0 mg/dL 14.6 (H)   HSV 1/2 IgM, CSF Latest Ref Range: <=0.89 IV 0.31   West Nile IgG, CSF Latest Ref Range: <=1.29 IV 0.33   West Nile IgM, CSF Latest Ref Range: <=0.89 IV 0.00     The patient has been following with Dr. Rose who at one point suggested the possibility of autoimmune encephalitis and ordered IVIG.  The patient completed several of these treatments without improvement.  The patient and his wife would be interested in getting a second neurology opinion.

## 2021-04-02 NOTE — ASSESSMENT & PLAN NOTE
Patient has rather severe urinary frequency treated with alfuzosin and finasteride.  He states that he used Flomax in the past but was changed to alfuzosin.  He follows with urology.  They were considering doing prostate surgery but elected to hold off considering everything else going on.

## 2021-04-02 NOTE — ASSESSMENT & PLAN NOTE
Patient's blood pressure has normalized with weight loss.  I stopped the losartan but the blood pressure remains low.  He does describe some orthostasis.

## 2021-04-05 ENCOUNTER — APPOINTMENT (OUTPATIENT)
Dept: BEHAVIORAL HEALTH | Facility: CLINIC | Age: 71
End: 2021-04-05
Payer: MEDICARE

## 2021-05-28 ENCOUNTER — HOME HEALTH ADMISSION (OUTPATIENT)
Dept: HOME HEALTH SERVICES | Facility: HOME HEALTHCARE | Age: 71
End: 2021-05-28
Payer: MEDICARE

## 2021-06-03 ENCOUNTER — HOME CARE VISIT (OUTPATIENT)
Dept: HOME HEALTH SERVICES | Facility: HOME HEALTHCARE | Age: 71
End: 2021-06-03
Payer: MEDICARE

## 2021-06-03 ENCOUNTER — DOCUMENTATION (OUTPATIENT)
Dept: MEDICAL GROUP | Facility: PHYSICIAN GROUP | Age: 71
End: 2021-06-03

## 2021-06-03 VITALS
RESPIRATION RATE: 16 BRPM | HEIGHT: 66 IN | OXYGEN SATURATION: 93 % | DIASTOLIC BLOOD PRESSURE: 60 MMHG | HEART RATE: 65 BPM | BODY MASS INDEX: 27.48 KG/M2 | SYSTOLIC BLOOD PRESSURE: 100 MMHG | WEIGHT: 171 LBS | TEMPERATURE: 97.2 F

## 2021-06-03 PROCEDURE — G0493 RN CARE EA 15 MIN HH/HOSPICE: HCPCS

## 2021-06-03 PROCEDURE — 665001 SOC-HOME HEALTH

## 2021-06-03 ASSESSMENT — FIBROSIS 4 INDEX: FIB4 SCORE: 2.86

## 2021-06-03 ASSESSMENT — ENCOUNTER SYMPTOMS
THOUGHT CONTENT - SUSPICIOUS: 1
DIFFICULTY THINKING: 1
POOR JUDGMENT: 1
AGORAPHOBIA: 1
DEPRESSED MOOD: 1

## 2021-06-03 NOTE — PROGRESS NOTES
Medication chart review for [facility] services    PCP:  Mynor Teresa M.D.  [address]  [place] [address]  Fax: [phone]    Current medication list     Current Outpatient Medications:   •  sennosides-docusate sodium, 1 tablet, Oral, BID  •  Melatonin, 1 capsule, Oral, QHS  •  DULoxetine, 20 mg, Oral, DAILY  •  Acetaminophen (TYLENOL PO), 500 mg, Oral, BID  •  alfuzosin, 10 mg, Oral, QAM  •  fenofibrate, 145 mg, Oral, DAILY  •  finasteride, 5 mg, Oral, DAILY  •  levothyroxine, 75 mcg, Oral, AM ES  •  omeprazole, 20 mg, Oral, DAILY  •  sertraline, 50 mg, Oral, DAILY (Patient not taking: Reported on 6/3/2021)  •  QUEtiapine, 50 mg, Oral, DAILY (Patient not taking: Reported on 6/3/2021)  •  gabapentin, Take 2 tablets PO hs x 2 weeks then 1 tablet PO hs x 2 weeks then stop the medicine.  •  Cyanocobalamin (VITAMIN B12 PO), 1 tablet, Oral, DAILY  •  Glucosamine-Chondroitin (GLUCOSAMINE CHONDR COMPLEX PO), 1 tablet, Oral, DAILY  •  dorzolamide-timolol, 1 Drop, Both Eyes, BID  •  vitamin D, 1,000 Units, Oral, DAILY  •  latanoprost, 1 Drop, Both Eyes, Q EVENING  •  Coenzyme Q10, 200 mg, Oral, Q EVENING  •  Multiple Vitamin (MULTI VITAMIN MENS PO), 1 tablet, Oral, QAM    Allergies   Allergen Reactions   • Pcn [Penicillins] Unspecified     As a child, unknown reaction       Labs     Lab Results   Component Value Date/Time    SODIUM 138 03/22/2021 10:28 PM    POTASSIUM 3.7 03/22/2021 10:28 PM    CHLORIDE 106 03/22/2021 10:28 PM    CO2 21 03/22/2021 10:28 PM    GLUCOSE 129 (H) 03/22/2021 10:28 PM    BUN 26 (H) 03/22/2021 10:28 PM    CREATININE 1.06 03/22/2021 10:28 PM      Lab Results   Component Value Date/Time    ALKPHOSPHAT 40 03/22/2021 10:28 PM    ASTSGOT 28 03/22/2021 10:28 PM    ALTSGPT 17 03/22/2021 10:28 PM    TBILIRUBIN 0.3 03/22/2021 10:28 PM    ALBUMIN 3.7 03/22/2021 10:28 PM    INR 1.16 (H) 01/11/2021 01:10 AM          Assessment and Plan:   • Received referral from [facility].  Medications reviewed.         Say Che, PharmD, MS, BCACP, [facility]  Phone [phone] fax [phone]    This note was created using voice recognition software (Dragon). The accuracy of the dictation is limited by the abilities of the software. I have reviewed the note prior to signing, however some errors in grammar and context are still possible. If you have any questions related to this note please do not hesitate to contact our office.

## 2021-06-03 NOTE — CASE COMMUNICATION
Primary dx/Skilled need: Drug induced subacute dyskinesia. Adjustment disorder w/mixed anxiety and depressed mood. Suicidal ideation and homicidal ideation. NIDDM. Polyneuropathy.  Skilled need for Medication Response and Effectiveness, Deconditioning and Rehabilitation Assessment and Education, Diabetes education, Education and Management of New Medications, Home Exercise Program For Function and Safety, Hyper/Hypoglycemic Education, Neuro/depression Management, Assessment and Education and Pain Management  SN frequency.1wk1, 2wk4, 1wk4  Zip code. [id number]  Disciplines ordered. SN, PT, OT  Insurance and authorization. [facility]  Certification period. 06/03/21 to 08/01/21  Special considerations. antipsychotic med changes noted per Dr. Mclain

## 2021-06-04 ASSESSMENT — ENCOUNTER SYMPTOMS
VOMITING: DENIES
NAUSEA: DENIES
AGITATION: 1

## 2021-06-04 ASSESSMENT — PATIENT HEALTH QUESTIONNAIRE - PHQ9
SUM OF ALL RESPONSES TO PHQ QUESTIONS 1-9: 15
CLINICAL INTERPRETATION OF PHQ2 SCORE: 4
5. POOR APPETITE OR OVEREATING: 1 - SEVERAL DAYS
1. LITTLE INTEREST OR PLEASURE IN DOING THINGS: 02
2. FEELING DOWN, DEPRESSED, IRRITABLE, OR HOPELESS: 03

## 2021-06-04 ASSESSMENT — ACTIVITIES OF DAILY LIVING (ADL): OASIS_M1830: 03

## 2021-06-08 ENCOUNTER — HOME CARE VISIT (OUTPATIENT)
Dept: HOME HEALTH SERVICES | Facility: HOME HEALTHCARE | Age: 71
End: 2021-06-08
Payer: MEDICARE

## 2021-06-08 VITALS
OXYGEN SATURATION: 96 % | TEMPERATURE: 98 F | SYSTOLIC BLOOD PRESSURE: 100 MMHG | RESPIRATION RATE: 16 BRPM | HEART RATE: 68 BPM | DIASTOLIC BLOOD PRESSURE: 58 MMHG

## 2021-06-08 PROCEDURE — G0151 HHCP-SERV OF PT,EA 15 MIN: HCPCS

## 2021-06-08 PROCEDURE — G0493 RN CARE EA 15 MIN HH/HOSPICE: HCPCS

## 2021-06-08 ASSESSMENT — ENCOUNTER SYMPTOMS
POOR JUDGMENT: 1
DIFFICULTY THINKING: 1

## 2021-06-08 ASSESSMENT — ACTIVITIES OF DAILY LIVING (ADL)
AMBULATION_DISTANCE/DURATION_TOLERATED: 30 FEET
AMBULATION ASSISTANCE ON FLAT SURFACES: 1

## 2021-06-09 VITALS
HEART RATE: 68 BPM | SYSTOLIC BLOOD PRESSURE: 100 MMHG | OXYGEN SATURATION: 96 % | RESPIRATION RATE: 16 BRPM | DIASTOLIC BLOOD PRESSURE: 65 MMHG | TEMPERATURE: 98 F

## 2021-06-09 ASSESSMENT — ENCOUNTER SYMPTOMS
VOMITING: DENIES
NAUSEA: DENIES
MUSCLE WEAKNESS: 1

## 2021-06-10 ENCOUNTER — HOME CARE VISIT (OUTPATIENT)
Dept: HOME HEALTH SERVICES | Facility: HOME HEALTHCARE | Age: 71
End: 2021-06-10
Payer: MEDICARE

## 2021-06-10 NOTE — CASE COMMUNICATION
Quality Review Completed for 6/3 SOC OASIS by ALEX Kaba RN on Aliya 10,  2021:  Edits completed by ALEX Kaba RN:  1.  is 6/2 per intake for valid referral   2.  is early  3.  is 0-no supportive documentation for low vision  4.  changed to #3, per EMR recent ED visit reported to MD general body aches, and he takes scheduled pain meds for OA  5.  is #1 per functional limitation of endurance  6.  changed to 1 per therapy  7. [id number] D, E is partial/mod per narrative. J is #88 per therapy patient endured 30'/  8.  changed to 6/8 for PT collaboration  9. Triage code changed to low for congregate living situation has 24/7 assist

## 2021-06-11 ENCOUNTER — HOME CARE VISIT (OUTPATIENT)
Dept: HOME HEALTH SERVICES | Facility: HOME HEALTHCARE | Age: 71
End: 2021-06-11
Payer: MEDICARE

## 2021-06-11 VITALS
TEMPERATURE: 98.2 F | SYSTOLIC BLOOD PRESSURE: 100 MMHG | OXYGEN SATURATION: 96 % | WEIGHT: 165 LBS | HEIGHT: 70 IN | RESPIRATION RATE: 16 BRPM | BODY MASS INDEX: 23.62 KG/M2 | HEART RATE: 69 BPM | DIASTOLIC BLOOD PRESSURE: 68 MMHG

## 2021-06-11 PROCEDURE — G0493 RN CARE EA 15 MIN HH/HOSPICE: HCPCS

## 2021-06-11 ASSESSMENT — ENCOUNTER SYMPTOMS
MUSCLE WEAKNESS: 1
NAUSEA: DENIES
VOMITING: DENIES

## 2021-06-11 ASSESSMENT — FIBROSIS 4 INDEX: FIB4 SCORE: 2.86

## 2021-06-11 NOTE — Clinical Note
Call placed to patient PCP  and message left with Kian as to whether Dr. Teresa would like to increase patient's Cymbalta, patient remains very depressed. He does not have any DIA at this time or plans to kill himself but care givers state that when he first arrived at the care home on 4-01-21 he did. No homocidal ideation voiced at this time.  The patient's  affect remains very flat at this time.

## 2021-06-12 ENCOUNTER — HOME CARE VISIT (OUTPATIENT)
Dept: HOME HEALTH SERVICES | Facility: HOME HEALTHCARE | Age: 71
End: 2021-06-12
Payer: MEDICARE

## 2021-06-12 NOTE — CASE COMMUNICATION
This CC is to inform you that Mr. Salinas has a PHQ-9 of 15 and is recognized by this  services at [facility].  Medication changes noted per MD and ongoing assessment/response by our disciplines will continue with report to MD barry.    Thank you, Bharat Hughes RN C (start nurse)

## 2021-06-12 NOTE — CASE COMMUNICATION
I agree with these changes    ----- Message -----  From: Brittany Kaba R.N.  Sent: 6/10/2021  11:01 AM PDT  To: Valerie Hughes R.N.      Quality Review Completed for 6/3 SOC OASIS by ALEX Kaba, RN on Aliya 10,  2021:  Edits completed by ALEX Kaba, RN:  1.  is 6/2 per intake for valid referral   2.  is early  3.  is 0-no supportive documentation for low vision  4.  changed to #3, per EMR recent ED visit reported to MD general body aches, and he takes scheduled pain meds for OA  5.  is #1 per functional limitation of endurance  6.  changed to 1 per therapy  7. [id number] D, E is partial/mod per narrative. J is #88 per therapy patient endured 30'/  8.  changed to 6/8 for PT collaboration  9. Triage code changed to low for congregate living situation has 24/7 assist

## 2021-06-14 ENCOUNTER — HOME CARE VISIT (OUTPATIENT)
Dept: HOME HEALTH SERVICES | Facility: HOME HEALTHCARE | Age: 71
End: 2021-06-14
Payer: MEDICARE

## 2021-06-14 VITALS
DIASTOLIC BLOOD PRESSURE: 70 MMHG | HEART RATE: 89 BPM | OXYGEN SATURATION: 96 % | RESPIRATION RATE: 16 BRPM | TEMPERATURE: 98.4 F | SYSTOLIC BLOOD PRESSURE: 100 MMHG

## 2021-06-14 PROCEDURE — G0493 RN CARE EA 15 MIN HH/HOSPICE: HCPCS

## 2021-06-14 ASSESSMENT — ENCOUNTER SYMPTOMS
ADDITIONAL INFORMATION: 2/10
NAUSEA: DENIES
MUSCLE WEAKNESS: 1
VOMITING: DENIES

## 2021-06-15 ENCOUNTER — HOME CARE VISIT (OUTPATIENT)
Dept: HOME HEALTH SERVICES | Facility: HOME HEALTHCARE | Age: 71
End: 2021-06-15
Payer: MEDICARE

## 2021-06-15 NOTE — CASE COMMUNICATION
OT phoned client's group home.  The caregiver that answered the phone states that Lico wants to cancel all therapies.  Please advise if someone has alternate information.  I will not write a DC order until I know for sure.

## 2021-06-18 ENCOUNTER — HOME CARE VISIT (OUTPATIENT)
Dept: HOME HEALTH SERVICES | Facility: HOME HEALTHCARE | Age: 71
End: 2021-06-18
Payer: MEDICARE

## 2021-06-18 VITALS
TEMPERATURE: 98.4 F | HEART RATE: 86 BPM | RESPIRATION RATE: 16 BRPM | SYSTOLIC BLOOD PRESSURE: 100 MMHG | DIASTOLIC BLOOD PRESSURE: 70 MMHG | OXYGEN SATURATION: 95 %

## 2021-06-18 PROCEDURE — G0493 RN CARE EA 15 MIN HH/HOSPICE: HCPCS

## 2021-06-18 ASSESSMENT — ENCOUNTER SYMPTOMS
VOMITING: DENIES
NAUSEA: DENIES

## 2021-06-21 ENCOUNTER — HOME CARE VISIT (OUTPATIENT)
Dept: HOME HEALTH SERVICES | Facility: HOME HEALTHCARE | Age: 71
End: 2021-06-21
Payer: MEDICARE

## 2021-06-21 VITALS
DIASTOLIC BLOOD PRESSURE: 80 MMHG | HEART RATE: 89 BPM | OXYGEN SATURATION: 94 % | TEMPERATURE: 98.6 F | RESPIRATION RATE: 16 BRPM | SYSTOLIC BLOOD PRESSURE: 110 MMHG

## 2021-06-21 PROCEDURE — G0493 RN CARE EA 15 MIN HH/HOSPICE: HCPCS

## 2021-06-21 ASSESSMENT — ENCOUNTER SYMPTOMS
VOMITING: DENIES
MUSCLE WEAKNESS: 1
NAUSEA: DENIES

## 2021-06-24 ENCOUNTER — HOME CARE VISIT (OUTPATIENT)
Dept: HOME HEALTH SERVICES | Facility: HOME HEALTHCARE | Age: 71
End: 2021-06-24
Payer: MEDICARE

## 2021-06-24 VITALS
TEMPERATURE: 98.2 F | DIASTOLIC BLOOD PRESSURE: 75 MMHG | HEART RATE: 76 BPM | OXYGEN SATURATION: 96 % | SYSTOLIC BLOOD PRESSURE: 105 MMHG | RESPIRATION RATE: 16 BRPM

## 2021-06-24 PROCEDURE — G0493 RN CARE EA 15 MIN HH/HOSPICE: HCPCS

## 2021-06-24 ASSESSMENT — ENCOUNTER SYMPTOMS
VOMITING: DENIES
NAUSEA: DENIE
MUSCLE WEAKNESS: 1

## 2021-06-28 ENCOUNTER — HOME CARE VISIT (OUTPATIENT)
Dept: HOME HEALTH SERVICES | Facility: HOME HEALTHCARE | Age: 71
End: 2021-06-28
Payer: MEDICARE

## 2021-06-28 VITALS
SYSTOLIC BLOOD PRESSURE: 115 MMHG | OXYGEN SATURATION: 96 % | TEMPERATURE: 98.4 F | HEART RATE: 73 BPM | RESPIRATION RATE: 16 BRPM | DIASTOLIC BLOOD PRESSURE: 70 MMHG

## 2021-06-28 PROCEDURE — G0493 RN CARE EA 15 MIN HH/HOSPICE: HCPCS

## 2021-06-28 ASSESSMENT — ENCOUNTER SYMPTOMS
VOMITING: DENES
NAUSEA: DENIES
MUSCLE WEAKNESS: 1
ADDITIONAL INFORMATION: 4/10

## 2021-07-01 ENCOUNTER — HOME CARE VISIT (OUTPATIENT)
Dept: HOME HEALTH SERVICES | Facility: HOME HEALTHCARE | Age: 71
End: 2021-07-01
Payer: MEDICARE

## 2021-07-01 PROCEDURE — G0495 RN CARE TRAIN/EDU IN HH: HCPCS

## 2021-07-02 VITALS
DIASTOLIC BLOOD PRESSURE: 60 MMHG | HEART RATE: 84 BPM | OXYGEN SATURATION: 97 % | RESPIRATION RATE: 16 BRPM | TEMPERATURE: 97.4 F | SYSTOLIC BLOOD PRESSURE: 100 MMHG

## 2021-07-02 ASSESSMENT — ENCOUNTER SYMPTOMS
VOMITING: DENIES
LIMITED RANGE OF MOTION: 1
DEPRESSED MOOD: 1
MUSCLE WEAKNESS: 1
NAUSEA: DENIES

## 2021-07-05 ENCOUNTER — HOME CARE VISIT (OUTPATIENT)
Dept: HOME HEALTH SERVICES | Facility: HOME HEALTHCARE | Age: 71
End: 2021-07-05
Payer: MEDICARE

## 2021-07-05 VITALS
OXYGEN SATURATION: 97 % | SYSTOLIC BLOOD PRESSURE: 100 MMHG | DIASTOLIC BLOOD PRESSURE: 70 MMHG | RESPIRATION RATE: 16 BRPM | HEART RATE: 67 BPM | TEMPERATURE: 97.6 F

## 2021-07-05 PROCEDURE — G0493 RN CARE EA 15 MIN HH/HOSPICE: HCPCS

## 2021-07-05 PROCEDURE — 665001 SOC-HOME HEALTH

## 2021-07-05 ASSESSMENT — ENCOUNTER SYMPTOMS
MUSCLE WEAKNESS: 1
NAUSEA: DENIES
VOMITING: DENIES

## 2021-07-14 ENCOUNTER — HOME CARE VISIT (OUTPATIENT)
Dept: HOME HEALTH SERVICES | Facility: HOME HEALTHCARE | Age: 71
End: 2021-07-14
Payer: MEDICARE

## 2021-07-14 VITALS
OXYGEN SATURATION: 95 % | TEMPERATURE: 98.2 F | RESPIRATION RATE: 16 BRPM | HEART RATE: 78 BPM | DIASTOLIC BLOOD PRESSURE: 68 MMHG | SYSTOLIC BLOOD PRESSURE: 100 MMHG

## 2021-07-14 PROCEDURE — G0493 RN CARE EA 15 MIN HH/HOSPICE: HCPCS

## 2021-07-14 ASSESSMENT — ENCOUNTER SYMPTOMS
MUSCLE WEAKNESS: 1
NAUSEA: DENIE
VOMITING: DENIES

## 2021-07-19 ENCOUNTER — HOME CARE VISIT (OUTPATIENT)
Dept: HOME HEALTH SERVICES | Facility: HOME HEALTHCARE | Age: 71
End: 2021-07-19
Payer: MEDICARE

## 2021-07-19 VITALS
OXYGEN SATURATION: 95 % | RESPIRATION RATE: 16 BRPM | DIASTOLIC BLOOD PRESSURE: 70 MMHG | HEART RATE: 76 BPM | TEMPERATURE: 97.6 F | SYSTOLIC BLOOD PRESSURE: 118 MMHG

## 2021-07-19 PROCEDURE — G0493 RN CARE EA 15 MIN HH/HOSPICE: HCPCS

## 2021-07-20 ASSESSMENT — ACTIVITIES OF DAILY LIVING (ADL): HOME_HEALTH_OASIS: 00

## 2021-07-20 ASSESSMENT — PATIENT HEALTH QUESTIONNAIRE - PHQ9: CLINICAL INTERPRETATION OF PHQ2 SCORE: 0

## 2021-07-22 ENCOUNTER — HOME CARE VISIT (OUTPATIENT)
Dept: HOME HEALTH SERVICES | Facility: HOME HEALTHCARE | Age: 71
End: 2021-07-22
Payer: MEDICARE

## 2021-07-22 ASSESSMENT — ACTIVITIES OF DAILY LIVING (ADL): OASIS_M1830: 00

## 2021-07-22 NOTE — CASE COMMUNICATION
Quality Review for 7/19/21 WI OASIS by LUCERO Bradley, DRISS on  July 22, 2021      Edits completed by LUCERO Bradley RN:    1.  A and E are yes per the care plan  2.  is 0 per narrative

## 2021-07-24 NOTE — CASE COMMUNICATION
I agree with changes.  ----- Message -----  From: Dyan Bradley R.N.  Sent: 7/22/2021   2:05 PM PDT  To: Sherri Stahl R.N.      Quality Review for 7/19/21 DC OASIS by LUCERO Bradley, DRISS on  July 22, 2021      Edits completed by LUCERO Bradley RN:    1.  A and E are yes per the care plan  2.  is 0 per narrative

## 2022-02-02 ENCOUNTER — HOSPITAL ENCOUNTER (OUTPATIENT)
Facility: MEDICAL CENTER | Age: 72
End: 2022-02-02
Attending: INTERNAL MEDICINE
Payer: MEDICARE

## 2022-02-02 PROCEDURE — 85025 COMPLETE CBC W/AUTO DIFF WBC: CPT

## 2022-02-02 PROCEDURE — 87086 URINE CULTURE/COLONY COUNT: CPT

## 2022-02-02 PROCEDURE — 80053 COMPREHEN METABOLIC PANEL: CPT

## 2022-02-03 LAB
ALBUMIN SERPL BCP-MCNC: 3.4 G/DL (ref 3.2–4.9)
ALBUMIN/GLOB SERPL: 1.1 G/DL
ALP SERPL-CCNC: 71 U/L (ref 30–99)
ALT SERPL-CCNC: 45 U/L (ref 2–50)
ANION GAP SERPL CALC-SCNC: 14 MMOL/L (ref 7–16)
AST SERPL-CCNC: 61 U/L (ref 12–45)
BASOPHILS # BLD AUTO: 0.4 % (ref 0–1.8)
BASOPHILS # BLD: 0.07 K/UL (ref 0–0.12)
BILIRUB SERPL-MCNC: 0.3 MG/DL (ref 0.1–1.5)
BUN SERPL-MCNC: 18 MG/DL (ref 8–22)
CALCIUM SERPL-MCNC: 9 MG/DL (ref 8.5–10.5)
CHLORIDE SERPL-SCNC: 98 MMOL/L (ref 96–112)
CO2 SERPL-SCNC: 22 MMOL/L (ref 20–33)
CREAT SERPL-MCNC: 0.93 MG/DL (ref 0.5–1.4)
EOSINOPHIL # BLD AUTO: 0.08 K/UL (ref 0–0.51)
EOSINOPHIL NFR BLD: 0.4 % (ref 0–6.9)
ERYTHROCYTE [DISTWIDTH] IN BLOOD BY AUTOMATED COUNT: 44 FL (ref 35.9–50)
GLOBULIN SER CALC-MCNC: 3 G/DL (ref 1.9–3.5)
GLUCOSE SERPL-MCNC: 180 MG/DL (ref 65–99)
HCT VFR BLD AUTO: 38 % (ref 42–52)
HGB BLD-MCNC: 12.1 G/DL (ref 14–18)
IMM GRANULOCYTES # BLD AUTO: 0.15 K/UL (ref 0–0.11)
IMM GRANULOCYTES NFR BLD AUTO: 0.8 % (ref 0–0.9)
LYMPHOCYTES # BLD AUTO: 3.76 K/UL (ref 1–4.8)
LYMPHOCYTES NFR BLD: 20.7 % (ref 22–41)
MCH RBC QN AUTO: 28.5 PG (ref 27–33)
MCHC RBC AUTO-ENTMCNC: 31.8 G/DL (ref 33.7–35.3)
MCV RBC AUTO: 89.4 FL (ref 81.4–97.8)
MONOCYTES # BLD AUTO: 0.51 K/UL (ref 0–0.85)
MONOCYTES NFR BLD AUTO: 2.8 % (ref 0–13.4)
NEUTROPHILS # BLD AUTO: 13.6 K/UL (ref 1.82–7.42)
NEUTROPHILS NFR BLD: 74.9 % (ref 44–72)
NRBC # BLD AUTO: 0 K/UL
NRBC BLD-RTO: 0 /100 WBC
PLATELET # BLD AUTO: 365 K/UL (ref 164–446)
PMV BLD AUTO: 10.9 FL (ref 9–12.9)
POTASSIUM SERPL-SCNC: 4.6 MMOL/L (ref 3.6–5.5)
PROT SERPL-MCNC: 6.4 G/DL (ref 6–8.2)
RBC # BLD AUTO: 4.25 M/UL (ref 4.7–6.1)
SODIUM SERPL-SCNC: 134 MMOL/L (ref 135–145)
WBC # BLD AUTO: 18.2 K/UL (ref 4.8–10.8)

## 2022-02-05 LAB
BACTERIA UR CULT: NORMAL
SIGNIFICANT IND 70042: NORMAL
SITE SITE: NORMAL
SOURCE SOURCE: NORMAL

## 2022-02-18 ENCOUNTER — HOSPITAL ENCOUNTER (OUTPATIENT)
Facility: MEDICAL CENTER | Age: 72
End: 2022-02-18
Attending: INTERNAL MEDICINE
Payer: MEDICARE

## 2022-02-18 PROCEDURE — 80048 BASIC METABOLIC PNL TOTAL CA: CPT

## 2022-02-18 PROCEDURE — 85025 COMPLETE CBC W/AUTO DIFF WBC: CPT

## 2022-02-19 LAB
ANION GAP SERPL CALC-SCNC: 15 MMOL/L (ref 7–16)
BASOPHILS # BLD AUTO: 0.4 % (ref 0–1.8)
BASOPHILS # BLD: 0.07 K/UL (ref 0–0.12)
BUN SERPL-MCNC: 14 MG/DL (ref 8–22)
CALCIUM SERPL-MCNC: 9.1 MG/DL (ref 8.5–10.5)
CHLORIDE SERPL-SCNC: 97 MMOL/L (ref 96–112)
CO2 SERPL-SCNC: 22 MMOL/L (ref 20–33)
CREAT SERPL-MCNC: 0.75 MG/DL (ref 0.5–1.4)
EOSINOPHIL # BLD AUTO: 0.14 K/UL (ref 0–0.51)
EOSINOPHIL NFR BLD: 0.8 % (ref 0–6.9)
ERYTHROCYTE [DISTWIDTH] IN BLOOD BY AUTOMATED COUNT: 46.6 FL (ref 35.9–50)
GLUCOSE SERPL-MCNC: 191 MG/DL (ref 65–99)
HCT VFR BLD AUTO: 39 % (ref 42–52)
HGB BLD-MCNC: 12 G/DL (ref 14–18)
IMM GRANULOCYTES # BLD AUTO: 0.13 K/UL (ref 0–0.11)
IMM GRANULOCYTES NFR BLD AUTO: 0.7 % (ref 0–0.9)
LYMPHOCYTES # BLD AUTO: 4.71 K/UL (ref 1–4.8)
LYMPHOCYTES NFR BLD: 26.9 % (ref 22–41)
MCH RBC QN AUTO: 27.9 PG (ref 27–33)
MCHC RBC AUTO-ENTMCNC: 30.8 G/DL (ref 33.7–35.3)
MCV RBC AUTO: 90.7 FL (ref 81.4–97.8)
MONOCYTES # BLD AUTO: 0.57 K/UL (ref 0–0.85)
MONOCYTES NFR BLD AUTO: 3.3 % (ref 0–13.4)
NEUTROPHILS # BLD AUTO: 11.87 K/UL (ref 1.82–7.42)
NEUTROPHILS NFR BLD: 67.9 % (ref 44–72)
NRBC # BLD AUTO: 0 K/UL
NRBC BLD-RTO: 0 /100 WBC
PLATELET # BLD AUTO: 392 K/UL (ref 164–446)
PMV BLD AUTO: 10.4 FL (ref 9–12.9)
POTASSIUM SERPL-SCNC: 4.7 MMOL/L (ref 3.6–5.5)
RBC # BLD AUTO: 4.3 M/UL (ref 4.7–6.1)
SODIUM SERPL-SCNC: 134 MMOL/L (ref 135–145)
WBC # BLD AUTO: 17.5 K/UL (ref 4.8–10.8)

## 2022-02-23 ENCOUNTER — HOSPITAL ENCOUNTER (OUTPATIENT)
Facility: MEDICAL CENTER | Age: 72
End: 2022-02-23
Attending: INTERNAL MEDICINE
Payer: MEDICARE

## 2022-02-23 PROCEDURE — 80048 BASIC METABOLIC PNL TOTAL CA: CPT

## 2022-02-23 PROCEDURE — 81003 URINALYSIS AUTO W/O SCOPE: CPT

## 2022-02-23 PROCEDURE — 87086 URINE CULTURE/COLONY COUNT: CPT

## 2022-02-23 PROCEDURE — 85025 COMPLETE CBC W/AUTO DIFF WBC: CPT

## 2022-02-23 PROCEDURE — 87077 CULTURE AEROBIC IDENTIFY: CPT

## 2022-02-25 PROCEDURE — 87086 URINE CULTURE/COLONY COUNT: CPT

## 2022-02-25 PROCEDURE — 87077 CULTURE AEROBIC IDENTIFY: CPT

## 2022-02-25 PROCEDURE — 80048 BASIC METABOLIC PNL TOTAL CA: CPT

## 2022-02-25 PROCEDURE — 85025 COMPLETE CBC W/AUTO DIFF WBC: CPT

## 2022-02-25 PROCEDURE — 81003 URINALYSIS AUTO W/O SCOPE: CPT

## 2022-02-26 LAB
ANION GAP SERPL CALC-SCNC: 11 MMOL/L (ref 7–16)
APPEARANCE UR: CLEAR
BASOPHILS # BLD AUTO: 0.3 % (ref 0–1.8)
BASOPHILS # BLD: 0.05 K/UL (ref 0–0.12)
BILIRUB UR QL STRIP.AUTO: NEGATIVE
BUN SERPL-MCNC: 12 MG/DL (ref 8–22)
CALCIUM SERPL-MCNC: 8.9 MG/DL (ref 8.5–10.5)
CHLORIDE SERPL-SCNC: 99 MMOL/L (ref 96–112)
CO2 SERPL-SCNC: 23 MMOL/L (ref 20–33)
COLOR UR: YELLOW
CREAT SERPL-MCNC: 0.72 MG/DL (ref 0.5–1.4)
EOSINOPHIL # BLD AUTO: 0.05 K/UL (ref 0–0.51)
EOSINOPHIL NFR BLD: 0.3 % (ref 0–6.9)
ERYTHROCYTE [DISTWIDTH] IN BLOOD BY AUTOMATED COUNT: 45.5 FL (ref 35.9–50)
GLUCOSE SERPL-MCNC: 151 MG/DL (ref 65–99)
GLUCOSE UR STRIP.AUTO-MCNC: NEGATIVE MG/DL
HCT VFR BLD AUTO: 34.3 % (ref 42–52)
HGB BLD-MCNC: 11.1 G/DL (ref 14–18)
IMM GRANULOCYTES # BLD AUTO: 0.11 K/UL (ref 0–0.11)
IMM GRANULOCYTES NFR BLD AUTO: 0.7 % (ref 0–0.9)
KETONES UR STRIP.AUTO-MCNC: NEGATIVE MG/DL
LEUKOCYTE ESTERASE UR QL STRIP.AUTO: NEGATIVE
LYMPHOCYTES # BLD AUTO: 4.17 K/UL (ref 1–4.8)
LYMPHOCYTES NFR BLD: 25.9 % (ref 22–41)
MCH RBC QN AUTO: 28.4 PG (ref 27–33)
MCHC RBC AUTO-ENTMCNC: 32.4 G/DL (ref 33.7–35.3)
MCV RBC AUTO: 87.7 FL (ref 81.4–97.8)
MICRO URNS: NORMAL
MONOCYTES # BLD AUTO: 0.78 K/UL (ref 0–0.85)
MONOCYTES NFR BLD AUTO: 4.9 % (ref 0–13.4)
NEUTROPHILS # BLD AUTO: 10.91 K/UL (ref 1.82–7.42)
NEUTROPHILS NFR BLD: 67.9 % (ref 44–72)
NITRITE UR QL STRIP.AUTO: NEGATIVE
NRBC # BLD AUTO: 0 K/UL
NRBC BLD-RTO: 0 /100 WBC
PH UR STRIP.AUTO: 7 [PH] (ref 5–8)
PLATELET # BLD AUTO: 419 K/UL (ref 164–446)
PMV BLD AUTO: 10.5 FL (ref 9–12.9)
POTASSIUM SERPL-SCNC: 4.3 MMOL/L (ref 3.6–5.5)
PROT UR QL STRIP: NEGATIVE MG/DL
RBC # BLD AUTO: 3.91 M/UL (ref 4.7–6.1)
RBC UR QL AUTO: NEGATIVE
SODIUM SERPL-SCNC: 133 MMOL/L (ref 135–145)
SP GR UR STRIP.AUTO: 1.02
UROBILINOGEN UR STRIP.AUTO-MCNC: 1 MG/DL
WBC # BLD AUTO: 16.1 K/UL (ref 4.8–10.8)

## 2022-02-28 ENCOUNTER — HOSPITAL ENCOUNTER (INPATIENT)
Facility: MEDICAL CENTER | Age: 72
LOS: 11 days | DRG: 871 | End: 2022-03-11
Attending: EMERGENCY MEDICINE | Admitting: INTERNAL MEDICINE
Payer: MEDICARE

## 2022-02-28 ENCOUNTER — APPOINTMENT (OUTPATIENT)
Dept: RADIOLOGY | Facility: MEDICAL CENTER | Age: 72
DRG: 871 | End: 2022-02-28
Attending: EMERGENCY MEDICINE
Payer: MEDICARE

## 2022-02-28 DIAGNOSIS — R53.81 DEBILITY: ICD-10-CM

## 2022-02-28 DIAGNOSIS — E11.9 DIABETES MELLITUS TYPE 2, NONINSULIN DEPENDENT (HCC): ICD-10-CM

## 2022-02-28 DIAGNOSIS — G47.00 INSOMNIA, UNSPECIFIED TYPE: ICD-10-CM

## 2022-02-28 DIAGNOSIS — R33.8 BENIGN PROSTATIC HYPERPLASIA WITH URINARY RETENTION: ICD-10-CM

## 2022-02-28 DIAGNOSIS — H40.89 OTHER GLAUCOMA OF BOTH EYES: ICD-10-CM

## 2022-02-28 DIAGNOSIS — N40.1 BENIGN PROSTATIC HYPERPLASIA WITH URINARY RETENTION: ICD-10-CM

## 2022-02-28 DIAGNOSIS — K65.1 RIGHT LOWER QUADRANT ABDOMINAL ABSCESS (HCC): ICD-10-CM

## 2022-02-28 DIAGNOSIS — E78.5 DYSLIPIDEMIA: ICD-10-CM

## 2022-02-28 DIAGNOSIS — E03.9 HYPOTHYROIDISM, UNSPECIFIED TYPE: ICD-10-CM

## 2022-02-28 PROBLEM — A41.9 SEPSIS (HCC): Status: ACTIVE | Noted: 2022-02-28

## 2022-02-28 LAB
ALBUMIN SERPL BCP-MCNC: 3.6 G/DL (ref 3.2–4.9)
ALBUMIN/GLOB SERPL: 0.9 G/DL
ALP SERPL-CCNC: 65 U/L (ref 30–99)
ALT SERPL-CCNC: 23 U/L (ref 2–50)
ANION GAP SERPL CALC-SCNC: 15 MMOL/L (ref 7–16)
ANISOCYTOSIS BLD QL SMEAR: ABNORMAL
APTT PPP: 37.8 SEC (ref 24.7–36)
AST SERPL-CCNC: 28 U/L (ref 12–45)
BACTERIA UR CULT: NORMAL
BASOPHILS # BLD AUTO: 0 % (ref 0–1.8)
BASOPHILS # BLD: 0 K/UL (ref 0–0.12)
BILIRUB SERPL-MCNC: 0.4 MG/DL (ref 0.1–1.5)
BUN SERPL-MCNC: 18 MG/DL (ref 8–22)
CALCIUM SERPL-MCNC: 9.4 MG/DL (ref 8.5–10.5)
CHLORIDE SERPL-SCNC: 99 MMOL/L (ref 96–112)
CO2 SERPL-SCNC: 23 MMOL/L (ref 20–33)
CREAT SERPL-MCNC: 0.8 MG/DL (ref 0.5–1.4)
EOSINOPHIL # BLD AUTO: 0 K/UL (ref 0–0.51)
EOSINOPHIL NFR BLD: 0 % (ref 0–6.9)
ERYTHROCYTE [DISTWIDTH] IN BLOOD BY AUTOMATED COUNT: 45.4 FL (ref 35.9–50)
GLOBULIN SER CALC-MCNC: 3.9 G/DL (ref 1.9–3.5)
GLUCOSE SERPL-MCNC: 161 MG/DL (ref 65–99)
HCT VFR BLD AUTO: 34.5 % (ref 42–52)
HGB BLD-MCNC: 11.2 G/DL (ref 14–18)
INR PPP: 1.35 (ref 0.87–1.13)
INR PPP: 1.36 (ref 0.87–1.13)
LACTATE BLD-SCNC: 1.9 MMOL/L (ref 0.5–2)
LYMPHOCYTES # BLD AUTO: 5.89 K/UL (ref 1–4.8)
LYMPHOCYTES NFR BLD: 32.2 % (ref 22–41)
MANUAL DIFF BLD: NORMAL
MCH RBC QN AUTO: 28.1 PG (ref 27–33)
MCHC RBC AUTO-ENTMCNC: 32.5 G/DL (ref 33.7–35.3)
MCV RBC AUTO: 86.7 FL (ref 81.4–97.8)
MICROCYTES BLD QL SMEAR: ABNORMAL
MONOCYTES # BLD AUTO: 0.79 K/UL (ref 0–0.85)
MONOCYTES NFR BLD AUTO: 4.3 % (ref 0–13.4)
MORPHOLOGY BLD-IMP: NORMAL
NEUTROPHILS # BLD AUTO: 11.62 K/UL (ref 1.82–7.42)
NEUTROPHILS NFR BLD: 60 % (ref 44–72)
NEUTS BAND NFR BLD MANUAL: 3.5 % (ref 0–10)
NRBC # BLD AUTO: 0 K/UL
NRBC BLD-RTO: 0 /100 WBC
PLATELET # BLD AUTO: 459 K/UL (ref 164–446)
PLATELET BLD QL SMEAR: NORMAL
PMV BLD AUTO: 9.4 FL (ref 9–12.9)
POLYCHROMASIA BLD QL SMEAR: NORMAL
POTASSIUM SERPL-SCNC: 4.2 MMOL/L (ref 3.6–5.5)
PROT SERPL-MCNC: 7.5 G/DL (ref 6–8.2)
PROTHROMBIN TIME: 16.3 SEC (ref 12–14.6)
PROTHROMBIN TIME: 16.3 SEC (ref 12–14.6)
RBC # BLD AUTO: 3.98 M/UL (ref 4.7–6.1)
RBC BLD AUTO: PRESENT
SARS-COV+SARS-COV-2 AG RESP QL IA.RAPID: NOTDETECTED
SIGNIFICANT IND 70042: NORMAL
SITE SITE: NORMAL
SMUDGE CELLS BLD QL SMEAR: NORMAL
SODIUM SERPL-SCNC: 137 MMOL/L (ref 135–145)
SOURCE SOURCE: NORMAL
SPECIMEN SOURCE: NORMAL
WBC # BLD AUTO: 18.3 K/UL (ref 4.8–10.8)

## 2022-02-28 PROCEDURE — 36415 COLL VENOUS BLD VENIPUNCTURE: CPT

## 2022-02-28 PROCEDURE — 770006 HCHG ROOM/CARE - MED/SURG/GYN SEMI*

## 2022-02-28 PROCEDURE — 700101 HCHG RX REV CODE 250: Performed by: EMERGENCY MEDICINE

## 2022-02-28 PROCEDURE — A9270 NON-COVERED ITEM OR SERVICE: HCPCS | Performed by: INTERNAL MEDICINE

## 2022-02-28 PROCEDURE — 700111 HCHG RX REV CODE 636 W/ 250 OVERRIDE (IP): Performed by: INTERNAL MEDICINE

## 2022-02-28 PROCEDURE — 700111 HCHG RX REV CODE 636 W/ 250 OVERRIDE (IP): Performed by: EMERGENCY MEDICINE

## 2022-02-28 PROCEDURE — 700117 HCHG RX CONTRAST REV CODE 255: Performed by: EMERGENCY MEDICINE

## 2022-02-28 PROCEDURE — 700105 HCHG RX REV CODE 258: Performed by: INTERNAL MEDICINE

## 2022-02-28 PROCEDURE — 83605 ASSAY OF LACTIC ACID: CPT

## 2022-02-28 PROCEDURE — 85730 THROMBOPLASTIN TIME PARTIAL: CPT

## 2022-02-28 PROCEDURE — 74177 CT ABD & PELVIS W/CONTRAST: CPT | Mod: ME

## 2022-02-28 PROCEDURE — 87040 BLOOD CULTURE FOR BACTERIA: CPT

## 2022-02-28 PROCEDURE — 99221 1ST HOSP IP/OBS SF/LOW 40: CPT | Performed by: SURGERY

## 2022-02-28 PROCEDURE — 85007 BL SMEAR W/DIFF WBC COUNT: CPT

## 2022-02-28 PROCEDURE — 99285 EMERGENCY DEPT VISIT HI MDM: CPT

## 2022-02-28 PROCEDURE — 96375 TX/PRO/DX INJ NEW DRUG ADDON: CPT

## 2022-02-28 PROCEDURE — 96367 TX/PROPH/DG ADDL SEQ IV INF: CPT

## 2022-02-28 PROCEDURE — 96365 THER/PROPH/DIAG IV INF INIT: CPT

## 2022-02-28 PROCEDURE — 700102 HCHG RX REV CODE 250 W/ 637 OVERRIDE(OP): Performed by: INTERNAL MEDICINE

## 2022-02-28 PROCEDURE — 700105 HCHG RX REV CODE 258: Performed by: EMERGENCY MEDICINE

## 2022-02-28 PROCEDURE — 80053 COMPREHEN METABOLIC PANEL: CPT

## 2022-02-28 PROCEDURE — 85610 PROTHROMBIN TIME: CPT | Mod: 91

## 2022-02-28 PROCEDURE — 85027 COMPLETE CBC AUTOMATED: CPT

## 2022-02-28 PROCEDURE — 87426 SARSCOV CORONAVIRUS AG IA: CPT

## 2022-02-28 PROCEDURE — 99223 1ST HOSP IP/OBS HIGH 75: CPT | Performed by: INTERNAL MEDICINE

## 2022-02-28 RX ORDER — PHENOL 1.4 %
10 AEROSOL, SPRAY (ML) MUCOUS MEMBRANE
Status: ON HOLD | COMMUNITY
End: 2022-03-09 | Stop reason: SDUPTHER

## 2022-02-28 RX ORDER — GLUCOSAMINE/CHONDR SU A SOD 750-600 MG
1500 TABLET ORAL DAILY
COMMUNITY

## 2022-02-28 RX ORDER — ACETAMINOPHEN 500 MG
500 TABLET ORAL 2 TIMES DAILY
COMMUNITY

## 2022-02-28 RX ORDER — VITAMIN B COMPLEX
100 TABLET ORAL DAILY
COMMUNITY

## 2022-02-28 RX ORDER — POLYETHYLENE GLYCOL 3350 17 G/17G
1 POWDER, FOR SOLUTION ORAL
Status: DISCONTINUED | OUTPATIENT
Start: 2022-02-28 | End: 2022-03-11 | Stop reason: HOSPADM

## 2022-02-28 RX ORDER — CHOLECALCIFEROL (VITAMIN D3) 125 MCG
10 CAPSULE ORAL
Status: DISCONTINUED | OUTPATIENT
Start: 2022-02-28 | End: 2022-03-11 | Stop reason: HOSPADM

## 2022-02-28 RX ORDER — DORZOLAMIDE HYDROCHLORIDE AND TIMOLOL MALEATE 20; 5 MG/ML; MG/ML
1 SOLUTION/ DROPS OPHTHALMIC 2 TIMES DAILY
Status: DISCONTINUED | OUTPATIENT
Start: 2022-02-28 | End: 2022-03-11 | Stop reason: HOSPADM

## 2022-02-28 RX ORDER — OMEPRAZOLE 20 MG/1
20 CAPSULE, DELAYED RELEASE ORAL DAILY
Status: DISCONTINUED | OUTPATIENT
Start: 2022-03-01 | End: 2022-03-11 | Stop reason: HOSPADM

## 2022-02-28 RX ORDER — LATANOPROST 50 UG/ML
1 SOLUTION/ DROPS OPHTHALMIC EVERY EVENING
Status: DISCONTINUED | OUTPATIENT
Start: 2022-02-28 | End: 2022-03-11 | Stop reason: HOSPADM

## 2022-02-28 RX ORDER — CEFTRIAXONE 2 G/1
2 INJECTION, POWDER, FOR SOLUTION INTRAMUSCULAR; INTRAVENOUS ONCE
Status: COMPLETED | OUTPATIENT
Start: 2022-02-28 | End: 2022-02-28

## 2022-02-28 RX ORDER — AMOXICILLIN 250 MG
2 CAPSULE ORAL 2 TIMES DAILY
Status: DISCONTINUED | OUTPATIENT
Start: 2022-03-01 | End: 2022-03-11 | Stop reason: HOSPADM

## 2022-02-28 RX ORDER — SODIUM CHLORIDE 9 MG/ML
INJECTION, SOLUTION INTRAVENOUS CONTINUOUS
Status: DISCONTINUED | OUTPATIENT
Start: 2022-02-28 | End: 2022-03-02

## 2022-02-28 RX ORDER — DIPHENHYDRAMINE HYDROCHLORIDE 50 MG/ML
25 INJECTION INTRAMUSCULAR; INTRAVENOUS ONCE
Status: DISPENSED | OUTPATIENT
Start: 2022-02-28 | End: 2022-03-01

## 2022-02-28 RX ORDER — MULTIVIT-MIN/FA/LYCOPEN/LUTEIN .4-300-25
1 TABLET ORAL DAILY
COMMUNITY

## 2022-02-28 RX ORDER — ACETAMINOPHEN 325 MG/1
650 TABLET ORAL EVERY 6 HOURS PRN
Status: DISCONTINUED | OUTPATIENT
Start: 2022-02-28 | End: 2022-03-11 | Stop reason: HOSPADM

## 2022-02-28 RX ORDER — TAMSULOSIN HYDROCHLORIDE 0.4 MG/1
0.4 CAPSULE ORAL
Status: DISCONTINUED | OUTPATIENT
Start: 2022-02-28 | End: 2022-03-11 | Stop reason: HOSPADM

## 2022-02-28 RX ORDER — FENOFIBRATE 67 MG/1
134 CAPSULE ORAL DAILY
Status: DISCONTINUED | OUTPATIENT
Start: 2022-03-01 | End: 2022-03-11 | Stop reason: HOSPADM

## 2022-02-28 RX ORDER — METHYLPREDNISOLONE SODIUM SUCCINATE 125 MG/2ML
62.5 INJECTION, POWDER, LYOPHILIZED, FOR SOLUTION INTRAMUSCULAR; INTRAVENOUS ONCE
Status: DISPENSED | OUTPATIENT
Start: 2022-02-28 | End: 2022-03-01

## 2022-02-28 RX ORDER — LEVOTHYROXINE SODIUM 0.07 MG/1
75 TABLET ORAL
Status: DISCONTINUED | OUTPATIENT
Start: 2022-03-01 | End: 2022-03-11 | Stop reason: HOSPADM

## 2022-02-28 RX ORDER — BISACODYL 10 MG
10 SUPPOSITORY, RECTAL RECTAL
Status: DISCONTINUED | OUTPATIENT
Start: 2022-02-28 | End: 2022-03-11 | Stop reason: HOSPADM

## 2022-02-28 RX ORDER — FINASTERIDE 5 MG/1
5 TABLET, FILM COATED ORAL DAILY
Status: DISCONTINUED | OUTPATIENT
Start: 2022-03-01 | End: 2022-03-11 | Stop reason: HOSPADM

## 2022-02-28 RX ADMIN — Medication 10 MG: at 21:47

## 2022-02-28 RX ADMIN — PIPERACILLIN AND TAZOBACTAM 3.38 G: 3; .375 INJECTION, POWDER, LYOPHILIZED, FOR SOLUTION INTRAVENOUS; PARENTERAL at 23:28

## 2022-02-28 RX ADMIN — CEFTRIAXONE SODIUM 2 G: 2 INJECTION, POWDER, FOR SOLUTION INTRAMUSCULAR; INTRAVENOUS at 17:55

## 2022-02-28 RX ADMIN — TAMSULOSIN HYDROCHLORIDE 0.4 MG: 0.4 CAPSULE ORAL at 21:48

## 2022-02-28 RX ADMIN — SODIUM CHLORIDE: 9 INJECTION, SOLUTION INTRAVENOUS at 21:47

## 2022-02-28 RX ADMIN — METRONIDAZOLE 500 MG: 500 INJECTION, SOLUTION INTRAVENOUS at 18:50

## 2022-02-28 RX ADMIN — VANCOMYCIN HYDROCHLORIDE 2000 MG: 500 INJECTION, POWDER, LYOPHILIZED, FOR SOLUTION INTRAVENOUS at 18:57

## 2022-02-28 RX ADMIN — PIPERACILLIN AND TAZOBACTAM 3.38 G: 3; .375 INJECTION, POWDER, LYOPHILIZED, FOR SOLUTION INTRAVENOUS; PARENTERAL at 21:39

## 2022-02-28 RX ADMIN — IOHEXOL 100 ML: 350 INJECTION, SOLUTION INTRAVENOUS at 17:16

## 2022-02-28 ASSESSMENT — ENCOUNTER SYMPTOMS
CHILLS: 1
HEARTBURN: 0
PSYCHIATRIC NEGATIVE: 1
DIARRHEA: 0
WEIGHT LOSS: 0
MUSCULOSKELETAL NEGATIVE: 1
NEUROLOGICAL NEGATIVE: 1
BLOOD IN STOOL: 0
VOMITING: 0
FEVER: 0
CARDIOVASCULAR NEGATIVE: 1
ABDOMINAL PAIN: 1
NAUSEA: 0
CONSTIPATION: 0

## 2022-02-28 ASSESSMENT — LIFESTYLE VARIABLES
ON A TYPICAL DAY WHEN YOU DRINK ALCOHOL HOW MANY DRINKS DO YOU HAVE: 0
HOW MANY TIMES IN THE PAST YEAR HAVE YOU HAD 5 OR MORE DRINKS IN A DAY: 0
ALCOHOL_USE: NO
EVER HAD A DRINK FIRST THING IN THE MORNING TO STEADY YOUR NERVES TO GET RID OF A HANGOVER: NO
EVER FELT BAD OR GUILTY ABOUT YOUR DRINKING: NO
HAVE PEOPLE ANNOYED YOU BY CRITICIZING YOUR DRINKING: NO
TOTAL SCORE: 0
AVERAGE NUMBER OF DAYS PER WEEK YOU HAVE A DRINK CONTAINING ALCOHOL: 0
HAVE YOU EVER FELT YOU SHOULD CUT DOWN ON YOUR DRINKING: NO
TOTAL SCORE: 0
TOTAL SCORE: 0
CONSUMPTION TOTAL: NEGATIVE

## 2022-02-28 ASSESSMENT — PATIENT HEALTH QUESTIONNAIRE - PHQ9
2. FEELING DOWN, DEPRESSED, IRRITABLE, OR HOPELESS: NOT AT ALL
SUM OF ALL RESPONSES TO PHQ9 QUESTIONS 1 AND 2: 0
1. LITTLE INTEREST OR PLEASURE IN DOING THINGS: NOT AT ALL

## 2022-02-28 ASSESSMENT — FIBROSIS 4 INDEX: FIB4 SCORE: 1.58

## 2022-02-28 NOTE — ED PROVIDER NOTES
ED Provider Note    Scribed for Abi Saavedra M.D. by Zenon Kay. 2/28/2022, 2:42 PM.    Primary care provider: No primary care provider note.  Means of arrival: Walk in  History obtained from: Family member  History limited by: None    CHIEF COMPLAINT  Chief Complaint   Patient presents with   • Other     Mass on right side of abdomen.        HPI  Lico Salinas is a 71 y.o. male who presents to the Emergency Department for evaluation of moderate sized mass on abdomen onset prior to arrival. The patient localizes the mass on the right side of the abdomen. The patient notes that the pain has not been noticeable till recently. Associated symptoms include abdominal pain. Patient denies any vomiting, chest pain, dyspnea, bloating, change in bowel movements, or change in urinary habits. 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 Additionally the patient has received various diagnostic studies throughout February 2022 in his group home.  Patient has had a fever of unclear source for about 3 weeks. The patient notes that he still has his appendix. The patient has been NPO since 11:30 AM today. The patient reports that he is allergic to penicillin. No alleviating or exacerbating factors noted.     REVIEW OF SYSTEMS  Pertinent positives include abdomen mass and abdominal pain and intermittent fever. Pertinent negatives include no vomiting, chest pain, dyspnea, bloating, change in bowel movements, or change in urinary habits. All other systems reviewed and negative.     PAST MEDICAL HISTORY   has a past medical history of Anxiety, Diabetes (HCC) (07-10-15), Glaucoma, High cholesterol (07-10-15), Hypertension (07-10-15), Hypothyroidism, Neuropathy, peripheral, Skin cancer, Snoring, and Thyroid disease.    SURGICAL HISTORY   has a past surgical history that includes lymph node excision (Left, 7/21/2015); carpal  "tunnel endoscopic (Left, 2017); and lumbar disc replacement.    SOCIAL HISTORY  Social History     Tobacco Use   • Smoking status: Former Smoker     Packs/day: 1.00     Years: 20.00     Pack years: 20.00     Types: Cigarettes     Quit date: 1980     Years since quittin.1   • Smokeless tobacco: Never Used   Vaping Use   • Vaping Use: Never used   Substance Use Topics   • Alcohol use: Not Currently     Alcohol/week: 0.5 oz     Types: 1 Cans of beer per week     Comment: has not been drinking lately    • Drug use: No      Social History     Substance and Sexual Activity   Drug Use No       FAMILY HISTORY  Family History   Problem Relation Age of Onset   • Heart Disease Mother    • Alcohol abuse Mother    • Lung Disease Father    • Heart Disease Father    • Drug abuse Sister        CURRENT MEDICATIONS  Home Medications    **Home medications have not yet been reviewed for this encounter**         ALLERGIES  Allergies   Allergen Reactions   • Pcn [Penicillins] Unspecified     As a child, unknown reaction       PHYSICAL EXAM  VITAL SIGNS: /75   Pulse 90   Temp 36.3 °C (97.4 °F) (Temporal)   Resp 16   Ht 1.753 m (5' 9\")   Wt 79.6 kg (175 lb 7.8 oz)   SpO2 96%   BMI 25.91 kg/m²     Constitutional:. Well developed, No acute distress, Non-toxic appearance.   HENT: Normocephalic, Atraumatic, Bilateral external ears normal,  Nose normal.   Eyes: PERRL, EOMI, Conjunctiva normal.    Neck: Normal range of motion, No tenderness, Supple.     Cardiovascular: Normal heart rate, Normal rhythm.    Thorax & Lungs: Normal breath sounds, No respiratory distress.    Abdomen: Right lower quadrant tenderness, no distention, no guarding, no rebound. Palpable right lower quadrant mass, no pulsatile mass  Skin: Warm, Dry, No erythema, No rash.   Back: No tenderness, No CVA tenderness.   Extremities: Intact distal pulses, No edema, No tenderness   Neurologic: Alert & oriented x 3, Normal motor function, Normal sensory " function, No focal deficits noted.   Psychiatric: Appropriate                                                    DIAGNOSTIC STUDIES / PROCEDURES\    LABS  Results for orders placed or performed during the hospital encounter of 02/28/22   CBC With Differential   Result Value Ref Range    WBC 18.3 (H) 4.8 - 10.8 K/uL    RBC 3.98 (L) 4.70 - 6.10 M/uL    Hemoglobin 11.2 (L) 14.0 - 18.0 g/dL    Hematocrit 34.5 (L) 42.0 - 52.0 %    MCV 86.7 81.4 - 97.8 fL    MCH 28.1 27.0 - 33.0 pg    MCHC 32.5 (L) 33.7 - 35.3 g/dL    RDW 45.4 35.9 - 50.0 fL    Platelet Count 459 (H) 164 - 446 K/uL    MPV 9.4 9.0 - 12.9 fL    Neutrophils-Polys 60.00 44.00 - 72.00 %    Lymphocytes 32.20 22.00 - 41.00 %    Monocytes 4.30 0.00 - 13.40 %    Eosinophils 0.00 0.00 - 6.90 %    Basophils 0.00 0.00 - 1.80 %    Nucleated RBC 0.00 /100 WBC    Neutrophils (Absolute) 11.62 (H) 1.82 - 7.42 K/uL    Lymphs (Absolute) 5.89 (H) 1.00 - 4.80 K/uL    Monos (Absolute) 0.79 0.00 - 0.85 K/uL    Eos (Absolute) 0.00 0.00 - 0.51 K/uL    Baso (Absolute) 0.00 0.00 - 0.12 K/uL    NRBC (Absolute) 0.00 K/uL    Anisocytosis 1+     Microcytosis 1+    Comp Metabolic Panel   Result Value Ref Range    Sodium 137 135 - 145 mmol/L    Potassium 4.2 3.6 - 5.5 mmol/L    Chloride 99 96 - 112 mmol/L    Co2 23 20 - 33 mmol/L    Anion Gap 15.0 7.0 - 16.0    Glucose 161 (H) 65 - 99 mg/dL    Bun 18 8 - 22 mg/dL    Creatinine 0.80 0.50 - 1.40 mg/dL    Calcium 9.4 8.5 - 10.5 mg/dL    AST(SGOT) 28 12 - 45 U/L    ALT(SGPT) 23 2 - 50 U/L    Alkaline Phosphatase 65 30 - 99 U/L    Total Bilirubin 0.4 0.1 - 1.5 mg/dL    Albumin 3.6 3.2 - 4.9 g/dL    Total Protein 7.5 6.0 - 8.2 g/dL    Globulin 3.9 (H) 1.9 - 3.5 g/dL    A-G Ratio 0.9 g/dL   Lactic Acid   Result Value Ref Range    Lactic Acid 1.9 0.5 - 2.0 mmol/L   ESTIMATED GFR   Result Value Ref Range    GFR If African American >60 >60 mL/min/1.73 m 2    GFR If Non African American >60 >60 mL/min/1.73 m 2   DIFFERENTIAL MANUAL   Result Value  Ref Range    Bands-Stabs 3.50 0.00 - 10.00 %    Manual Diff Status PERFORMED    PERIPHERAL SMEAR REVIEW   Result Value Ref Range    Peripheral Smear Review see below    PLATELET ESTIMATE   Result Value Ref Range    Plt Estimation Increased    MORPHOLOGY   Result Value Ref Range    RBC Morphology Present     Polychromia 1+     Smudge Cells Few      All labs reviewed by me.    RADIOLOGY  CT-ABDOMEN-PELVIS WITH   Final Result   Addendum 1 of 1         ADDENDUM:   Please note that the phlegmonous process and extensive abscess in the    right lower quadrant could be due to an indolent previously ruptured    appendicitis.      Final      1.  There is a complex multiloculated fluid collection with heterogeneous peripheral enhancement and internal air lucencies consistent with abscess in the anterior right lower quadrant, etiology uncertain. This extends from the peritoneal cavity through    the musculature into the subcutaneous tissues. There is abnormal enhancement abutting the right abdominal wall musculature and right psoas muscle with multiple smaller loculated fluid components.   2.  There is a questionable phlegmonous type process versus mass just inferior to the cecum.   3.  There are bilateral parapelvic cysts and renal cortical cysts which do not need further imaging follow-up.   4.  There is colonic diverticulosis with no acute diverticulitis.   5.  There is a tiny dependent gallstone.      CT-DRAIN-PERITONEAL    (Results Pending)     The radiologist's interpretation of all radiological studies have been reviewed by me.    COURSE & MEDICAL DECISION MAKING  Nursing notes, VS, PMSFHx reviewed in chart.     Patient presents to the emergency department for evaluation of a right lower quadrant mass that was noticed today.  Patient has a history of a intermittent fever for the last 3 weeks.  The patient's white count has been trending upward but without any obvious cause.  Urine did not show evidence of infection.   Patient does not describe an episode of severe abdominal pain vomiting or diarrhea recently.  Patient has a obvious palpable mass on exam.  He does not have any peritoneal signs.  It is not pulsatile.  Patient has a normal blood pressure.  Will evaluate for possible sepsis.    2:42 PM Patient seen and examined at bedside. The patient presents with mass in right lower quadrant of abdomen and the differential diagnosis includes but is not limited to abdominal mass of unclear etiology, will rule out sepsis. Ordered for UA, Blood Culture, Lactic Acid, CMP, CBC with diff, Estimated GFR, and CT-Abdomen-Pelvis With to evaluate.    Patient's labs show an elevated white count.  Patient is mildly anemic at 11.2.  There is no evidence of bandemia.  Patient does have an elevated glucose but no evidence of a gap acidosis.  Liver function test was normal.  Lactic acid was normal.    CT scan has returned and shows evidence of a right lower quadrant abscess.  Patient will be placed on antibiotics.  As the exact etiology or source of the abscess is unclear I did speak with pharmacy and we will place the patient on Vanco, Rocephin and Flagyl.    5:42 PM - Patient was reevaluated at bedside. Discussed lab and radiology results with the patient and informed them that the studies indicate an abscess but I am unable to determine if it is from the appendix or abdominal wall or psoas. Additionally the labs do not indicate any sepsis, but the increasing white blood cell count is beginning to be concerning. I will page Surgery and a Hospitalist to proceed with hosptialization. Discussed medicating with Rocephin, but I urged the patient to alert staff to any reactions given his allergy to penicillin.       5:47 PM - Ordered Vancomycin 2,000 mg in  mL IVPB, Flagyl IVPB 500 mg, and Rocephin to treat the patient    5:58 PM - Paged Surgery    6:08 PM - Paged Hospitalist    6:13PM - Paged Cardiology    6:14 PM - I discussed the patient's  case and the above findings with Dr. Powell (Hospitalist) who will assess the patient for hospitalization.    6:18 PM I discussed the patient's case and the above findings with Dr. Youssef (Surgery) who recommended ordering and IR drain. Additionally he will come down evaluate the patient.    DISPOSITION:  Patient will be hospitalized by Dr. Powell in guarded condition.     FINAL IMPRESSION  1. Right lower quadrant abdominal abscess (HCC)          Zenon WOOD (Scribe), am scribing for, and in the presence of, Abi Saavedra M.D..    Electronically signed by: Zenon Kay (Scribe), 2/28/2022 C     Abi WOOD M.D. personally performed the services described in this documentation, as scribed by Zenon Kay in my presence, and it is both accurate and complete.    The note accurately reflects work and decisions made by me.  Abi Saavedra M.D.  2/28/2022  7:49 PM

## 2022-02-28 NOTE — ED TRIAGE NOTES
"Chief Complaint   Patient presents with   • Other     Mass on right side of abdomen.      /75   Pulse 90   Temp 36.3 °C (97.4 °F) (Temporal)   Resp 16   Ht 1.753 m (5' 9\")   Wt 79.6 kg (175 lb 7.8 oz)   SpO2 96%   BMI 25.91 kg/m²     C/O  mass on the right lower portion of his abdomen, mass is firm on palpation, denies pain.     Pt states that he still has his appendix. Denies fever/chills.     Had a BM today. Mass was identified by care giver this morning.     Family states Pt has a decreased apatite.     Pt back out to lobby, asked to notify staff of changes.     "

## 2022-03-01 ENCOUNTER — APPOINTMENT (OUTPATIENT)
Dept: RADIOLOGY | Facility: MEDICAL CENTER | Age: 72
DRG: 871 | End: 2022-03-01
Attending: SURGERY
Payer: MEDICARE

## 2022-03-01 LAB
ALBUMIN SERPL BCP-MCNC: 2.7 G/DL (ref 3.2–4.9)
ALBUMIN/GLOB SERPL: 0.8 G/DL
ALP SERPL-CCNC: 53 U/L (ref 30–99)
ALT SERPL-CCNC: 17 U/L (ref 2–50)
ANION GAP SERPL CALC-SCNC: 10 MMOL/L (ref 7–16)
APPEARANCE UR: CLEAR
AST SERPL-CCNC: 20 U/L (ref 12–45)
BASOPHILS # BLD AUTO: 0.3 % (ref 0–1.8)
BASOPHILS # BLD: 0.04 K/UL (ref 0–0.12)
BILIRUB SERPL-MCNC: 0.3 MG/DL (ref 0.1–1.5)
BILIRUB UR QL STRIP.AUTO: NEGATIVE
BUN SERPL-MCNC: 15 MG/DL (ref 8–22)
CALCIUM SERPL-MCNC: 8.8 MG/DL (ref 8.5–10.5)
CEA SERPL-MCNC: 0.6 NG/ML (ref 0–3)
CHLORIDE SERPL-SCNC: 105 MMOL/L (ref 96–112)
CO2 SERPL-SCNC: 23 MMOL/L (ref 20–33)
COLOR UR: YELLOW
CREAT SERPL-MCNC: 0.67 MG/DL (ref 0.5–1.4)
CRP SERPL HS-MCNC: 13.48 MG/DL (ref 0–0.75)
EOSINOPHIL # BLD AUTO: 0.11 K/UL (ref 0–0.51)
EOSINOPHIL NFR BLD: 0.7 % (ref 0–6.9)
ERYTHROCYTE [DISTWIDTH] IN BLOOD BY AUTOMATED COUNT: 44.8 FL (ref 35.9–50)
GLOBULIN SER CALC-MCNC: 3.2 G/DL (ref 1.9–3.5)
GLUCOSE BLD STRIP.AUTO-MCNC: 127 MG/DL (ref 65–99)
GLUCOSE SERPL-MCNC: 140 MG/DL (ref 65–99)
GLUCOSE UR STRIP.AUTO-MCNC: NEGATIVE MG/DL
HCT VFR BLD AUTO: 29.2 % (ref 42–52)
HGB BLD-MCNC: 9.6 G/DL (ref 14–18)
IMM GRANULOCYTES # BLD AUTO: 0.13 K/UL (ref 0–0.11)
IMM GRANULOCYTES NFR BLD AUTO: 0.9 % (ref 0–0.9)
KETONES UR STRIP.AUTO-MCNC: NEGATIVE MG/DL
LACTATE BLD-SCNC: 1.3 MMOL/L (ref 0.5–2)
LEUKOCYTE ESTERASE UR QL STRIP.AUTO: NEGATIVE
LYMPHOCYTES # BLD AUTO: 5.04 K/UL (ref 1–4.8)
LYMPHOCYTES NFR BLD: 33 % (ref 22–41)
MAGNESIUM SERPL-MCNC: 1.8 MG/DL (ref 1.5–2.5)
MCH RBC QN AUTO: 28.2 PG (ref 27–33)
MCHC RBC AUTO-ENTMCNC: 32.9 G/DL (ref 33.7–35.3)
MCV RBC AUTO: 85.9 FL (ref 81.4–97.8)
MICRO URNS: NORMAL
MONOCYTES # BLD AUTO: 0.97 K/UL (ref 0–0.85)
MONOCYTES NFR BLD AUTO: 6.3 % (ref 0–13.4)
NEUTROPHILS # BLD AUTO: 9 K/UL (ref 1.82–7.42)
NEUTROPHILS NFR BLD: 58.8 % (ref 44–72)
NITRITE UR QL STRIP.AUTO: NEGATIVE
NRBC # BLD AUTO: 0 K/UL
NRBC BLD-RTO: 0 /100 WBC
PH UR STRIP.AUTO: 6 [PH] (ref 5–8)
PLATELET # BLD AUTO: 395 K/UL (ref 164–446)
PMV BLD AUTO: 9.3 FL (ref 9–12.9)
POTASSIUM SERPL-SCNC: 3.8 MMOL/L (ref 3.6–5.5)
PROCALCITONIN SERPL-MCNC: 0.16 NG/ML
PROT SERPL-MCNC: 5.9 G/DL (ref 6–8.2)
PROT UR QL STRIP: NEGATIVE MG/DL
RBC # BLD AUTO: 3.4 M/UL (ref 4.7–6.1)
RBC UR QL AUTO: NEGATIVE
SODIUM SERPL-SCNC: 138 MMOL/L (ref 135–145)
SP GR UR STRIP.AUTO: 1.02
UROBILINOGEN UR STRIP.AUTO-MCNC: 1 MG/DL
WBC # BLD AUTO: 15.3 K/UL (ref 4.8–10.8)

## 2022-03-01 PROCEDURE — 85025 COMPLETE CBC W/AUTO DIFF WBC: CPT

## 2022-03-01 PROCEDURE — 0W9G30Z DRAINAGE OF PERITONEAL CAVITY WITH DRAINAGE DEVICE, PERCUTANEOUS APPROACH: ICD-10-PCS | Performed by: STUDENT IN AN ORGANIZED HEALTH CARE EDUCATION/TRAINING PROGRAM

## 2022-03-01 PROCEDURE — 87205 SMEAR GRAM STAIN: CPT | Mod: 91

## 2022-03-01 PROCEDURE — 80053 COMPREHEN METABOLIC PANEL: CPT

## 2022-03-01 PROCEDURE — 99152 MOD SED SAME PHYS/QHP 5/>YRS: CPT

## 2022-03-01 PROCEDURE — 99232 SBSQ HOSP IP/OBS MODERATE 35: CPT | Performed by: HOSPITALIST

## 2022-03-01 PROCEDURE — 87102 FUNGUS ISOLATION CULTURE: CPT

## 2022-03-01 PROCEDURE — 83735 ASSAY OF MAGNESIUM: CPT

## 2022-03-01 PROCEDURE — 700111 HCHG RX REV CODE 636 W/ 250 OVERRIDE (IP): Performed by: INTERNAL MEDICINE

## 2022-03-01 PROCEDURE — 700111 HCHG RX REV CODE 636 W/ 250 OVERRIDE (IP)

## 2022-03-01 PROCEDURE — 86140 C-REACTIVE PROTEIN: CPT

## 2022-03-01 PROCEDURE — 82962 GLUCOSE BLOOD TEST: CPT

## 2022-03-01 PROCEDURE — 81003 URINALYSIS AUTO W/O SCOPE: CPT

## 2022-03-01 PROCEDURE — 87186 SC STD MICRODIL/AGAR DIL: CPT

## 2022-03-01 PROCEDURE — 87070 CULTURE OTHR SPECIMN AEROBIC: CPT

## 2022-03-01 PROCEDURE — 84145 PROCALCITONIN (PCT): CPT

## 2022-03-01 PROCEDURE — 36415 COLL VENOUS BLD VENIPUNCTURE: CPT

## 2022-03-01 PROCEDURE — 87086 URINE CULTURE/COLONY COUNT: CPT

## 2022-03-01 PROCEDURE — A9270 NON-COVERED ITEM OR SERVICE: HCPCS | Performed by: INTERNAL MEDICINE

## 2022-03-01 PROCEDURE — 700105 HCHG RX REV CODE 258: Performed by: INTERNAL MEDICINE

## 2022-03-01 PROCEDURE — 87077 CULTURE AEROBIC IDENTIFY: CPT | Mod: 91

## 2022-03-01 PROCEDURE — 700102 HCHG RX REV CODE 250 W/ 637 OVERRIDE(OP): Performed by: INTERNAL MEDICINE

## 2022-03-01 PROCEDURE — 99223 1ST HOSP IP/OBS HIGH 75: CPT | Performed by: INTERNAL MEDICINE

## 2022-03-01 PROCEDURE — 82378 CARCINOEMBRYONIC ANTIGEN: CPT

## 2022-03-01 PROCEDURE — 99232 SBSQ HOSP IP/OBS MODERATE 35: CPT | Mod: FS | Performed by: NURSE PRACTITIONER

## 2022-03-01 PROCEDURE — 770006 HCHG ROOM/CARE - MED/SURG/GYN SEMI*

## 2022-03-01 PROCEDURE — 83605 ASSAY OF LACTIC ACID: CPT

## 2022-03-01 RX ORDER — ONDANSETRON 2 MG/ML
4 INJECTION INTRAMUSCULAR; INTRAVENOUS PRN
Status: ACTIVE | OUTPATIENT
Start: 2022-03-01 | End: 2022-03-01

## 2022-03-01 RX ORDER — MIDAZOLAM HYDROCHLORIDE 1 MG/ML
.5-2 INJECTION INTRAMUSCULAR; INTRAVENOUS PRN
Status: ACTIVE | OUTPATIENT
Start: 2022-03-01 | End: 2022-03-01

## 2022-03-01 RX ORDER — SODIUM CHLORIDE 9 MG/ML
500 INJECTION, SOLUTION INTRAVENOUS
Status: ACTIVE | OUTPATIENT
Start: 2022-03-01 | End: 2022-03-01

## 2022-03-01 RX ORDER — NALOXONE HYDROCHLORIDE 0.4 MG/ML
INJECTION, SOLUTION INTRAMUSCULAR; INTRAVENOUS; SUBCUTANEOUS
Status: DISPENSED
Start: 2022-03-01 | End: 2022-03-02

## 2022-03-01 RX ORDER — MIDAZOLAM HYDROCHLORIDE 1 MG/ML
INJECTION INTRAMUSCULAR; INTRAVENOUS
Status: COMPLETED
Start: 2022-03-01 | End: 2022-03-01

## 2022-03-01 RX ADMIN — MIDAZOLAM 1 MG: 1 INJECTION, SOLUTION INTRAMUSCULAR; INTRAVENOUS at 17:49

## 2022-03-01 RX ADMIN — LATANOPROST 1 DROP: 50 SOLUTION OPHTHALMIC at 20:35

## 2022-03-01 RX ADMIN — SENNOSIDES AND DOCUSATE SODIUM 2 TABLET: 50; 8.6 TABLET ORAL at 20:27

## 2022-03-01 RX ADMIN — FINASTERIDE 5 MG: 5 TABLET, FILM COATED ORAL at 05:15

## 2022-03-01 RX ADMIN — PIPERACILLIN AND TAZOBACTAM 3.38 G: 3; .375 INJECTION, POWDER, LYOPHILIZED, FOR SOLUTION INTRAVENOUS; PARENTERAL at 20:26

## 2022-03-01 RX ADMIN — MIDAZOLAM HYDROCHLORIDE 1 MG: 1 INJECTION INTRAMUSCULAR; INTRAVENOUS at 17:49

## 2022-03-01 RX ADMIN — PIPERACILLIN AND TAZOBACTAM 3.38 G: 3; .375 INJECTION, POWDER, LYOPHILIZED, FOR SOLUTION INTRAVENOUS; PARENTERAL at 05:15

## 2022-03-01 RX ADMIN — DORZOLAMIDE HYDROCHLORIDE AND TIMOLOL MALEATE 1 DROP: 20; 5 SOLUTION OPHTHALMIC at 05:17

## 2022-03-01 RX ADMIN — DORZOLAMIDE HYDROCHLORIDE AND TIMOLOL MALEATE 1 DROP: 20; 5 SOLUTION OPHTHALMIC at 20:34

## 2022-03-01 RX ADMIN — OMEPRAZOLE 20 MG: 20 CAPSULE, DELAYED RELEASE ORAL at 05:14

## 2022-03-01 RX ADMIN — Medication 10 MG: at 20:27

## 2022-03-01 RX ADMIN — SODIUM CHLORIDE: 9 INJECTION, SOLUTION INTRAVENOUS at 06:28

## 2022-03-01 RX ADMIN — FENTANYL CITRATE 50 MCG: 50 INJECTION, SOLUTION INTRAMUSCULAR; INTRAVENOUS at 17:49

## 2022-03-01 RX ADMIN — TAMSULOSIN HYDROCHLORIDE 0.4 MG: 0.4 CAPSULE ORAL at 20:27

## 2022-03-01 RX ADMIN — LEVOTHYROXINE SODIUM 75 MCG: 75 TABLET ORAL at 05:15

## 2022-03-01 RX ADMIN — PIPERACILLIN AND TAZOBACTAM 3.38 G: 3; .375 INJECTION, POWDER, LYOPHILIZED, FOR SOLUTION INTRAVENOUS; PARENTERAL at 13:21

## 2022-03-01 RX ADMIN — FENOFIBRATE 134 MG: 67 CAPSULE ORAL at 05:14

## 2022-03-01 ASSESSMENT — ENCOUNTER SYMPTOMS
PALPITATIONS: 0
SHORTNESS OF BREATH: 0
SPEECH CHANGE: 0
VOMITING: 0
FEVER: 0
NAUSEA: 0
ABDOMINAL PAIN: 0
COUGH: 0
CHILLS: 0

## 2022-03-01 ASSESSMENT — COGNITIVE AND FUNCTIONAL STATUS - GENERAL
SUGGESTED CMS G CODE MODIFIER DAILY ACTIVITY: CH
SUGGESTED CMS G CODE MODIFIER MOBILITY: CI
MOBILITY SCORE: 23
DAILY ACTIVITIY SCORE: 24
MOVING FROM LYING ON BACK TO SITTING ON SIDE OF FLAT BED: A LITTLE

## 2022-03-01 NOTE — ASSESSMENT & PLAN NOTE
CT imaging noted right lower quadrant abdominal abscess s/p IR drain placement on 3/01 and replacement after dislodgment on 03/04.  Cultures grew Streptococcus intermedius and E. Coli  ID following with recommendations to continue with Zosyn.     Patient had repeat CT imaging on 03/08, which showed interval decrease in the abscess.  Drain in place for an additional 2 days with minimal output, IR removed the drain on 03/10.  ID recommends patient to continue with Omnicef and Flagyl with end date of 04/04/2022.

## 2022-03-01 NOTE — ED NOTES
Med rec  Updated and complete. Allergies reviewed.   Per  [facility] from [facility] . No antibiotics noted on MARS. Group home phone number   [phone].      Home pharmacy [facility] [phone]

## 2022-03-01 NOTE — DISCHARGE PLANNING
[facility] TCN chart review completed. Collaborated with ZANA Mills prior to meeting with the pt. The most current review of medical record, knowledge of pt's PLOF and social support, LACE+ score of 64, 6 clicks scores of 23 mobility were considered.      Pt seen at bedside with spouse Pina present. Introduced TCN program. Discussed post acute resources in the event that IV abx are recommended. Also reviewed [facility] plan benefits including [facility] and [facility] transitional care services. Pt verbalizes understanding. Confirms  as [facility] [phone]. Choice form proactively obtained for SNF and given to ZANA Mills. [facility] referral not sent as Dr. Mclain follows this . TCN will continue to follow and collaborate with discharge planning team as additional post acute needs arise. Thank you.

## 2022-03-01 NOTE — CONSULTS
CHIEF COMPLAINT: Right lower quadrant mass leukocytosis.     HISTORY OF PRESENT ILLNESS: The patient is a 71-year-old white male who presented to the emergency department for evaluation of right lower quadrant mass with associated leukocytosis that has been worsening over the last few weeks.  Patient did report a febrile illness near the end of January and labs have been followed at his group home which have shown steadily worsening leukocytosis.  He denies significant pain or changes in appetite or bowel movements.  Denies any recent fevers.  Denies any cough or respiratory symptoms.  He has been somewhat depressed of late but that is no worse than it has been over the last few months.      PAST MEDICAL HISTORY:  has a past medical history of Anxiety, Diabetes (HCC) (07-10-15), Glaucoma, High cholesterol (07-10-15), Hypertension (07-10-15), Hypothyroidism, Neuropathy, peripheral, Skin cancer, Snoring, and Thyroid disease.    PAST SURGICAL HISTORY:  has a past surgical history that includes lymph node excision (Left, 7/21/2015); carpal tunnel endoscopic (Left, 9/5/2017); and lumbar disc replacement.    ALLERGIES:   Allergies   Allergen Reactions   • Pcn [Penicillins] Unspecified     As a child, unknown reaction       CURRENT MEDICATIONS:   Home Medications     Reviewed by Erin Mckeon (Pharmacy Tech) on 02/28/22 at 1852  Med List Status: Complete   Medication Last Dose Status   acetaminophen (TYLENOL) 500 MG Tab 2/27/2022 Active   alfuzosin (UROXATRAL) 10 MG SR tablet 2/27/2022 Active   Cholecalciferol (VITAMIN D3 ADULT GUMMIES PO) 2/27/2022 Active   Coenzyme Q10 (COQ10) 100 MG Cap 2/27/2022 Active   Cyanocobalamin 5000 MCG TABLET DISPERSIBLE 2/28/2022 Active   dorzolamide-timolol (COSOPT) 22.3-6.8 MG/ML Solution 2/28/2022 Active   fenofibrate (TRICOR) 145 MG Tab 2/28/2022 Active   finasteride (PROSCAR) 5 MG Tab 2/28/2022 Active   Glucosamine HCl 1500 MG Tab 2/27/2022 Active   latanoprost (XALATAN) 0.005  "% Solution 2/27/2022 Active   levothyroxine (SYNTHROID) 75 MCG Tab 2/28/2022 Active   Melatonin 10 MG Tab 2/27/2022 Active   Multiple Vitamins-Minerals (CENTRUM SILVER ADULT 50+) Tab 2/28/2022 Active   omeprazole (PRILOSEC) 20 MG delayed-release capsule 2/28/2022 Active   sennosides-docusate sodium (SENOKOT-S) 8.6-50 MG tablet 2/28/2022 Active                FAMILY HISTORY: family history includes Alcohol abuse in his mother; Drug abuse in his sister; Heart Disease in his father and mother; Lung Disease in his father.    SOCIAL HISTORY:  reports that he quit smoking about 42 years ago. His smoking use included cigarettes. He has a 20.00 pack-year smoking history. He has never used smokeless tobacco. He reports previous alcohol use of about 0.5 oz of alcohol per week. He reports that he does not use drugs.    REVIEW OF SYSTEMS: Several weeks of malaise, depression, right lower quadrant fullness with poor appetite and depression.  Review of systems otherwise negative per CMS criteria    PHYSICAL EXAMINATION:      Constitutional:     Vital Signs: /75   Pulse 81   Temp 36.3 °C (97.4 °F) (Temporal)   Resp 16   Ht 1.753 m (5' 9\")   Wt 79.6 kg (175 lb 7.8 oz)   SpO2 92%    General Appearance: The patient is an elderly male in no acute distress.  HEENT:    Normocephalic and atraumatic.  No scleral icterus.  Mucous membranes are somewhat dry  Neck:    Normal range of motion.  Trachea is midline  Respiratory:   Inspection: Unlabored respirations, no intercostal retractions, paradoxical motion, or accessory muscle use.   Auscultation: clear to auscultation.  Cardiovascular:   Inspection: The skin is warm and well purfused.  Auscultation: Regular rate and rhythm.   Peripheral Pulses: Normal.   Abdomen:   Inspection: Abdominal inspection reveals mild protuberance of the right lower abdominal wall without significant erythema.   Palpation: Palpation is remarkable for Large palpable mass right lower quadrant without " crepitus or significant rebound or guarding  Genitourinary:   Normal external genitalia without obvious inguinal hernia  Musculoskeletal:   No sign  Skin:    Examination of the skin reveals pallor without significant lesion.  Neurologic:    Alert and oriented  Neurologic examination reveals no focal deficits noted.    LABORATORY VALUES:   Recent Labs     02/25/22 2219 02/28/22  1503   WBC 16.1* 18.3*   RBC 3.91* 3.98*   HEMOGLOBIN 11.1* 11.2*   HEMATOCRIT 34.3* 34.5*   MCV 87.7 86.7   MCH 28.4 28.1   MCHC 32.4* 32.5*   RDW 45.5 45.4   PLATELETCT 419 459*   MPV 10.5 9.4     Recent Labs     02/25/22 2219 02/28/22  1503   SODIUM 133* 137   POTASSIUM 4.3 4.2   CHLORIDE 99 99   CO2 23 23   GLUCOSE 151* 161*   BUN 12 18   CREATININE 0.72 0.80   CALCIUM 8.9 9.4     Recent Labs     02/28/22  1503   ASTSGOT 28   ALTSGPT 23   TBILIRUBIN 0.4   ALKPHOSPHAT 65   GLOBULIN 3.9*            IMAGING:   CT-ABDOMEN-PELVIS WITH   Final Result   Addendum 1 of 1         ADDENDUM:   Please note that the phlegmonous process and extensive abscess in the    right lower quadrant could be due to an indolent previously ruptured    appendicitis.      Final      1.  There is a complex multiloculated fluid collection with heterogeneous peripheral enhancement and internal air lucencies consistent with abscess in the anterior right lower quadrant, etiology uncertain. This extends from the peritoneal cavity through    the musculature into the subcutaneous tissues. There is abnormal enhancement abutting the right abdominal wall musculature and right psoas muscle with multiple smaller loculated fluid components.   2.  There is a questionable phlegmonous type process versus mass just inferior to the cecum.   3.  There are bilateral parapelvic cysts and renal cortical cysts which do not need further imaging follow-up.   4.  There is colonic diverticulosis with no acute diverticulitis.   5.  There is a tiny dependent gallstone.      CT-DRAIN-PERITONEAL     (Results Pending)       ASSESSMENT AND PLAN:   This is a 71-year-old male who has a relatively subacute illness secondary to large right lower quadrant abdominal abscess likely secondary to a missed appendicitis however given his age, malignancy should still be considered as a possible cause.  Based on the anatomy on the CT scan, laparotomy could be potentially hazardous and given the extent of the phlegmon, the causative factor likely cannot be resected.  For this reason, we have requested IR to drain the fluid collection.  I discussed the case with Dr. Dudley who states the patient can be put on the schedule for tomorrow morning.  He has been started on antibiotics.  I ordered appropriate coagulation studies and Covid test preprocedure.  CEA is also ordered to evaluate for occult malignancy.  Patient should be n.p.o. except meds after midnight but can probably eat before then.  We have asked that the hospitalist admit the patient and they have already evaluated him.    We will follow patient closely during this hospitalization, because if CT-guided drain is unsuccessful, patient may still require laparotomy.    Right lower quadrant abdominal abscess (HCC)  2/28 IR drain placement pending.  Antibiotics initiated on admit.           ____________________________________     Chris Youssef D.O.    DD: 2/28/2022  7:14 PM

## 2022-03-01 NOTE — ASSESSMENT & PLAN NOTE
2/28 Zosyn initiated on admit.  3/1 IR drain placement.  3/4 Final cultures positive for E coli & Strep Intermedius  -WBC increased to 23.9, STAT CT shows decrease in abscess  -IR drain replaced after accidentally malpositioned   3/5 WBC trend down  -Continue antibiotics per ID  -Continue IR drain  -Repeat ABD CT 3/8

## 2022-03-01 NOTE — PROGRESS NOTES
Received report and assumed care of pt at change of shift.   Pt is resting in bed states no pain at this time.   Assessment completed at this time pt is A&O 4, VSS stable on RA   Discussed POC/shift goals with pt including placement of drain today  Pt states no further needs at this time.  Bed is locked and in lowest position, bed alarm is on  Hourly rounding in place.      Per NOC monitoring for reactions to Zosyn. Pt is currently running zosyn IV states no adverse effects, no SOB noted on exam. No other signs of possible allergy at this time

## 2022-03-01 NOTE — DISCHARGE PLANNING
Anticipated Discharge Disposition: Snf then return to group home.     Action: Reviewed with Dr Jaime Garcia and reviewed chart.   Pt with RLQ abscess, IR to place drain today. Antibiotic, IV Zosyn, initiated on admit, await cultures.   Reviewed with Jenelle ORTIZ who met with pt. Pt states he lives at a group home called [facility], [phone].     Pt may need snf placement for LT ABX, Jenelle to obtain snf choice.      Barriers to Discharge: not med cleared.     Plan: [facility] to follow for dc planning needs.     Addendum at 12:10-placed hospitals [phone].

## 2022-03-01 NOTE — CARE PLAN
The patient is Stable - Low risk of patient condition declining or worsening    Shift Goals  Clinical Goals: Monitor SXS of allergic reaction    Progress made toward(s) clinical / shift goals:  Pt has not SXS of an allergic reaction of Zosyn.     Patient is not progressing towards the following goals:

## 2022-03-01 NOTE — PROGRESS NOTES
4 Eyes Skin Assessment Completed by DRISS Doran and DRISS Arnold.    Head WDL  Ears Redness and Blanching  Nose WDL  Mouth WDL  Neck WDL  Breast/Chest WDL  Shoulder Blades WDL  Spine WDL  (R) Arm/Elbow/Hand WDL  (L) Arm/Elbow/Hand WDL  Abdomen Redness and Blanching  Groin Redness and Blanching  Scrotum/Coccyx/Buttocks Redness and Blanching  (R) Leg WDL  (L) Leg WDL  (R) Heel/Foot/Toe dry  (L) Heel/Foot/Toe dry          Devices In Places PIV      Interventions In Place Pillows and Pressure Redistribution Mattress    Possible Skin Injury No

## 2022-03-01 NOTE — ED NOTES
Pt given sandwich and juice per pt request. Pt's wife at bedside. Pt to be transported to  by transport services.

## 2022-03-01 NOTE — CONSULTS
INFECTIOUS DISEASES INPATIENT CONSULT NOTE     Date of Service: 3/1/2022    Consult Requested By: Nickolas Garcia M.D.    Reason for Consultation: Intra-abdominal abscess    History of Present Illness:   Lico Salinas is a 71 y.o. man with a history of type 2 diabetes mellitus, hyperlipidemia and hypertension admitted 2/28/2022 secondary to worsening right lower quadrant pain.  Patient was in his usual state of health until approximately 3 weeks prior to admission when he developed right lower quadrant pain.  Unfortunately, his right lower quadrant pain has progressed.  He describes his pain as intermittent.  No exacerbating factors.  He denies any unintentional weight loss, fevers, chills, diarrhea or constipation.  Denies any dysuria, hematuria or increased urinary frequency.  Denies any recent procedures.  Given his persistent symptoms, he presented to the ED for further evaluation and management.  On presentation, he was afebrile with a leukocytosis of 18.3.  CT scan of the abdomen and pelvis revealed a complex multiloculated fluid collection consistent with an abscess in the anterior right lower quadrant.  This is extending from the peritoneal cavity through the musculature into the subcutaneous tissues.  Plan for CT-guided drainage today.  He is currently on Zosyn.  Infectious disease service consulted for further antibiotic recommendations.    All other review of systems reviewed and negative except those documented above in the HPI.     PMH:   Past Medical History:   Diagnosis Date   • Anxiety    • Diabetes (HCC) 07-10-15   • Glaucoma     OU   • High cholesterol 07-10-15    hx of, took self off simvastin   • Hypertension 07-10-15    hx of, took himself off lisinopril and HTCZ   • Hypothyroidism    • Neuropathy, peripheral    • Skin cancer    • Snoring    • Thyroid disease        PSH:  Past Surgical History:   Procedure Laterality Date   • CARPAL TUNNEL ENDOSCOPIC Left 9/5/2017    Procedure: CARPAL  TUNNEL ENDOSCOPIC VERSUS;  Surgeon: Frank Alvarado M.D.;  Location: SURGERY [facility];  Service: Orthopedics   • LYMPH NODE EXCISION Left 2015    Procedure: LYMPH NODE EXCISION DEEP CERVICAL;  Surgeon: Michele Gan M.D.;  Location: SURGERY SAME DAY [facility];  Service:    • LUMBAR DISC REPLACEMENT         FAMILY HX:  Family History   Problem Relation Age of Onset   • Heart Disease Mother    • Alcohol abuse Mother    • Lung Disease Father    • Heart Disease Father    • Drug abuse Sister        SOCIAL HX:  Social History     Socioeconomic History   • Marital status:      Spouse name: Not on file   • Number of children: Not on file   • Years of education: Not on file   • Highest education level: Not on file   Occupational History   • Not on file   Tobacco Use   • Smoking status: Former Smoker     Packs/day: 1.00     Years: 20.00     Pack years: 20.00     Types: Cigarettes     Quit date: 1980     Years since quittin.1   • Smokeless tobacco: Never Used   Vaping Use   • Vaping Use: Never used   Substance and Sexual Activity   • Alcohol use: Not Currently     Alcohol/week: 0.5 oz     Types: 1 Cans of beer per week     Comment: has not been drinking lately    • Drug use: No   • Sexual activity: Yes     Partners: Female   Other Topics Concern   • Not on file   Social History Narrative   • Not on file     Social Determinants of Health     Financial Resource Strain: Not on file   Food Insecurity: Not on file   Transportation Needs: Not on file   Physical Activity: Not on file   Stress: Not on file   Social Connections: Not on file   Intimate Partner Violence: Not on file   Housing Stability: Not on file     Social History     Tobacco Use   Smoking Status Former Smoker   • Packs/day: 1.00   • Years: 20.00   • Pack years: 20.00   • Types: Cigarettes   • Quit date: 1980   • Years since quittin.1   Smokeless Tobacco Never Used     Social History     Substance and Sexual Activity   Alcohol  Use Not Currently   • Alcohol/week: 0.5 oz   • Types: 1 Cans of beer per week    Comment: has not been drinking lately        Allergies/Intolerances:  Allergies   Allergen Reactions   • Pcn [Penicillins] Unspecified     As a child, unknown reaction   Tolerating Zosyn    History reviewed with the patient    Other Current Medications:    Current Facility-Administered Medications:   •  tamsulosin (FLOMAX) capsule 0.4 mg, 0.4 mg, Oral, QHS, Vinh Mims M.D., 0.4 mg at 02/28/22 2148  •  dorzolamide-timolol (COSOPT) 22.3-6.8 MG/ML ophthalmic drops 1 Drop, 1 Drop, Both Eyes, BID, Vinh Mims M.D., 1 Drop at 03/01/22 0517  •  finasteride (PROSCAR) tablet 5 mg, 5 mg, Oral, DAILY, Vinh Mims M.D., 5 mg at 03/01/22 0515  •  latanoprost (XALATAN) 0.005 % ophthalmic solution 1 Drop, 1 Drop, Both Eyes, Q EVENING, Vinh Mims M.D.  •  levothyroxine (SYNTHROID) tablet 75 mcg, 75 mcg, Oral, AM ES, Vinh Mims M.D., 75 mcg at 03/01/22 0515  •  melatonin tablet 10 mg, 10 mg, Oral, QHS, Vinh Mims M.D., 10 mg at 02/28/22 2147  •  omeprazole (PRILOSEC) capsule 20 mg, 20 mg, Oral, DAILY, Vinh Mims M.D., 20 mg at 03/01/22 0514  •  senna-docusate (PERICOLACE or SENOKOT S) 8.6-50 MG per tablet 2 Tablet, 2 Tablet, Oral, BID **AND** polyethylene glycol/lytes (MIRALAX) PACKET 1 Packet, 1 Packet, Oral, QDAY PRN **AND** magnesium hydroxide (MILK OF MAGNESIA) suspension 30 mL, 30 mL, Oral, QDAY PRN **AND** bisacodyl (DULCOLAX) suppository 10 mg, 10 mg, Rectal, QDAY PRN, Vinh Mims M.D.  •  NS infusion, , Intravenous, Continuous, Vinh Mims M.D., Last Rate: 83 mL/hr at 03/01/22 0628, New Bag at 03/01/22 0628  •  enoxaparin (LOVENOX) inj 40 mg, 40 mg, Subcutaneous, DAILY, Vinh Mims M.D.  •  acetaminophen (Tylenol) tablet 650 mg, 650 mg, Oral, Q6HRS PRN, Vinh Mims M.D.  •  [COMPLETED] piperacillin-tazobactam (ZOSYN) 3.375 g in  mL IVPB, 3.375 g, Intravenous,  "Once, Stopped at 22 **AND** piperacillin-tazobactam (ZOSYN) 3.375 g in  mL IVPB, 3.375 g, Intravenous, Q8HRS, Vinh Mims M.D., Stopped at 22 0915  •  fenofibrate micronized (LOFIBRA) capsule 134 mg, 134 mg, Oral, DAILY, Vinh Mims M.D., 134 mg at 22 0514  •  diphenhydrAMINE (BENADRYL) injection 25 mg, 25 mg, Intravenous, Once, Vinh Mims M.D.  •  methylPREDNISolone sod succ (SOLU-MEDROL) 125 MG injection 62.5 mg, 62.5 mg, Intravenous, Once, Vinh Mims M.D.  [unfilled]    Most Recent Vital Signs:  /71   Pulse 68   Temp 36.4 °C (97.6 °F) (Temporal)   Resp 18   Ht 1.753 m (5' 9\")   Wt 79.6 kg (175 lb 7.8 oz)   SpO2 95%   BMI 25.91 kg/m²   Temp  Av.4 °C (97.6 °F)  Min: 36.2 °C (97.2 °F)  Max: 36.8 °C (98.3 °F)    Physical Exam:  General: well nourished, no diaphoresis, well-appearing, no acute distress  HEENT: sclera anicteric, PERRL, extraocular muscles intact, mucous membranes moist, oropharynx clear and moist, no oral lesions or exudate  Neck: supple, no lymphadenopathy  Chest: CTAB, no rales, rhonchi or wheezes, normal work of breathing.  Cardiac: regular rate and rhythm, normal S1 S2, no murmurs, rubs or gallops  Abdomen: + bowel sounds, soft, nondistended, right lower quadrant tenderness to palpation, no hepatosplenomegaly  Extremities: WWP, no edema, 2+ pedal pulses  Skin: warm and dry, no rashes or worrisome lesions  Neuro: Alert and oriented times 3, non-focal exam, speech fluent, full range of motion to bilateral upper and lower extremities  Psych: normal mood and behavior, pleasant; memory intact, normal judgement    Pertinent Lab Results:  Recent Labs     22  1503 22  0402   WBC 18.3* 15.3*      Recent Labs     22  1503 22  0402   HEMOGLOBIN 11.2* 9.6*   HEMATOCRIT 34.5* 29.2*   MCV 86.7 85.9   MCH 28.1 28.2   ANISOCYTOSIS 1+  --    PLATELETCT 459* 395         Recent Labs     22  1503 " "03/01/22  0402   SODIUM 137 138   POTASSIUM 4.2 3.8   CHLORIDE 99 105   CO2 23 23   CREATININE 0.80 0.67        Recent Labs     02/28/22  1503 03/01/22  0402   ALBUMIN 3.6 2.7*        Pertinent Micro:  Results     Procedure Component Value Units Date/Time    Blood Culture [[id number]] Collected: 02/28/22 1518    Order Status: Completed Specimen: Blood from Peripheral Updated: 03/01/22 0747     Significant Indicator NEG     Source BLD     Site PERIPHERAL     Culture Result No Growth  Note: Blood cultures are incubated for 5 days and  are monitored continuously.Positive blood cultures  are called to the RN and reported as soon as  they are identified.      Narrative:      2 of 2 blood culture x2  Sites order. Per Hospital Policy:  Only change Specimen Src: to \"Line\" if specified by physician  order.  Right AC    Blood Culture [[id number]] Collected: 02/28/22 1503    Order Status: Completed Specimen: Blood from Peripheral Updated: 03/01/22 0747     Significant Indicator NEG     Source BLD     Site PERIPHERAL     Culture Result No Growth  Note: Blood cultures are incubated for 5 days and  are monitored continuously.Positive blood cultures  are called to the RN and reported as soon as  they are identified.      Narrative:      1 of 2 for Blood Culture x 2 sites order. Per Hospital  Policy: Only change Specimen Src: to \"Line\" if specified by  physician order.  No site indicated    Urine Culture [[id number]]     Order Status: Sent Specimen: Urine, Clean Catch     Urinalysis [[id number]]     Order Status: Sent Specimen: Urine     Blood Culture [[id number]] Collected: 02/28/22 0000    Order Status: Canceled Specimen: Other from Peripheral     Blood Culture [[id number]] Collected: 02/28/22 0000    Order Status: Canceled Specimen: Other from Peripheral         No results found for: BLOODCULTU, BLDCULT, BCHOLD     Studies:  CT-ABDOMEN-PELVIS WITH    Addendum Date: 2/28/2022    ADDENDUM: Please note that the phlegmonous process and " extensive abscess in the right lower quadrant could be due to an indolent previously ruptured appendicitis.    Result Date: 2/28/2022 2/28/2022 4:49 PM HISTORY/REASON FOR EXAM:  RLQ abdominal pain (Age >= 14y). Palpable firm mass in the right lower abdomen TECHNIQUE/EXAM DESCRIPTION:   CT scan of the abdomen and pelvis with contrast. Contrast-enhanced helical scanning was obtained from the diaphragmatic domes through the pubic symphysis following the bolus administration of nonionic contrast without complication. 100 mL of Omnipaque 350 nonionic contrast was administered without complication. Low dose optimization technique was utilized for this CT exam including automated exposure control and adjustment of the mA and/or kV according to patient size. COMPARISON: 10/8/2020 FINDINGS: Lower Chest: Visualized lung bases demonstrate dependent atelectasis. No change in the previously biopsied 3.5 cm right benign breast mass. Liver: Normal. Spleen: Unremarkable. Pancreas: Unremarkable. Gallbladder: There is a tiny dependent gallstone.. Biliary: Nondilated. Adrenal glands: Normal. Kidneys: There is a nonobstructive 3 no other calcification the right superior pole kidney. There are left renal parapelvic cysts. There are simple appearing bilateral renal cortical cysts again seen. There may be right renal parapelvic cysts as well. There is minimal left perinephric stranding. Lymph nodes: No adenopathy. Vasculature: There is aortic atherosclerosis. Bowel: There is colonic diverticulosis. There is a questionable spiculated type masslike area in the right lower quadrant adjacent to the inferior cecum measuring 5.3 x 4.4 cm on the coronal reconstructions. There is abnormal enhancement abutting the rectus musculature and the right psoas musculature. Peritoneum: There is a complex heterogeneously enhancing multi loculated fluid collection in the right lower quadrant anteriorly with multiple internal air lucencies and ill-defined  peripheral rim enhancement. This extends from the right peritoneum just posterior to the rectus abdominis musculature at the level of the iliac crest and extends inferiorly into the midline and right lower pelvis extending through the right lateral abdominal wall musculature into the subcutaneous tissues. The origin is uncertain however may be related to the ill-defined masslike or phlegmon-type structure inferior to the cecum. The entire area measures an estimated 11.4 x 6.4 x 13.4 cm. There are numerous other smaller loculated enhancing fluid components extending into the soft tissues with associated subcutaneous edema. Pelvis: Bladder is unremarkable.. Musculoskeletal: No acute or destructive process. There are degenerative changes throughout the spine.     1.  There is a complex multiloculated fluid collection with heterogeneous peripheral enhancement and internal air lucencies consistent with abscess in the anterior right lower quadrant, etiology uncertain. This extends from the peritoneal cavity through the musculature into the subcutaneous tissues. There is abnormal enhancement abutting the right abdominal wall musculature and right psoas muscle with multiple smaller loculated fluid components. 2.  There is a questionable phlegmonous type process versus mass just inferior to the cecum. 3.  There are bilateral parapelvic cysts and renal cortical cysts which do not need further imaging follow-up. 4.  There is colonic diverticulosis with no acute diverticulitis. 5.  There is a tiny dependent gallstone.      IMPRESSION:   1.  Intra-abdominal abscesses    2.  Leukocytosis  3.  Type 2 diabetes mellitus      PLAN:   Lico Salinas is a 71 y.o. man with a history of type 2 diabetes mellitus, hyperlipidemia and hypertension admitted on 2/28/2022 secondary to progressive right lower quadrant abdominal pain.  CT scan showed a complex multiloculated fluid collection in the anterior right lower quadrant.  Plan for  CT-guided drainage today.  Blood cultures on admission are negative to date.  He is currently on Zosyn.    -Continue IV Zosyn  -Agree with CT-guided drainage today.  Please send fluid for routine bacterial, AFB and fungal cultures  -Monitor drain output  -Follow blood cultures from 2/28-negative to date  -Repeat CT scan 5 to 7 days from drain placement       Plan of care discussed with MARYJANE Garcia M.D.. Will continue to follow    Marisol Garcia M.D.      Please note that this dictation was created using voice recognition software. I have worked with technical experts from [facility] to optimize the interface.  I have made every reasonable attempt to correct obvious errors, but there may be errors of grammar and possibly content that I did not discover before finalizing the note.

## 2022-03-01 NOTE — PROGRESS NOTES
Hospital Medicine Daily Progress Note    Date of Service  3/1/2022    Chief Complaint  Lico Salinas is a 71 y.o. male admitted 2/28/2022 with abdominal pain    Hospital Course    71-year-old male with history of type 2 diabetes dyslipidemia hypertension admitted with right lower quadrant pain noted to have complex fluid collection concerning for an abscess in the right lower quadrant.  He was evaluated by surgery with recommendation for CT-guided drainage    Interval Problem Update    Complains of mild right lower quadrant pain no nausea  Afebrile  Schedule for CT-guided drainage  On Zosyn    I have personally seen and examined the patient at bedside. I discussed the plan of care with patient and bedside RN.    Consultants/Specialty  general surgery and infectious disease    Code Status  DNAR/DNI    Disposition  Patient is not medically cleared for discharge.   Anticipate discharge to to home with close outpatient follow-up.  I have placed the appropriate orders for post-discharge needs.    Review of Systems  Review of Systems   Constitutional: Negative for chills and fever.   Respiratory: Negative for cough and shortness of breath.    Cardiovascular: Negative for chest pain and palpitations.   All other systems reviewed and are negative.       Physical Exam  Temp:  [36.2 °C (97.2 °F)-36.8 °C (98.3 °F)] 36.4 °C (97.6 °F)  Pulse:  [68-86] 68  Resp:  [18] 18  BP: (104-127)/(59-75) 119/71  SpO2:  [92 %-95 %] 95 %    Physical Exam  Vitals and nursing note reviewed.   Constitutional:       Appearance: He is well-developed. He is not diaphoretic.   HENT:      Head: Normocephalic and atraumatic.      Mouth/Throat:      Pharynx: No oropharyngeal exudate.   Eyes:      General: No scleral icterus.        Right eye: No discharge.         Left eye: No discharge.      Conjunctiva/sclera: Conjunctivae normal.      Pupils: Pupils are equal, round, and reactive to light.   Neck:      Vascular: No JVD.      Trachea: No tracheal  deviation.   Cardiovascular:      Rate and Rhythm: Normal rate and regular rhythm.      Heart sounds: No murmur heard.    No friction rub. No gallop.   Pulmonary:      Effort: Pulmonary effort is normal. No respiratory distress.      Breath sounds: Normal breath sounds. No stridor. No wheezing.   Chest:      Chest wall: No tenderness.   Abdominal:      General: Bowel sounds are normal. There is no distension.      Palpations: Abdomen is soft.      Tenderness: There is abdominal tenderness. There is no guarding or rebound.   Musculoskeletal:         General: No tenderness.      Cervical back: Neck supple.   Skin:     General: Skin is warm and dry.      Nails: There is no clubbing.   Neurological:      Mental Status: He is alert and oriented to person, place, and time.      Cranial Nerves: No cranial nerve deficit.      Motor: No abnormal muscle tone.   Psychiatric:         Behavior: Behavior normal.         Fluids    Intake/Output Summary (Last 24 hours) at 3/1/2022 1323  Last data filed at 3/1/2022 1129  Gross per 24 hour   Intake 0 ml   Output 800 ml   Net -800 ml       Laboratory  Recent Labs     02/28/22  1503 03/01/22  0402   WBC 18.3* 15.3*   RBC 3.98* 3.40*   HEMOGLOBIN 11.2* 9.6*   HEMATOCRIT 34.5* 29.2*   MCV 86.7 85.9   MCH 28.1 28.2   MCHC 32.5* 32.9*   RDW 45.4 44.8   PLATELETCT 459* 395   MPV 9.4 9.3     Recent Labs     02/28/22  1503 03/01/22  0402   SODIUM 137 138   POTASSIUM 4.2 3.8   CHLORIDE 99 105   CO2 23 23   GLUCOSE 161* 140*   BUN 18 15   CREATININE 0.80 0.67   CALCIUM 9.4 8.8     Recent Labs     02/28/22  1503 02/28/22  1848   APTT 37.8*  --    INR 1.36* 1.35*               Imaging  CT-ABDOMEN-PELVIS WITH   Final Result   Addendum 1 of 1         ADDENDUM:   Please note that the phlegmonous process and extensive abscess in the    right lower quadrant could be due to an indolent previously ruptured    appendicitis.      Final      1.  There is a complex multiloculated fluid collection with  heterogeneous peripheral enhancement and internal air lucencies consistent with abscess in the anterior right lower quadrant, etiology uncertain. This extends from the peritoneal cavity through    the musculature into the subcutaneous tissues. There is abnormal enhancement abutting the right abdominal wall musculature and right psoas muscle with multiple smaller loculated fluid components.   2.  There is a questionable phlegmonous type process versus mass just inferior to the cecum.   3.  There are bilateral parapelvic cysts and renal cortical cysts which do not need further imaging follow-up.   4.  There is colonic diverticulosis with no acute diverticulitis.   5.  There is a tiny dependent gallstone.      CT-DRAIN-PERITONEAL    (Results Pending)        Assessment/Plan  * Sepsis (HCC)- (present on admission)  Assessment & Plan  This is Sepsis Present on admission  SIRS criteria identified on my evaluation include: Fever, with temperature greater than 101 deg F, Tachycardia, with heart rate greater than 90 BPM, Tachypnea, with respirations greater than 20 per minute and Leukocytosis, with WBC greater than 12,000  Source is intra-abdominal abscess  Sepsis protocol initiated  Fluid resuscitation ordered per protocol  IV antibiotics as appropriate for source of sepsis  While organ dysfunction may be noted elsewhere in this problem list or in the chart, degree of organ dysfunction does not meet CMS criteria for severe sepsis    Continue Zosyn  Discussed with ID Dr. Garcia  Surgery following  Plan is for CT-guided drainage        Right lower quadrant abdominal abscess (HCC)- (present on admission)  Assessment & Plan  Evaluated by surgery with recommendation for CT-guided drainage  Continue Zosyn and follow-up on cultures  Discussed with ID    Essential hypertension- (present on admission)  Assessment & Plan  Stable monitor blood pressure    Cognitive impairment- (present on admission)  Assessment & Plan  Monitor for  delirium      Dyslipidemia- (present on admission)  Assessment & Plan  On fenofibrate    Diabetes mellitus type 2, noninsulin dependent (HCC)- (present on admission)  Assessment & Plan  Insulin sliding scale monitor CBGs       VTE prophylaxis: SCDs/TEDs    I have performed a physical exam and reviewed and updated ROS and Plan today (3/1/2022). In review of yesterday's note (2/28/2022), there are no changes except as documented above.

## 2022-03-01 NOTE — H&P
Hospital Medicine History & Physical Note    Date of Service  2/28/2022    Primary Care Physician  Nayla Mclain M.D.    Consultants  general surgery    Code Status  DNAR/DNI    Chief Complaint  Chief Complaint   Patient presents with   • Other     Mass on right side of abdomen.        History of Presenting Illness    71-year-old male with history of diabetes, dyslipidemia hypertension and hypothyroidism presented 2/28 with abdominal pain, patient lives with his group and during nurse visit, abdominal mass was noticed and she encouraged him to come to the emergency, patient has had mild abdominal pain on the right side with low fever comes and goes, denied any significant nausea or vomiting no diarrhea and no chest pain, on admission labs showed leukocytosis 18 with normal lactic acid and normal kidney function, CT scan was done for abdomen and showed complex multiloculated fluid collection consistent with abscess in the anterior right lower quadrant, general surgeon were consulted who recommended IR for drain, IV antibiotics with IV fluid were started.    The case was discussed in detail with the patient and his wife, answered all their questions, discussed the plan of care, and reviewed the POLST with the patient and his wife.    I discussed the plan of care with patient, family, bedside RN and ED physician.    Review of Systems  Review of Systems   Constitutional: Positive for chills. Negative for fever, malaise/fatigue and weight loss.   HENT: Negative.    Cardiovascular: Negative.    Gastrointestinal: Positive for abdominal pain. Negative for blood in stool, constipation, diarrhea, heartburn, melena, nausea and vomiting.   Genitourinary: Negative.    Musculoskeletal: Negative.    Skin: Negative.    Neurological: Negative.    Psychiatric/Behavioral: Negative.        Past Medical History   has a past medical history of Anxiety, Diabetes (HCC) (07-10-15), Glaucoma, High cholesterol (07-10-15), Hypertension  (07-10-15), Hypothyroidism, Neuropathy, peripheral, Skin cancer, Snoring, and Thyroid disease.    Surgical History   has a past surgical history that includes lymph node excision (Left, 7/21/2015); carpal tunnel endoscopic (Left, 9/5/2017); and lumbar disc replacement.     Family History  family history includes Alcohol abuse in his mother; Drug abuse in his sister; Heart Disease in his father and mother; Lung Disease in his father.   Family history reviewed with patient. There is no family history that is pertinent to the chief complaint.     Social History   reports that he quit smoking about 42 years ago. His smoking use included cigarettes. He has a 20.00 pack-year smoking history. He has never used smokeless tobacco. He reports previous alcohol use of about 0.5 oz of alcohol per week. He reports that he does not use drugs.    Allergies  Allergies   Allergen Reactions   • Pcn [Penicillins] Unspecified     As a child, unknown reaction       Medications  Prior to Admission Medications   Prescriptions Last Dose Informant Patient Reported? Taking?   Acetaminophen (TYLENOL PO)   Yes No   Sig: Take 500 mg by mouth 2 times a day. given consistently for history - arthritis pain   Coenzyme Q10 200 MG Cap  Significant Other Yes No   Sig: Take 100 mg by mouth every evening.   Cyanocobalamin (VITAMIN B12 PO)  Significant Other Yes No   Sig: Take 1 tablet by mouth every day.   Glucosamine-Chondroitin (GLUCOSAMINE CHONDR COMPLEX PO)  Significant Other Yes No   Sig: Take 1 tablet by mouth every day.   Melatonin 1 MG Cap   Yes No   Sig: Take 10 mg by mouth at bedtime.   Multiple Vitamin (MULTI VITAMIN MENS PO)  Significant Other Yes No   Sig: Take 1 Tab by mouth every morning.   QUEtiapine (SEROQUEL) 50 MG tablet   No No   Sig: Take 1 tablet by mouth every day.   Patient not taking: Reported on 6/3/2021   alfuzosin (UROXATRAL) 10 MG SR tablet   No No   Sig: Take 1 tablet by mouth every morning.   dorzolamide-timolol (COSOPT)  22.3-6.8 MG/ML Solution  Significant Other Yes No   Sig: Administer 1 Drop into both eyes 2 times a day.   fenofibrate (TRICOR) 145 MG Tab   No No   Sig: Take 1 tablet by mouth every day.   finasteride (PROSCAR) 5 MG Tab   No No   Sig: Take 1 tablet by mouth every day.   gabapentin (NEURONTIN) 100 MG Cap   No No   Sig: Take 2 tablets PO hs x 2 weeks then 1 tablet PO hs x 2 weeks then stop the medicine.   Patient not taking: Reported on 6/3/2021   latanoprost (XALATAN) 0.005 % Solution  Significant Other Yes No   Sig: Administer 1 Drop into both eyes every evening.   levothyroxine (SYNTHROID) 75 MCG Tab   No No   Sig: Take 1 tablet by mouth every morning on an empty stomach.   omeprazole (PRILOSEC) 20 MG delayed-release capsule   No No   Sig: Take 1 capsule by mouth every day.   sennosides-docusate sodium (SENOKOT-S) 8.6-50 MG tablet   Yes No   Sig: Take 1 tablet by mouth 2 times a day.   sertraline (ZOLOFT) 50 MG Tab   No No   Sig: Take 1 tablet by mouth every day.   Patient not taking: Reported on 6/3/2021   vitamin D3, cholecalciferol, 1000 UNIT Tab  Significant Other Yes No   Sig: Take 1,000 Units by mouth every day. Please give two tabs per D. Magbo orders      Facility-Administered Medications: None       Physical Exam  Temp:  [36.3 °C (97.4 °F)-36.8 °C (98.3 °F)] 36.8 °C (98.3 °F)  Pulse:  [80-90] 80  Resp:  [16-18] 18  BP: (104-132)/(59-75) 114/62  SpO2:  [92 %-96 %] 93 %  Blood Pressure : 104/62   Temperature: 36.3 °C (97.4 °F)   Pulse: 86   Respiration: 16   Pulse Oximetry: 93 %       Physical Exam  Constitutional:       General: He is not in acute distress.     Appearance: He is obese. He is not ill-appearing.   HENT:      Head: Atraumatic.   Eyes:      General: No scleral icterus.  Cardiovascular:      Rate and Rhythm: Tachycardia present.   Pulmonary:      Effort: No respiratory distress.      Breath sounds: No wheezing or rales.   Abdominal:      General: There is no distension.      Palpations: Abdomen  is soft. There is mass.      Tenderness: There is abdominal tenderness.      Hernia: No hernia is present.      Comments: Tenderness with mass on the right lower quadrant   Musculoskeletal:         General: No swelling, deformity or signs of injury.      Right lower leg: No edema.      Left lower leg: No edema.   Skin:     Findings: No lesion or rash.   Neurological:      General: No focal deficit present.      Mental Status: He is oriented to person, place, and time. Mental status is at baseline.      Cranial Nerves: No cranial nerve deficit.      Sensory: No sensory deficit.      Motor: No weakness.         Laboratory:  Recent Labs     02/25/22 2219 02/28/22  1503   WBC 16.1* 18.3*   RBC 3.91* 3.98*   HEMOGLOBIN 11.1* 11.2*   HEMATOCRIT 34.3* 34.5*   MCV 87.7 86.7   MCH 28.4 28.1   MCHC 32.4* 32.5*   RDW 45.5 45.4   PLATELETCT 419 459*   MPV 10.5 9.4     Recent Labs     02/25/22 2219 02/28/22  1503   SODIUM 133* 137   POTASSIUM 4.3 4.2   CHLORIDE 99 99   CO2 23 23   GLUCOSE 151* 161*   BUN 12 18   CREATININE 0.72 0.80   CALCIUM 8.9 9.4     Recent Labs     02/25/22  2219 02/28/22  1503   ALTSGPT  --  23   ASTSGOT  --  28   ALKPHOSPHAT  --  65   TBILIRUBIN  --  0.4   GLUCOSE 151* 161*     Recent Labs     02/28/22  1503   APTT 37.8*   INR 1.36*     No results for input(s): NTPROBNP in the last 72 hours.      No results for input(s): TROPONINT in the last 72 hours.    Imaging:  CT-ABDOMEN-PELVIS WITH   Final Result   Addendum 1 of 1         ADDENDUM:   Please note that the phlegmonous process and extensive abscess in the    right lower quadrant could be due to an indolent previously ruptured    appendicitis.      Final      1.  There is a complex multiloculated fluid collection with heterogeneous peripheral enhancement and internal air lucencies consistent with abscess in the anterior right lower quadrant, etiology uncertain. This extends from the peritoneal cavity through    the musculature into the subcutaneous  tissues. There is abnormal enhancement abutting the right abdominal wall musculature and right psoas muscle with multiple smaller loculated fluid components.   2.  There is a questionable phlegmonous type process versus mass just inferior to the cecum.   3.  There are bilateral parapelvic cysts and renal cortical cysts which do not need further imaging follow-up.   4.  There is colonic diverticulosis with no acute diverticulitis.   5.  There is a tiny dependent gallstone.      CT-DRAIN-PERITONEAL    (Results Pending)       X-Ray:  I have personally reviewed the images and compared with prior images.  EKG:  I have personally reviewed the images and compared with prior images.    Assessment/Plan:  I anticipate this patient will require at least two midnights for appropriate medical management, necessitating inpatient admission.    * Sepsis (HCC)- (present on admission)  Assessment & Plan  This is Sepsis Present on admission  SIRS criteria identified on my evaluation include: Fever, with temperature greater than 101 deg F, Tachycardia, with heart rate greater than 90 BPM, Tachypnea, with respirations greater than 20 per minute and Leukocytosis, with WBC greater than 12,000  Source is intra-abdominal abscess  Sepsis protocol initiated  Fluid resuscitation ordered per protocol  IV antibiotics as appropriate for source of sepsis  While organ dysfunction may be noted elsewhere in this problem list or in the chart, degree of organ dysfunction does not meet CMS criteria for severe sepsis  Start with Zosyn  Waiting for blood culture  IR for drain          Right lower quadrant abdominal abscess (HCC)- (present on admission)  Assessment & Plan  Came with abdominal pain low-grade fever and leukocytosis  CT scan showed fluid collection likely abscess  Patient is allergic to penicillin when he was a child, however agreed for challenging with Zosyn  IV fluid and drainage by IR  Patient might need GI consult, rule out  cancer    Essential hypertension- (present on admission)  Assessment & Plan  His blood pressure is controlled  Holding medication at this time since patient has sepsis  Close monitoring    Cognitive impairment- (present on admission)  Assessment & Plan  Patient is alert and oriented x4  Patient is on high risk for delirium  Patient lives in a group home    Dyslipidemia- (present on admission)  Assessment & Plan  Continue fenofibrate    Diabetes mellitus type 2, noninsulin dependent (HCC)- (present on admission)  Assessment & Plan  Last A1c 5.8  Sliding scale      VTE prophylaxis: SCDs/TEDs and enoxaparin ppx     Interventions to be considered in all patients in order to minimize the risk of delirium.   -do not disturb patient (vitals or lab draws) between the hours of 10 PM and 6 AM.  -ideally the patient should not sleep during the day and we should avoid day time naps.   -up in chair for meals  -ambulate at least three times daily, as able  -watch for constipation  -timed voiding - ask patient is she would like to go to the bathroom q 2-3 hours, except during the do not disturb hours.   -remove all necessary lines (central lines, peripheral IVs, feeding tubes, sales catheters)  -unless patient has shown harm to self or others I would recommend against use of restraints - either chemical or physical (antipsychotics)   -minimize polypharmacy, do not dose medication during sleep hours

## 2022-03-01 NOTE — CARE PLAN
The patient is Stable - Low risk of patient condition declining or worsening    Shift Goals  Clinical Goals: drain placement  Patient Goals: to eat again    Progress made toward(s) clinical / shift goals:  report given to IR regarding drain placement today. Discussed with both pt and wife at bedside the poc and will update them when time is provided     Patient is not progressing towards the following goals: remains NPO pending placement of drain

## 2022-03-02 PROBLEM — E87.8 ELECTROLYTE ABNORMALITY: Status: ACTIVE | Noted: 2022-03-02

## 2022-03-02 LAB
ANION GAP SERPL CALC-SCNC: 9 MMOL/L (ref 7–16)
ANISOCYTOSIS BLD QL SMEAR: ABNORMAL
BASOPHILS # BLD AUTO: 0 % (ref 0–1.8)
BASOPHILS # BLD: 0 K/UL (ref 0–0.12)
BUN SERPL-MCNC: 13 MG/DL (ref 8–22)
CALCIUM SERPL-MCNC: 8.3 MG/DL (ref 8.5–10.5)
CHLORIDE SERPL-SCNC: 102 MMOL/L (ref 96–112)
CO2 SERPL-SCNC: 22 MMOL/L (ref 20–33)
CREAT SERPL-MCNC: 0.73 MG/DL (ref 0.5–1.4)
EOSINOPHIL # BLD AUTO: 0 K/UL (ref 0–0.51)
EOSINOPHIL NFR BLD: 0 % (ref 0–6.9)
ERYTHROCYTE [DISTWIDTH] IN BLOOD BY AUTOMATED COUNT: 43.9 FL (ref 35.9–50)
FUNGUS SPEC FUNGUS STN: NORMAL
GLUCOSE BLD STRIP.AUTO-MCNC: 119 MG/DL (ref 65–99)
GLUCOSE BLD STRIP.AUTO-MCNC: 121 MG/DL (ref 65–99)
GLUCOSE BLD STRIP.AUTO-MCNC: 137 MG/DL (ref 65–99)
GLUCOSE BLD STRIP.AUTO-MCNC: 167 MG/DL (ref 65–99)
GLUCOSE BLD STRIP.AUTO-MCNC: 214 MG/DL (ref 65–99)
GLUCOSE SERPL-MCNC: 164 MG/DL (ref 65–99)
GRAM STN SPEC: ABNORMAL
HCT VFR BLD AUTO: 29.8 % (ref 42–52)
HGB BLD-MCNC: 9.7 G/DL (ref 14–18)
LYMPHOCYTES # BLD AUTO: 3.94 K/UL (ref 1–4.8)
LYMPHOCYTES NFR BLD: 29.6 % (ref 22–41)
MAGNESIUM SERPL-MCNC: 1.7 MG/DL (ref 1.5–2.5)
MANUAL DIFF BLD: NORMAL
MCH RBC QN AUTO: 27.7 PG (ref 27–33)
MCHC RBC AUTO-ENTMCNC: 32.6 G/DL (ref 33.7–35.3)
MCV RBC AUTO: 85.1 FL (ref 81.4–97.8)
MICROCYTES BLD QL SMEAR: ABNORMAL
MONOCYTES # BLD AUTO: 0.69 K/UL (ref 0–0.85)
MONOCYTES NFR BLD AUTO: 5.2 % (ref 0–13.4)
MORPHOLOGY BLD-IMP: NORMAL
NEUTROPHILS # BLD AUTO: 8.67 K/UL (ref 1.82–7.42)
NEUTROPHILS NFR BLD: 65.2 % (ref 44–72)
NRBC # BLD AUTO: 0 K/UL
NRBC BLD-RTO: 0 /100 WBC
PHOSPHATE SERPL-MCNC: 2.7 MG/DL (ref 2.5–4.5)
PLATELET # BLD AUTO: 396 K/UL (ref 164–446)
PLATELET BLD QL SMEAR: NORMAL
PMV BLD AUTO: 9.2 FL (ref 9–12.9)
POTASSIUM SERPL-SCNC: 3.3 MMOL/L (ref 3.6–5.5)
RBC # BLD AUTO: 3.5 M/UL (ref 4.7–6.1)
RBC BLD AUTO: PRESENT
SIGNIFICANT IND 70042: ABNORMAL
SIGNIFICANT IND 70042: NORMAL
SITE SITE: ABNORMAL
SITE SITE: NORMAL
SODIUM SERPL-SCNC: 133 MMOL/L (ref 135–145)
SOURCE SOURCE: ABNORMAL
SOURCE SOURCE: NORMAL
WBC # BLD AUTO: 13.3 K/UL (ref 4.8–10.8)

## 2022-03-02 PROCEDURE — A9270 NON-COVERED ITEM OR SERVICE: HCPCS | Performed by: INTERNAL MEDICINE

## 2022-03-02 PROCEDURE — 700102 HCHG RX REV CODE 250 W/ 637 OVERRIDE(OP): Performed by: HOSPITALIST

## 2022-03-02 PROCEDURE — 99233 SBSQ HOSP IP/OBS HIGH 50: CPT | Performed by: INTERNAL MEDICINE

## 2022-03-02 PROCEDURE — 83735 ASSAY OF MAGNESIUM: CPT

## 2022-03-02 PROCEDURE — 700102 HCHG RX REV CODE 250 W/ 637 OVERRIDE(OP): Performed by: INTERNAL MEDICINE

## 2022-03-02 PROCEDURE — 84100 ASSAY OF PHOSPHORUS: CPT

## 2022-03-02 PROCEDURE — 80048 BASIC METABOLIC PNL TOTAL CA: CPT

## 2022-03-02 PROCEDURE — A9270 NON-COVERED ITEM OR SERVICE: HCPCS | Performed by: HOSPITALIST

## 2022-03-02 PROCEDURE — 85027 COMPLETE CBC AUTOMATED: CPT

## 2022-03-02 PROCEDURE — 36415 COLL VENOUS BLD VENIPUNCTURE: CPT

## 2022-03-02 PROCEDURE — 85007 BL SMEAR W/DIFF WBC COUNT: CPT

## 2022-03-02 PROCEDURE — 82962 GLUCOSE BLOOD TEST: CPT | Mod: 91

## 2022-03-02 PROCEDURE — 700111 HCHG RX REV CODE 636 W/ 250 OVERRIDE (IP): Performed by: HOSPITALIST

## 2022-03-02 PROCEDURE — 99231 SBSQ HOSP IP/OBS SF/LOW 25: CPT | Mod: FS

## 2022-03-02 PROCEDURE — 700105 HCHG RX REV CODE 258: Performed by: INTERNAL MEDICINE

## 2022-03-02 PROCEDURE — 700111 HCHG RX REV CODE 636 W/ 250 OVERRIDE (IP): Performed by: INTERNAL MEDICINE

## 2022-03-02 PROCEDURE — 770006 HCHG ROOM/CARE - MED/SURG/GYN SEMI*

## 2022-03-02 PROCEDURE — 99232 SBSQ HOSP IP/OBS MODERATE 35: CPT | Performed by: HOSPITALIST

## 2022-03-02 RX ORDER — POTASSIUM CHLORIDE 20 MEQ/1
40 TABLET, EXTENDED RELEASE ORAL ONCE
Status: COMPLETED | OUTPATIENT
Start: 2022-03-02 | End: 2022-03-02

## 2022-03-02 RX ORDER — MAGNESIUM SULFATE HEPTAHYDRATE 40 MG/ML
4 INJECTION, SOLUTION INTRAVENOUS ONCE
Status: COMPLETED | OUTPATIENT
Start: 2022-03-02 | End: 2022-03-02

## 2022-03-02 RX ADMIN — TAMSULOSIN HYDROCHLORIDE 0.4 MG: 0.4 CAPSULE ORAL at 20:25

## 2022-03-02 RX ADMIN — OMEPRAZOLE 20 MG: 20 CAPSULE, DELAYED RELEASE ORAL at 06:13

## 2022-03-02 RX ADMIN — LEVOTHYROXINE SODIUM 75 MCG: 75 TABLET ORAL at 06:13

## 2022-03-02 RX ADMIN — ENOXAPARIN SODIUM 40 MG: 40 INJECTION SUBCUTANEOUS at 06:13

## 2022-03-02 RX ADMIN — FINASTERIDE 5 MG: 5 TABLET, FILM COATED ORAL at 06:13

## 2022-03-02 RX ADMIN — INSULIN HUMAN 2 UNITS: 100 INJECTION, SOLUTION PARENTERAL at 01:19

## 2022-03-02 RX ADMIN — SENNOSIDES AND DOCUSATE SODIUM 2 TABLET: 50; 8.6 TABLET ORAL at 06:13

## 2022-03-02 RX ADMIN — Medication 10 MG: at 20:24

## 2022-03-02 RX ADMIN — DORZOLAMIDE HYDROCHLORIDE AND TIMOLOL MALEATE 1 DROP: 20; 5 SOLUTION OPHTHALMIC at 06:12

## 2022-03-02 RX ADMIN — DORZOLAMIDE HYDROCHLORIDE AND TIMOLOL MALEATE 1 DROP: 20; 5 SOLUTION OPHTHALMIC at 17:37

## 2022-03-02 RX ADMIN — LATANOPROST 1 DROP: 50 SOLUTION OPHTHALMIC at 17:37

## 2022-03-02 RX ADMIN — PIPERACILLIN AND TAZOBACTAM 3.38 G: 3; .375 INJECTION, POWDER, LYOPHILIZED, FOR SOLUTION INTRAVENOUS; PARENTERAL at 06:09

## 2022-03-02 RX ADMIN — PIPERACILLIN AND TAZOBACTAM 3.38 G: 3; .375 INJECTION, POWDER, LYOPHILIZED, FOR SOLUTION INTRAVENOUS; PARENTERAL at 20:24

## 2022-03-02 RX ADMIN — PIPERACILLIN AND TAZOBACTAM 3.38 G: 3; .375 INJECTION, POWDER, LYOPHILIZED, FOR SOLUTION INTRAVENOUS; PARENTERAL at 13:18

## 2022-03-02 RX ADMIN — FENOFIBRATE 134 MG: 67 CAPSULE ORAL at 06:13

## 2022-03-02 RX ADMIN — MAGNESIUM SULFATE HEPTAHYDRATE 4 G: 40 INJECTION, SOLUTION INTRAVENOUS at 11:13

## 2022-03-02 RX ADMIN — POTASSIUM CHLORIDE 40 MEQ: 1500 TABLET, EXTENDED RELEASE ORAL at 08:24

## 2022-03-02 RX ADMIN — INSULIN HUMAN 3 UNITS: 100 INJECTION, SOLUTION PARENTERAL at 11:17

## 2022-03-02 ASSESSMENT — ENCOUNTER SYMPTOMS
HEADACHES: 0
NAUSEA: 0
PALPITATIONS: 0
DIZZINESS: 0
FEVER: 0
DIARRHEA: 0
ABDOMINAL PAIN: 1
COUGH: 0
CHILLS: 0
ABDOMINAL PAIN: 0
WEAKNESS: 0
BRUISES/BLEEDS EASILY: 0
BLURRED VISION: 0
MYALGIAS: 0
VOMITING: 0
SHORTNESS OF BREATH: 0
HEMOPTYSIS: 0

## 2022-03-02 NOTE — PROGRESS NOTES
Infectious Disease Progress Note    Author: Marisol Garcia M.D. Date & Time of service: 3/2/2022  8:20 AM    Chief Complaint:  Follow-up for intra-abdominal abscess    Interval History:  3/2 afebrile, WBC 13.3.  Patient underwent drain placement yesterday.  Cultures are pending.  Feels well.  Tolerating IV antibiotics without any issues.      Labs Reviewed, Medications Reviewed and Radiology Reviewed.    Review of Systems:  Review of Systems   Constitutional: Negative for chills and fever.   Respiratory: Negative for cough and shortness of breath.    Gastrointestinal: Positive for abdominal pain. Negative for diarrhea, nausea and vomiting.   Neurological: Negative for dizziness and headaches.       Hemodynamics:  Temp (24hrs), Av.4 °C (97.5 °F), Min:36.2 °C (97.2 °F), Max:36.7 °C (98 °F)  Temperature: 36.3 °C (97.4 °F)  Pulse  Av.2  Min: 67  Max: 90   Blood Pressure : 131/69       Physical Exam:  Physical Exam  Vitals and nursing note reviewed.   Constitutional:       Appearance: Normal appearance.   HENT:      Mouth/Throat:      Mouth: Mucous membranes are moist.   Eyes:      Extraocular Movements: Extraocular movements intact.      Pupils: Pupils are equal, round, and reactive to light.   Cardiovascular:      Rate and Rhythm: Normal rate.   Pulmonary:      Effort: Pulmonary effort is normal. No respiratory distress.      Breath sounds: No wheezing.   Abdominal:      Palpations: Abdomen is soft.      Comments: Right lower quadrant drain in place-creamy gray fluid in bulb   Musculoskeletal:         General: Normal range of motion.   Skin:     General: Skin is warm and dry.   Neurological:      General: No focal deficit present.      Mental Status: He is alert and oriented to person, place, and time.   Psychiatric:         Behavior: Behavior normal.         Meds:    Current Facility-Administered Medications:   •  potassium chloride SA  •  magnesium sulfate  •  insulin regular **AND** POC blood glucose  manual result **AND** NOTIFY MD and PharmD **AND** Administer 20 grams of glucose (approximately 8 ounces of fruit juice) every 15 minutes PRN FSBG less than 70 mg/dL **AND** dextrose  •  tamsulosin  •  dorzolamide-timolol  •  finasteride  •  latanoprost  •  levothyroxine  •  melatonin  •  omeprazole  •  senna-docusate **AND** polyethylene glycol/lytes **AND** magnesium hydroxide **AND** bisacodyl  •  NS  •  enoxaparin  •  acetaminophen  •  [COMPLETED] piperacillin-tazobactam **AND** piperacillin-tazobactam  •  fenofibrate micronized    Labs:  Recent Labs     02/28/22  1503 03/01/22  0402 03/02/22  0149   WBC 18.3* 15.3* 13.3*   RBC 3.98* 3.40* 3.50*   HEMOGLOBIN 11.2* 9.6* 9.7*   HEMATOCRIT 34.5* 29.2* 29.8*   MCV 86.7 85.9 85.1   MCH 28.1 28.2 27.7   RDW 45.4 44.8 43.9   PLATELETCT 459* 395 396   MPV 9.4 9.3 9.2   NEUTSPOLYS 60.00 58.80 65.20   LYMPHOCYTES 32.20 33.00 29.60   MONOCYTES 4.30 6.30 5.20   EOSINOPHILS 0.00 0.70 0.00   BASOPHILS 0.00 0.30 0.00   RBCMORPHOLO Present  --  Present     Recent Labs     02/28/22  1503 03/01/22  0402 03/02/22  0149   SODIUM 137 138 133*   POTASSIUM 4.2 3.8 3.3*   CHLORIDE 99 105 102   CO2 23 23 22   GLUCOSE 161* 140* 164*   BUN 18 15 13     Recent Labs     02/28/22  1503 03/01/22  0402 03/02/22  0149   ALBUMIN 3.6 2.7*  --    TBILIRUBIN 0.4 0.3  --    ALKPHOSPHAT 65 53  --    TOTPROTEIN 7.5 5.9*  --    ALTSGPT 23 17  --    ASTSGOT 28 20  --    CREATININE 0.80 0.67 0.73       Imaging:  CT-ABDOMEN-PELVIS WITH    Addendum Date: 2/28/2022    ADDENDUM: Please note that the phlegmonous process and extensive abscess in the right lower quadrant could be due to an indolent previously ruptured appendicitis.    Result Date: 2/28/2022 2/28/2022 4:49 PM HISTORY/REASON FOR EXAM:  RLQ abdominal pain (Age >= 14y). Palpable firm mass in the right lower abdomen TECHNIQUE/EXAM DESCRIPTION:   CT scan of the abdomen and pelvis with contrast. Contrast-enhanced helical scanning was obtained from  the diaphragmatic domes through the pubic symphysis following the bolus administration of nonionic contrast without complication. 100 mL of Omnipaque 350 nonionic contrast was administered without complication. Low dose optimization technique was utilized for this CT exam including automated exposure control and adjustment of the mA and/or kV according to patient size. COMPARISON: 10/8/2020 FINDINGS: Lower Chest: Visualized lung bases demonstrate dependent atelectasis. No change in the previously biopsied 3.5 cm right benign breast mass. Liver: Normal. Spleen: Unremarkable. Pancreas: Unremarkable. Gallbladder: There is a tiny dependent gallstone.. Biliary: Nondilated. Adrenal glands: Normal. Kidneys: There is a nonobstructive 3 no other calcification the right superior pole kidney. There are left renal parapelvic cysts. There are simple appearing bilateral renal cortical cysts again seen. There may be right renal parapelvic cysts as well. There is minimal left perinephric stranding. Lymph nodes: No adenopathy. Vasculature: There is aortic atherosclerosis. Bowel: There is colonic diverticulosis. There is a questionable spiculated type masslike area in the right lower quadrant adjacent to the inferior cecum measuring 5.3 x 4.4 cm on the coronal reconstructions. There is abnormal enhancement abutting the rectus musculature and the right psoas musculature. Peritoneum: There is a complex heterogeneously enhancing multi loculated fluid collection in the right lower quadrant anteriorly with multiple internal air lucencies and ill-defined peripheral rim enhancement. This extends from the right peritoneum just posterior to the rectus abdominis musculature at the level of the iliac crest and extends inferiorly into the midline and right lower pelvis extending through the right lateral abdominal wall musculature into the subcutaneous tissues. The origin is uncertain however may be related to the ill-defined masslike or  phlegmon-type structure inferior to the cecum. The entire area measures an estimated 11.4 x 6.4 x 13.4 cm. There are numerous other smaller loculated enhancing fluid components extending into the soft tissues with associated subcutaneous edema. Pelvis: Bladder is unremarkable.. Musculoskeletal: No acute or destructive process. There are degenerative changes throughout the spine.     1.  There is a complex multiloculated fluid collection with heterogeneous peripheral enhancement and internal air lucencies consistent with abscess in the anterior right lower quadrant, etiology uncertain. This extends from the peritoneal cavity through the musculature into the subcutaneous tissues. There is abnormal enhancement abutting the right abdominal wall musculature and right psoas muscle with multiple smaller loculated fluid components. 2.  There is a questionable phlegmonous type process versus mass just inferior to the cecum. 3.  There are bilateral parapelvic cysts and renal cortical cysts which do not need further imaging follow-up. 4.  There is colonic diverticulosis with no acute diverticulitis. 5.  There is a tiny dependent gallstone.    CT-DRAIN-PERITONEAL    Result Date: 3/1/2022  3/1/2022 5:35 PM HISTORY/REASON FOR EXAM:  RLQ abscess x 3 weeks  -  high surgical risk; NPO, no thinners. Discussed With Dr Dudley PM 2/28. TECHNIQUE/EXAM DESCRIPTION: RLQ pelvic abscess drainage with CT guidance. Low dose optimization technique was utilized for this CT exam including automated exposure control and adjustment of the mA and/or kV according to patient size. PROCEDURE: Informed consent was obtained. Localizing CT images were obtained with the patient in supine position. The skin was prepped with Betadine and draped in a sterile fashion. Following local anesthesia with 1% Lidocaine, a 17 G guiding needle was placed and needle path confirmed with CT. An Amplatz guidewire was placed and following serial tract dilatation, a 10  biliary drainage catheter was placed to allow for additional sideholes to cross multiple aspects of the multiloculated abscess. A specimen was collected and submitted for culture and sensitivity and Gram stain. Total of 25 mL purulent fluid was drained. The catheter was secured to the skin and connected to suction bulb drainage. Final CT images were obtained documenting catheter position. The patient tolerated the procedure well with no evidence of complication. COMPARISON: CT abdomen pelvis dated 2/28/2022 FINDINGS: The final CT images show satisfactory catheter position within the target collection.     1.  CT guided peritoneal RLQ catheter drainage. 2.  The current plan is to obtain a follow-up CT in 5-7 days..      Micro:  Results     Procedure Component Value Units Date/Time    CULTURE WOUND W/ GRAM STAIN [[id number]] Collected: 03/01/22 1809    Order Status: No result Specimen: Wound Updated: 03/01/22 2013     Significant Indicator NEG     Source WND     Site Peritoneal Abscess Drain     Culture Result -     Gram Stain Result -    Narrative:      Collected By: [id number] AWILDA MACHUCA  Intra-abdominal  abcsess  Collected By: [id number] AWILDA MACHUCA    Fungal Culture [[id number]] Collected: 03/01/22 1809    Order Status: Sent Specimen: Other Body Fluid Updated: 03/01/22 2013    Narrative:      Collected By: [id number] AWILDA MACHUCA  Intra-abdominal  abcsess    FLUID CULTURE [[id number]] Collected: 03/01/22 1809    Order Status: Canceled Specimen: Other Body Fluid     FLUID CULTURE W/GRAM STAIN [[id number]] Collected: 03/01/22 1809    Order Status: Canceled Specimen: Other Body Fluid     AFB Culture [[id number]]     Order Status: No result Specimen: Respirate from Abscess     Urinalysis [[id number]] Collected: 03/01/22 1134    Order Status: Completed Specimen: Urine Updated: 03/01/22 1151     Color Yellow     Character Clear     Specific Gravity 1.020     Ph 6.0     Glucose Negative mg/dL      Ketones Negative mg/dL       "Protein Negative mg/dL      Bilirubin Negative     Urobilinogen, Urine 1.0     Nitrite Negative     Leukocyte Esterase Negative     Occult Blood Negative     Micro Urine Req see below     Comment: Microscopic examination not performed when specimen is clear  and chemically negative for protein, blood, leukocyte esterase  and nitrite.         Narrative:      Indication for culture:->Evaluation for sepsis without a  clear source of infection    Urine Culture [[id number]] Collected: 03/01/22 1134    Order Status: Sent Specimen: Urine, Clean Catch Updated: 03/01/22 1141    Narrative:      Indication for culture:->Evaluation for sepsis without a  clear source of infection    Blood Culture [[id number]] Collected: 02/28/22 1518    Order Status: Completed Specimen: Blood from Peripheral Updated: 03/01/22 0747     Significant Indicator NEG     Source BLD     Site PERIPHERAL     Culture Result No Growth  Note: Blood cultures are incubated for 5 days and  are monitored continuously.Positive blood cultures  are called to the RN and reported as soon as  they are identified.      Narrative:      2 of 2 blood culture x2  Sites order. Per Hospital Policy:  Only change Specimen Src: to \"Line\" if specified by physician  order.  Right AC    Blood Culture [[id number]] Collected: 02/28/22 1503    Order Status: Completed Specimen: Blood from Peripheral Updated: 03/01/22 0747     Significant Indicator NEG     Source BLD     Site PERIPHERAL     Culture Result No Growth  Note: Blood cultures are incubated for 5 days and  are monitored continuously.Positive blood cultures  are called to the RN and reported as soon as  they are identified.      Narrative:      1 of 2 for Blood Culture x 2 sites order. Per Hospital  Policy: Only change Specimen Src: to \"Line\" if specified by  physician order.  No site indicated    Blood Culture [[id number]] Collected: 02/28/22 0000    Order Status: Canceled Specimen: Other from Peripheral     Blood Culture " [[id number]] Collected: 02/28/22 0000    Order Status: Canceled Specimen: Other from Peripheral           Assessment:  Active Hospital Problems    Diagnosis    • *Sepsis (HCC) [A41.9]    • Right lower quadrant abdominal abscess (HCC) [K65.1]    • Essential hypertension [I10]    • Cognitive impairment [R41.89]    • Diabetes mellitus type 2, noninsulin dependent (HCC) [E11.9]    • Dyslipidemia [E78.5]      71 y.o. man with a history of type 2 diabetes mellitus, hyperlipidemia and hypertension admitted on 2/28/2022 secondary to progressive right lower quadrant abdominal pain.  CT scan showed a complex multiloculated fluid collection in the anterior right lower quadrant.    Status post drainage on 3/1. Blood cultures on admission are negative to date.  He is currently on Zosyn.    Pertinent diagnoses:  Intra-abdominal abscess  Leukocytosis, improving  Type 2 diabetes mellitus, controlled.  Hemoglobin A1c 5.8  Cognitive impairment    Plan:  -Continue IV Zosyn  -Status post drain placement on 3/1.  Follow cultures-pending  -Monitor drain output  -Repeat CT scan 5 days from drain placement (~3/6)  -Blood sugar control  -Duration of antibiotics will depend on results of repeat CT scan    Discussed with internal medicine/Dr. Abdi

## 2022-03-02 NOTE — PROGRESS NOTES
Pt arrived back to unit form IR procedure. R MICHELLE drain in place, dressing is CDI. Pt asking for food, Pagekeila CODY with regards to diet orders. Pending diet order at this time. Pt states no pain. Bed is locked and in lowest position. Strip alarm on

## 2022-03-02 NOTE — PROGRESS NOTES
Received report and assumed care of pt at change of shift.   Pt is resting in bed states no pain at this time.   Assessment completed at this time pt is A&O 4/ flat affact, VSS stable on RA   Discussed POC/shift goals with pt   Pt states no further needs at this time.  Bed is locked and in lowest position, bed alarm is on  Hourly rounding in place.

## 2022-03-02 NOTE — PROGRESS NOTES
Hospital Medicine Daily Progress Note    Date of Service  3/2/2022    Chief Complaint  Lico Salinas is a 71 y.o. male admitted 2/28/2022 with abdominal pain    Hospital Course    71-year-old male with history of type 2 diabetes dyslipidemia hypertension admitted with right lower quadrant pain noted to have complex fluid collection concerning for an abscess in the right lower quadrant.  He was evaluated by surgery with recommendation for CT-guided drainage    Interval Problem Update    Had drain placement yesterday with purulence drainage  Afebrile  Tolerating diet      I have personally seen and examined the patient at bedside. I discussed the plan of care with patient, bedside RN and .    Consultants/Specialty  general surgery and infectious disease    Code Status  DNAR/DNI    Disposition  Patient is not medically cleared for discharge.   Anticipate discharge to to home with close outpatient follow-up.  I have placed the appropriate orders for post-discharge needs.    Review of Systems  Review of Systems   Constitutional: Negative for chills and fever.   Respiratory: Negative for cough and shortness of breath.    Gastrointestinal: Negative for abdominal pain, diarrhea, nausea and vomiting.   All other systems reviewed and are negative.       Physical Exam  Temp:  [36.2 °C (97.2 °F)-36.7 °C (98 °F)] 36.3 °C (97.4 °F)  Pulse:  [67-72] 68  Resp:  [11-18] 18  BP: ()/(56-75) 131/69  SpO2:  [91 %-96 %] 95 %    Physical Exam  Vitals and nursing note reviewed.   Constitutional:       Appearance: He is well-developed. He is not diaphoretic.   HENT:      Head: Normocephalic and atraumatic.      Mouth/Throat:      Pharynx: No oropharyngeal exudate.   Eyes:      General: No scleral icterus.        Right eye: No discharge.         Left eye: No discharge.      Conjunctiva/sclera: Conjunctivae normal.      Pupils: Pupils are equal, round, and reactive to light.   Neck:      Vascular: No JVD.      Trachea: No  tracheal deviation.   Cardiovascular:      Rate and Rhythm: Normal rate and regular rhythm.      Heart sounds: No murmur heard.    No friction rub. No gallop.   Pulmonary:      Effort: Pulmonary effort is normal. No respiratory distress.      Breath sounds: Normal breath sounds. No stridor. No wheezing.   Chest:      Chest wall: No tenderness.   Abdominal:      General: There is no distension.      Palpations: Abdomen is soft. There is mass (Right lower quadrant).      Tenderness: There is no abdominal tenderness. There is no rebound.      Comments: Right lower quadrant drain with thick tan-colored drainage   Musculoskeletal:         General: No tenderness.      Cervical back: Neck supple.   Skin:     General: Skin is warm and dry.      Nails: There is no clubbing.   Neurological:      Mental Status: He is alert and oriented to person, place, and time.      Cranial Nerves: No cranial nerve deficit.      Motor: No abnormal muscle tone.   Psychiatric:         Behavior: Behavior normal.         Fluids    Intake/Output Summary (Last 24 hours) at 3/2/2022 1232  Last data filed at 3/2/2022 0820  Gross per 24 hour   Intake 338 ml   Output 1225 ml   Net -887 ml       Laboratory  Recent Labs     02/28/22  1503 03/01/22  0402 03/02/22  0149   WBC 18.3* 15.3* 13.3*   RBC 3.98* 3.40* 3.50*   HEMOGLOBIN 11.2* 9.6* 9.7*   HEMATOCRIT 34.5* 29.2* 29.8*   MCV 86.7 85.9 85.1   MCH 28.1 28.2 27.7   MCHC 32.5* 32.9* 32.6*   RDW 45.4 44.8 43.9   PLATELETCT 459* 395 396   MPV 9.4 9.3 9.2     Recent Labs     02/28/22  1503 03/01/22  0402 03/02/22  0149   SODIUM 137 138 133*   POTASSIUM 4.2 3.8 3.3*   CHLORIDE 99 105 102   CO2 23 23 22   GLUCOSE 161* 140* 164*   BUN 18 15 13   CREATININE 0.80 0.67 0.73   CALCIUM 9.4 8.8 8.3*     Recent Labs     02/28/22  1503 02/28/22  1848   APTT 37.8*  --    INR 1.36* 1.35*               Imaging  CT-DRAIN-PERITONEAL   Final Result      1.  CT guided peritoneal RLQ catheter drainage.   2.  The current  plan is to obtain a follow-up CT in 5-7 days..      CT-ABDOMEN-PELVIS WITH   Final Result   Addendum 1 of 1         ADDENDUM:   Please note that the phlegmonous process and extensive abscess in the    right lower quadrant could be due to an indolent previously ruptured    appendicitis.      Final      1.  There is a complex multiloculated fluid collection with heterogeneous peripheral enhancement and internal air lucencies consistent with abscess in the anterior right lower quadrant, etiology uncertain. This extends from the peritoneal cavity through    the musculature into the subcutaneous tissues. There is abnormal enhancement abutting the right abdominal wall musculature and right psoas muscle with multiple smaller loculated fluid components.   2.  There is a questionable phlegmonous type process versus mass just inferior to the cecum.   3.  There are bilateral parapelvic cysts and renal cortical cysts which do not need further imaging follow-up.   4.  There is colonic diverticulosis with no acute diverticulitis.   5.  There is a tiny dependent gallstone.           Assessment/Plan  * Sepsis (HCC)- (present on admission)  Assessment & Plan  Sepsis resolved    Continue Zosyn        Electrolyte abnormality  Assessment & Plan  Hypokalemia  Hypomagnesemia    Replete and monitor    Right lower quadrant abdominal abscess (HCC)- (present on admission)  Assessment & Plan  Likely secondary to ruptured appendix   status post CT-guided drainage on 3/1/2022  Continue Zosyn follow-up blood cultures  ID and surgery following discussed with Dr. Garcia she is recommending repeat CT on 3/6/2022    Essential hypertension- (present on admission)  Assessment & Plan  Stable off medical therapy monitor    Anemia- (present on admission)  Assessment & Plan  Acute on chronic    No signs of active bleeding at this time  Monitor CBC  Will need outpatient work-up    Cognitive impairment- (present on admission)  Assessment & Plan  Stable  denies use of sedating agents and monitor for delirium      Dyslipidemia- (present on admission)  Assessment & Plan  On fenofibrate    Diabetes mellitus type 2, noninsulin dependent (HCC)- (present on admission)  Assessment & Plan  Stable on sliding scale insulin monitor CBGs  Most recent hemoglobin A1c 5.8       VTE prophylaxis: SCDs/TEDs    I have performed a physical exam and reviewed and updated ROS and Plan today (3/2/2022). In review of yesterday's note (3/1/2022), there are no changes except as documented above.

## 2022-03-02 NOTE — PROGRESS NOTES
"    DATE: 3/2/2022    Hospital Day 2 right lower quadrant abdominal abscess.    INTERVAL EVENTS:  IR drain placement yesterday  50mL of output since placement  Zosyn day 3  Abscess cultures pending  Remains afebrile with WBC downward trend    REVIEW OF SYSTEMS:  ROS   + fatigue, no chills  No abdominal pain, nausea, or vomiting  + flatus    PHYSICAL EXAMINATION:  Vital Signs: /69   Pulse 68   Temp 36.3 °C (97.4 °F) (Temporal)   Resp 18   Ht 1.753 m (5' 9\")   Wt 79.6 kg (175 lb 7.8 oz)   SpO2 95%   Physical Exam     Abdominal soft, mass palpated in right lower quadrant  + bowel sounds in all 4 quadrants  MICHELLE drain with thick tan output    LABORATORY VALUES:   Recent Labs     02/28/22  1503 03/01/22  0402 03/02/22  0149   WBC 18.3* 15.3* 13.3*   RBC 3.98* 3.40* 3.50*   HEMOGLOBIN 11.2* 9.6* 9.7*   HEMATOCRIT 34.5* 29.2* 29.8*   MCV 86.7 85.9 85.1   MCH 28.1 28.2 27.7   MCHC 32.5* 32.9* 32.6*   RDW 45.4 44.8 43.9   PLATELETCT 459* 395 396   MPV 9.4 9.3 9.2     Recent Labs     02/28/22  1503 03/01/22  0402 03/02/22  0149   SODIUM 137 138 133*   POTASSIUM 4.2 3.8 3.3*   CHLORIDE 99 105 102   CO2 23 23 22   GLUCOSE 161* 140* 164*   BUN 18 15 13   CREATININE 0.80 0.67 0.73   CALCIUM 9.4 8.8 8.3*     Recent Labs     02/28/22  1503 02/28/22  1848 03/01/22  0402   ASTSGOT 28  --  20   ALTSGPT 23  --  17   TBILIRUBIN 0.4  --  0.3   ALKPHOSPHAT 65  --  53   GLOBULIN 3.9*  --  3.2   INR 1.36* 1.35*  --      Recent Labs     02/28/22  1503 02/28/22  1848   APTT 37.8*  --    INR 1.36* 1.35*        IMAGING:   CT-DRAIN-PERITONEAL   Final Result      1.  CT guided peritoneal RLQ catheter drainage.   2.  The current plan is to obtain a follow-up CT in 5-7 days..      CT-ABDOMEN-PELVIS WITH   Final Result   Addendum 1 of 1         ADDENDUM:   Please note that the phlegmonous process and extensive abscess in the    right lower quadrant could be due to an indolent previously ruptured    appendicitis.      Final      1.  There " is a complex multiloculated fluid collection with heterogeneous peripheral enhancement and internal air lucencies consistent with abscess in the anterior right lower quadrant, etiology uncertain. This extends from the peritoneal cavity through    the musculature into the subcutaneous tissues. There is abnormal enhancement abutting the right abdominal wall musculature and right psoas muscle with multiple smaller loculated fluid components.   2.  There is a questionable phlegmonous type process versus mass just inferior to the cecum.   3.  There are bilateral parapelvic cysts and renal cortical cysts which do not need further imaging follow-up.   4.  There is colonic diverticulosis with no acute diverticulitis.   5.  There is a tiny dependent gallstone.          ASSESSMENT AND PLAN:   Right lower quadrant abdominal abscess (HCC)- (present on admission)  Assessment & Plan  2/28 Zosyn initiated on admit.  3/1 IR drain placement.  -Abscess cultures pending  Antibiotics per ID.         ____________________________________     NAVDEEP Gomes.    DD: 3/2/2022  9:08 AM

## 2022-03-02 NOTE — CARE PLAN
The patient is Watcher - Medium risk of patient condition declining or worsening    Shift Goals  Clinical Goals: Remain free of falls  Patient Goals: to eat again    Progress made toward(s) clinical / shift goals:  yes  Problem: Knowledge Deficit - Standard  Goal: Patient and family/care givers will demonstrate understanding of plan of care, disease process/condition, diagnostic tests and medications  Outcome: Progressing     Problem: Respiratory  Goal: Patient will achieve/maintain optimum respiratory ventilation and gas exchange  Outcome: Progressing     Problem: Fall Risk  Goal: Patient will remain free from falls  Outcome: Progressing       Patient is not progressing towards the following goals:

## 2022-03-02 NOTE — PROGRESS NOTES
IR Nursing Note:    Patient underwent a Peritoneal drain placement by Dr. Canela. Procedure site was marked by MD and verified using imaging guidance.  Patient was placed in a supine position.  Right lower quadrant was accessed.   Vitals were taken every 5 minutes and remained stable during procedure (see doc flow sheet for results).  CO2 waveform capnography was monitored and remained 25-35 throughout procedure.  A Percustay dressing was placed over surgical site. Report called to Mary NAQVI. Pt transported by bed with RN to S616. 8 mL to the lab 16 mL discarded.       [facility] Flexima RO Regular 10F x 25 cm Ref # [id number] Lot # [id number]

## 2022-03-02 NOTE — CARE PLAN
The patient is Stable - Low risk of patient condition declining or worsening    Shift Goals  Clinical Goals: remain free from injury  Patient Goals: to feel better    Progress made toward(s) clinical / shift goals:  pt has all precautions in place, scores as moderate. Bed alarm is on and working appropriately. Fall risk band as well as yellow socks in use. Discussed use of call light with pt. SB assist with FWW. Pt states he is feeling better after getting the drain placed yesterday. Continue IV ABX as ordered.     Patient is not progressing towards the following goals:

## 2022-03-02 NOTE — OR SURGEON
Immediate Post- Operative Note        Findings: RLQ abscess      Procedure(s): Drain placement      Estimated Blood Loss: Less than 5 ml        Complications: None            3/1/2022     5:44 PM     Thiago Canela M.D.

## 2022-03-02 NOTE — PROGRESS NOTES
Trauma / Surgical Daily Progress Note    Date of Service  3/1/2022    Chief Complaint  71 y.o. male admitted 2/28/2022 with a right lower quadrant abscess    Interval Events  Hungry  Awaiting CT guided drain placement    - May have diet per medicine team after procedure, no restrictions from a surgical perspective      Review of Systems  Review of Systems   Constitutional: Negative for chills and fever.   Respiratory: Negative for shortness of breath.    Cardiovascular: Negative for chest pain.   Gastrointestinal: Negative for abdominal pain, nausea and vomiting.   Neurological: Negative for speech change.        Vital Signs  Temp:  [36.2 °C (97.2 °F)-36.8 °C (98.3 °F)] 36.7 °C (98 °F)  Pulse:  [68-81] 70  Resp:  [18] 18  BP: (114-126)/(62-75) 122/72  SpO2:  [92 %-95 %] 93 %    Physical Exam  Physical Exam  Vitals and nursing note reviewed. Exam conducted with a chaperone present (family at bedside).   Constitutional:       General: He is awake. He is not in acute distress.     Appearance: He is well-developed. He is not ill-appearing.   Pulmonary:      Effort: Pulmonary effort is normal. No respiratory distress.   Abdominal:      General: There is no distension.      Tenderness: There is abdominal tenderness (mild RLQ tenderness to palpation). There is no guarding.   Musculoskeletal:      Cervical back: Neck supple.      Comments: Moves all extremities   Skin:     General: Skin is warm and dry.      Coloration: Skin is pale.   Neurological:      General: No focal deficit present.      Mental Status: He is alert and oriented to person, place, and time.   Psychiatric:         Mood and Affect: Mood normal.         Behavior: Behavior normal. Behavior is cooperative.         Laboratory  Recent Results (from the past 24 hour(s))   SARS-COV Antigen VLAD    Collection Time: 02/28/22  6:46 PM   Result Value Ref Range    SARS-CoV-2 Source Nasal Swab     SARS-COV ANTIGEN VLAD NotDetected NotDetected   Prothrombin time (INR)     Collection Time: 02/28/22  6:48 PM   Result Value Ref Range    PT 16.3 (H) 12.0 - 14.6 sec    INR 1.35 (H) 0.87 - 1.13   CEA    Collection Time: 03/01/22  4:02 AM   Result Value Ref Range    Carcinoembryonic Antigen 0.6 0.0 - 3.0 ng/mL   CBC WITH DIFFERENTIAL    Collection Time: 03/01/22  4:02 AM   Result Value Ref Range    WBC 15.3 (H) 4.8 - 10.8 K/uL    RBC 3.40 (L) 4.70 - 6.10 M/uL    Hemoglobin 9.6 (L) 14.0 - 18.0 g/dL    Hematocrit 29.2 (L) 42.0 - 52.0 %    MCV 85.9 81.4 - 97.8 fL    MCH 28.2 27.0 - 33.0 pg    MCHC 32.9 (L) 33.7 - 35.3 g/dL    RDW 44.8 35.9 - 50.0 fL    Platelet Count 395 164 - 446 K/uL    MPV 9.3 9.0 - 12.9 fL    Neutrophils-Polys 58.80 44.00 - 72.00 %    Lymphocytes 33.00 22.00 - 41.00 %    Monocytes 6.30 0.00 - 13.40 %    Eosinophils 0.70 0.00 - 6.90 %    Basophils 0.30 0.00 - 1.80 %    Immature Granulocytes 0.90 0.00 - 0.90 %    Nucleated RBC 0.00 /100 WBC    Neutrophils (Absolute) 9.00 (H) 1.82 - 7.42 K/uL    Lymphs (Absolute) 5.04 (H) 1.00 - 4.80 K/uL    Monos (Absolute) 0.97 (H) 0.00 - 0.85 K/uL    Eos (Absolute) 0.11 0.00 - 0.51 K/uL    Baso (Absolute) 0.04 0.00 - 0.12 K/uL    Immature Granulocytes (abs) 0.13 (H) 0.00 - 0.11 K/uL    NRBC (Absolute) 0.00 K/uL   Comp Metabolic Panel    Collection Time: 03/01/22  4:02 AM   Result Value Ref Range    Sodium 138 135 - 145 mmol/L    Potassium 3.8 3.6 - 5.5 mmol/L    Chloride 105 96 - 112 mmol/L    Co2 23 20 - 33 mmol/L    Anion Gap 10.0 7.0 - 16.0    Glucose 140 (H) 65 - 99 mg/dL    Bun 15 8 - 22 mg/dL    Creatinine 0.67 0.50 - 1.40 mg/dL    Calcium 8.8 8.5 - 10.5 mg/dL    AST(SGOT) 20 12 - 45 U/L    ALT(SGPT) 17 2 - 50 U/L    Alkaline Phosphatase 53 30 - 99 U/L    Total Bilirubin 0.3 0.1 - 1.5 mg/dL    Albumin 2.7 (L) 3.2 - 4.9 g/dL    Total Protein 5.9 (L) 6.0 - 8.2 g/dL    Globulin 3.2 1.9 - 3.5 g/dL    A-G Ratio 0.8 g/dL   MAGNESIUM    Collection Time: 03/01/22  4:02 AM   Result Value Ref Range    Magnesium 1.8 1.5 - 2.5 mg/dL   CRP  QUANTITIVE (NON-CARDIAC)    Collection Time: 03/01/22  4:02 AM   Result Value Ref Range    Stat C-Reactive Protein 13.48 (H) 0.00 - 0.75 mg/dL   Lactic Acid -STAT Once    Collection Time: 03/01/22  4:02 AM   Result Value Ref Range    Lactic Acid 1.3 0.5 - 2.0 mmol/L   PROCALCITONIN    Collection Time: 03/01/22  4:02 AM   Result Value Ref Range    Procalcitonin 0.16 <0.25 ng/mL   ESTIMATED GFR    Collection Time: 03/01/22  4:02 AM   Result Value Ref Range    GFR If African American >60 >60 mL/min/1.73 m 2    GFR If Non African American >60 >60 mL/min/1.73 m 2   Urinalysis    Collection Time: 03/01/22 11:34 AM    Specimen: Urine   Result Value Ref Range    Color Yellow     Character Clear     Specific Gravity 1.020 <1.035    Ph 6.0 5.0 - 8.0    Glucose Negative Negative mg/dL    Ketones Negative Negative mg/dL    Protein Negative Negative mg/dL    Bilirubin Negative Negative    Urobilinogen, Urine 1.0 Negative    Nitrite Negative Negative    Leukocyte Esterase Negative Negative    Occult Blood Negative Negative    Micro Urine Req see below    POCT glucose device results    Collection Time: 03/01/22  2:57 PM   Result Value Ref Range    POC Glucose, Blood 127 (H) 65 - 99 mg/dL       Fluids    Intake/Output Summary (Last 24 hours) at 3/1/2022 1628  Last data filed at 3/1/2022 1430  Gross per 24 hour   Intake 0 ml   Output 1350 ml   Net -1350 ml       Core Measures & Quality Metrics  Labs reviewed, Medications reviewed and Radiology images reviewed  Bro catheter: No Bro        DVT prophylaxis - mechanical: SCDs  Ulcer prophylaxis: Yes  Antibiotics: Treating active infection/contamination beyond 24 hours perioperative coverage        Assessment/Plan  Right lower quadrant abdominal abscess (HCC)- (present on admission)  Assessment & Plan  2/28 Zosyn initiated on admit. IR drain placement ordered.  3/1 Awaiting drain placement and cultures.  Antibiotics per ID.      Discussed patient condition with Family, Patient and  general surgery.  Dr. Samson

## 2022-03-02 NOTE — PROGRESS NOTES
Patient A/Ox4, clear but delayed response  VSS, RA, up with one person assist  Update wife via telephone  No c/o pain  IVF of NS infusing @ 83 mL/hr  BG checks Q6H, though pt is on Regular diet  RLQ drain in place, dressing c/d/i  Pt is safe, needs met, bed low/locked, alarm active/audible, precautions in place, call light within reach, hourly rounding  No s/sx of acute distress

## 2022-03-03 PROBLEM — E87.1 HYPONATREMIA: Status: ACTIVE | Noted: 2022-03-03

## 2022-03-03 PROBLEM — A41.9 SEPSIS (HCC): Status: RESOLVED | Noted: 2022-02-28 | Resolved: 2022-03-03

## 2022-03-03 LAB
ANION GAP SERPL CALC-SCNC: 10 MMOL/L (ref 7–16)
ANISOCYTOSIS BLD QL SMEAR: ABNORMAL
BACTERIA UR CULT: NORMAL
BACTERIA WND AEROBE CULT: ABNORMAL
BASOPHILS # BLD AUTO: 0 % (ref 0–1.8)
BASOPHILS # BLD: 0 K/UL (ref 0–0.12)
BUN SERPL-MCNC: 9 MG/DL (ref 8–22)
CALCIUM SERPL-MCNC: 8.5 MG/DL (ref 8.5–10.5)
CHLORIDE SERPL-SCNC: 100 MMOL/L (ref 96–112)
CO2 SERPL-SCNC: 23 MMOL/L (ref 20–33)
CREAT SERPL-MCNC: 0.6 MG/DL (ref 0.5–1.4)
EOSINOPHIL # BLD AUTO: 0.28 K/UL (ref 0–0.51)
EOSINOPHIL NFR BLD: 1.7 % (ref 0–6.9)
ERYTHROCYTE [DISTWIDTH] IN BLOOD BY AUTOMATED COUNT: 43.4 FL (ref 35.9–50)
GLUCOSE BLD STRIP.AUTO-MCNC: 118 MG/DL (ref 65–99)
GLUCOSE BLD STRIP.AUTO-MCNC: 146 MG/DL (ref 65–99)
GLUCOSE SERPL-MCNC: 128 MG/DL (ref 65–99)
GRAM STN SPEC: ABNORMAL
HCT VFR BLD AUTO: 32 % (ref 42–52)
HGB BLD-MCNC: 10.5 G/DL (ref 14–18)
LYMPHOCYTES # BLD AUTO: 6.79 K/UL (ref 1–4.8)
LYMPHOCYTES NFR BLD: 40.9 % (ref 22–41)
MAGNESIUM SERPL-MCNC: 2 MG/DL (ref 1.5–2.5)
MANUAL DIFF BLD: NORMAL
MCH RBC QN AUTO: 27.9 PG (ref 27–33)
MCHC RBC AUTO-ENTMCNC: 32.8 G/DL (ref 33.7–35.3)
MCV RBC AUTO: 85.1 FL (ref 81.4–97.8)
MICROCYTES BLD QL SMEAR: ABNORMAL
MONOCYTES # BLD AUTO: 0 K/UL (ref 0–0.85)
MONOCYTES NFR BLD AUTO: 0 % (ref 0–13.4)
MORPHOLOGY BLD-IMP: NORMAL
MYELOCYTES NFR BLD MANUAL: 1.7 %
NEUTROPHILS # BLD AUTO: 9.25 K/UL (ref 1.82–7.42)
NEUTROPHILS NFR BLD: 55.7 % (ref 44–72)
NRBC # BLD AUTO: 0 K/UL
NRBC BLD-RTO: 0 /100 WBC
PLATELET # BLD AUTO: 445 K/UL (ref 164–446)
PLATELET BLD QL SMEAR: NORMAL
PMV BLD AUTO: 9.4 FL (ref 9–12.9)
POTASSIUM SERPL-SCNC: 3.7 MMOL/L (ref 3.6–5.5)
RBC # BLD AUTO: 3.76 M/UL (ref 4.7–6.1)
RBC BLD AUTO: PRESENT
SIGNIFICANT IND 70042: ABNORMAL
SIGNIFICANT IND 70042: NORMAL
SITE SITE: ABNORMAL
SITE SITE: NORMAL
SMUDGE CELLS BLD QL SMEAR: NORMAL
SODIUM SERPL-SCNC: 133 MMOL/L (ref 135–145)
SOURCE SOURCE: ABNORMAL
SOURCE SOURCE: NORMAL
WBC # BLD AUTO: 16.6 K/UL (ref 4.8–10.8)

## 2022-03-03 PROCEDURE — 770006 HCHG ROOM/CARE - MED/SURG/GYN SEMI*

## 2022-03-03 PROCEDURE — 85007 BL SMEAR W/DIFF WBC COUNT: CPT

## 2022-03-03 PROCEDURE — 82962 GLUCOSE BLOOD TEST: CPT | Mod: 91

## 2022-03-03 PROCEDURE — 85027 COMPLETE CBC AUTOMATED: CPT

## 2022-03-03 PROCEDURE — 36415 COLL VENOUS BLD VENIPUNCTURE: CPT

## 2022-03-03 PROCEDURE — 700105 HCHG RX REV CODE 258: Performed by: INTERNAL MEDICINE

## 2022-03-03 PROCEDURE — 700111 HCHG RX REV CODE 636 W/ 250 OVERRIDE (IP): Performed by: INTERNAL MEDICINE

## 2022-03-03 PROCEDURE — 99231 SBSQ HOSP IP/OBS SF/LOW 25: CPT | Mod: FS

## 2022-03-03 PROCEDURE — 99233 SBSQ HOSP IP/OBS HIGH 50: CPT | Performed by: INTERNAL MEDICINE

## 2022-03-03 PROCEDURE — 99232 SBSQ HOSP IP/OBS MODERATE 35: CPT | Performed by: HOSPITALIST

## 2022-03-03 PROCEDURE — A9270 NON-COVERED ITEM OR SERVICE: HCPCS | Performed by: INTERNAL MEDICINE

## 2022-03-03 PROCEDURE — 83735 ASSAY OF MAGNESIUM: CPT

## 2022-03-03 PROCEDURE — 700102 HCHG RX REV CODE 250 W/ 637 OVERRIDE(OP): Performed by: INTERNAL MEDICINE

## 2022-03-03 PROCEDURE — 80048 BASIC METABOLIC PNL TOTAL CA: CPT

## 2022-03-03 RX ADMIN — SENNOSIDES AND DOCUSATE SODIUM 2 TABLET: 50; 8.6 TABLET ORAL at 17:00

## 2022-03-03 RX ADMIN — LEVOTHYROXINE SODIUM 75 MCG: 75 TABLET ORAL at 04:08

## 2022-03-03 RX ADMIN — FENOFIBRATE 134 MG: 67 CAPSULE ORAL at 04:08

## 2022-03-03 RX ADMIN — FINASTERIDE 5 MG: 5 TABLET, FILM COATED ORAL at 04:08

## 2022-03-03 RX ADMIN — TAMSULOSIN HYDROCHLORIDE 0.4 MG: 0.4 CAPSULE ORAL at 20:11

## 2022-03-03 RX ADMIN — ENOXAPARIN SODIUM 40 MG: 40 INJECTION SUBCUTANEOUS at 04:08

## 2022-03-03 RX ADMIN — ACETAMINOPHEN 650 MG: 325 TABLET, FILM COATED ORAL at 20:11

## 2022-03-03 RX ADMIN — OMEPRAZOLE 20 MG: 20 CAPSULE, DELAYED RELEASE ORAL at 04:08

## 2022-03-03 RX ADMIN — PIPERACILLIN AND TAZOBACTAM 3.38 G: 3; .375 INJECTION, POWDER, LYOPHILIZED, FOR SOLUTION INTRAVENOUS; PARENTERAL at 12:47

## 2022-03-03 RX ADMIN — PIPERACILLIN AND TAZOBACTAM 3.38 G: 3; .375 INJECTION, POWDER, LYOPHILIZED, FOR SOLUTION INTRAVENOUS; PARENTERAL at 04:07

## 2022-03-03 RX ADMIN — PIPERACILLIN AND TAZOBACTAM 3.38 G: 3; .375 INJECTION, POWDER, LYOPHILIZED, FOR SOLUTION INTRAVENOUS; PARENTERAL at 20:11

## 2022-03-03 RX ADMIN — DORZOLAMIDE HYDROCHLORIDE AND TIMOLOL MALEATE 1 DROP: 20; 5 SOLUTION OPHTHALMIC at 17:00

## 2022-03-03 RX ADMIN — LATANOPROST 1 DROP: 50 SOLUTION OPHTHALMIC at 17:00

## 2022-03-03 RX ADMIN — Medication 10 MG: at 20:11

## 2022-03-03 RX ADMIN — DORZOLAMIDE HYDROCHLORIDE AND TIMOLOL MALEATE 1 DROP: 20; 5 SOLUTION OPHTHALMIC at 04:08

## 2022-03-03 ASSESSMENT — ENCOUNTER SYMPTOMS
BRUISES/BLEEDS EASILY: 0
MYALGIAS: 0
HEADACHES: 0
BLOOD IN STOOL: 0
WEAKNESS: 0
FEVER: 0
ABDOMINAL PAIN: 1
COUGH: 0
PALPITATIONS: 0
CHILLS: 0
DIZZINESS: 0
NAUSEA: 0
BLURRED VISION: 0
DIARRHEA: 0
HEMOPTYSIS: 0
VOMITING: 0
SHORTNESS OF BREATH: 0

## 2022-03-03 ASSESSMENT — PAIN DESCRIPTION - PAIN TYPE: TYPE: ACUTE PAIN

## 2022-03-03 NOTE — PROGRESS NOTES
Infectious Disease Progress Note    Author: Marisol Garcia M.D. Date & Time of service: 3/3/2022  8:22 AM    Chief Complaint:  Follow-up for intra-abdominal abscess    Interval History:  3/2 afebrile, WBC 13.3.  Patient underwent drain placement yesterday.  Cultures are pending.  Feels well.  Tolerating IV antibiotics without any issues.  3/3 afebrile. Drain cultures growing Streptococcus intermedius and E. coli, 130 cc of drain output recorded yesterday. No new issues to report    Labs Reviewed, Medications Reviewed and Radiology Reviewed.    Review of Systems:  Review of Systems   Constitutional: Negative for chills and fever.   Respiratory: Negative for cough and shortness of breath.    Gastrointestinal: Positive for abdominal pain. Negative for diarrhea, nausea and vomiting.   Neurological: Negative for dizziness and headaches.       Hemodynamics:  Temp (24hrs), Av.4 °C (97.5 °F), Min:36.1 °C (97 °F), Max:36.6 °C (97.8 °F)  Temperature: 36.4 °C (97.6 °F)  Pulse  Av.5  Min: 67  Max: 90   Blood Pressure : 107/59       Physical Exam:  Physical Exam  Vitals and nursing note reviewed.   Constitutional:       Appearance: Normal appearance.   HENT:      Mouth/Throat:      Mouth: Mucous membranes are moist.   Eyes:      Extraocular Movements: Extraocular movements intact.      Pupils: Pupils are equal, round, and reactive to light.   Cardiovascular:      Rate and Rhythm: Normal rate.   Pulmonary:      Effort: Pulmonary effort is normal. No respiratory distress.      Breath sounds: No wheezing.   Abdominal:      Palpations: Abdomen is soft.      Comments: Right lower quadrant drain in place-creamy gray fluid in bulb   Musculoskeletal:         General: Normal range of motion.   Skin:     General: Skin is warm and dry.   Neurological:      General: No focal deficit present.      Mental Status: He is alert and oriented to person, place, and time.   Psychiatric:         Behavior: Behavior normal.          Meds:    Current Facility-Administered Medications:   •  insulin regular **AND** POC blood glucose manual result **AND** NOTIFY MD and PharmD **AND** Administer 20 grams of glucose (approximately 8 ounces of fruit juice) every 15 minutes PRN FSBG less than 70 mg/dL **AND** dextrose  •  tamsulosin  •  dorzolamide-timolol  •  finasteride  •  latanoprost  •  levothyroxine  •  melatonin  •  omeprazole  •  senna-docusate **AND** polyethylene glycol/lytes **AND** magnesium hydroxide **AND** bisacodyl  •  enoxaparin  •  acetaminophen  •  [COMPLETED] piperacillin-tazobactam **AND** piperacillin-tazobactam  •  fenofibrate micronized    Labs:  Recent Labs     02/28/22  1503 03/01/22 0402 03/02/22 0149   WBC 18.3* 15.3* 13.3*   RBC 3.98* 3.40* 3.50*   HEMOGLOBIN 11.2* 9.6* 9.7*   HEMATOCRIT 34.5* 29.2* 29.8*   MCV 86.7 85.9 85.1   MCH 28.1 28.2 27.7   RDW 45.4 44.8 43.9   PLATELETCT 459* 395 396   MPV 9.4 9.3 9.2   NEUTSPOLYS 60.00 58.80 65.20   LYMPHOCYTES 32.20 33.00 29.60   MONOCYTES 4.30 6.30 5.20   EOSINOPHILS 0.00 0.70 0.00   BASOPHILS 0.00 0.30 0.00   RBCMORPHOLO Present  --  Present     Recent Labs     02/28/22  1503 03/01/22  0402 03/02/22  0149   SODIUM 137 138 133*   POTASSIUM 4.2 3.8 3.3*   CHLORIDE 99 105 102   CO2 23 23 22   GLUCOSE 161* 140* 164*   BUN 18 15 13     Recent Labs     02/28/22  1503 03/01/22 0402 03/02/22 0149   ALBUMIN 3.6 2.7*  --    TBILIRUBIN 0.4 0.3  --    ALKPHOSPHAT 65 53  --    TOTPROTEIN 7.5 5.9*  --    ALTSGPT 23 17  --    ASTSGOT 28 20  --    CREATININE 0.80 0.67 0.73       Imaging:  CT-ABDOMEN-PELVIS WITH    Addendum Date: 2/28/2022    ADDENDUM: Please note that the phlegmonous process and extensive abscess in the right lower quadrant could be due to an indolent previously ruptured appendicitis.    Result Date: 2/28/2022 2/28/2022 4:49 PM HISTORY/REASON FOR EXAM:  RLQ abdominal pain (Age >= 14y). Palpable firm mass in the right lower abdomen TECHNIQUE/EXAM DESCRIPTION:   CT  scan of the abdomen and pelvis with contrast. Contrast-enhanced helical scanning was obtained from the diaphragmatic domes through the pubic symphysis following the bolus administration of nonionic contrast without complication. 100 mL of Omnipaque 350 nonionic contrast was administered without complication. Low dose optimization technique was utilized for this CT exam including automated exposure control and adjustment of the mA and/or kV according to patient size. COMPARISON: 10/8/2020 FINDINGS: Lower Chest: Visualized lung bases demonstrate dependent atelectasis. No change in the previously biopsied 3.5 cm right benign breast mass. Liver: Normal. Spleen: Unremarkable. Pancreas: Unremarkable. Gallbladder: There is a tiny dependent gallstone.. Biliary: Nondilated. Adrenal glands: Normal. Kidneys: There is a nonobstructive 3 no other calcification the right superior pole kidney. There are left renal parapelvic cysts. There are simple appearing bilateral renal cortical cysts again seen. There may be right renal parapelvic cysts as well. There is minimal left perinephric stranding. Lymph nodes: No adenopathy. Vasculature: There is aortic atherosclerosis. Bowel: There is colonic diverticulosis. There is a questionable spiculated type masslike area in the right lower quadrant adjacent to the inferior cecum measuring 5.3 x 4.4 cm on the coronal reconstructions. There is abnormal enhancement abutting the rectus musculature and the right psoas musculature. Peritoneum: There is a complex heterogeneously enhancing multi loculated fluid collection in the right lower quadrant anteriorly with multiple internal air lucencies and ill-defined peripheral rim enhancement. This extends from the right peritoneum just posterior to the rectus abdominis musculature at the level of the iliac crest and extends inferiorly into the midline and right lower pelvis extending through the right lateral abdominal wall musculature into the  subcutaneous tissues. The origin is uncertain however may be related to the ill-defined masslike or phlegmon-type structure inferior to the cecum. The entire area measures an estimated 11.4 x 6.4 x 13.4 cm. There are numerous other smaller loculated enhancing fluid components extending into the soft tissues with associated subcutaneous edema. Pelvis: Bladder is unremarkable.. Musculoskeletal: No acute or destructive process. There are degenerative changes throughout the spine.     1.  There is a complex multiloculated fluid collection with heterogeneous peripheral enhancement and internal air lucencies consistent with abscess in the anterior right lower quadrant, etiology uncertain. This extends from the peritoneal cavity through the musculature into the subcutaneous tissues. There is abnormal enhancement abutting the right abdominal wall musculature and right psoas muscle with multiple smaller loculated fluid components. 2.  There is a questionable phlegmonous type process versus mass just inferior to the cecum. 3.  There are bilateral parapelvic cysts and renal cortical cysts which do not need further imaging follow-up. 4.  There is colonic diverticulosis with no acute diverticulitis. 5.  There is a tiny dependent gallstone.    CT-DRAIN-PERITONEAL    Result Date: 3/1/2022  3/1/2022 5:35 PM HISTORY/REASON FOR EXAM:  RLQ abscess x 3 weeks  -  high surgical risk; NPO, no thinners. Discussed With Dr Dudley PM 2/28. TECHNIQUE/EXAM DESCRIPTION: RLQ pelvic abscess drainage with CT guidance. Low dose optimization technique was utilized for this CT exam including automated exposure control and adjustment of the mA and/or kV according to patient size. PROCEDURE: Informed consent was obtained. Localizing CT images were obtained with the patient in supine position. The skin was prepped with Betadine and draped in a sterile fashion. Following local anesthesia with 1% Lidocaine, a 17 G guiding needle was placed and needle path  confirmed with CT. An Amplatz guidewire was placed and following serial tract dilatation, a 10 biliary drainage catheter was placed to allow for additional sideholes to cross multiple aspects of the multiloculated abscess. A specimen was collected and submitted for culture and sensitivity and Gram stain. Total of 25 mL purulent fluid was drained. The catheter was secured to the skin and connected to suction bulb drainage. Final CT images were obtained documenting catheter position. The patient tolerated the procedure well with no evidence of complication. COMPARISON: CT abdomen pelvis dated 2/28/2022 FINDINGS: The final CT images show satisfactory catheter position within the target collection.     1.  CT guided peritoneal RLQ catheter drainage. 2.  The current plan is to obtain a follow-up CT in 5-7 days..      Micro:  Results     Procedure Component Value Units Date/Time    Urine Culture [[id number]] Collected: 03/01/22 1134    Order Status: Completed Specimen: Urine, Clean Catch Updated: 03/03/22 0440     Significant Indicator NEG     Source UR     Site URINE, CLEAN CATCH     Culture Result No growth at 24 hours.    Narrative:      Indication for culture:->Evaluation for sepsis without a  clear source of infection  Indication for culture:->Evaluation for sepsis without a    Fungal Culture [[id number]] Collected: 03/01/22 1809    Order Status: Completed Specimen: Wound from Other Body Fluid Updated: 03/02/22 1715     Significant Indicator NEG     Source WND     Site Peritoneal Abscess Drain     Culture Result Culture in progress.     Fungal Smear Results No fungal elements seen.    Narrative:      Collected By: [id number] AWILDA MACHUCA  Intra-abdominal  abcsess  Collected By: [id number] AWILDA MACHUCA    CULTURE WOUND W/ GRAM STAIN [[id number]]  (Abnormal) Collected: 03/01/22 1809    Order Status: Completed Specimen: Wound Updated: 03/02/22 1715     Significant Indicator POS     Source WND     Site Peritoneal Abscess Drain      Culture Result -     Gram Stain Result Many WBCs.  Many Gram positive cocci.       Culture Result Streptococcus intermedius  Moderate growth        Escherichia coli  Rare growth      Narrative:      Collected By: [id number] AWILDA MACHUCA  Intra-abdominal  abcsess  Collected By: [id number] AWILDA MACHUCA    Fungal Smear [[id number]] Collected: 03/01/22 1809    Order Status: Completed Specimen: Wound Updated: 03/02/22 1715     Significant Indicator NEG     Source WND     Site Peritoneal Abscess Drain     Fungal Smear Results No fungal elements seen.    Narrative:      Collected By: [id number] AWILDA MACHUCA  Intra-abdominal  abcsess  Collected By: [id number] AWILDA MACHUCA    GRAM STAIN [[id number]]  (Abnormal) Collected: 03/01/22 1809    Order Status: Completed Specimen: Wound Updated: 03/02/22 1714     Significant Indicator .     Source WND     Site Peritoneal Abscess Drain     Gram Stain Result Many WBCs.  Many Gram positive cocci.      Narrative:      Collected By: [id number] AWILDA MACHUCA  Intra-abdominal  abcsess  Collected By: [id number] AWILDA MACHUCA    FLUID CULTURE [[id number]] Collected: 03/01/22 1809    Order Status: Canceled Specimen: Other Body Fluid     FLUID CULTURE W/GRAM STAIN [[id number]] Collected: 03/01/22 1809    Order Status: Canceled Specimen: Other Body Fluid     AFB Culture [[id number]]     Order Status: No result Specimen: Respirate from Abscess     Urinalysis [[id number]] Collected: 03/01/22 1134    Order Status: Completed Specimen: Urine Updated: 03/01/22 1151     Color Yellow     Character Clear     Specific Gravity 1.020     Ph 6.0     Glucose Negative mg/dL      Ketones Negative mg/dL      Protein Negative mg/dL      Bilirubin Negative     Urobilinogen, Urine 1.0     Nitrite Negative     Leukocyte Esterase Negative     Occult Blood Negative     Micro Urine Req see below     Comment: Microscopic examination not performed when specimen is clear  and chemically negative for protein, blood, leukocyte  "esterase  and nitrite.         Narrative:      Indication for culture:->Evaluation for sepsis without a  clear source of infection    Blood Culture [[id number]] Collected: 02/28/22 1518    Order Status: Completed Specimen: Blood from Peripheral Updated: 03/01/22 0747     Significant Indicator NEG     Source BLD     Site PERIPHERAL     Culture Result No Growth  Note: Blood cultures are incubated for 5 days and  are monitored continuously.Positive blood cultures  are called to the RN and reported as soon as  they are identified.      Narrative:      2 of 2 blood culture x2  Sites order. Per Hospital Policy:  Only change Specimen Src: to \"Line\" if specified by physician  order.  Right AC    Blood Culture [[id number]] Collected: 02/28/22 1503    Order Status: Completed Specimen: Blood from Peripheral Updated: 03/01/22 0747     Significant Indicator NEG     Source BLD     Site PERIPHERAL     Culture Result No Growth  Note: Blood cultures are incubated for 5 days and  are monitored continuously.Positive blood cultures  are called to the RN and reported as soon as  they are identified.      Narrative:      1 of 2 for Blood Culture x 2 sites order. Per Hospital  Policy: Only change Specimen Src: to \"Line\" if specified by  physician order.  No site indicated    Blood Culture [[id number]] Collected: 02/28/22 0000    Order Status: Canceled Specimen: Other from Peripheral     Blood Culture [[id number]] Collected: 02/28/22 0000    Order Status: Canceled Specimen: Other from Peripheral           Assessment:  Active Hospital Problems    Diagnosis    • *Sepsis (HCC) [A41.9]    • Right lower quadrant abdominal abscess (HCC) [K65.1]    • Essential hypertension [I10]    • Cognitive impairment [R41.89]    • Diabetes mellitus type 2, noninsulin dependent (HCC) [E11.9]    • Dyslipidemia [E78.5]      71 y.o. man with a history of type 2 diabetes mellitus, hyperlipidemia and hypertension admitted on 2/28/2022 secondary to progressive right " lower quadrant abdominal pain.  CT scan showed a complex multiloculated fluid collection in the anterior right lower quadrant.    Status post drainage on 3/1. Blood cultures on admission are negative to date.  He is currently on Zosyn.    Pertinent diagnoses:  Intra-abdominal abscess  Leukocytosis, improving  Type 2 diabetes mellitus, controlled.  Hemoglobin A1c 5.8  Cognitive impairment    Plan:  -Continue IV Zosyn  -Status post drain placement on 3/1.  Follow cultures-Streptococcus intermedius, E. coli. Follow susceptibilities  -Monitor drain output-130 cc of drain output recorded yesterday  -Repeat CT scan 5 days from drain placement (~3/6)  -Blood sugar control  -Duration of antibiotics will depend on results of repeat CT scan    I have performed a physical exam and reviewed and updated ROS and plan today 3/3/2022.  In review of yesterday's note 3/2/2022, there are no changes except as documented above.      Discussed with internal medicine/Dr. Abdi

## 2022-03-03 NOTE — PROGRESS NOTES
Hospital Medicine Daily Progress Note    Date of Service  3/3/2022    Chief Complaint  Lico Salinas is a 71 y.o. male admitted 2/28/2022 with abdominal pain    Hospital Course    71-year-old male with history of type 2 diabetes dyslipidemia hypertension admitted with right lower quadrant pain noted to have complex fluid collection concerning for an abscess in the right lower quadrant.  He was evaluated by surgery with recommendation for CT-guided drainage    Interval Problem Update      Patient is afebrile  No nausea vomiting or diarrhea  Drain output 100 mL or pus 24 hours  Tolerating p.o. intake  WBC 16.6  Fluid cultures growing strep intermedius and E. Coli        I have personally seen and examined the patient at bedside. I discussed the plan of care with patient, bedside RN and .    Consultants/Specialty  general surgery and infectious disease    Code Status  DNAR/DNI    Disposition  Patient is not medically cleared for discharge.   Anticipate discharge to to home with close outpatient follow-up.  I have placed the appropriate orders for post-discharge needs.    Review of Systems  Review of Systems   Respiratory: Negative for shortness of breath.    Cardiovascular: Negative for chest pain.   Gastrointestinal: Negative for blood in stool, diarrhea and vomiting.   All other systems reviewed and are negative.       Physical Exam  Temp:  [36.1 °C (97 °F)-36.6 °C (97.8 °F)] 36.4 °C (97.6 °F)  Pulse:  [68-85] 68  Resp:  [17-18] 18  BP: (107-128)/(53-72) 107/59  SpO2:  [91 %-93 %] 92 %    Physical Exam  Vitals and nursing note reviewed.   Constitutional:       Appearance: He is well-developed. He is not diaphoretic.   HENT:      Head: Normocephalic and atraumatic.      Mouth/Throat:      Pharynx: No oropharyngeal exudate.   Eyes:      General: No scleral icterus.        Right eye: No discharge.         Left eye: No discharge.      Conjunctiva/sclera: Conjunctivae normal.      Pupils: Pupils are equal,  round, and reactive to light.   Neck:      Vascular: No JVD.      Trachea: No tracheal deviation.   Cardiovascular:      Rate and Rhythm: Normal rate and regular rhythm.      Heart sounds: No murmur heard.    No friction rub. No gallop.   Pulmonary:      Effort: Pulmonary effort is normal. No respiratory distress.      Breath sounds: Normal breath sounds. No stridor. No wheezing.   Chest:      Chest wall: No tenderness.   Abdominal:      General: Bowel sounds are normal. There is no distension.      Palpations: Abdomen is soft. There is mass (Right lower quadrant).      Tenderness: There is no abdominal tenderness. There is no rebound.      Comments: Right lower quadrant drain with thick tan drainage   Musculoskeletal:         General: No tenderness.      Cervical back: Neck supple.   Skin:     General: Skin is warm and dry.      Nails: There is no clubbing.   Neurological:      Mental Status: He is alert and oriented to person, place, and time.      Cranial Nerves: No cranial nerve deficit.      Motor: No abnormal muscle tone.   Psychiatric:         Behavior: Behavior normal.         Fluids    Intake/Output Summary (Last 24 hours) at 3/3/2022 1403  Last data filed at 3/3/2022 1300  Gross per 24 hour   Intake 1050 ml   Output 1200 ml   Net -150 ml       Laboratory  Recent Labs     03/01/22  0402 03/02/22  0149 03/03/22  0742   WBC 15.3* 13.3* 16.6*   RBC 3.40* 3.50* 3.76*   HEMOGLOBIN 9.6* 9.7* 10.5*   HEMATOCRIT 29.2* 29.8* 32.0*   MCV 85.9 85.1 85.1   MCH 28.2 27.7 27.9   MCHC 32.9* 32.6* 32.8*   RDW 44.8 43.9 43.4   PLATELETCT 395 396 445   MPV 9.3 9.2 9.4     Recent Labs     03/01/22  0402 03/02/22  0149 03/03/22  0742   SODIUM 138 133* 133*   POTASSIUM 3.8 3.3* 3.7   CHLORIDE 105 102 100   CO2 23 22 23   GLUCOSE 140* 164* 128*   BUN 15 13 9   CREATININE 0.67 0.73 0.60   CALCIUM 8.8 8.3* 8.5     Recent Labs     02/28/22  1503 02/28/22  1848   APTT 37.8*  --    INR 1.36* 1.35*                Imaging  CT-DRAIN-PERITONEAL   Final Result      1.  CT guided peritoneal RLQ catheter drainage.   2.  The current plan is to obtain a follow-up CT in 5-7 days..      CT-ABDOMEN-PELVIS WITH   Final Result   Addendum 1 of 1         ADDENDUM:   Please note that the phlegmonous process and extensive abscess in the    right lower quadrant could be due to an indolent previously ruptured    appendicitis.      Final      1.  There is a complex multiloculated fluid collection with heterogeneous peripheral enhancement and internal air lucencies consistent with abscess in the anterior right lower quadrant, etiology uncertain. This extends from the peritoneal cavity through    the musculature into the subcutaneous tissues. There is abnormal enhancement abutting the right abdominal wall musculature and right psoas muscle with multiple smaller loculated fluid components.   2.  There is a questionable phlegmonous type process versus mass just inferior to the cecum.   3.  There are bilateral parapelvic cysts and renal cortical cysts which do not need further imaging follow-up.   4.  There is colonic diverticulosis with no acute diverticulitis.   5.  There is a tiny dependent gallstone.           Assessment/Plan  Hyponatremia  Assessment & Plan  Mild    Monitor electrolytes    Electrolyte abnormality  Assessment & Plan  Hypokalemia  Hypomagnesemia  Repleted      Right lower quadrant abdominal abscess (HCC)- (present on admission)  Assessment & Plan  Likely secondary to ruptured appendix   status post CT-guided drainage on 3/1/2022  Continue Zosyn follow-up on final cultures and sensitivities  Surgery infectious disease following  Plan is to repeat CT on 3/6/2022    Essential hypertension- (present on admission)  Assessment & Plan  Stable off medical therapy monitor    Anemia- (present on admission)  Assessment & Plan  Acute on chronic    Hemoglobin 10.5 overall stable  No clinical signs of bleeding continue to  monitor    Cognitive impairment- (present on admission)  Assessment & Plan  Stable denies use of sedating agents and monitor for delirium      Dyslipidemia- (present on admission)  Assessment & Plan  Continue fenofibrate    Diabetes mellitus type 2, noninsulin dependent (HCC)- (present on admission)  Assessment & Plan  CBGs 119-146  Most recent hemoglobin A1c 5.8    We will DC sliding scale insulin              VTE prophylaxis: SCDs/TEDs    I have performed a physical exam and reviewed and updated ROS and Plan today (3/3/2022). In review of yesterday's note (3/2/2022), there are no changes except as documented above.

## 2022-03-03 NOTE — PROGRESS NOTES
"Radiology Progress Note   Author: DYLLAN Soto Date & Time created: 3/3/2022  10:48 AM   Date of admission  2/28/2022  Note to reader: this note follows the APSO format rather than the historical SOAP format. Assessment and plan located at the top of the note for ease of use.    Chief Complaint  71 y.o. male admitted 2/28/2022 with   Chief Complaint   Patient presents with   • Other     Mass on right side of abdomen.        HPI  \" 71-year-old male with history of type 2 diabetes dyslipidemia hypertension admitted with right lower quadrant pain noted to have complex fluid collection concerning for an abscess in the right lower quadrant.  He was evaluated by surgery with recommendation for CT-guided drainage\"    Assessment/Plan  Interval History   Principal Problem:    Sepsis (HCC)  Active Problems:    Diabetes mellitus type 2, noninsulin dependent (HCC)    Dyslipidemia    Cognitive impairment    Anemia    Essential hypertension    Right lower quadrant abdominal abscess (HCC)    Electrolyte abnormality      Plan IR  - Irrigate RLQ drain with 10 ml of sterile saline twice per shift   - Fluid cultures + Streptococcus intermedius, Escherichia coli  - ID and Surgery following, antibiotics per ID    - Recommend follow up CT scan in 5-7 days (3/6)    - Continue to monitor drains, VS and labs       IR:   3/1- RLQ drain placed   3/2- 130 ml purulent fluid, WBC improving since drain placed 13.3, 0/10 pain,   3/3- leukocytosis 16.6, 20 ml output in AM,            Review of Systems  Physical Exam   Review of Systems   Constitutional: Negative for chills and fever.   HENT: Negative for hearing loss.    Eyes: Negative for blurred vision.   Respiratory: Negative for cough, hemoptysis and shortness of breath.    Cardiovascular: Negative for chest pain and palpitations.   Gastrointestinal: Positive for abdominal pain. Negative for vomiting.   Genitourinary: Negative for dysuria.   Musculoskeletal: Negative for myalgias. "   Skin: Negative for rash.   Neurological: Negative for dizziness, weakness and headaches.   Endo/Heme/Allergies: Does not bruise/bleed easily.   Psychiatric/Behavioral: Negative for suicidal ideas.      Vitals:    03/03/22 0748   BP: 107/59   Pulse: 68   Resp: 18   Temp: 36.4 °C (97.6 °F)   SpO2: 92%        Physical Exam  Constitutional:       Appearance: Normal appearance.   HENT:      Head: Normocephalic.      Nose: Nose normal.      Mouth/Throat:      Mouth: Mucous membranes are moist.   Eyes:      Pupils: Pupils are equal, round, and reactive to light.   Cardiovascular:      Rate and Rhythm: Normal rate.   Pulmonary:      Effort: Pulmonary effort is normal. No respiratory distress.   Abdominal:      Palpations: Abdomen is soft.      Tenderness: There is no abdominal tenderness.       Musculoskeletal:         General: No tenderness or deformity.      Cervical back: Normal range of motion.   Skin:     General: Skin is warm and dry.      Capillary Refill: Capillary refill takes less than 2 seconds.      Coloration: Skin is not jaundiced or pale.   Neurological:      General: No focal deficit present.      Mental Status: He is alert.      Motor: No weakness.   Psychiatric:         Mood and Affect: Mood normal.         Behavior: Behavior normal.             Labs    Recent Labs     03/01/22  0402 03/02/22  0149 03/03/22  0742   WBC 15.3* 13.3* 16.6*   RBC 3.40* 3.50* 3.76*   HEMOGLOBIN 9.6* 9.7* 10.5*   HEMATOCRIT 29.2* 29.8* 32.0*   MCV 85.9 85.1 85.1   MCH 28.2 27.7 27.9   MCHC 32.9* 32.6* 32.8*   RDW 44.8 43.9 43.4   PLATELETCT 395 396 445   MPV 9.3 9.2 9.4     Recent Labs     03/01/22  0402 03/02/22  0149 03/03/22  0742   SODIUM 138 133* 133*   POTASSIUM 3.8 3.3* 3.7   CHLORIDE 105 102 100   CO2 23 22 23   GLUCOSE 140* 164* 128*   BUN 15 13 9   CREATININE 0.67 0.73 0.60   CALCIUM 8.8 8.3* 8.5     Recent Labs     02/28/22  1503 03/01/22  0402 03/02/22  0149 03/03/22  0742   ALBUMIN 3.6 2.7*  --   --    TBILIRUBIN  0.4 0.3  --   --    ALKPHOSPHAT 65 53  --   --    TOTPROTEIN 7.5 5.9*  --   --    ALTSGPT 23 17  --   --    ASTSGOT 28 20  --   --    CREATININE 0.80 0.67 0.73 0.60     CT-DRAIN-PERITONEAL   Final Result      1.  CT guided peritoneal RLQ catheter drainage.   2.  The current plan is to obtain a follow-up CT in 5-7 days..      CT-ABDOMEN-PELVIS WITH   Final Result   Addendum 1 of 1         ADDENDUM:   Please note that the phlegmonous process and extensive abscess in the    right lower quadrant could be due to an indolent previously ruptured    appendicitis.      Final      1.  There is a complex multiloculated fluid collection with heterogeneous peripheral enhancement and internal air lucencies consistent with abscess in the anterior right lower quadrant, etiology uncertain. This extends from the peritoneal cavity through    the musculature into the subcutaneous tissues. There is abnormal enhancement abutting the right abdominal wall musculature and right psoas muscle with multiple smaller loculated fluid components.   2.  There is a questionable phlegmonous type process versus mass just inferior to the cecum.   3.  There are bilateral parapelvic cysts and renal cortical cysts which do not need further imaging follow-up.   4.  There is colonic diverticulosis with no acute diverticulitis.   5.  There is a tiny dependent gallstone.          INR   Date Value Ref Range Status   02/28/2022 1.35 (H) 0.87 - 1.13 Final     Comment:     INR - Non-therapeutic Reference Range: 0.87-1.13  INR - Therapeutic Reference Range: 2.0-4.0       No results found for: POCINR     Intake/Output Summary (Last 24 hours) at 3/2/2022 1347  Last data filed at 3/2/2022 1310  Gross per 24 hour   Intake 458 ml   Output 1225 ml   Net -767 ml      Labs not explicitly included in this progress note were reviewed by the author. Radiology/imaging not explicitly included in this progress note was reviewed by the author.     I have performed a physical  exam and reviewed and updated ROS and Plan today (3/3/2022).     15 minutes in directly providing and coordinating care and extensive data review.  No time overlap and excludes procedures.

## 2022-03-03 NOTE — PROGRESS NOTES
"    DATE: 3/3/2022    Hospital Day 3 right lower quadrant abdominal abscess.    INTERVAL EVENTS:  100mL of tan thick output in IR drain over last 24 hours  Zosyn day 4  Abscess prelim cultures positive for E coli & Strep Intermedius  WBC trend up    REVIEW OF SYSTEMS:  ROS     + fatigue, no fever or chills  No abdominal pain, nausea, or vomiting  + flatus    PHYSICAL EXAMINATION:  Vital Signs: /59   Pulse 68   Temp 36.4 °C (97.6 °F) (Temporal)   Resp 18   Ht 1.753 m (5' 9\")   Wt 79.6 kg (175 lb 7.8 oz)   SpO2 92%   Physical Exam       Abdominal soft, mass palpated in right lower quadrant  Mild RLQ abdominal tenderness  MICHELLE drain with thick tan output    LABORATORY VALUES:   Recent Labs     03/01/22  0402 03/02/22  0149 03/03/22  0742   WBC 15.3* 13.3* 16.6*   RBC 3.40* 3.50* 3.76*   HEMOGLOBIN 9.6* 9.7* 10.5*   HEMATOCRIT 29.2* 29.8* 32.0*   MCV 85.9 85.1 85.1   MCH 28.2 27.7 27.9   MCHC 32.9* 32.6* 32.8*   RDW 44.8 43.9 43.4   PLATELETCT 395 396 445   MPV 9.3 9.2 9.4     Recent Labs     03/01/22  0402 03/02/22  0149 03/03/22  0742   SODIUM 138 133* 133*   POTASSIUM 3.8 3.3* 3.7   CHLORIDE 105 102 100   CO2 23 22 23   GLUCOSE 140* 164* 128*   BUN 15 13 9   CREATININE 0.67 0.73 0.60   CALCIUM 8.8 8.3* 8.5     Recent Labs     02/28/22  1503 02/28/22  1848 03/01/22  0402   ASTSGOT 28  --  20   ALTSGPT 23  --  17   TBILIRUBIN 0.4  --  0.3   ALKPHOSPHAT 65  --  53   GLOBULIN 3.9*  --  3.2   INR 1.36* 1.35*  --      Recent Labs     02/28/22  1503 02/28/22  1848   APTT 37.8*  --    INR 1.36* 1.35*        IMAGING:   CT-DRAIN-PERITONEAL   Final Result      1.  CT guided peritoneal RLQ catheter drainage.   2.  The current plan is to obtain a follow-up CT in 5-7 days..      CT-ABDOMEN-PELVIS WITH   Final Result   Addendum 1 of 1         ADDENDUM:   Please note that the phlegmonous process and extensive abscess in the    right lower quadrant could be due to an indolent previously ruptured    appendicitis.      Final "      1.  There is a complex multiloculated fluid collection with heterogeneous peripheral enhancement and internal air lucencies consistent with abscess in the anterior right lower quadrant, etiology uncertain. This extends from the peritoneal cavity through    the musculature into the subcutaneous tissues. There is abnormal enhancement abutting the right abdominal wall musculature and right psoas muscle with multiple smaller loculated fluid components.   2.  There is a questionable phlegmonous type process versus mass just inferior to the cecum.   3.  There are bilateral parapelvic cysts and renal cortical cysts which do not need further imaging follow-up.   4.  There is colonic diverticulosis with no acute diverticulitis.   5.  There is a tiny dependent gallstone.          ASSESSMENT AND PLAN:   Right lower quadrant abdominal abscess (HCC)- (present on admission)  Assessment & Plan  2/28 Zosyn initiated on admit.  3/1 IR drain placement.  3/3 prelim cultures positive for E coli & Strep Intermedius  Antibiotics per ID.         ____________________________________     NAVDEEP Gomes.    DD: 3/2/2022  9:08 AM

## 2022-03-03 NOTE — CARE PLAN
The patient is Stable - Low risk of patient condition declining or worsening    Shift Goals  Clinical Goals: Patient's drain site will be changed  Patient Goals: MARTY    Progress made toward(s) clinical / shift goals:    Problem: Skin Integrity  Goal: Skin integrity is maintained or improved  Outcome: Progressing       Patient is not progressing towards the following goals:

## 2022-03-04 ENCOUNTER — APPOINTMENT (OUTPATIENT)
Dept: RADIOLOGY | Facility: MEDICAL CENTER | Age: 72
DRG: 871 | End: 2022-03-04
Attending: NURSE PRACTITIONER
Payer: MEDICARE

## 2022-03-04 LAB
ANION GAP SERPL CALC-SCNC: 11 MMOL/L (ref 7–16)
BASOPHILS # BLD AUTO: 0 % (ref 0–1.8)
BASOPHILS # BLD: 0 K/UL (ref 0–0.12)
BUN SERPL-MCNC: 11 MG/DL (ref 8–22)
CALCIUM SERPL-MCNC: 8.2 MG/DL (ref 8.5–10.5)
CHLORIDE SERPL-SCNC: 102 MMOL/L (ref 96–112)
CO2 SERPL-SCNC: 22 MMOL/L (ref 20–33)
CREAT SERPL-MCNC: 0.72 MG/DL (ref 0.5–1.4)
DACRYOCYTES BLD QL SMEAR: NORMAL
EOSINOPHIL # BLD AUTO: 0 K/UL (ref 0–0.51)
EOSINOPHIL NFR BLD: 0 % (ref 0–6.9)
ERYTHROCYTE [DISTWIDTH] IN BLOOD BY AUTOMATED COUNT: 43.9 FL (ref 35.9–50)
GLUCOSE SERPL-MCNC: 143 MG/DL (ref 65–99)
HCT VFR BLD AUTO: 31.2 % (ref 42–52)
HGB BLD-MCNC: 10.2 G/DL (ref 14–18)
HYPOCHROMIA BLD QL SMEAR: ABNORMAL
LYMPHOCYTES # BLD AUTO: 3.8 K/UL (ref 1–4.8)
LYMPHOCYTES NFR BLD: 15.9 % (ref 22–41)
MANUAL DIFF BLD: NORMAL
MCH RBC QN AUTO: 28 PG (ref 27–33)
MCHC RBC AUTO-ENTMCNC: 32.7 G/DL (ref 33.7–35.3)
MCV RBC AUTO: 85.7 FL (ref 81.4–97.8)
MONOCYTES # BLD AUTO: 1.27 K/UL (ref 0–0.85)
MONOCYTES NFR BLD AUTO: 5.3 % (ref 0–13.4)
MORPHOLOGY BLD-IMP: NORMAL
NEUTROPHILS # BLD AUTO: 18.83 K/UL (ref 1.82–7.42)
NEUTROPHILS NFR BLD: 78.8 % (ref 44–72)
NRBC # BLD AUTO: 0 K/UL
NRBC BLD-RTO: 0 /100 WBC
PLATELET # BLD AUTO: 428 K/UL (ref 164–446)
PLATELET BLD QL SMEAR: NORMAL
PMV BLD AUTO: 9.5 FL (ref 9–12.9)
POIKILOCYTOSIS BLD QL SMEAR: NORMAL
POTASSIUM SERPL-SCNC: 3.7 MMOL/L (ref 3.6–5.5)
RBC # BLD AUTO: 3.64 M/UL (ref 4.7–6.1)
RBC BLD AUTO: PRESENT
SMUDGE CELLS BLD QL SMEAR: NORMAL
SODIUM SERPL-SCNC: 135 MMOL/L (ref 135–145)
WBC # BLD AUTO: 23.9 K/UL (ref 4.8–10.8)

## 2022-03-04 PROCEDURE — 770006 HCHG ROOM/CARE - MED/SURG/GYN SEMI*

## 2022-03-04 PROCEDURE — A9270 NON-COVERED ITEM OR SERVICE: HCPCS | Performed by: INTERNAL MEDICINE

## 2022-03-04 PROCEDURE — 700105 HCHG RX REV CODE 258: Performed by: HOSPITALIST

## 2022-03-04 PROCEDURE — 49423 EXCHANGE DRAINAGE CATHETER: CPT

## 2022-03-04 PROCEDURE — 700111 HCHG RX REV CODE 636 W/ 250 OVERRIDE (IP)

## 2022-03-04 PROCEDURE — 49999 UNLISTED PX ABD PERTM&OMN: CPT

## 2022-03-04 PROCEDURE — 99232 SBSQ HOSP IP/OBS MODERATE 35: CPT | Mod: FS

## 2022-03-04 PROCEDURE — 700111 HCHG RX REV CODE 636 W/ 250 OVERRIDE (IP): Performed by: INTERNAL MEDICINE

## 2022-03-04 PROCEDURE — 99233 SBSQ HOSP IP/OBS HIGH 50: CPT | Performed by: HOSPITALIST

## 2022-03-04 PROCEDURE — 700111 HCHG RX REV CODE 636 W/ 250 OVERRIDE (IP): Performed by: RADIOLOGY

## 2022-03-04 PROCEDURE — 85007 BL SMEAR W/DIFF WBC COUNT: CPT

## 2022-03-04 PROCEDURE — 700102 HCHG RX REV CODE 250 W/ 637 OVERRIDE(OP): Performed by: INTERNAL MEDICINE

## 2022-03-04 PROCEDURE — 36415 COLL VENOUS BLD VENIPUNCTURE: CPT

## 2022-03-04 PROCEDURE — 74177 CT ABD & PELVIS W/CONTRAST: CPT | Mod: MG

## 2022-03-04 PROCEDURE — 85027 COMPLETE CBC AUTOMATED: CPT

## 2022-03-04 PROCEDURE — 80048 BASIC METABOLIC PNL TOTAL CA: CPT

## 2022-03-04 PROCEDURE — 700117 HCHG RX CONTRAST REV CODE 255: Performed by: NURSE PRACTITIONER

## 2022-03-04 PROCEDURE — 700105 HCHG RX REV CODE 258: Performed by: INTERNAL MEDICINE

## 2022-03-04 PROCEDURE — 76497 UNLISTED CT PROCEDURE: CPT

## 2022-03-04 PROCEDURE — 0W2GX0Z CHANGE DRAINAGE DEVICE IN PERITONEAL CAVITY, EXTERNAL APPROACH: ICD-10-PCS | Performed by: RADIOLOGY

## 2022-03-04 RX ORDER — NALOXONE HYDROCHLORIDE 0.4 MG/ML
INJECTION, SOLUTION INTRAMUSCULAR; INTRAVENOUS; SUBCUTANEOUS
Status: COMPLETED
Start: 2022-03-04 | End: 2022-03-04

## 2022-03-04 RX ORDER — SODIUM CHLORIDE, SODIUM LACTATE, POTASSIUM CHLORIDE, CALCIUM CHLORIDE 600; 310; 30; 20 MG/100ML; MG/100ML; MG/100ML; MG/100ML
INJECTION, SOLUTION INTRAVENOUS CONTINUOUS
Status: DISCONTINUED | OUTPATIENT
Start: 2022-03-04 | End: 2022-03-05

## 2022-03-04 RX ORDER — ONDANSETRON 2 MG/ML
INJECTION INTRAMUSCULAR; INTRAVENOUS
Status: COMPLETED
Start: 2022-03-04 | End: 2022-03-04

## 2022-03-04 RX ORDER — SODIUM CHLORIDE 9 MG/ML
500 INJECTION, SOLUTION INTRAVENOUS
Status: ACTIVE | OUTPATIENT
Start: 2022-03-04 | End: 2022-03-04

## 2022-03-04 RX ORDER — MIDAZOLAM HYDROCHLORIDE 1 MG/ML
INJECTION INTRAMUSCULAR; INTRAVENOUS
Status: COMPLETED
Start: 2022-03-04 | End: 2022-03-04

## 2022-03-04 RX ORDER — MIDAZOLAM HYDROCHLORIDE 1 MG/ML
.5-2 INJECTION INTRAMUSCULAR; INTRAVENOUS PRN
Status: ACTIVE | OUTPATIENT
Start: 2022-03-04 | End: 2022-03-04

## 2022-03-04 RX ORDER — ONDANSETRON 2 MG/ML
4 INJECTION INTRAMUSCULAR; INTRAVENOUS PRN
Status: ACTIVE | OUTPATIENT
Start: 2022-03-04 | End: 2022-03-04

## 2022-03-04 RX ADMIN — PIPERACILLIN AND TAZOBACTAM 3.38 G: 3; .375 INJECTION, POWDER, LYOPHILIZED, FOR SOLUTION INTRAVENOUS; PARENTERAL at 04:46

## 2022-03-04 RX ADMIN — FENTANYL CITRATE 50 MCG: 50 INJECTION, SOLUTION INTRAMUSCULAR; INTRAVENOUS at 17:21

## 2022-03-04 RX ADMIN — PIPERACILLIN AND TAZOBACTAM 3.38 G: 3; .375 INJECTION, POWDER, LYOPHILIZED, FOR SOLUTION INTRAVENOUS; PARENTERAL at 21:38

## 2022-03-04 RX ADMIN — Medication 10 MG: at 21:37

## 2022-03-04 RX ADMIN — MIDAZOLAM HYDROCHLORIDE 1 MG: 1 INJECTION, SOLUTION INTRAMUSCULAR; INTRAVENOUS at 17:21

## 2022-03-04 RX ADMIN — DORZOLAMIDE HYDROCHLORIDE AND TIMOLOL MALEATE 1 DROP: 20; 5 SOLUTION OPHTHALMIC at 17:59

## 2022-03-04 RX ADMIN — FENTANYL CITRATE 25 MCG: 50 INJECTION, SOLUTION INTRAMUSCULAR; INTRAVENOUS at 17:27

## 2022-03-04 RX ADMIN — PIPERACILLIN AND TAZOBACTAM 3.38 G: 3; .375 INJECTION, POWDER, LYOPHILIZED, FOR SOLUTION INTRAVENOUS; PARENTERAL at 13:22

## 2022-03-04 RX ADMIN — FENTANYL CITRATE 50 MCG: 50 INJECTION INTRAMUSCULAR; INTRAVENOUS at 17:21

## 2022-03-04 RX ADMIN — FENOFIBRATE 134 MG: 67 CAPSULE ORAL at 04:46

## 2022-03-04 RX ADMIN — IOHEXOL 100 ML: 350 INJECTION, SOLUTION INTRAVENOUS at 11:00

## 2022-03-04 RX ADMIN — SODIUM CHLORIDE, POTASSIUM CHLORIDE, SODIUM LACTATE AND CALCIUM CHLORIDE: 600; 310; 30; 20 INJECTION, SOLUTION INTRAVENOUS at 20:04

## 2022-03-04 RX ADMIN — ENOXAPARIN SODIUM 40 MG: 40 INJECTION SUBCUTANEOUS at 04:46

## 2022-03-04 RX ADMIN — DORZOLAMIDE HYDROCHLORIDE AND TIMOLOL MALEATE 1 DROP: 20; 5 SOLUTION OPHTHALMIC at 04:46

## 2022-03-04 RX ADMIN — MIDAZOLAM HYDROCHLORIDE 1 MG: 1 INJECTION, SOLUTION INTRAMUSCULAR; INTRAVENOUS at 17:27

## 2022-03-04 RX ADMIN — OMEPRAZOLE 20 MG: 20 CAPSULE, DELAYED RELEASE ORAL at 04:46

## 2022-03-04 RX ADMIN — LEVOTHYROXINE SODIUM 75 MCG: 75 TABLET ORAL at 04:46

## 2022-03-04 RX ADMIN — TAMSULOSIN HYDROCHLORIDE 0.4 MG: 0.4 CAPSULE ORAL at 21:38

## 2022-03-04 RX ADMIN — LATANOPROST 1 DROP: 50 SOLUTION OPHTHALMIC at 17:59

## 2022-03-04 RX ADMIN — FINASTERIDE 5 MG: 5 TABLET, FILM COATED ORAL at 04:46

## 2022-03-04 ASSESSMENT — ENCOUNTER SYMPTOMS
BLURRED VISION: 0
SHORTNESS OF BREATH: 0
DIZZINESS: 0
ABDOMINAL PAIN: 1
VOMITING: 0
NAUSEA: 0
FEVER: 0
BRUISES/BLEEDS EASILY: 0
HEADACHES: 0
COUGH: 0
HEMOPTYSIS: 0
WEAKNESS: 0
MYALGIAS: 0
PALPITATIONS: 0
CHILLS: 0
ABDOMINAL PAIN: 0

## 2022-03-04 ASSESSMENT — PAIN DESCRIPTION - PAIN TYPE: TYPE: ACUTE PAIN

## 2022-03-04 NOTE — PROGRESS NOTES
Hospital Medicine Daily Progress Note    Date of Service  3/4/2022    Chief Complaint  Lico Salinas is a 71 y.o. male admitted 2/28/2022 with abdominal pain    Hospital Course    71-year-old male with history of type 2 diabetes dyslipidemia hypertension admitted with right lower quadrant pain noted to have complex fluid collection concerning for an abscess in the right lower quadrant.  He was evaluated by surgery with recommendation for CT-guided drainage    Interval Problem Update      WBC trending up 20 3K  Patient's drain dislodged discussed with interventional radiology  Repeat CT of the abdomen reviewed with decrease in size of abscess  Patient denies abdominal pain          I have personally seen and examined the patient at bedside. I discussed the plan of care with patient, bedside RN and .    Consultants/Specialty  general surgery and infectious disease    Code Status  DNAR/DNI    Disposition  Patient is not medically cleared for discharge.   Anticipate discharge to to home with close outpatient follow-up.  I have placed the appropriate orders for post-discharge needs.    Review of Systems  Review of Systems   Constitutional: Negative for chills and fever.   Respiratory: Negative for cough and shortness of breath.    Cardiovascular: Negative for chest pain and palpitations.   Gastrointestinal: Negative for abdominal pain, nausea and vomiting.   All other systems reviewed and are negative.       Physical Exam  Temp:  [36.1 °C (97 °F)-37.5 °C (99.5 °F)] 36.8 °C (98.3 °F)  Pulse:  [] 61  Resp:  [17-18] 18  BP: (101-131)/(53-65) 108/53  SpO2:  [90 %-95 %] 95 %    Physical Exam  Vitals and nursing note reviewed.   Constitutional:       Appearance: He is well-developed. He is not diaphoretic.   HENT:      Head: Normocephalic and atraumatic.      Mouth/Throat:      Pharynx: No oropharyngeal exudate.   Eyes:      General: No scleral icterus.        Right eye: No discharge.         Left eye: No  discharge.      Conjunctiva/sclera: Conjunctivae normal.      Pupils: Pupils are equal, round, and reactive to light.   Neck:      Vascular: No JVD.      Trachea: No tracheal deviation.   Cardiovascular:      Rate and Rhythm: Normal rate and regular rhythm.      Heart sounds: No murmur heard.    No friction rub. No gallop.   Pulmonary:      Effort: Pulmonary effort is normal. No respiratory distress.      Breath sounds: Normal breath sounds. No stridor. No wheezing.   Chest:      Chest wall: No tenderness.   Abdominal:      General: Bowel sounds are normal. There is no distension.      Palpations: Abdomen is soft. There is mass (Right lower quadrant).      Tenderness: There is no abdominal tenderness. There is no rebound.      Comments: Drain with purulent drainage from insertion area saturating dressing   Musculoskeletal:         General: No tenderness.      Cervical back: Neck supple.   Skin:     General: Skin is warm and dry.      Nails: There is no clubbing.   Neurological:      Mental Status: He is alert and oriented to person, place, and time.      Cranial Nerves: No cranial nerve deficit.      Motor: No abnormal muscle tone.   Psychiatric:         Behavior: Behavior normal.         Fluids    Intake/Output Summary (Last 24 hours) at 3/4/2022 1500  Last data filed at 3/4/2022 0900  Gross per 24 hour   Intake 660 ml   Output 65 ml   Net 595 ml       Laboratory  Recent Labs     03/02/22  0149 03/03/22  0742 03/04/22  0327   WBC 13.3* 16.6* 23.9*   RBC 3.50* 3.76* 3.64*   HEMOGLOBIN 9.7* 10.5* 10.2*   HEMATOCRIT 29.8* 32.0* 31.2*   MCV 85.1 85.1 85.7   MCH 27.7 27.9 28.0   MCHC 32.6* 32.8* 32.7*   RDW 43.9 43.4 43.9   PLATELETCT 396 445 428   MPV 9.2 9.4 9.5     Recent Labs     03/02/22  0149 03/03/22  0742 03/04/22  0327   SODIUM 133* 133* 135   POTASSIUM 3.3* 3.7 3.7   CHLORIDE 102 100 102   CO2 22 23 22   GLUCOSE 164* 128* 143*   BUN 13 9 11   CREATININE 0.73 0.60 0.72   CALCIUM 8.3* 8.5 8.2*                    Imaging  CT-ABDOMEN-PELVIS WITH   Final Result      1.  Interval decrease in the abscess involving the anterior right abdominal wall status post pigtail catheter placement. Inflammatory changes in the right lower quadrant are unchanged. Inflammation extends to the anterior right lateral bladder wall      2.  Right nephrolithiasis.      3.  Diverticulosis.      4.  Stable soft tissue mass in the anterior right lower chest, presumably benign.      CT-DRAIN-PERITONEAL   Final Result      1.  CT guided peritoneal RLQ catheter drainage.   2.  The current plan is to obtain a follow-up CT in 5-7 days..      CT-ABDOMEN-PELVIS WITH   Final Result   Addendum 1 of 1         ADDENDUM:   Please note that the phlegmonous process and extensive abscess in the    right lower quadrant could be due to an indolent previously ruptured    appendicitis.      Final      1.  There is a complex multiloculated fluid collection with heterogeneous peripheral enhancement and internal air lucencies consistent with abscess in the anterior right lower quadrant, etiology uncertain. This extends from the peritoneal cavity through    the musculature into the subcutaneous tissues. There is abnormal enhancement abutting the right abdominal wall musculature and right psoas muscle with multiple smaller loculated fluid components.   2.  There is a questionable phlegmonous type process versus mass just inferior to the cecum.   3.  There are bilateral parapelvic cysts and renal cortical cysts which do not need further imaging follow-up.   4.  There is colonic diverticulosis with no acute diverticulitis.   5.  There is a tiny dependent gallstone.      CT-DRAIN-PERITONEAL    (Results Pending)        Assessment/Plan  Hyponatremia  Assessment & Plan  Improved    Electrolyte abnormality  Assessment & Plan        Right lower quadrant abdominal abscess (HCC)- (present on admission)  Assessment & Plan  Likely secondary to ruptured appendix   status post  CT-guided drainage on 3/1/2022  Continue Zosyn  Reviewed CT abdomen from this morning  Discussed with IR plan is to replace drain  Discussed with Dr. Whatley  from surgery continue close clinical monitoring in the leukocytosis worsening patient may need to proceed with laparotomy for drainage of abscess  Plan of care reviewed with nursing staff patient and discussed with the patient's wife at his bedside    Essential hypertension- (present on admission)  Assessment & Plan  Stable off medical therapy monitor    Anemia- (present on admission)  Assessment & Plan  Acute on chronic    Hemoglobin 10.2 no clinical signs of bleeding      Cognitive impairment- (present on admission)  Assessment & Plan  Stable   Frequent orientation and monitor for delirium      Dyslipidemia- (present on admission)  Assessment & Plan  Continue fenofibrate    Diabetes mellitus type 2, noninsulin dependent (HCC)- (present on admission)  Assessment & Plan  Controlled                  VTE prophylaxis: SCDs/TEDs    I have performed a physical exam and reviewed and updated ROS and Plan today (3/4/2022). In review of yesterday's note (3/3/2022), there are no changes except as documented above.

## 2022-03-04 NOTE — CARE PLAN
The patient is Watcher - Medium risk of patient condition declining or worsening    Shift Goals  Clinical Goals: Patient's drain dressing to be changed  Patient Goals: Remain free from pain    Progress made toward(s) clinical / shift goals:  Pt drain dressing was changed this AM. MICHELLE drain flushed and assessed see progress note. Pt has continued to deny pain. Pt labs show increased WBC's and neutrophils trending up and drainage from drain was purulent. Education provided on frequent movement in bed to prevent pressure injury, and educated on call light usage.    Problem: Knowledge Deficit - Standard  Goal: Patient and family/care givers will demonstrate understanding of plan of care, disease process/condition, diagnostic tests and medications  Outcome: Progressing     Problem: Hemodynamics  Goal: Patient's hemodynamics, fluid balance and neurologic status will be stable or improve  Outcome: Progressing     Problem: Fluid Volume  Goal: Fluid volume balance will be maintained  Outcome: Progressing     Problem: Urinary - Renal Perfusion  Goal: Ability to achieve and maintain adequate renal perfusion and functioning will improve  Outcome: Progressing     Problem: Respiratory  Goal: Patient will achieve/maintain optimum respiratory ventilation and gas exchange  Outcome: Progressing     Problem: Fall Risk  Goal: Patient will remain free from falls  Outcome: Progressing     Problem: Skin Integrity  Goal: Skin integrity is maintained or improved  Outcome: Progressing     Problem: Pain - Standard  Goal: Alleviation of pain or a reduction in pain to the patient’s comfort goal  Outcome: Progressing       Patient is not progressing towards the following goals:    Problem: Physical Regulation  Goal: Diagnostic test results will improve  Outcome: Not Progressing  Goal: Signs and symptoms of infection will decrease  Outcome: Not Progressing

## 2022-03-04 NOTE — PROGRESS NOTES
"Radiology Progress Note   Author: DYLLAN Soto Date & Time created: 3/4/2022  11:26 AM   Date of admission  2/28/2022  Note to reader: this note follows the APSO format rather than the historical SOAP format. Assessment and plan located at the top of the note for ease of use.    Chief Complaint  71 y.o. male admitted 2/28/2022 with   Chief Complaint   Patient presents with   • Other     Mass on right side of abdomen.        HPI  \" 71-year-old male with history of type 2 diabetes dyslipidemia hypertension admitted with right lower quadrant pain noted to have complex fluid collection concerning for an abscess in the right lower quadrant.  He was evaluated by surgery with recommendation for CT-guided drainage\"    Assessment/Plan  Interval History   Active Problems:    Diabetes mellitus type 2, noninsulin dependent (HCC)    Dyslipidemia    Cognitive impairment    Anemia    Essential hypertension    Right lower quadrant abdominal abscess (HCC)    Electrolyte abnormality    Hyponatremia      Plan IR  - Drain pulled back, not holding suction, CT scan today and drain replacement 3/4   - Fluid cultures + Streptococcus intermedius, Escherichia coli  - ID and Surgery following, antibiotics per ID    - NPO  - Hold blood thinners/ASA         IR:   3/1- RLQ drain placed   3/2- 130 ml purulent fluid, WBC improving since drain placed 13.3, 0/10 pain,   3/3- leukocytosis 16.6, 20 ml output in AM  3/4- Drain pulled back by accident overnight, Leukocytosis increasing 23.9, scant output            Review of Systems  Physical Exam   Review of Systems   Constitutional: Negative for chills and fever.   HENT: Negative for hearing loss.    Eyes: Negative for blurred vision.   Respiratory: Negative for cough, hemoptysis and shortness of breath.    Cardiovascular: Negative for chest pain and palpitations.   Gastrointestinal: Positive for abdominal pain. Negative for vomiting.   Genitourinary: Negative for dysuria. "   Musculoskeletal: Negative for myalgias.   Skin: Negative for rash.   Neurological: Negative for dizziness, weakness and headaches.   Endo/Heme/Allergies: Does not bruise/bleed easily.   Psychiatric/Behavioral: Negative for suicidal ideas.      Vitals:    03/04/22 0711   BP: 108/53   Pulse: 61   Resp: 18   Temp: 36.8 °C (98.3 °F)   SpO2: 95%        Physical Exam  Constitutional:       Appearance: Normal appearance.   HENT:      Head: Normocephalic.      Nose: Nose normal.      Mouth/Throat:      Mouth: Mucous membranes are moist.   Eyes:      Pupils: Pupils are equal, round, and reactive to light.   Cardiovascular:      Rate and Rhythm: Normal rate.   Pulmonary:      Effort: Pulmonary effort is normal. No respiratory distress.   Abdominal:      Palpations: Abdomen is soft.      Tenderness: There is no abdominal tenderness.       Musculoskeletal:         General: No tenderness or deformity.      Cervical back: Normal range of motion.   Skin:     General: Skin is warm and dry.      Capillary Refill: Capillary refill takes less than 2 seconds.      Coloration: Skin is not jaundiced or pale.   Neurological:      General: No focal deficit present.      Mental Status: He is alert.      Motor: No weakness.   Psychiatric:         Mood and Affect: Mood normal.         Behavior: Behavior normal.             Labs    Recent Labs     03/02/22  0149 03/03/22 0742 03/04/22 0327   WBC 13.3* 16.6* 23.9*   RBC 3.50* 3.76* 3.64*   HEMOGLOBIN 9.7* 10.5* 10.2*   HEMATOCRIT 29.8* 32.0* 31.2*   MCV 85.1 85.1 85.7   MCH 27.7 27.9 28.0   MCHC 32.6* 32.8* 32.7*   RDW 43.9 43.4 43.9   PLATELETCT 396 445 428   MPV 9.2 9.4 9.5     Recent Labs     03/02/22  0149 03/03/22 0742 03/04/22 0327   SODIUM 133* 133* 135   POTASSIUM 3.3* 3.7 3.7   CHLORIDE 102 100 102   CO2 22 23 22   GLUCOSE 164* 128* 143*   BUN 13 9 11   CREATININE 0.73 0.60 0.72   CALCIUM 8.3* 8.5 8.2*     Recent Labs     03/02/22  0149 03/03/22 0742 03/04/22 0327   CREATININE  0.73 0.60 0.72     CT-ABDOMEN-PELVIS WITH   Final Result      1.  Interval decrease in the abscess involving the anterior right abdominal wall status post pigtail catheter placement. Inflammatory changes in the right lower quadrant are unchanged. Inflammation extends to the anterior right lateral bladder wall      2.  Right nephrolithiasis.      3.  Diverticulosis.      4.  Stable soft tissue mass in the anterior right lower chest, presumably benign.      CT-DRAIN-PERITONEAL   Final Result      1.  CT guided peritoneal RLQ catheter drainage.   2.  The current plan is to obtain a follow-up CT in 5-7 days..      CT-ABDOMEN-PELVIS WITH   Final Result   Addendum 1 of 1         ADDENDUM:   Please note that the phlegmonous process and extensive abscess in the    right lower quadrant could be due to an indolent previously ruptured    appendicitis.      Final      1.  There is a complex multiloculated fluid collection with heterogeneous peripheral enhancement and internal air lucencies consistent with abscess in the anterior right lower quadrant, etiology uncertain. This extends from the peritoneal cavity through    the musculature into the subcutaneous tissues. There is abnormal enhancement abutting the right abdominal wall musculature and right psoas muscle with multiple smaller loculated fluid components.   2.  There is a questionable phlegmonous type process versus mass just inferior to the cecum.   3.  There are bilateral parapelvic cysts and renal cortical cysts which do not need further imaging follow-up.   4.  There is colonic diverticulosis with no acute diverticulitis.   5.  There is a tiny dependent gallstone.          INR   Date Value Ref Range Status   02/28/2022 1.35 (H) 0.87 - 1.13 Final     Comment:     INR - Non-therapeutic Reference Range: 0.87-1.13  INR - Therapeutic Reference Range: 2.0-4.0       No results found for: POCINR     Intake/Output Summary (Last 24 hours) at 3/2/2022 5320  Last data filed at  3/2/2022 1310  Gross per 24 hour   Intake 458 ml   Output 1225 ml   Net -767 ml      Labs not explicitly included in this progress note were reviewed by the author. Radiology/imaging not explicitly included in this progress note was reviewed by the author.     I have performed a physical exam and reviewed and updated ROS and Plan today (3/4/2022).     15 minutes in directly providing and coordinating care and extensive data review.  No time overlap and excludes procedures.

## 2022-03-04 NOTE — PROGRESS NOTES
Assumed care of pt and received bedside report. Pt is A&O x 4, denies pain, bedside table and personal items within reach, bed locked in low position, nonslip socks on patient, dressing appears to be soaked. Dressing to be changed on unit. No additional needs at this time.    0900: Request sent to CT to put pt on transport for STAT CT of abdomen ordered by physician.    0930: Flushed patient's MICHELLE drain and observed leaking from the insertion site. Bulb will not stay suctioned due to the leak. Dressing changed. Provider notified. Pt signed informed consent for contrast.    1100: Pt off unit with transport to CT.     1130: Pt returned to unit following CT. Orders received for diet NPO, hold ASA/blood thinners. Awaiting replacement of MICHELLE drain. Pt is currently in bed resting.     1340: Pt wife at bedside requesting information about the POC. Provider notified.     1700: Pt taken by transport down to IR for removal of malpositioned MICHELLE drain and placement of new drain.     1740: Pt returned to the unit in stable condition. Post-procedure vital signs taken see flowsheets. No additional needs at this time.

## 2022-03-04 NOTE — DISCHARGE PLANNING
TCN following. HTH/SCP chart review completed. Pt with ongoing need for inpatient management of his abdominal abscess. Disposition: d/c back to  vs SNF placement for management of IV abx. Pending ID recommendations to determine discharge needs.      Previously completed:  - Choice forms: SNF  - [facility] introduced (Y), referral (not sent- does not follow at his ).

## 2022-03-04 NOTE — PROGRESS NOTES
"    DATE: 3/4/2022    Hospital Day 4 right lower quadrant abdominal abscess.    INTERVAL EVENTS:  IR drain not holding suction  IR notified, coming to bedside to eval drain  WBC trend up to 23.9, STAT CT pending  Abscess final cultures positive for E coli & Strep Intermedius    PHYSICAL EXAMINATION:  Vital Signs: /53   Pulse 61   Temp 36.8 °C (98.3 °F) (Temporal)   Resp 18   Ht 1.753 m (5' 9\")   Wt 79.6 kg (175 lb 7.8 oz)   SpO2 95%   Physical Exam     Abdominal soft, mass palpated in right lower quadrant  Mild RLQ abdominal tenderness  MICHELLE drain with thick tan output, unable to hold suction  + bowel sounds    LABORATORY VALUES:   Recent Labs     03/02/22  0149 03/03/22  0742 03/04/22  0327   WBC 13.3* 16.6* 23.9*   RBC 3.50* 3.76* 3.64*   HEMOGLOBIN 9.7* 10.5* 10.2*   HEMATOCRIT 29.8* 32.0* 31.2*   MCV 85.1 85.1 85.7   MCH 27.7 27.9 28.0   MCHC 32.6* 32.8* 32.7*   RDW 43.9 43.4 43.9   PLATELETCT 396 445 428   MPV 9.2 9.4 9.5     Recent Labs     03/02/22  0149 03/03/22  0742 03/04/22  0327   SODIUM 133* 133* 135   POTASSIUM 3.3* 3.7 3.7   CHLORIDE 102 100 102   CO2 22 23 22   GLUCOSE 164* 128* 143*   BUN 13 9 11   CREATININE 0.73 0.60 0.72   CALCIUM 8.3* 8.5 8.2*                IMAGING:   CT-ABDOMEN-PELVIS WITH   Final Result      1.  Interval decrease in the abscess involving the anterior right abdominal wall status post pigtail catheter placement. Inflammatory changes in the right lower quadrant are unchanged. Inflammation extends to the anterior right lateral bladder wall      2.  Right nephrolithiasis.      3.  Diverticulosis.      4.  Stable soft tissue mass in the anterior right lower chest, presumably benign.      CT-DRAIN-PERITONEAL   Final Result      1.  CT guided peritoneal RLQ catheter drainage.   2.  The current plan is to obtain a follow-up CT in 5-7 days..      CT-ABDOMEN-PELVIS WITH   Final Result   Addendum 1 of 1         ADDENDUM:   Please note that the phlegmonous process and extensive " abscess in the    right lower quadrant could be due to an indolent previously ruptured    appendicitis.      Final      1.  There is a complex multiloculated fluid collection with heterogeneous peripheral enhancement and internal air lucencies consistent with abscess in the anterior right lower quadrant, etiology uncertain. This extends from the peritoneal cavity through    the musculature into the subcutaneous tissues. There is abnormal enhancement abutting the right abdominal wall musculature and right psoas muscle with multiple smaller loculated fluid components.   2.  There is a questionable phlegmonous type process versus mass just inferior to the cecum.   3.  There are bilateral parapelvic cysts and renal cortical cysts which do not need further imaging follow-up.   4.  There is colonic diverticulosis with no acute diverticulitis.   5.  There is a tiny dependent gallstone.      CT-DRAIN-PERITONEAL    (Results Pending)       ASSESSMENT AND PLAN:   Right lower quadrant abdominal abscess (HCC)- (present on admission)  Assessment & Plan  2/28 Zosyn initiated on admit.  3/1 IR drain placement.  3/4 Final cultures positive for E coli & Strep Intermedius  -WBC increased to 23.9, STAT CT pending  -May need possible washout of abscess  -Antibiotics per ID         ____________________________________     DYLLAN Gomes    DD: 3/2/2022  9:08 AM

## 2022-03-05 PROBLEM — E87.1 HYPONATREMIA: Status: RESOLVED | Noted: 2022-03-03 | Resolved: 2022-03-05

## 2022-03-05 LAB
ANION GAP SERPL CALC-SCNC: 10 MMOL/L (ref 7–16)
BACTERIA BLD CULT: NORMAL
BACTERIA BLD CULT: NORMAL
BASOPHILS # BLD AUTO: 1.7 % (ref 0–1.8)
BASOPHILS # BLD: 0.24 K/UL (ref 0–0.12)
BUN SERPL-MCNC: 10 MG/DL (ref 8–22)
CALCIUM SERPL-MCNC: 8.5 MG/DL (ref 8.5–10.5)
CHLORIDE SERPL-SCNC: 102 MMOL/L (ref 96–112)
CO2 SERPL-SCNC: 24 MMOL/L (ref 20–33)
CREAT SERPL-MCNC: 0.62 MG/DL (ref 0.5–1.4)
EOSINOPHIL # BLD AUTO: 0.13 K/UL (ref 0–0.51)
EOSINOPHIL NFR BLD: 0.9 % (ref 0–6.9)
ERYTHROCYTE [DISTWIDTH] IN BLOOD BY AUTOMATED COUNT: 43.4 FL (ref 35.9–50)
GLUCOSE SERPL-MCNC: 121 MG/DL (ref 65–99)
HCT VFR BLD AUTO: 29.2 % (ref 42–52)
HGB BLD-MCNC: 9.8 G/DL (ref 14–18)
LYMPHOCYTES # BLD AUTO: 6.56 K/UL (ref 1–4.8)
LYMPHOCYTES NFR BLD: 46.5 % (ref 22–41)
MAGNESIUM SERPL-MCNC: 1.8 MG/DL (ref 1.5–2.5)
MANUAL DIFF BLD: NORMAL
MCH RBC QN AUTO: 28.4 PG (ref 27–33)
MCHC RBC AUTO-ENTMCNC: 33.6 G/DL (ref 33.7–35.3)
MCV RBC AUTO: 84.6 FL (ref 81.4–97.8)
MONOCYTES # BLD AUTO: 0.37 K/UL (ref 0–0.85)
MONOCYTES NFR BLD AUTO: 2.6 % (ref 0–13.4)
MORPHOLOGY BLD-IMP: NORMAL
MYELOCYTES NFR BLD MANUAL: 0.9 %
NEUTROPHILS # BLD AUTO: 6.68 K/UL (ref 1.82–7.42)
NEUTROPHILS NFR BLD: 47.4 % (ref 44–72)
NRBC # BLD AUTO: 0 K/UL
NRBC BLD-RTO: 0 /100 WBC
PHOSPHATE SERPL-MCNC: 2.3 MG/DL (ref 2.5–4.5)
PLATELET # BLD AUTO: 380 K/UL (ref 164–446)
PLATELET BLD QL SMEAR: NORMAL
PMV BLD AUTO: 9.6 FL (ref 9–12.9)
POIKILOCYTOSIS BLD QL SMEAR: NORMAL
POLYCHROMASIA BLD QL SMEAR: NORMAL
POTASSIUM SERPL-SCNC: 3.5 MMOL/L (ref 3.6–5.5)
RBC # BLD AUTO: 3.45 M/UL (ref 4.7–6.1)
RBC BLD AUTO: PRESENT
SIGNIFICANT IND 70042: NORMAL
SIGNIFICANT IND 70042: NORMAL
SITE SITE: NORMAL
SITE SITE: NORMAL
SODIUM SERPL-SCNC: 136 MMOL/L (ref 135–145)
SOURCE SOURCE: NORMAL
SOURCE SOURCE: NORMAL
STOMATOCYTES BLD QL SMEAR: NORMAL
WBC # BLD AUTO: 14.1 K/UL (ref 4.8–10.8)

## 2022-03-05 PROCEDURE — 36415 COLL VENOUS BLD VENIPUNCTURE: CPT

## 2022-03-05 PROCEDURE — 99232 SBSQ HOSP IP/OBS MODERATE 35: CPT | Performed by: HOSPITALIST

## 2022-03-05 PROCEDURE — 99232 SBSQ HOSP IP/OBS MODERATE 35: CPT | Mod: FS

## 2022-03-05 PROCEDURE — 700105 HCHG RX REV CODE 258: Performed by: HOSPITALIST

## 2022-03-05 PROCEDURE — A9270 NON-COVERED ITEM OR SERVICE: HCPCS | Performed by: INTERNAL MEDICINE

## 2022-03-05 PROCEDURE — 700105 HCHG RX REV CODE 258: Performed by: INTERNAL MEDICINE

## 2022-03-05 PROCEDURE — 99233 SBSQ HOSP IP/OBS HIGH 50: CPT | Performed by: INTERNAL MEDICINE

## 2022-03-05 PROCEDURE — 80048 BASIC METABOLIC PNL TOTAL CA: CPT

## 2022-03-05 PROCEDURE — 83735 ASSAY OF MAGNESIUM: CPT

## 2022-03-05 PROCEDURE — 85007 BL SMEAR W/DIFF WBC COUNT: CPT

## 2022-03-05 PROCEDURE — 700111 HCHG RX REV CODE 636 W/ 250 OVERRIDE (IP): Performed by: INTERNAL MEDICINE

## 2022-03-05 PROCEDURE — 85027 COMPLETE CBC AUTOMATED: CPT

## 2022-03-05 PROCEDURE — 770006 HCHG ROOM/CARE - MED/SURG/GYN SEMI*

## 2022-03-05 PROCEDURE — 700102 HCHG RX REV CODE 250 W/ 637 OVERRIDE(OP): Performed by: INTERNAL MEDICINE

## 2022-03-05 PROCEDURE — 700101 HCHG RX REV CODE 250: Performed by: HOSPITALIST

## 2022-03-05 PROCEDURE — 700111 HCHG RX REV CODE 636 W/ 250 OVERRIDE (IP): Performed by: HOSPITALIST

## 2022-03-05 PROCEDURE — 84100 ASSAY OF PHOSPHORUS: CPT

## 2022-03-05 RX ADMIN — FENOFIBRATE 134 MG: 67 CAPSULE ORAL at 05:17

## 2022-03-05 RX ADMIN — DORZOLAMIDE HYDROCHLORIDE AND TIMOLOL MALEATE 1 DROP: 20; 5 SOLUTION OPHTHALMIC at 05:16

## 2022-03-05 RX ADMIN — TAMSULOSIN HYDROCHLORIDE 0.4 MG: 0.4 CAPSULE ORAL at 21:01

## 2022-03-05 RX ADMIN — FINASTERIDE 5 MG: 5 TABLET, FILM COATED ORAL at 05:17

## 2022-03-05 RX ADMIN — SENNOSIDES AND DOCUSATE SODIUM 2 TABLET: 50; 8.6 TABLET ORAL at 17:35

## 2022-03-05 RX ADMIN — POTASSIUM PHOSPHATE, MONOBASIC AND POTASSIUM PHOSPHATE, DIBASIC 30 MMOL: 224; 236 INJECTION, SOLUTION, CONCENTRATE INTRAVENOUS at 09:25

## 2022-03-05 RX ADMIN — DORZOLAMIDE HYDROCHLORIDE AND TIMOLOL MALEATE 1 DROP: 20; 5 SOLUTION OPHTHALMIC at 17:35

## 2022-03-05 RX ADMIN — LATANOPROST 1 DROP: 50 SOLUTION OPHTHALMIC at 17:35

## 2022-03-05 RX ADMIN — PIPERACILLIN AND TAZOBACTAM 3.38 G: 3; .375 INJECTION, POWDER, LYOPHILIZED, FOR SOLUTION INTRAVENOUS; PARENTERAL at 13:29

## 2022-03-05 RX ADMIN — PIPERACILLIN AND TAZOBACTAM 3.38 G: 3; .375 INJECTION, POWDER, LYOPHILIZED, FOR SOLUTION INTRAVENOUS; PARENTERAL at 05:15

## 2022-03-05 RX ADMIN — LEVOTHYROXINE SODIUM 75 MCG: 75 TABLET ORAL at 05:17

## 2022-03-05 RX ADMIN — SODIUM CHLORIDE, POTASSIUM CHLORIDE, SODIUM LACTATE AND CALCIUM CHLORIDE: 600; 310; 30; 20 INJECTION, SOLUTION INTRAVENOUS at 09:42

## 2022-03-05 RX ADMIN — Medication 10 MG: at 21:01

## 2022-03-05 RX ADMIN — ENOXAPARIN SODIUM 40 MG: 40 INJECTION SUBCUTANEOUS at 05:16

## 2022-03-05 RX ADMIN — OMEPRAZOLE 20 MG: 20 CAPSULE, DELAYED RELEASE ORAL at 05:17

## 2022-03-05 RX ADMIN — PIPERACILLIN AND TAZOBACTAM 3.38 G: 3; .375 INJECTION, POWDER, LYOPHILIZED, FOR SOLUTION INTRAVENOUS; PARENTERAL at 21:01

## 2022-03-05 ASSESSMENT — FIBROSIS 4 INDEX: FIB4 SCORE: 0.93

## 2022-03-05 ASSESSMENT — ENCOUNTER SYMPTOMS
ROS GI COMMENTS: IMPROVED
ABDOMINAL PAIN: 0
COUGH: 0
NAUSEA: 0
FEVER: 0
DIZZINESS: 0
SHORTNESS OF BREATH: 0
ABDOMINAL PAIN: 1
CHILLS: 0
DIARRHEA: 0
VOMITING: 0
HEADACHES: 0

## 2022-03-05 ASSESSMENT — PAIN DESCRIPTION - PAIN TYPE
TYPE: ACUTE PAIN
TYPE: ACUTE PAIN

## 2022-03-05 NOTE — PROGRESS NOTES
A&Ox4, resting in bed. Denied pain. RLQ dressing CDI. Drain flushed with NS 10 mL. Reinforced use of call light for assistance; pt able to make needs known.

## 2022-03-05 NOTE — CARE PLAN
The patient is Stable - Low risk of patient condition declining or worsening    Shift Goals  Clinical Goals: monitor drain op, maintain drain  Patient Goals: ambulate    Progress made toward(s) clinical / shift goals:  patient ambulated to bathroom. Reinforced call bell button prior to getting out of bed.

## 2022-03-05 NOTE — PROGRESS NOTES
"    DATE: 3/5/2022    Hospital Day 4 right lower quadrant abdominal abscess.    INTERVAL EVENTS:  IR drain replaced yesterday, output 93mL since replacement  WBC trend down to 14.1, remains nontoxic  ABD CT yesterday shows decrease in abscess size  Continue antibiotics per ID    PHYSICAL EXAMINATION:  Vital Signs: /78   Pulse 63   Temp 36.4 °C (97.6 °F) (Temporal)   Resp 18   Ht 1.753 m (5' 9\")   Wt 79.6 kg (175 lb 7.8 oz)   SpO2 96%   Physical Exam     Abdominal soft, mass palpated in right lower quadrant  No abdominal tenderness  MICHELLE drain with thin tan tinged output  + bowel sounds  BM yesterday    LABORATORY VALUES:   Recent Labs     03/03/22  0742 03/04/22 0327 03/05/22  0548   WBC 16.6* 23.9* 14.1*   RBC 3.76* 3.64* 3.45*   HEMOGLOBIN 10.5* 10.2* 9.8*   HEMATOCRIT 32.0* 31.2* 29.2*   MCV 85.1 85.7 84.6   MCH 27.9 28.0 28.4   MCHC 32.8* 32.7* 33.6*   RDW 43.4 43.9 43.4   PLATELETCT 445 428 380   MPV 9.4 9.5 9.6     Recent Labs     03/03/22  0742 03/04/22 0327 03/05/22  0548   SODIUM 133* 135 136   POTASSIUM 3.7 3.7 3.5*   CHLORIDE 100 102 102   CO2 23 22 24   GLUCOSE 128* 143* 121*   BUN 9 11 10   CREATININE 0.60 0.72 0.62   CALCIUM 8.5 8.2* 8.5                IMAGING:   CT-ABDOMEN-PELVIS WITH   Final Result      1.  Interval decrease in the abscess involving the anterior right abdominal wall status post pigtail catheter placement. Inflammatory changes in the right lower quadrant are unchanged. Inflammation extends to the anterior right lateral bladder wall      2.  Right nephrolithiasis.      3.  Diverticulosis.      4.  Stable soft tissue mass in the anterior right lower chest, presumably benign.      CT-DRAIN-PERITONEAL   Final Result      1.  CT guided peritoneal RLQ catheter drainage.   2.  The current plan is to obtain a follow-up CT in 5-7 days..      CT-ABDOMEN-PELVIS WITH   Final Result   Addendum 1 of 1         ADDENDUM:   Please note that the phlegmonous process and extensive abscess in " the    right lower quadrant could be due to an indolent previously ruptured    appendicitis.      Final      1.  There is a complex multiloculated fluid collection with heterogeneous peripheral enhancement and internal air lucencies consistent with abscess in the anterior right lower quadrant, etiology uncertain. This extends from the peritoneal cavity through    the musculature into the subcutaneous tissues. There is abnormal enhancement abutting the right abdominal wall musculature and right psoas muscle with multiple smaller loculated fluid components.   2.  There is a questionable phlegmonous type process versus mass just inferior to the cecum.   3.  There are bilateral parapelvic cysts and renal cortical cysts which do not need further imaging follow-up.   4.  There is colonic diverticulosis with no acute diverticulitis.   5.  There is a tiny dependent gallstone.      CT-DRAIN-PERITONEAL    (Results Pending)       ASSESSMENT AND PLAN:   Right lower quadrant abdominal abscess (HCC)- (present on admission)  Assessment & Plan  2/28 Zosyn initiated on admit.  3/1 IR drain placement.  3/4 Final cultures positive for E coli & Strep Intermedius  -WBC increased to 23.9, STAT CT shows decrease in abscess  -IR drain replaced after accidentally malpositioned   3/5 WBC trend down to 14.1  -Continue antibiotics per ID  -Continue IR drain         ____________________________________     NAVDEEP Gomes.    DD: 3/2/2022  9:08 AM

## 2022-03-05 NOTE — PROCEDURES
? IR Nursing Note    Site Marked rlq, Procedure Confirmed with MD, RN,  and patient pre procedure.   ?   10Fr locking loop drain placed RLQ to bulb syringe w/o incident, pt tolerated well.  ?  10 Fr REF#[id number] LOT# [id number] exp12/05/24     ?   Patient tolerated procedure, vs hemodynamically stable; pt awake and alert post procedure; report given to DRISS mello. Patient transported to room via IR RN monitored then transferred care to report RN.   ?   :

## 2022-03-05 NOTE — PROGRESS NOTES
Infectious Disease Progress Note    Author: Marisol Garcia M.D. Date & Time of service: 3/5/2022  8:31 AM    Chief Complaint:  Follow-up for intra-abdominal abscess    Interval History:  3/2 afebrile, WBC 13.3.  Patient underwent drain placement yesterday.  Cultures are pending.  Feels well.  Tolerating IV antibiotics without any issues.  3/3 afebrile. Drain cultures growing Streptococcus intermedius and E. coli, 130 cc of drain output recorded yesterday. No new issues to report  3/ afebrile WBC 14.4 repeat CT scan revealed interval decrease in the abscess of the right lower quadrant fluid collection.  Patient underwent CT-guided placement of new right lower quadrant drain yesterday patient doing well without any new issues.    Labs Reviewed, Medications Reviewed and Radiology Reviewed.    Review of Systems:  Review of Systems   Constitutional: Negative for chills and fever.   Respiratory: Negative for cough and shortness of breath.    Gastrointestinal: Positive for abdominal pain. Negative for diarrhea, nausea and vomiting.        Improved   Neurological: Negative for dizziness and headaches.       Hemodynamics:  Temp (24hrs), Av.7 °C (98 °F), Min:36.4 °C (97.5 °F), Max:37.1 °C (98.8 °F)  Temperature: 36.4 °C (97.6 °F)  Pulse  Av.5  Min: 61  Max: 107   Blood Pressure : 133/78       Physical Exam:  Physical Exam  Vitals and nursing note reviewed.   Constitutional:       Appearance: Normal appearance.   HENT:      Mouth/Throat:      Mouth: Mucous membranes are moist.   Eyes:      Extraocular Movements: Extraocular movements intact.      Pupils: Pupils are equal, round, and reactive to light.   Cardiovascular:      Rate and Rhythm: Normal rate.   Pulmonary:      Effort: Pulmonary effort is normal. No respiratory distress.      Breath sounds: No wheezing.   Abdominal:      General: There is no distension.      Palpations: Abdomen is soft.      Tenderness: There is no abdominal tenderness.      Comments:  Right lower quadrant drain in place   Musculoskeletal:         General: Normal range of motion.   Skin:     General: Skin is warm and dry.   Neurological:      General: No focal deficit present.      Mental Status: He is alert and oriented to person, place, and time.   Psychiatric:         Behavior: Behavior normal.      Comments: Flat affect         Meds:    Current Facility-Administered Medications:   •  potassium phosphate  •  LR  •  tamsulosin  •  dorzolamide-timolol  •  finasteride  •  latanoprost  •  levothyroxine  •  melatonin  •  omeprazole  •  senna-docusate **AND** polyethylene glycol/lytes **AND** magnesium hydroxide **AND** bisacodyl  •  enoxaparin  •  acetaminophen  •  [COMPLETED] piperacillin-tazobactam **AND** piperacillin-tazobactam  •  fenofibrate micronized    Labs:  Recent Labs     03/03/22 0742 03/04/22 0327 03/05/22  0548   WBC 16.6* 23.9* 14.1*   RBC 3.76* 3.64* 3.45*   HEMOGLOBIN 10.5* 10.2* 9.8*   HEMATOCRIT 32.0* 31.2* 29.2*   MCV 85.1 85.7 84.6   MCH 27.9 28.0 28.4   RDW 43.4 43.9 43.4   PLATELETCT 445 428 380   MPV 9.4 9.5 9.6   NEUTSPOLYS 55.70 78.80* 47.40   LYMPHOCYTES 40.90 15.90* 46.50*   MONOCYTES 0.00 5.30 2.60   EOSINOPHILS 1.70 0.00 0.90   BASOPHILS 0.00 0.00 1.70   RBCMORPHOLO Present Present Present     Recent Labs     03/03/22  0742 03/04/22 0327 03/05/22  0548   SODIUM 133* 135 136   POTASSIUM 3.7 3.7 3.5*   CHLORIDE 100 102 102   CO2 23 22 24   GLUCOSE 128* 143* 121*   BUN 9 11 10     Recent Labs     03/03/22  0742 03/04/22 0327 03/05/22  0548   CREATININE 0.60 0.72 0.62       Imaging:  CT-ABDOMEN-PELVIS WITH    Addendum Date: 2/28/2022    ADDENDUM: Please note that the phlegmonous process and extensive abscess in the right lower quadrant could be due to an indolent previously ruptured appendicitis.    Result Date: 2/28/2022 2/28/2022 4:49 PM HISTORY/REASON FOR EXAM:  RLQ abdominal pain (Age >= 14y). Palpable firm mass in the right lower abdomen TECHNIQUE/EXAM  DESCRIPTION:   CT scan of the abdomen and pelvis with contrast. Contrast-enhanced helical scanning was obtained from the diaphragmatic domes through the pubic symphysis following the bolus administration of nonionic contrast without complication. 100 mL of Omnipaque 350 nonionic contrast was administered without complication. Low dose optimization technique was utilized for this CT exam including automated exposure control and adjustment of the mA and/or kV according to patient size. COMPARISON: 10/8/2020 FINDINGS: Lower Chest: Visualized lung bases demonstrate dependent atelectasis. No change in the previously biopsied 3.5 cm right benign breast mass. Liver: Normal. Spleen: Unremarkable. Pancreas: Unremarkable. Gallbladder: There is a tiny dependent gallstone.. Biliary: Nondilated. Adrenal glands: Normal. Kidneys: There is a nonobstructive 3 no other calcification the right superior pole kidney. There are left renal parapelvic cysts. There are simple appearing bilateral renal cortical cysts again seen. There may be right renal parapelvic cysts as well. There is minimal left perinephric stranding. Lymph nodes: No adenopathy. Vasculature: There is aortic atherosclerosis. Bowel: There is colonic diverticulosis. There is a questionable spiculated type masslike area in the right lower quadrant adjacent to the inferior cecum measuring 5.3 x 4.4 cm on the coronal reconstructions. There is abnormal enhancement abutting the rectus musculature and the right psoas musculature. Peritoneum: There is a complex heterogeneously enhancing multi loculated fluid collection in the right lower quadrant anteriorly with multiple internal air lucencies and ill-defined peripheral rim enhancement. This extends from the right peritoneum just posterior to the rectus abdominis musculature at the level of the iliac crest and extends inferiorly into the midline and right lower pelvis extending through the right lateral abdominal wall musculature  into the subcutaneous tissues. The origin is uncertain however may be related to the ill-defined masslike or phlegmon-type structure inferior to the cecum. The entire area measures an estimated 11.4 x 6.4 x 13.4 cm. There are numerous other smaller loculated enhancing fluid components extending into the soft tissues with associated subcutaneous edema. Pelvis: Bladder is unremarkable.. Musculoskeletal: No acute or destructive process. There are degenerative changes throughout the spine.     1.  There is a complex multiloculated fluid collection with heterogeneous peripheral enhancement and internal air lucencies consistent with abscess in the anterior right lower quadrant, etiology uncertain. This extends from the peritoneal cavity through the musculature into the subcutaneous tissues. There is abnormal enhancement abutting the right abdominal wall musculature and right psoas muscle with multiple smaller loculated fluid components. 2.  There is a questionable phlegmonous type process versus mass just inferior to the cecum. 3.  There are bilateral parapelvic cysts and renal cortical cysts which do not need further imaging follow-up. 4.  There is colonic diverticulosis with no acute diverticulitis. 5.  There is a tiny dependent gallstone.    CT-DRAIN-PERITONEAL    Result Date: 3/1/2022  3/1/2022 5:35 PM HISTORY/REASON FOR EXAM:  RLQ abscess x 3 weeks  -  high surgical risk; NPO, no thinners. Discussed With Dr Dudley PM 2/28. TECHNIQUE/EXAM DESCRIPTION: RLQ pelvic abscess drainage with CT guidance. Low dose optimization technique was utilized for this CT exam including automated exposure control and adjustment of the mA and/or kV according to patient size. PROCEDURE: Informed consent was obtained. Localizing CT images were obtained with the patient in supine position. The skin was prepped with Betadine and draped in a sterile fashion. Following local anesthesia with 1% Lidocaine, a 17 G guiding needle was placed and  needle path confirmed with CT. An Amplatz guidewire was placed and following serial tract dilatation, a 10 biliary drainage catheter was placed to allow for additional sideholes to cross multiple aspects of the multiloculated abscess. A specimen was collected and submitted for culture and sensitivity and Gram stain. Total of 25 mL purulent fluid was drained. The catheter was secured to the skin and connected to suction bulb drainage. Final CT images were obtained documenting catheter position. The patient tolerated the procedure well with no evidence of complication. COMPARISON: CT abdomen pelvis dated 2/28/2022 FINDINGS: The final CT images show satisfactory catheter position within the target collection.     1.  CT guided peritoneal RLQ catheter drainage. 2.  The current plan is to obtain a follow-up CT in 5-7 days..      Micro:  Results     Procedure Component Value Units Date/Time    Urine Culture [[id number]] Collected: 03/01/22 1134    Order Status: Completed Specimen: Urine, Clean Catch Updated: 03/03/22 2304     Significant Indicator NEG     Source UR     Site URINE, CLEAN CATCH     Culture Result No growth at 48 hours.    Narrative:      Indication for culture:->Evaluation for sepsis without a  clear source of infection  Indication for culture:->Evaluation for sepsis without a    Fungal Culture [[id number]] Collected: 03/01/22 1809    Order Status: Completed Specimen: Wound from Other Body Fluid Updated: 03/03/22 1403     Significant Indicator NEG     Source WND     Site Peritoneal Abscess Drain     Culture Result Culture in progress.     Fungal Smear Results No fungal elements seen.    Narrative:      Collected By: [id number] AWILDA MACHUCA  Intra-abdominal  abcsess  Collected By: [id number] AWILDA MACHUCA    CULTURE WOUND W/ GRAM STAIN [[id number]]  (Abnormal)  (Susceptibility) Collected: 03/01/22 1809    Order Status: Completed Specimen: Wound Updated: 03/03/22 1403     Significant Indicator POS     Source WND      Site Peritoneal Abscess Drain     Culture Result -     Gram Stain Result Many WBCs.  Many Gram positive cocci.       Culture Result Streptococcus intermedius  Moderate growth        Escherichia coli  Rare growth      Narrative:      Collected By: [id number] AWILDA MACHUCA  Intra-abdominal  abcsess  Collected By: [id number] AWILDA MACHUCA    Susceptibility     Escherichia coli (2)     Antibiotic Interpretation Microscan   Method Status    Ampicillin Resistant >16 mcg/mL DIO Final    Ceftriaxone Sensitive <=1 mcg/mL DIO Final    Cefazolin Intermediate 4 mcg/mL DIO Final    Ciprofloxacin Resistant >2 mcg/mL DIO Final    Cefepime Sensitive <=2 mcg/mL DIO Final    Cefuroxime Sensitive <=4 mcg/mL DIO Final    Ampicillin/sulbactam Resistant >16/8 mcg/mL DIO Final    Ertapenem Sensitive <=0.5 mcg/mL DIO Final    Tobramycin Sensitive <=2 mcg/mL DIO Final    Gentamicin Sensitive <=2 mcg/mL DIO Final    Minocycline Sensitive <=4 mcg/mL DIO Final    Moxifloxacin Resistant >4 mcg/mL DIO Final    Pip/Tazobactam Sensitive <=8 mcg/mL DIO Final    Trimeth/Sulfa Resistant >2/38 mcg/mL DIO Final    Tigecycline Sensitive <=2 mcg/mL DIO Final                   Fungal Smear [[id number]] Collected: 03/01/22 1809    Order Status: Completed Specimen: Wound Updated: 03/02/22 1715     Significant Indicator NEG     Source WND     Site Peritoneal Abscess Drain     Fungal Smear Results No fungal elements seen.    Narrative:      Collected By: [id number] AWILDA MACHUCA  Intra-abdominal  abcsess  Collected By: [id number] AWILDA MACHUCA    GRAM STAIN [[id number]]  (Abnormal) Collected: 03/01/22 1809    Order Status: Completed Specimen: Wound Updated: 03/02/22 1714     Significant Indicator .     Source WND     Site Peritoneal Abscess Drain     Gram Stain Result Many WBCs.  Many Gram positive cocci.      Narrative:      Collected By: [id number] AWILDA MACHUCA  Intra-abdominal  abcsess  Collected By: [id number] AWILDA MACHUCA    FLUID CULTURE [[id number]] Collected: 03/01/22  "1809    Order Status: Canceled Specimen: Other Body Fluid     FLUID CULTURE W/GRAM STAIN [[id number]] Collected: 03/01/22 1809    Order Status: Canceled Specimen: Other Body Fluid     AFB Culture [[id number]]     Order Status: No result Specimen: Respirate from Abscess     Urinalysis [[id number]] Collected: 03/01/22 1134    Order Status: Completed Specimen: Urine Updated: 03/01/22 1151     Color Yellow     Character Clear     Specific Gravity 1.020     Ph 6.0     Glucose Negative mg/dL      Ketones Negative mg/dL      Protein Negative mg/dL      Bilirubin Negative     Urobilinogen, Urine 1.0     Nitrite Negative     Leukocyte Esterase Negative     Occult Blood Negative     Micro Urine Req see below     Comment: Microscopic examination not performed when specimen is clear  and chemically negative for protein, blood, leukocyte esterase  and nitrite.         Narrative:      Indication for culture:->Evaluation for sepsis without a  clear source of infection    Blood Culture [[id number]] Collected: 02/28/22 1518    Order Status: Completed Specimen: Blood from Peripheral Updated: 03/01/22 0747     Significant Indicator NEG     Source BLD     Site PERIPHERAL     Culture Result No Growth  Note: Blood cultures are incubated for 5 days and  are monitored continuously.Positive blood cultures  are called to the RN and reported as soon as  they are identified.      Narrative:      2 of 2 blood culture x2  Sites order. Per Hospital Policy:  Only change Specimen Src: to \"Line\" if specified by physician  order.  Right AC    Blood Culture [[id number]] Collected: 02/28/22 1503    Order Status: Completed Specimen: Blood from Peripheral Updated: 03/01/22 0747     Significant Indicator NEG     Source BLD     Site PERIPHERAL     Culture Result No Growth  Note: Blood cultures are incubated for 5 days and  are monitored continuously.Positive blood cultures  are called to the RN and reported as soon as  they are identified.      Narrative:      " "1 of 2 for Blood Culture x 2 sites order. Per Hospital  Policy: Only change Specimen Src: to \"Line\" if specified by  physician order.  No site indicated    Blood Culture [[id number]] Collected: 02/28/22 0000    Order Status: Canceled Specimen: Other from Peripheral     Blood Culture [[id number]] Collected: 02/28/22 0000    Order Status: Canceled Specimen: Other from Peripheral           Assessment:  Active Hospital Problems    Diagnosis    • *Sepsis (HCC) [A41.9]    • Right lower quadrant abdominal abscess (HCC) [K65.1]    • Essential hypertension [I10]    • Cognitive impairment [R41.89]    • Diabetes mellitus type 2, noninsulin dependent (HCC) [E11.9]    • Dyslipidemia [E78.5]      71 y.o. man with a history of type 2 diabetes mellitus, hyperlipidemia and hypertension admitted on 2/28/2022 secondary to progressive right lower quadrant abdominal pain.  CT scan showed a complex multiloculated fluid collection in the anterior right lower quadrant.    Status post drainage on 3/1. Blood cultures on admission are negative to date.  He is currently on Zosyn.    Pertinent diagnoses:  Intra-abdominal abscess  Leukocytosis, improving  Type 2 diabetes mellitus, controlled.  Hemoglobin A1c 5.8  Cognitive impairment    Plan:  -Continue IV Zosyn  -Status post drain placement on 3/1.  Follow cultures-Streptococcus intermedius, E. coli.   -Monitor drain output  -Repeat CT scan on 3/4-rim-enhancing collection has decreased with the lateral aspect now measuring 4.2 x 1.8 cm in the medial aspect of the collection measuring 2.3 x 6 cm.   -Status post CT-guided new right lower quadrant drain placement on 3/4  -Blood sugar control  -Repeat CT scan 5 days from new drain placement on 3/4 (~3/9)  -Duration of antibiotics will depend on results of repeat CT scan    Discussed with internal medicine/Dr. Abdi  "

## 2022-03-05 NOTE — OR SURGEON
Immediate Post- Operative Note        Findings: Displaced RLQ drain.       Procedure(s): CT guided placement of a new 10 fr RLQ drain. Removal of the old, malpositioned drain.       Estimated Blood Loss: Less than 5 ml        Complications: None            3/4/2022     1733 PM     Chuy Roblero M.D.

## 2022-03-05 NOTE — PROGRESS NOTES
Received bedside report from night shift RN.   Assumed care of patient at change of shift.   Assessment complete and POC discussed with patient and pertinent medical team.   Patient is A&Ox4, VS are stable, on RA.   Patient not complaining of pain, and does not show any signs of physical distress. Patient's affect is flat and does not seem interested in much discussion, but does follow commands appropriately.    Patient is indepentently eating meals, and ate them in bed with the HOB elevated.  His family did join him at bedside. She asked about questions regarding his POC, which I discussed with her.   Bed is in lowest/locked position.   Call light and belongings are within reach.   All needs addressed and patient has not complaints.

## 2022-03-05 NOTE — PROGRESS NOTES
Hospital Medicine Daily Progress Note    Date of Service  3/5/2022    Chief Complaint  Lico Salinas is a 71 y.o. male admitted 2/28/2022 with abdominal pain    Hospital Course    71-year-old male with history of type 2 diabetes dyslipidemia hypertension admitted with right lower quadrant pain noted to have complex fluid collection concerning for an abscess in the right lower quadrant.  He was evaluated by surgery with recommendation for CT-guided drainage    Interval Problem Update      Had abdominal drain replaced yesterday  Leukocytosis improved  Afebrile,on RA  Denies pain  Drain output 93ml        I have personally seen and examined the patient at bedside. I discussed the plan of care with patient, bedside RN and .    Consultants/Specialty  general surgery and infectious disease    Code Status  DNAR/DNI    Disposition  Patient is not medically cleared for discharge.   Anticipate discharge to to home with close outpatient follow-up.  I have placed the appropriate orders for post-discharge needs.    Review of Systems  Review of Systems   Constitutional: Negative for chills and fever.   Respiratory: Negative for cough and shortness of breath.    Cardiovascular: Negative for chest pain.   Gastrointestinal: Negative for abdominal pain, nausea and vomiting.   All other systems reviewed and are negative.       Physical Exam  Temp:  [36.4 °C (97.5 °F)-37.1 °C (98.8 °F)] 36.4 °C (97.6 °F)  Pulse:  [63-84] 63  Resp:  [11-18] 18  BP: (109-136)/(56-78) 133/78  SpO2:  [92 %-97 %] 96 %    Physical Exam  Vitals and nursing note reviewed.   Constitutional:       Appearance: He is well-developed. He is not diaphoretic.   HENT:      Head: Normocephalic and atraumatic.      Mouth/Throat:      Pharynx: No oropharyngeal exudate.   Eyes:      General: No scleral icterus.        Right eye: No discharge.         Left eye: No discharge.      Conjunctiva/sclera: Conjunctivae normal.      Pupils: Pupils are equal, round,  and reactive to light.   Neck:      Vascular: No JVD.      Trachea: No tracheal deviation.   Cardiovascular:      Rate and Rhythm: Normal rate and regular rhythm.      Heart sounds: No murmur heard.    No friction rub. No gallop.   Pulmonary:      Effort: Pulmonary effort is normal. No respiratory distress.      Breath sounds: Normal breath sounds. No stridor. No wheezing.   Chest:      Chest wall: No tenderness.   Abdominal:      General: Bowel sounds are normal. There is no distension.      Palpations: Abdomen is soft. There is mass.      Tenderness: There is no abdominal tenderness. There is no rebound.      Comments: Right lower quadrant drain with purulent drainage noted    Musculoskeletal:         General: No tenderness.      Cervical back: Neck supple.   Skin:     General: Skin is warm and dry.      Nails: There is no clubbing.   Neurological:      Mental Status: He is alert and oriented to person, place, and time.      Cranial Nerves: No cranial nerve deficit.      Motor: No abnormal muscle tone.   Psychiatric:         Behavior: Behavior normal.         Fluids    Intake/Output Summary (Last 24 hours) at 3/5/2022 1333  Last data filed at 3/5/2022 0942  Gross per 24 hour   Intake 240 ml   Output 78 ml   Net 162 ml       Laboratory  Recent Labs     03/03/22  0742 03/04/22  0327 03/05/22  0548   WBC 16.6* 23.9* 14.1*   RBC 3.76* 3.64* 3.45*   HEMOGLOBIN 10.5* 10.2* 9.8*   HEMATOCRIT 32.0* 31.2* 29.2*   MCV 85.1 85.7 84.6   MCH 27.9 28.0 28.4   MCHC 32.8* 32.7* 33.6*   RDW 43.4 43.9 43.4   PLATELETCT 445 428 380   MPV 9.4 9.5 9.6     Recent Labs     03/03/22  0742 03/04/22  0327 03/05/22  0548   SODIUM 133* 135 136   POTASSIUM 3.7 3.7 3.5*   CHLORIDE 100 102 102   CO2 23 22 24   GLUCOSE 128* 143* 121*   BUN 9 11 10   CREATININE 0.60 0.72 0.62   CALCIUM 8.5 8.2* 8.5                   Imaging  CT-DRAIN-PERITONEAL   Final Result      1.  CT GUIDED EXCHANGE OF A RIGHT LOWER QUADRANT DRAINAGE CATHETER.      2.  THE  CURRENT PLAN IS TO MONITOR DRAINAGE OUTPUT AND OBTAIN A FOLLOWUP CT SCAN IN 5-7 DAYS IF CLINICALLY INDICATED.      CT-ABDOMEN-PELVIS WITH   Final Result      1.  Interval decrease in the abscess involving the anterior right abdominal wall status post pigtail catheter placement. Inflammatory changes in the right lower quadrant are unchanged. Inflammation extends to the anterior right lateral bladder wall      2.  Right nephrolithiasis.      3.  Diverticulosis.      4.  Stable soft tissue mass in the anterior right lower chest, presumably benign.      CT-DRAIN-PERITONEAL   Final Result      1.  CT guided peritoneal RLQ catheter drainage.   2.  The current plan is to obtain a follow-up CT in 5-7 days..      CT-ABDOMEN-PELVIS WITH   Final Result   Addendum 1 of 1         ADDENDUM:   Please note that the phlegmonous process and extensive abscess in the    right lower quadrant could be due to an indolent previously ruptured    appendicitis.      Final      1.  There is a complex multiloculated fluid collection with heterogeneous peripheral enhancement and internal air lucencies consistent with abscess in the anterior right lower quadrant, etiology uncertain. This extends from the peritoneal cavity through    the musculature into the subcutaneous tissues. There is abnormal enhancement abutting the right abdominal wall musculature and right psoas muscle with multiple smaller loculated fluid components.   2.  There is a questionable phlegmonous type process versus mass just inferior to the cecum.   3.  There are bilateral parapelvic cysts and renal cortical cysts which do not need further imaging follow-up.   4.  There is colonic diverticulosis with no acute diverticulitis.   5.  There is a tiny dependent gallstone.           Assessment/Plan  Electrolyte abnormality  Assessment & Plan  Hypokalemia  Hypophosphatemia      Replete and monitor      Right lower quadrant abdominal abscess (HCC)- (present on admission)  Assessment  & Plan  Likely secondary to ruptured appendix   status post CT-guided drainage on 3/1/2022  Status post replacement of dislodged drain on 3/4/2022    Continue Zosyn and follow-up on cultures so far growing strep intermedius and E. Coli  Discussed with ID recommendations repeat CT on 3/9/2022  Discussed with Dr. Whatley given clinical improvement continue current management and close monitoring  Patient updated on plan of care    Essential hypertension- (present on admission)  Assessment & Plan  Stable off medical therapy monitor    Anemia- (present on admission)  Assessment & Plan  Acute on chronic    Overall stable no clinical signs of bleeding      Cognitive impairment- (present on admission)  Assessment & Plan  Stable   Frequent orientation and monitor for delirium      Dyslipidemia- (present on admission)  Assessment & Plan  Continue fenofibrate    Diabetes mellitus type 2, noninsulin dependent (HCC)- (present on admission)  Assessment & Plan  Controlled                  VTE prophylaxis: SCDs/TEDs    I have performed a physical exam and reviewed and updated ROS and Plan today (3/5/2022). In review of yesterday's note (3/4/2022), there are no changes except as documented above.       Right neck mass

## 2022-03-06 LAB
ANION GAP SERPL CALC-SCNC: 12 MMOL/L (ref 7–16)
BASOPHILS # BLD AUTO: 0 % (ref 0–1.8)
BASOPHILS # BLD: 0 K/UL (ref 0–0.12)
BUN SERPL-MCNC: 9 MG/DL (ref 8–22)
CALCIUM SERPL-MCNC: 8.9 MG/DL (ref 8.5–10.5)
CHLORIDE SERPL-SCNC: 102 MMOL/L (ref 96–112)
CO2 SERPL-SCNC: 23 MMOL/L (ref 20–33)
CREAT SERPL-MCNC: 0.66 MG/DL (ref 0.5–1.4)
EOSINOPHIL # BLD AUTO: 0 K/UL (ref 0–0.51)
EOSINOPHIL NFR BLD: 0 % (ref 0–6.9)
ERYTHROCYTE [DISTWIDTH] IN BLOOD BY AUTOMATED COUNT: 44 FL (ref 35.9–50)
GLUCOSE SERPL-MCNC: 130 MG/DL (ref 65–99)
HCT VFR BLD AUTO: 31.7 % (ref 42–52)
HGB BLD-MCNC: 10.3 G/DL (ref 14–18)
LYMPHOCYTES # BLD AUTO: 9.25 K/UL (ref 1–4.8)
LYMPHOCYTES NFR BLD: 58.9 % (ref 22–41)
MAGNESIUM SERPL-MCNC: 1.8 MG/DL (ref 1.5–2.5)
MANUAL DIFF BLD: NORMAL
MCH RBC QN AUTO: 28 PG (ref 27–33)
MCHC RBC AUTO-ENTMCNC: 32.5 G/DL (ref 33.7–35.3)
MCV RBC AUTO: 86.1 FL (ref 81.4–97.8)
MONOCYTES # BLD AUTO: 0.28 K/UL (ref 0–0.85)
MONOCYTES NFR BLD AUTO: 1.8 % (ref 0–13.4)
MORPHOLOGY BLD-IMP: NORMAL
NEUTROPHILS # BLD AUTO: 6.17 K/UL (ref 1.82–7.42)
NEUTROPHILS NFR BLD: 39.3 % (ref 44–72)
NRBC # BLD AUTO: 0 K/UL
NRBC BLD-RTO: 0 /100 WBC
PHOSPHATE SERPL-MCNC: 2.7 MG/DL (ref 2.5–4.5)
PLATELET # BLD AUTO: 424 K/UL (ref 164–446)
PLATELET BLD QL SMEAR: NORMAL
PMV BLD AUTO: 9.6 FL (ref 9–12.9)
POTASSIUM SERPL-SCNC: 3.6 MMOL/L (ref 3.6–5.5)
RBC # BLD AUTO: 3.68 M/UL (ref 4.7–6.1)
SODIUM SERPL-SCNC: 137 MMOL/L (ref 135–145)
WBC # BLD AUTO: 15.7 K/UL (ref 4.8–10.8)

## 2022-03-06 PROCEDURE — 700102 HCHG RX REV CODE 250 W/ 637 OVERRIDE(OP): Performed by: INTERNAL MEDICINE

## 2022-03-06 PROCEDURE — 84100 ASSAY OF PHOSPHORUS: CPT

## 2022-03-06 PROCEDURE — 770006 HCHG ROOM/CARE - MED/SURG/GYN SEMI*

## 2022-03-06 PROCEDURE — 85027 COMPLETE CBC AUTOMATED: CPT

## 2022-03-06 PROCEDURE — 99232 SBSQ HOSP IP/OBS MODERATE 35: CPT | Performed by: HOSPITALIST

## 2022-03-06 PROCEDURE — 700105 HCHG RX REV CODE 258: Performed by: HOSPITALIST

## 2022-03-06 PROCEDURE — 700102 HCHG RX REV CODE 250 W/ 637 OVERRIDE(OP): Performed by: HOSPITALIST

## 2022-03-06 PROCEDURE — 700111 HCHG RX REV CODE 636 W/ 250 OVERRIDE (IP): Performed by: HOSPITALIST

## 2022-03-06 PROCEDURE — 83735 ASSAY OF MAGNESIUM: CPT

## 2022-03-06 PROCEDURE — 700111 HCHG RX REV CODE 636 W/ 250 OVERRIDE (IP): Performed by: INTERNAL MEDICINE

## 2022-03-06 PROCEDURE — 85007 BL SMEAR W/DIFF WBC COUNT: CPT

## 2022-03-06 PROCEDURE — 80048 BASIC METABOLIC PNL TOTAL CA: CPT

## 2022-03-06 PROCEDURE — 99232 SBSQ HOSP IP/OBS MODERATE 35: CPT | Mod: FS

## 2022-03-06 PROCEDURE — 36415 COLL VENOUS BLD VENIPUNCTURE: CPT

## 2022-03-06 PROCEDURE — A9270 NON-COVERED ITEM OR SERVICE: HCPCS | Performed by: HOSPITALIST

## 2022-03-06 PROCEDURE — A9270 NON-COVERED ITEM OR SERVICE: HCPCS | Performed by: INTERNAL MEDICINE

## 2022-03-06 RX ORDER — MAGNESIUM SULFATE HEPTAHYDRATE 40 MG/ML
2 INJECTION, SOLUTION INTRAVENOUS ONCE
Status: COMPLETED | OUTPATIENT
Start: 2022-03-06 | End: 2022-03-06

## 2022-03-06 RX ORDER — POTASSIUM CHLORIDE 20 MEQ/1
20 TABLET, EXTENDED RELEASE ORAL ONCE
Status: COMPLETED | OUTPATIENT
Start: 2022-03-06 | End: 2022-03-06

## 2022-03-06 RX ADMIN — MAGNESIUM SULFATE HEPTAHYDRATE 2 G: 40 INJECTION, SOLUTION INTRAVENOUS at 13:07

## 2022-03-06 RX ADMIN — PIPERACILLIN AND TAZOBACTAM 3.38 G: 3; .375 INJECTION, POWDER, LYOPHILIZED, FOR SOLUTION INTRAVENOUS; PARENTERAL at 13:07

## 2022-03-06 RX ADMIN — Medication 10 MG: at 20:59

## 2022-03-06 RX ADMIN — POTASSIUM CHLORIDE 20 MEQ: 1500 TABLET, EXTENDED RELEASE ORAL at 13:07

## 2022-03-06 RX ADMIN — PIPERACILLIN AND TAZOBACTAM 3.38 G: 3; .375 INJECTION, POWDER, LYOPHILIZED, FOR SOLUTION INTRAVENOUS; PARENTERAL at 05:24

## 2022-03-06 RX ADMIN — TAMSULOSIN HYDROCHLORIDE 0.4 MG: 0.4 CAPSULE ORAL at 20:59

## 2022-03-06 RX ADMIN — DORZOLAMIDE HYDROCHLORIDE AND TIMOLOL MALEATE 1 DROP: 20; 5 SOLUTION OPHTHALMIC at 05:24

## 2022-03-06 RX ADMIN — PIPERACILLIN AND TAZOBACTAM 3.38 G: 3; .375 INJECTION, POWDER, LYOPHILIZED, FOR SOLUTION INTRAVENOUS; PARENTERAL at 21:00

## 2022-03-06 RX ADMIN — DORZOLAMIDE HYDROCHLORIDE AND TIMOLOL MALEATE 1 DROP: 20; 5 SOLUTION OPHTHALMIC at 16:56

## 2022-03-06 RX ADMIN — ENOXAPARIN SODIUM 40 MG: 40 INJECTION SUBCUTANEOUS at 05:25

## 2022-03-06 RX ADMIN — LEVOTHYROXINE SODIUM 75 MCG: 75 TABLET ORAL at 05:24

## 2022-03-06 RX ADMIN — LATANOPROST 1 DROP: 50 SOLUTION OPHTHALMIC at 16:56

## 2022-03-06 RX ADMIN — FENOFIBRATE 134 MG: 67 CAPSULE ORAL at 05:24

## 2022-03-06 RX ADMIN — FINASTERIDE 5 MG: 5 TABLET, FILM COATED ORAL at 05:25

## 2022-03-06 RX ADMIN — OMEPRAZOLE 20 MG: 20 CAPSULE, DELAYED RELEASE ORAL at 05:25

## 2022-03-06 ASSESSMENT — ENCOUNTER SYMPTOMS
CHILLS: 0
VOMITING: 0
FEVER: 0
ABDOMINAL PAIN: 0
NAUSEA: 0

## 2022-03-06 ASSESSMENT — FIBROSIS 4 INDEX: FIB4 SCORE: 0.84

## 2022-03-06 NOTE — CARE PLAN
The patient is Watcher - Medium risk of patient condition declining or worsening    Shift Goals  Clinical Goals: Monitor drain  Patient Goals: ambulate    Progress made toward(s) clinical / shift goals:  MICHELLE Drain maintaining output, dressing CDI.    Patient is not progressing towards the following goals:

## 2022-03-06 NOTE — PROGRESS NOTES
Hospital Medicine Daily Progress Note    Date of Service  3/6/2022    Chief Complaint  Lico Salinas is a 71 y.o. male admitted 2/28/2022 with abdominal pain    Hospital Course    71-year-old male with history of type 2 diabetes dyslipidemia hypertension admitted with right lower quadrant pain noted to have complex fluid collection concerning for an abscess in the right lower quadrant.  He was evaluated by surgery with recommendation for CT-guided drainage    Interval Problem Update        Patient is afebrile  He denies abdominal pain and is tolerating his diet  No nausea vomiting  Drain output 50 mL  WBC 15.7  Currently on room air    Discussed with nursing staff    I have personally seen and examined the patient at bedside. I discussed the plan of care with patient and bedside RN.    Consultants/Specialty  general surgery and infectious disease    Code Status  DNAR/DNI    Disposition  Patient is not medically cleared for discharge.   Anticipate discharge to to home with close outpatient follow-up.  I have placed the appropriate orders for post-discharge needs.    Review of Systems  Review of Systems   Constitutional: Positive for malaise/fatigue. Negative for chills and fever.   Gastrointestinal: Negative for abdominal pain, nausea and vomiting.   All other systems reviewed and are negative.       Physical Exam  Temp:  [36.4 °C (97.6 °F)-36.6 °C (97.8 °F)] 36.4 °C (97.6 °F)  Pulse:  [62-72] 65  Resp:  [17-18] 18  BP: (121-126)/(61-67) 125/65  SpO2:  [95 %-96 %] 95 %    Physical Exam  Vitals and nursing note reviewed.   Constitutional:       Appearance: He is well-developed. He is not diaphoretic.   HENT:      Head: Normocephalic and atraumatic.      Mouth/Throat:      Pharynx: No oropharyngeal exudate.   Eyes:      General: No scleral icterus.        Right eye: No discharge.         Left eye: No discharge.      Conjunctiva/sclera: Conjunctivae normal.      Pupils: Pupils are equal, round, and reactive to light.    Neck:      Vascular: No JVD.      Trachea: No tracheal deviation.   Cardiovascular:      Rate and Rhythm: Normal rate and regular rhythm.      Heart sounds: No murmur heard.    No friction rub. No gallop.   Pulmonary:      Effort: Pulmonary effort is normal. No respiratory distress.      Breath sounds: Normal breath sounds. No stridor. No wheezing.   Chest:      Chest wall: No tenderness.   Abdominal:      General: Bowel sounds are normal. There is no distension.      Palpations: Abdomen is soft. There is mass.      Tenderness: There is no abdominal tenderness. There is no rebound.      Comments: Right lower quadrant MICHELLE drain with tan thin liquid drainage   Musculoskeletal:         General: No tenderness.      Cervical back: Neck supple.   Skin:     General: Skin is warm and dry.      Nails: There is no clubbing.   Neurological:      Mental Status: He is alert and oriented to person, place, and time.      Cranial Nerves: No cranial nerve deficit.      Motor: No abnormal muscle tone.   Psychiatric:         Behavior: Behavior normal.         Fluids    Intake/Output Summary (Last 24 hours) at 3/6/2022 1216  Last data filed at 3/6/2022 0900  Gross per 24 hour   Intake 1134.8 ml   Output 330 ml   Net 804.8 ml       Laboratory  Recent Labs     03/04/22  0327 03/05/22  0548 03/06/22  0428   WBC 23.9* 14.1* 15.7*   RBC 3.64* 3.45* 3.68*   HEMOGLOBIN 10.2* 9.8* 10.3*   HEMATOCRIT 31.2* 29.2* 31.7*   MCV 85.7 84.6 86.1   MCH 28.0 28.4 28.0   MCHC 32.7* 33.6* 32.5*   RDW 43.9 43.4 44.0   PLATELETCT 428 380 424   MPV 9.5 9.6 9.6     Recent Labs     03/04/22  0327 03/05/22  0548 03/06/22  0428   SODIUM 135 136 137   POTASSIUM 3.7 3.5* 3.6   CHLORIDE 102 102 102   CO2 22 24 23   GLUCOSE 143* 121* 130*   BUN 11 10 9   CREATININE 0.72 0.62 0.66   CALCIUM 8.2* 8.5 8.9                   Imaging  CT-DRAIN-PERITONEAL   Final Result      1.  CT GUIDED EXCHANGE OF A RIGHT LOWER QUADRANT DRAINAGE CATHETER.      2.  THE CURRENT PLAN IS  TO MONITOR DRAINAGE OUTPUT AND OBTAIN A FOLLOWUP CT SCAN IN 5-7 DAYS IF CLINICALLY INDICATED.      CT-ABDOMEN-PELVIS WITH   Final Result      1.  Interval decrease in the abscess involving the anterior right abdominal wall status post pigtail catheter placement. Inflammatory changes in the right lower quadrant are unchanged. Inflammation extends to the anterior right lateral bladder wall      2.  Right nephrolithiasis.      3.  Diverticulosis.      4.  Stable soft tissue mass in the anterior right lower chest, presumably benign.      CT-DRAIN-PERITONEAL   Final Result      1.  CT guided peritoneal RLQ catheter drainage.   2.  The current plan is to obtain a follow-up CT in 5-7 days..      CT-ABDOMEN-PELVIS WITH   Final Result   Addendum 1 of 1         ADDENDUM:   Please note that the phlegmonous process and extensive abscess in the    right lower quadrant could be due to an indolent previously ruptured    appendicitis.      Final      1.  There is a complex multiloculated fluid collection with heterogeneous peripheral enhancement and internal air lucencies consistent with abscess in the anterior right lower quadrant, etiology uncertain. This extends from the peritoneal cavity through    the musculature into the subcutaneous tissues. There is abnormal enhancement abutting the right abdominal wall musculature and right psoas muscle with multiple smaller loculated fluid components.   2.  There is a questionable phlegmonous type process versus mass just inferior to the cecum.   3.  There are bilateral parapelvic cysts and renal cortical cysts which do not need further imaging follow-up.   4.  There is colonic diverticulosis with no acute diverticulitis.   5.  There is a tiny dependent gallstone.           Assessment/Plan  Electrolyte abnormality  Assessment & Plan  Hypokalemia  Hypomagnesemia      Replete electrolytes and continue to monitor      Right lower quadrant abdominal abscess (HCC)- (present on  admission)  Assessment & Plan  Likely secondary to ruptured appendix   status post CT-guided drainage on 3/1/2022  Status post replacement of dislodged drain on 3/4/2022    Clinically stable on Zosyn  ID and surgery following  Cultures grew E. coli and strep intermedius  Monitor drain output and CBC and follow-up CT on 3 9    Essential hypertension- (present on admission)  Assessment & Plan        Anemia- (present on admission)  Assessment & Plan  Acute on chronic    Hemoglobin 10.3  No clinical signs of bleeding      Cognitive impairment- (present on admission)  Assessment & Plan  Stable   Frequent orientation and monitor for delirium      Dyslipidemia- (present on admission)  Assessment & Plan  Continue fenofibrate    Diabetes mellitus type 2, noninsulin dependent (HCC)- (present on admission)  Assessment & Plan  Controlled                  VTE prophylaxis: SCDs/TEDs    I have performed a physical exam and reviewed and updated ROS and Plan today (3/6/2022). In review of yesterday's note (3/5/2022), there are no changes except as documented above.

## 2022-03-06 NOTE — PROGRESS NOTES
A&Ox4, resting in bed. Denied pain. RLQ dressing CDI. Reinforced use of call light for assistance; pt able to make needs known.

## 2022-03-06 NOTE — CARE PLAN
The patient is Stable - Low risk of patient condition declining or worsening    Shift Goals  Clinical Goals: Monitor MICHELLE drain output    Progress made toward(s) clinical / shift goals:  MICHELLE drain is intact, patent, draining appropriately. Output documented in flowsheets.

## 2022-03-06 NOTE — PROGRESS NOTES
Received bedside report from night shift RN.   Assumed care of patient at change of shift.   Assessment complete and POC discussed.   Patient is A&Ox4, VSS, on RA.   MICHELLE Drain RLQ is patent and draining appropriately.   Patient denies pain, no apparent signs of distress or discomfort.   Patient is sitting up in bed for breakfast.   Bed is in lowest/locked position.   Call light and belongings are within reach.   No further needs at this time.

## 2022-03-06 NOTE — PROGRESS NOTES
"    DATE: 3/6/2022    Hospital Day 5 right lower quadrant abdominal abscess.    INTERVAL EVENTS:  IR drain output 50mL over last 24 hours  WBC 15.7, remains nontoxic  Continue antibiotics per ID    PHYSICAL EXAMINATION:  Vital Signs: /65   Pulse 65   Temp 36.4 °C (97.6 °F) (Temporal)   Resp 18   Ht 1.753 m (5' 9\")   Wt 80 kg (176 lb 5.9 oz)   SpO2 95%   Physical Exam     Abdominal soft, mass palpated in right lower quadrant  No abdominal tenderness  MICHELLE drain with thin tan tinged output  + bowel sounds  BM 3/4    LABORATORY VALUES:   Recent Labs     03/04/22 0327 03/05/22  0548 03/06/22  0428   WBC 23.9* 14.1* 15.7*   RBC 3.64* 3.45* 3.68*   HEMOGLOBIN 10.2* 9.8* 10.3*   HEMATOCRIT 31.2* 29.2* 31.7*   MCV 85.7 84.6 86.1   MCH 28.0 28.4 28.0   MCHC 32.7* 33.6* 32.5*   RDW 43.9 43.4 44.0   PLATELETCT 428 380 424   MPV 9.5 9.6 9.6     Recent Labs     03/04/22 0327 03/05/22  0548 03/06/22  0428   SODIUM 135 136 137   POTASSIUM 3.7 3.5* 3.6   CHLORIDE 102 102 102   CO2 22 24 23   GLUCOSE 143* 121* 130*   BUN 11 10 9   CREATININE 0.72 0.62 0.66   CALCIUM 8.2* 8.5 8.9                IMAGING:   CT-DRAIN-PERITONEAL   Final Result      1.  CT GUIDED EXCHANGE OF A RIGHT LOWER QUADRANT DRAINAGE CATHETER.      2.  THE CURRENT PLAN IS TO MONITOR DRAINAGE OUTPUT AND OBTAIN A FOLLOWUP CT SCAN IN 5-7 DAYS IF CLINICALLY INDICATED.      CT-ABDOMEN-PELVIS WITH   Final Result      1.  Interval decrease in the abscess involving the anterior right abdominal wall status post pigtail catheter placement. Inflammatory changes in the right lower quadrant are unchanged. Inflammation extends to the anterior right lateral bladder wall      2.  Right nephrolithiasis.      3.  Diverticulosis.      4.  Stable soft tissue mass in the anterior right lower chest, presumably benign.      CT-DRAIN-PERITONEAL   Final Result      1.  CT guided peritoneal RLQ catheter drainage.   2.  The current plan is to obtain a follow-up CT in 5-7 days..    "   CT-ABDOMEN-PELVIS WITH   Final Result   Addendum 1 of 1         ADDENDUM:   Please note that the phlegmonous process and extensive abscess in the    right lower quadrant could be due to an indolent previously ruptured    appendicitis.      Final      1.  There is a complex multiloculated fluid collection with heterogeneous peripheral enhancement and internal air lucencies consistent with abscess in the anterior right lower quadrant, etiology uncertain. This extends from the peritoneal cavity through    the musculature into the subcutaneous tissues. There is abnormal enhancement abutting the right abdominal wall musculature and right psoas muscle with multiple smaller loculated fluid components.   2.  There is a questionable phlegmonous type process versus mass just inferior to the cecum.   3.  There are bilateral parapelvic cysts and renal cortical cysts which do not need further imaging follow-up.   4.  There is colonic diverticulosis with no acute diverticulitis.   5.  There is a tiny dependent gallstone.          ASSESSMENT AND PLAN:   Right lower quadrant abdominal abscess (HCC)- (present on admission)  Assessment & Plan  2/28 Zosyn initiated on admit.  3/1 IR drain placement.  3/4 Final cultures positive for E coli & Strep Intermedius  -WBC increased to 23.9, STAT CT shows decrease in abscess  -IR drain replaced after accidentally malpositioned   3/5 WBC trend down to 14.1  -Continue antibiotics per ID  -Continue IR drain         ____________________________________     DYLLAN Gomes    DD: 3/2/2022  9:08 AM

## 2022-03-07 LAB
ANION GAP SERPL CALC-SCNC: 11 MMOL/L (ref 7–16)
ANISOCYTOSIS BLD QL SMEAR: ABNORMAL
BASOPHILS # BLD AUTO: 0.9 % (ref 0–1.8)
BASOPHILS # BLD: 0.12 K/UL (ref 0–0.12)
BUN SERPL-MCNC: 12 MG/DL (ref 8–22)
CALCIUM SERPL-MCNC: 9 MG/DL (ref 8.5–10.5)
CHLORIDE SERPL-SCNC: 102 MMOL/L (ref 96–112)
CO2 SERPL-SCNC: 22 MMOL/L (ref 20–33)
CREAT SERPL-MCNC: 0.77 MG/DL (ref 0.5–1.4)
EOSINOPHIL # BLD AUTO: 0 K/UL (ref 0–0.51)
EOSINOPHIL NFR BLD: 0 % (ref 0–6.9)
ERYTHROCYTE [DISTWIDTH] IN BLOOD BY AUTOMATED COUNT: 43.9 FL (ref 35.9–50)
GLUCOSE SERPL-MCNC: 131 MG/DL (ref 65–99)
HCT VFR BLD AUTO: 30.9 % (ref 42–52)
HGB BLD-MCNC: 10.1 G/DL (ref 14–18)
LYMPHOCYTES # BLD AUTO: 7.1 K/UL (ref 1–4.8)
LYMPHOCYTES NFR BLD: 53 % (ref 22–41)
MAGNESIUM SERPL-MCNC: 2.1 MG/DL (ref 1.5–2.5)
MANUAL DIFF BLD: NORMAL
MCH RBC QN AUTO: 27.9 PG (ref 27–33)
MCHC RBC AUTO-ENTMCNC: 32.7 G/DL (ref 33.7–35.3)
MCV RBC AUTO: 85.4 FL (ref 81.4–97.8)
METAMYELOCYTES NFR BLD MANUAL: 0.9 %
MICROCYTES BLD QL SMEAR: ABNORMAL
MONOCYTES # BLD AUTO: 0.35 K/UL (ref 0–0.85)
MONOCYTES NFR BLD AUTO: 2.6 % (ref 0–13.4)
MORPHOLOGY BLD-IMP: NORMAL
MYELOCYTES NFR BLD MANUAL: 1.7 %
NEUTROPHILS # BLD AUTO: 5.48 K/UL (ref 1.82–7.42)
NEUTROPHILS NFR BLD: 40.9 % (ref 44–72)
NRBC # BLD AUTO: 0 K/UL
NRBC BLD-RTO: 0 /100 WBC
OVALOCYTES BLD QL SMEAR: NORMAL
PLATELET # BLD AUTO: 401 K/UL (ref 164–446)
PLATELET BLD QL SMEAR: NORMAL
PMV BLD AUTO: 9.5 FL (ref 9–12.9)
POIKILOCYTOSIS BLD QL SMEAR: NORMAL
POTASSIUM SERPL-SCNC: 3.9 MMOL/L (ref 3.6–5.5)
RBC # BLD AUTO: 3.62 M/UL (ref 4.7–6.1)
RBC BLD AUTO: PRESENT
SODIUM SERPL-SCNC: 135 MMOL/L (ref 135–145)
WBC # BLD AUTO: 13.4 K/UL (ref 4.8–10.8)

## 2022-03-07 PROCEDURE — 700111 HCHG RX REV CODE 636 W/ 250 OVERRIDE (IP): Performed by: INTERNAL MEDICINE

## 2022-03-07 PROCEDURE — 700105 HCHG RX REV CODE 258: Performed by: HOSPITALIST

## 2022-03-07 PROCEDURE — 36415 COLL VENOUS BLD VENIPUNCTURE: CPT

## 2022-03-07 PROCEDURE — 85007 BL SMEAR W/DIFF WBC COUNT: CPT

## 2022-03-07 PROCEDURE — A9270 NON-COVERED ITEM OR SERVICE: HCPCS | Performed by: INTERNAL MEDICINE

## 2022-03-07 PROCEDURE — 99232 SBSQ HOSP IP/OBS MODERATE 35: CPT | Performed by: INTERNAL MEDICINE

## 2022-03-07 PROCEDURE — 83735 ASSAY OF MAGNESIUM: CPT

## 2022-03-07 PROCEDURE — 700111 HCHG RX REV CODE 636 W/ 250 OVERRIDE (IP): Performed by: HOSPITALIST

## 2022-03-07 PROCEDURE — 80048 BASIC METABOLIC PNL TOTAL CA: CPT

## 2022-03-07 PROCEDURE — 700102 HCHG RX REV CODE 250 W/ 637 OVERRIDE(OP): Performed by: INTERNAL MEDICINE

## 2022-03-07 PROCEDURE — 770006 HCHG ROOM/CARE - MED/SURG/GYN SEMI*

## 2022-03-07 PROCEDURE — 85027 COMPLETE CBC AUTOMATED: CPT

## 2022-03-07 PROCEDURE — 99232 SBSQ HOSP IP/OBS MODERATE 35: CPT | Performed by: HOSPITALIST

## 2022-03-07 PROCEDURE — 99232 SBSQ HOSP IP/OBS MODERATE 35: CPT | Mod: FS

## 2022-03-07 RX ADMIN — PIPERACILLIN AND TAZOBACTAM 3.38 G: 3; .375 INJECTION, POWDER, LYOPHILIZED, FOR SOLUTION INTRAVENOUS; PARENTERAL at 20:33

## 2022-03-07 RX ADMIN — Medication 10 MG: at 20:25

## 2022-03-07 RX ADMIN — PIPERACILLIN AND TAZOBACTAM 3.38 G: 3; .375 INJECTION, POWDER, LYOPHILIZED, FOR SOLUTION INTRAVENOUS; PARENTERAL at 04:42

## 2022-03-07 RX ADMIN — PIPERACILLIN AND TAZOBACTAM 3.38 G: 3; .375 INJECTION, POWDER, LYOPHILIZED, FOR SOLUTION INTRAVENOUS; PARENTERAL at 14:50

## 2022-03-07 RX ADMIN — DORZOLAMIDE HYDROCHLORIDE AND TIMOLOL MALEATE 1 DROP: 20; 5 SOLUTION OPHTHALMIC at 16:40

## 2022-03-07 RX ADMIN — LATANOPROST 1 DROP: 50 SOLUTION OPHTHALMIC at 16:40

## 2022-03-07 RX ADMIN — ENOXAPARIN SODIUM 40 MG: 40 INJECTION SUBCUTANEOUS at 04:44

## 2022-03-07 RX ADMIN — OMEPRAZOLE 20 MG: 20 CAPSULE, DELAYED RELEASE ORAL at 04:45

## 2022-03-07 RX ADMIN — TAMSULOSIN HYDROCHLORIDE 0.4 MG: 0.4 CAPSULE ORAL at 20:24

## 2022-03-07 RX ADMIN — LEVOTHYROXINE SODIUM 75 MCG: 75 TABLET ORAL at 04:45

## 2022-03-07 RX ADMIN — DORZOLAMIDE HYDROCHLORIDE AND TIMOLOL MALEATE 1 DROP: 20; 5 SOLUTION OPHTHALMIC at 04:53

## 2022-03-07 RX ADMIN — FINASTERIDE 5 MG: 5 TABLET, FILM COATED ORAL at 04:45

## 2022-03-07 RX ADMIN — FENOFIBRATE 134 MG: 67 CAPSULE ORAL at 04:45

## 2022-03-07 ASSESSMENT — PAIN DESCRIPTION - PAIN TYPE
TYPE: ACUTE PAIN
TYPE: ACUTE PAIN

## 2022-03-07 ASSESSMENT — ENCOUNTER SYMPTOMS
COUGH: 0
WEAKNESS: 0
CHILLS: 0
SHORTNESS OF BREATH: 0
DIZZINESS: 0
MYALGIAS: 0
ROS GI COMMENTS: IMPROVED
HEMOPTYSIS: 0
ABDOMINAL PAIN: 1
NAUSEA: 0
DIARRHEA: 0
HEADACHES: 0
ABDOMINAL PAIN: 0
VOMITING: 0
PALPITATIONS: 0
FEVER: 0
BLURRED VISION: 0
BRUISES/BLEEDS EASILY: 0

## 2022-03-07 NOTE — PROGRESS NOTES
"Radiology Progress Note   Author: DYLLAN Soto Date & Time created: 3/7/2022  7:48 AM   Date of admission  2/28/2022  Note to reader: this note follows the APSO format rather than the historical SOAP format. Assessment and plan located at the top of the note for ease of use.    Chief Complaint  71 y.o. male admitted 2/28/2022 with   Chief Complaint   Patient presents with   • Other     Mass on right side of abdomen.        HPI  \" 71-year-old male with history of type 2 diabetes dyslipidemia hypertension admitted with right lower quadrant pain noted to have complex fluid collection concerning for an abscess in the right lower quadrant.  He was evaluated by surgery with recommendation for CT-guided drainage\"    Assessment/Plan  Interval History   Active Problems:    Diabetes mellitus type 2, noninsulin dependent (HCC)    Dyslipidemia    Cognitive impairment    Anemia    Essential hypertension    Right lower quadrant abdominal abscess (HCC)    Electrolyte abnormality      Plan IR  - Irrigate RLQ drain with 10 ml of sterile saline twice per shift   - Recommend follow up CT scan in 5-7 days (3/8 )    - Fluid cultures + Streptococcus intermedius, Escherichia coli  - ID and Surgery following, antibiotics per ID             IR:   3/1- RLQ drain placed   3/2- 130 ml purulent fluid, WBC improving since drain placed 13.3, 0/10 pain,   3/3- leukocytosis 16.6, 20 ml output in AM  3/4- Drain pulled back by accident overnight, Leukocytosis increasing 23.9, scant output   :Displaced RLQ drain. New 10 fr RLQ drain. Removal of the old, malpositioned drain.   3/5- 30 ml   3/6- 15 ml   3/7-  20 ml purulent fluid,  WBC trending down 13.4            Review of Systems  Physical Exam   Review of Systems   Constitutional: Negative for chills and fever.   HENT: Negative for hearing loss.    Eyes: Negative for blurred vision.   Respiratory: Negative for cough, hemoptysis and shortness of breath.    Cardiovascular: Negative for " chest pain and palpitations.   Gastrointestinal: Negative for abdominal pain and vomiting.   Genitourinary: Negative for dysuria.   Musculoskeletal: Negative for myalgias.   Skin: Negative for rash.   Neurological: Negative for dizziness, weakness and headaches.   Endo/Heme/Allergies: Does not bruise/bleed easily.   Psychiatric/Behavioral: Negative for suicidal ideas.      Vitals:    03/07/22 0733   BP: 142/66   Pulse: (!) 56   Resp: 17   Temp: 36 °C (96.8 °F)   SpO2: 96%        Physical Exam  Constitutional:       Appearance: Normal appearance.   HENT:      Head: Normocephalic.      Nose: Nose normal.      Mouth/Throat:      Mouth: Mucous membranes are moist.   Eyes:      Pupils: Pupils are equal, round, and reactive to light.   Cardiovascular:      Rate and Rhythm: Normal rate.   Pulmonary:      Effort: Pulmonary effort is normal. No respiratory distress.   Abdominal:      Palpations: Abdomen is soft.      Tenderness: There is no abdominal tenderness.       Musculoskeletal:         General: No tenderness or deformity.      Cervical back: Normal range of motion.   Skin:     General: Skin is warm and dry.      Capillary Refill: Capillary refill takes less than 2 seconds.      Coloration: Skin is not jaundiced or pale.   Neurological:      General: No focal deficit present.      Mental Status: He is alert.      Motor: No weakness.   Psychiatric:         Mood and Affect: Mood normal.         Behavior: Behavior normal.             Labs    Recent Labs     03/05/22  0548 03/06/22  0428 03/07/22  0203   WBC 14.1* 15.7* 13.4*   RBC 3.45* 3.68* 3.62*   HEMOGLOBIN 9.8* 10.3* 10.1*   HEMATOCRIT 29.2* 31.7* 30.9*   MCV 84.6 86.1 85.4   MCH 28.4 28.0 27.9   MCHC 33.6* 32.5* 32.7*   RDW 43.4 44.0 43.9   PLATELETCT 380 424 401   MPV 9.6 9.6 9.5     Recent Labs     03/05/22  0548 03/06/22  0428 03/07/22  0203   SODIUM 136 137 135   POTASSIUM 3.5* 3.6 3.9   CHLORIDE 102 102 102   CO2 24 23 22   GLUCOSE 121* 130* 131*   BUN 10 9 12    CREATININE 0.62 0.66 0.77   CALCIUM 8.5 8.9 9.0     Recent Labs     03/05/22  0548 03/06/22  0428 03/07/22  0203   CREATININE 0.62 0.66 0.77     CT-DRAIN-PERITONEAL   Final Result      1.  CT GUIDED EXCHANGE OF A RIGHT LOWER QUADRANT DRAINAGE CATHETER.      2.  THE CURRENT PLAN IS TO MONITOR DRAINAGE OUTPUT AND OBTAIN A FOLLOWUP CT SCAN IN 5-7 DAYS IF CLINICALLY INDICATED.      CT-ABDOMEN-PELVIS WITH   Final Result      1.  Interval decrease in the abscess involving the anterior right abdominal wall status post pigtail catheter placement. Inflammatory changes in the right lower quadrant are unchanged. Inflammation extends to the anterior right lateral bladder wall      2.  Right nephrolithiasis.      3.  Diverticulosis.      4.  Stable soft tissue mass in the anterior right lower chest, presumably benign.      CT-DRAIN-PERITONEAL   Final Result      1.  CT guided peritoneal RLQ catheter drainage.   2.  The current plan is to obtain a follow-up CT in 5-7 days..      CT-ABDOMEN-PELVIS WITH   Final Result   Addendum 1 of 1         ADDENDUM:   Please note that the phlegmonous process and extensive abscess in the    right lower quadrant could be due to an indolent previously ruptured    appendicitis.      Final      1.  There is a complex multiloculated fluid collection with heterogeneous peripheral enhancement and internal air lucencies consistent with abscess in the anterior right lower quadrant, etiology uncertain. This extends from the peritoneal cavity through    the musculature into the subcutaneous tissues. There is abnormal enhancement abutting the right abdominal wall musculature and right psoas muscle with multiple smaller loculated fluid components.   2.  There is a questionable phlegmonous type process versus mass just inferior to the cecum.   3.  There are bilateral parapelvic cysts and renal cortical cysts which do not need further imaging follow-up.   4.  There is colonic diverticulosis with no acute  diverticulitis.   5.  There is a tiny dependent gallstone.          INR   Date Value Ref Range Status   02/28/2022 1.35 (H) 0.87 - 1.13 Final     Comment:     INR - Non-therapeutic Reference Range: 0.87-1.13  INR - Therapeutic Reference Range: 2.0-4.0       No results found for: POCINR     Intake/Output Summary (Last 24 hours) at 3/2/2022 1347  Last data filed at 3/2/2022 1310  Gross per 24 hour   Intake 458 ml   Output 1225 ml   Net -767 ml      Labs not explicitly included in this progress note were reviewed by the author. Radiology/imaging not explicitly included in this progress note was reviewed by the author.     I have performed a physical exam and reviewed and updated ROS and Plan today (3/7/2022).     15 minutes in directly providing and coordinating care and extensive data review.  No time overlap and excludes procedures.

## 2022-03-07 NOTE — PROGRESS NOTES
Infectious Disease Progress Note    Author: Rinku Llanos M.D. Date & Time of service: 3/7/2022  10:15 AM    Chief Complaint:  Follow-up for intra-abdominal abscess    Interval History:  3/ afebrile, WBC 13.3.  Patient underwent drain placement yesterday.  Cultures are pending.  Feels well.  Tolerating IV antibiotics without any issues.  3/3 afebrile. Drain cultures growing Streptococcus intermedius and E. coli, 130 cc of drain output recorded yesterday. No new issues to report  3/5 afebrile WBC 14.4 repeat CT scan revealed interval decrease in the abscess of the right lower quadrant fluid collection.  Patient underwent CT-guided placement of new right lower quadrant drain yesterday patient doing well without any new issues.  3/7 patient remains afebrile, white count is down to 13.4 today, tolerating Zosyn thus far    Labs Reviewed, Medications Reviewed and Radiology Reviewed.    Review of Systems:  Review of Systems   Constitutional: Negative for chills and fever.   Respiratory: Negative for cough and shortness of breath.    Gastrointestinal: Positive for abdominal pain. Negative for diarrhea, nausea and vomiting.        Improved   Neurological: Negative for dizziness and headaches.       Hemodynamics:  Temp (24hrs), Av.2 °C (97.2 °F), Min:36 °C (96.8 °F), Max:36.6 °C (97.8 °F)  Temperature: 36 °C (96.8 °F)  Pulse  Av.5  Min: 56  Max: 107   Blood Pressure : 142/66       Physical Exam:  Physical Exam  Vitals and nursing note reviewed.   Constitutional:       Appearance: Normal appearance.   HENT:      Mouth/Throat:      Mouth: Mucous membranes are moist.   Eyes:      Extraocular Movements: Extraocular movements intact.      Pupils: Pupils are equal, round, and reactive to light.   Cardiovascular:      Rate and Rhythm: Normal rate.   Pulmonary:      Effort: Pulmonary effort is normal. No respiratory distress.      Breath sounds: No wheezing.   Abdominal:      General: There is no distension.       Palpations: Abdomen is soft.      Tenderness: There is no abdominal tenderness.      Comments: Right lower quadrant drain in place   Musculoskeletal:         General: Normal range of motion.   Skin:     General: Skin is warm and dry.   Neurological:      General: No focal deficit present.      Mental Status: He is alert and oriented to person, place, and time.   Psychiatric:         Behavior: Behavior normal.      Comments: Flat affect         Meds:    Current Facility-Administered Medications:   •  tamsulosin  •  dorzolamide-timolol  •  finasteride  •  latanoprost  •  levothyroxine  •  melatonin  •  omeprazole  •  senna-docusate **AND** polyethylene glycol/lytes **AND** magnesium hydroxide **AND** bisacodyl  •  enoxaparin  •  acetaminophen  •  [COMPLETED] piperacillin-tazobactam **AND** piperacillin-tazobactam  •  fenofibrate micronized    Labs:  Recent Labs     03/05/22 0548 03/06/22 0428 03/07/22  0203   WBC 14.1* 15.7* 13.4*   RBC 3.45* 3.68* 3.62*   HEMOGLOBIN 9.8* 10.3* 10.1*   HEMATOCRIT 29.2* 31.7* 30.9*   MCV 84.6 86.1 85.4   MCH 28.4 28.0 27.9   RDW 43.4 44.0 43.9   PLATELETCT 380 424 401   MPV 9.6 9.6 9.5   NEUTSPOLYS 47.40 39.30* 40.90*   LYMPHOCYTES 46.50* 58.90* 53.00*   MONOCYTES 2.60 1.80 2.60   EOSINOPHILS 0.90 0.00 0.00   BASOPHILS 1.70 0.00 0.90   RBCMORPHOLO Present  --  Present     Recent Labs     03/05/22 0548 03/06/22 0428 03/07/22  0203   SODIUM 136 137 135   POTASSIUM 3.5* 3.6 3.9   CHLORIDE 102 102 102   CO2 24 23 22   GLUCOSE 121* 130* 131*   BUN 10 9 12     Recent Labs     03/05/22 0548 03/06/22 0428 03/07/22  0203   CREATININE 0.62 0.66 0.77       Imaging:  CT-ABDOMEN-PELVIS WITH    Addendum Date: 2/28/2022    ADDENDUM: Please note that the phlegmonous process and extensive abscess in the right lower quadrant could be due to an indolent previously ruptured appendicitis.    Result Date: 2/28/2022 2/28/2022 4:49 PM HISTORY/REASON FOR EXAM:  RLQ abdominal pain (Age >= 14y). Palpable  firm mass in the right lower abdomen TECHNIQUE/EXAM DESCRIPTION:   CT scan of the abdomen and pelvis with contrast. Contrast-enhanced helical scanning was obtained from the diaphragmatic domes through the pubic symphysis following the bolus administration of nonionic contrast without complication. 100 mL of Omnipaque 350 nonionic contrast was administered without complication. Low dose optimization technique was utilized for this CT exam including automated exposure control and adjustment of the mA and/or kV according to patient size. COMPARISON: 10/8/2020 FINDINGS: Lower Chest: Visualized lung bases demonstrate dependent atelectasis. No change in the previously biopsied 3.5 cm right benign breast mass. Liver: Normal. Spleen: Unremarkable. Pancreas: Unremarkable. Gallbladder: There is a tiny dependent gallstone.. Biliary: Nondilated. Adrenal glands: Normal. Kidneys: There is a nonobstructive 3 no other calcification the right superior pole kidney. There are left renal parapelvic cysts. There are simple appearing bilateral renal cortical cysts again seen. There may be right renal parapelvic cysts as well. There is minimal left perinephric stranding. Lymph nodes: No adenopathy. Vasculature: There is aortic atherosclerosis. Bowel: There is colonic diverticulosis. There is a questionable spiculated type masslike area in the right lower quadrant adjacent to the inferior cecum measuring 5.3 x 4.4 cm on the coronal reconstructions. There is abnormal enhancement abutting the rectus musculature and the right psoas musculature. Peritoneum: There is a complex heterogeneously enhancing multi loculated fluid collection in the right lower quadrant anteriorly with multiple internal air lucencies and ill-defined peripheral rim enhancement. This extends from the right peritoneum just posterior to the rectus abdominis musculature at the level of the iliac crest and extends inferiorly into the midline and right lower pelvis extending  through the right lateral abdominal wall musculature into the subcutaneous tissues. The origin is uncertain however may be related to the ill-defined masslike or phlegmon-type structure inferior to the cecum. The entire area measures an estimated 11.4 x 6.4 x 13.4 cm. There are numerous other smaller loculated enhancing fluid components extending into the soft tissues with associated subcutaneous edema. Pelvis: Bladder is unremarkable.. Musculoskeletal: No acute or destructive process. There are degenerative changes throughout the spine.     1.  There is a complex multiloculated fluid collection with heterogeneous peripheral enhancement and internal air lucencies consistent with abscess in the anterior right lower quadrant, etiology uncertain. This extends from the peritoneal cavity through the musculature into the subcutaneous tissues. There is abnormal enhancement abutting the right abdominal wall musculature and right psoas muscle with multiple smaller loculated fluid components. 2.  There is a questionable phlegmonous type process versus mass just inferior to the cecum. 3.  There are bilateral parapelvic cysts and renal cortical cysts which do not need further imaging follow-up. 4.  There is colonic diverticulosis with no acute diverticulitis. 5.  There is a tiny dependent gallstone.    CT-DRAIN-PERITONEAL    Result Date: 3/1/2022  3/1/2022 5:35 PM HISTORY/REASON FOR EXAM:  RLQ abscess x 3 weeks  -  high surgical risk; NPO, no thinners. Discussed With Dr Dudley PM 2/28. TECHNIQUE/EXAM DESCRIPTION: RLQ pelvic abscess drainage with CT guidance. Low dose optimization technique was utilized for this CT exam including automated exposure control and adjustment of the mA and/or kV according to patient size. PROCEDURE: Informed consent was obtained. Localizing CT images were obtained with the patient in supine position. The skin was prepped with Betadine and draped in a sterile fashion. Following local anesthesia with 1%  "Lidocaine, a 17 G guiding needle was placed and needle path confirmed with CT. An Amplatz guidewire was placed and following serial tract dilatation, a 10 biliary drainage catheter was placed to allow for additional sideholes to cross multiple aspects of the multiloculated abscess. A specimen was collected and submitted for culture and sensitivity and Gram stain. Total of 25 mL purulent fluid was drained. The catheter was secured to the skin and connected to suction bulb drainage. Final CT images were obtained documenting catheter position. The patient tolerated the procedure well with no evidence of complication. COMPARISON: CT abdomen pelvis dated 2/28/2022 FINDINGS: The final CT images show satisfactory catheter position within the target collection.     1.  CT guided peritoneal RLQ catheter drainage. 2.  The current plan is to obtain a follow-up CT in 5-7 days..      Micro:  Results     Procedure Component Value Units Date/Time    Blood Culture [[id number]] Collected: 02/28/22 1503    Order Status: Completed Specimen: Blood from Peripheral Updated: 03/05/22 1700     Significant Indicator NEG     Source BLD     Site PERIPHERAL     Culture Result No growth after 5 days of incubation.    Narrative:      1 of 2 for Blood Culture x 2 sites order. Per Hospital  Policy: Only change Specimen Src: to \"Line\" if specified by  physician order.  No site indicated    Blood Culture [[id number]] Collected: 02/28/22 1518    Order Status: Completed Specimen: Blood from Peripheral Updated: 03/05/22 1700     Significant Indicator NEG     Source BLD     Site PERIPHERAL     Culture Result No growth after 5 days of incubation.    Narrative:      2 of 2 blood culture x2  Sites order. Per Hospital Policy:  Only change Specimen Src: to \"Line\" if specified by physician  order.  Right AC    Urine Culture [[id number]] Collected: 03/01/22 1134    Order Status: Completed Specimen: Urine, Clean Catch Updated: 03/03/22 2307     Significant " Indicator NEG     Source UR     Site URINE, CLEAN CATCH     Culture Result No growth at 48 hours.    Narrative:      Indication for culture:->Evaluation for sepsis without a  clear source of infection  Indication for culture:->Evaluation for sepsis without a    Fungal Culture [[id number]] Collected: 03/01/22 1809    Order Status: Completed Specimen: Wound from Other Body Fluid Updated: 03/03/22 1403     Significant Indicator NEG     Source WND     Site Peritoneal Abscess Drain     Culture Result Culture in progress.     Fungal Smear Results No fungal elements seen.    Narrative:      Collected By: [id number] AWILDA MACHUCA  Intra-abdominal  abcsess  Collected By: [id number] AWILDA MACHUCA    CULTURE WOUND W/ GRAM STAIN [[id number]]  (Abnormal)  (Susceptibility) Collected: 03/01/22 1809    Order Status: Completed Specimen: Wound Updated: 03/03/22 1403     Significant Indicator POS     Source WND     Site Peritoneal Abscess Drain     Culture Result -     Gram Stain Result Many WBCs.  Many Gram positive cocci.       Culture Result Streptococcus intermedius  Moderate growth        Escherichia coli  Rare growth      Narrative:      Collected By: [id number] AWILDA MACHUCA  Intra-abdominal  abcsess  Collected By: [id number] AWILDA MACHUCA    Susceptibility     Escherichia coli (2)     Antibiotic Interpretation Microscan   Method Status    Ampicillin Resistant >16 mcg/mL DIO Final    Ceftriaxone Sensitive <=1 mcg/mL DIO Final    Cefazolin Intermediate 4 mcg/mL DIO Final    Ciprofloxacin Resistant >2 mcg/mL DIO Final    Cefepime Sensitive <=2 mcg/mL DIO Final    Cefuroxime Sensitive <=4 mcg/mL DIO Final    Ampicillin/sulbactam Resistant >16/8 mcg/mL DIO Final    Ertapenem Sensitive <=0.5 mcg/mL DIO Final    Tobramycin Sensitive <=2 mcg/mL DIO Final    Gentamicin Sensitive <=2 mcg/mL DIO Final    Minocycline Sensitive <=4 mcg/mL DIO Final    Moxifloxacin Resistant >4 mcg/mL DIO Final    Pip/Tazobactam Sensitive <=8 mcg/mL DIO Final     Trimeth/Sulfa Resistant >2/38 mcg/mL DIO Final    Tigecycline Sensitive <=2 mcg/mL DIO Final                   Fungal Smear [[id number]] Collected: 03/01/22 1809    Order Status: Completed Specimen: Wound Updated: 03/02/22 1715     Significant Indicator NEG     Source WND     Site Peritoneal Abscess Drain     Fungal Smear Results No fungal elements seen.    Narrative:      Collected By: [id number] AWILDA MACHUCA  Intra-abdominal  abcsess  Collected By: [id number] AWILDA MACHUCA    GRAM STAIN [[id number]]  (Abnormal) Collected: 03/01/22 1809    Order Status: Completed Specimen: Wound Updated: 03/02/22 1714     Significant Indicator .     Source WND     Site Peritoneal Abscess Drain     Gram Stain Result Many WBCs.  Many Gram positive cocci.      Narrative:      Collected By: [id number] AWILDA MACHUCA  Intra-abdominal  abcsess  Collected By: [id number] AWILDA MACHUCA    FLUID CULTURE [[id number]] Collected: 03/01/22 1809    Order Status: Canceled Specimen: Other Body Fluid     FLUID CULTURE W/GRAM STAIN [[id number]] Collected: 03/01/22 1809    Order Status: Canceled Specimen: Other Body Fluid     AFB Culture [[id number]]     Order Status: No result Specimen: Respirate from Abscess     Urinalysis [[id number]] Collected: 03/01/22 1134    Order Status: Completed Specimen: Urine Updated: 03/01/22 1151     Color Yellow     Character Clear     Specific Gravity 1.020     Ph 6.0     Glucose Negative mg/dL      Ketones Negative mg/dL      Protein Negative mg/dL      Bilirubin Negative     Urobilinogen, Urine 1.0     Nitrite Negative     Leukocyte Esterase Negative     Occult Blood Negative     Micro Urine Req see below     Comment: Microscopic examination not performed when specimen is clear  and chemically negative for protein, blood, leukocyte esterase  and nitrite.         Narrative:      Indication for culture:->Evaluation for sepsis without a  clear source of infection          Assessment:  71 y.o. man with a history of type 2 diabetes  mellitus, hyperlipidemia and hypertension admitted on 2/28/2022 secondary to progressive right lower quadrant abdominal pain.  CT scan showed a complex multiloculated fluid collection in the anterior right lower quadrant, extending from the peritoneal cavity through the musculature into the subcutaneous tissues, etiology is uncertain.    Status post IR drainage on 3/1. Blood cultures on admission are negative to date.  He is currently on Zosyn.    Pertinent diagnoses:  Intra-abdominal abscess  Polymicrobial infection  Leukocytosis, improving  Type 2 diabetes mellitus, controlled.  Hemoglobin A1c 5.8  Cognitive impairment    Plan:  -Continue IV Zosyn 3.375 g every 8 hours for now  -Status post drain placement on 3/1.  Follow cultures-Streptococcus intermedius, E. Coli (Unasyn resistant, cefazolin intermediate, ciprofloxacin resistant).   -Monitor drain output  -Repeat CT scan on 3/4-rim-enhancing collection has decreased with the lateral aspect now measuring 4.2 x 1.8 cm in the medial aspect of the collection measuring 2.3 x 6 cm.   -Accidental displacement of IR drain, status post CT-guided new right lower quadrant drain placement on 3/4  -Repeat CT scan 5 days from new drain placement on 3/4 (~3/9)  -Duration of antibiotics will depend on results of repeat CT scan    Discussed with internal medicine/Dr. Abdi

## 2022-03-07 NOTE — PROGRESS NOTES
Hospital Medicine Daily Progress Note    Date of Service  3/7/2022    Chief Complaint  Lico Salinas is a 71 y.o. male admitted 2/28/2022 with abdominal pain    Hospital Course    71-year-old male with history of type 2 diabetes dyslipidemia hypertension admitted with right lower quadrant pain noted to have complex fluid collection concerning for an abscess in the right lower quadrant.  He was evaluated by surgery with recommendation for CT-guided drainage    Interval Problem Update        Patient is afebrile currently on room air  Drain output 35 mL over past 24 hours  Denies abdominal pain  Denies nausea  Tolerating p.o. intake  WBC trending down 13.4      I have personally seen and examined the patient at bedside. I discussed the plan of care with patient and bedside RN.    Consultants/Specialty  general surgery and infectious disease    Code Status  DNAR/DNI    Disposition  Patient is not medically cleared for discharge.   Anticipate discharge to to home with close outpatient follow-up.  I have placed the appropriate orders for post-discharge needs.    Review of Systems  Review of Systems   Constitutional: Positive for malaise/fatigue. Negative for chills and fever.   Respiratory: Negative for cough and shortness of breath.    Cardiovascular: Negative for chest pain and palpitations.   Gastrointestinal: Negative for abdominal pain, nausea and vomiting.   All other systems reviewed and are negative.       Physical Exam  Temp:  [36 °C (96.8 °F)-36.6 °C (97.8 °F)] 36 °C (96.8 °F)  Pulse:  [56-65] 56  Resp:  [16-18] 17  BP: (111-146)/(56-70) 142/66  SpO2:  [95 %-97 %] 96 %    Physical Exam  Vitals and nursing note reviewed.   Constitutional:       Appearance: He is well-developed. He is not diaphoretic.   HENT:      Head: Normocephalic and atraumatic.      Mouth/Throat:      Pharynx: No oropharyngeal exudate.   Eyes:      General: No scleral icterus.        Right eye: No discharge.         Left eye: No discharge.       Conjunctiva/sclera: Conjunctivae normal.      Pupils: Pupils are equal, round, and reactive to light.   Neck:      Vascular: No JVD.      Trachea: No tracheal deviation.   Cardiovascular:      Rate and Rhythm: Normal rate and regular rhythm.      Heart sounds: No murmur heard.    No friction rub. No gallop.   Pulmonary:      Effort: Pulmonary effort is normal. No respiratory distress.      Breath sounds: Normal breath sounds. No stridor. No wheezing.   Chest:      Chest wall: No tenderness.   Abdominal:      General: Bowel sounds are normal. There is no distension.      Palpations: Abdomen is soft.      Tenderness: There is no abdominal tenderness. There is no rebound.      Comments: Right lower quadrant drain in place with thin tan liquid   Musculoskeletal:         General: No tenderness.      Cervical back: Neck supple.   Skin:     General: Skin is warm and dry.      Nails: There is no clubbing.   Neurological:      Mental Status: He is alert and oriented to person, place, and time.      Cranial Nerves: No cranial nerve deficit.      Motor: No abnormal muscle tone.   Psychiatric:         Mood and Affect: Affect is flat.         Fluids    Intake/Output Summary (Last 24 hours) at 3/7/2022 1218  Last data filed at 3/7/2022 0600  Gross per 24 hour   Intake 692 ml   Output 35 ml   Net 657 ml       Laboratory  Recent Labs     03/05/22  0548 03/06/22  0428 03/07/22  0203   WBC 14.1* 15.7* 13.4*   RBC 3.45* 3.68* 3.62*   HEMOGLOBIN 9.8* 10.3* 10.1*   HEMATOCRIT 29.2* 31.7* 30.9*   MCV 84.6 86.1 85.4   MCH 28.4 28.0 27.9   MCHC 33.6* 32.5* 32.7*   RDW 43.4 44.0 43.9   PLATELETCT 380 424 401   MPV 9.6 9.6 9.5     Recent Labs     03/05/22  0548 03/06/22  0428 03/07/22  0203   SODIUM 136 137 135   POTASSIUM 3.5* 3.6 3.9   CHLORIDE 102 102 102   CO2 24 23 22   GLUCOSE 121* 130* 131*   BUN 10 9 12   CREATININE 0.62 0.66 0.77   CALCIUM 8.5 8.9 9.0                   Imaging  CT-DRAIN-PERITONEAL   Final Result      1.  CT  GUIDED EXCHANGE OF A RIGHT LOWER QUADRANT DRAINAGE CATHETER.      2.  THE CURRENT PLAN IS TO MONITOR DRAINAGE OUTPUT AND OBTAIN A FOLLOWUP CT SCAN IN 5-7 DAYS IF CLINICALLY INDICATED.      CT-ABDOMEN-PELVIS WITH   Final Result      1.  Interval decrease in the abscess involving the anterior right abdominal wall status post pigtail catheter placement. Inflammatory changes in the right lower quadrant are unchanged. Inflammation extends to the anterior right lateral bladder wall      2.  Right nephrolithiasis.      3.  Diverticulosis.      4.  Stable soft tissue mass in the anterior right lower chest, presumably benign.      CT-DRAIN-PERITONEAL   Final Result      1.  CT guided peritoneal RLQ catheter drainage.   2.  The current plan is to obtain a follow-up CT in 5-7 days..      CT-ABDOMEN-PELVIS WITH   Final Result   Addendum 1 of 1         ADDENDUM:   Please note that the phlegmonous process and extensive abscess in the    right lower quadrant could be due to an indolent previously ruptured    appendicitis.      Final      1.  There is a complex multiloculated fluid collection with heterogeneous peripheral enhancement and internal air lucencies consistent with abscess in the anterior right lower quadrant, etiology uncertain. This extends from the peritoneal cavity through    the musculature into the subcutaneous tissues. There is abnormal enhancement abutting the right abdominal wall musculature and right psoas muscle with multiple smaller loculated fluid components.   2.  There is a questionable phlegmonous type process versus mass just inferior to the cecum.   3.  There are bilateral parapelvic cysts and renal cortical cysts which do not need further imaging follow-up.   4.  There is colonic diverticulosis with no acute diverticulitis.   5.  There is a tiny dependent gallstone.           Assessment/Plan  Electrolyte abnormality  Assessment & Plan  Hypokalemia  Hypomagnesemia      Repleted    Right lower quadrant  abdominal abscess (HCC)- (present on admission)  Assessment & Plan  Likely secondary to ruptured appendix   status post CT-guided drainage on 3/1/2022  Status post replacement of dislodged drain on 3/4/2022    Stable on Zosyn  Culture grew E. coli and strep intermedius  ID and surgery following  Patient appears to be clinically improving with decreased drain output and improved leukocytosis  Continue current medical therapy and repeat CT tomorrow      Essential hypertension- (present on admission)  Assessment & Plan        Anemia- (present on admission)  Assessment & Plan  Acute on chronic    Hemoglobin stable      Cognitive impairment- (present on admission)  Assessment & Plan  Stable   Frequent orientation and monitor for delirium      Dyslipidemia- (present on admission)  Assessment & Plan  Continue fenofibrate    Diabetes mellitus type 2, noninsulin dependent (HCC)- (present on admission)  Assessment & Plan  Controlled                  VTE prophylaxis: SCDs/TEDs    I have performed a physical exam and reviewed and updated ROS and Plan today (3/7/2022). In review of yesterday's note (3/6/2022), there are no changes except as documented above.

## 2022-03-07 NOTE — DISCHARGE PLANNING
TCN following. HTH/SCP chart review completed. Medical treatment plan and d/c disposition still developing. Anticipate repeat CT scan 3/9 per ID.     Previously completed:  - Choice forms: SNF (in case of need for ongoing IV abx)  - [facility] introduced (Y), referral (not sent-  followed by Dr. Mclain).

## 2022-03-07 NOTE — PROGRESS NOTES
"    DATE: 3/7/2022    Hospital Day 6 right lower quadrant abdominal abscess.    INTERVAL EVENTS:  IR drain output 35mL over last 24 hours  WBC trend down  Continue antibiotics per ID  Repeat abdominal CT tomorrow     PHYSICAL EXAMINATION:  Vital Signs: /66   Pulse (!) 56   Temp 36 °C (96.8 °F) (Temporal)   Resp 17   Ht 1.753 m (5' 9\")   Wt 80 kg (176 lb 5.9 oz)   SpO2 96%   Physical Exam     Abdominal soft, mass palpated in right lower quadrant  No abdominal tenderness  MICHELLE drain with thin tan tinged output  + bowel sounds  BM 3/6    LABORATORY VALUES:   Recent Labs     03/05/22  0548 03/06/22  0428 03/07/22  0203   WBC 14.1* 15.7* 13.4*   RBC 3.45* 3.68* 3.62*   HEMOGLOBIN 9.8* 10.3* 10.1*   HEMATOCRIT 29.2* 31.7* 30.9*   MCV 84.6 86.1 85.4   MCH 28.4 28.0 27.9   MCHC 33.6* 32.5* 32.7*   RDW 43.4 44.0 43.9   PLATELETCT 380 424 401   MPV 9.6 9.6 9.5     Recent Labs     03/05/22  0548 03/06/22  0428 03/07/22  0203   SODIUM 136 137 135   POTASSIUM 3.5* 3.6 3.9   CHLORIDE 102 102 102   CO2 24 23 22   GLUCOSE 121* 130* 131*   BUN 10 9 12   CREATININE 0.62 0.66 0.77   CALCIUM 8.5 8.9 9.0                IMAGING:   CT-DRAIN-PERITONEAL   Final Result      1.  CT GUIDED EXCHANGE OF A RIGHT LOWER QUADRANT DRAINAGE CATHETER.      2.  THE CURRENT PLAN IS TO MONITOR DRAINAGE OUTPUT AND OBTAIN A FOLLOWUP CT SCAN IN 5-7 DAYS IF CLINICALLY INDICATED.      CT-ABDOMEN-PELVIS WITH   Final Result      1.  Interval decrease in the abscess involving the anterior right abdominal wall status post pigtail catheter placement. Inflammatory changes in the right lower quadrant are unchanged. Inflammation extends to the anterior right lateral bladder wall      2.  Right nephrolithiasis.      3.  Diverticulosis.      4.  Stable soft tissue mass in the anterior right lower chest, presumably benign.      CT-DRAIN-PERITONEAL   Final Result      1.  CT guided peritoneal RLQ catheter drainage.   2.  The current plan is to obtain a follow-up " CT in 5-7 days..      CT-ABDOMEN-PELVIS WITH   Final Result   Addendum 1 of 1         ADDENDUM:   Please note that the phlegmonous process and extensive abscess in the    right lower quadrant could be due to an indolent previously ruptured    appendicitis.      Final      1.  There is a complex multiloculated fluid collection with heterogeneous peripheral enhancement and internal air lucencies consistent with abscess in the anterior right lower quadrant, etiology uncertain. This extends from the peritoneal cavity through    the musculature into the subcutaneous tissues. There is abnormal enhancement abutting the right abdominal wall musculature and right psoas muscle with multiple smaller loculated fluid components.   2.  There is a questionable phlegmonous type process versus mass just inferior to the cecum.   3.  There are bilateral parapelvic cysts and renal cortical cysts which do not need further imaging follow-up.   4.  There is colonic diverticulosis with no acute diverticulitis.   5.  There is a tiny dependent gallstone.          ASSESSMENT AND PLAN:   Right lower quadrant abdominal abscess (HCC)- (present on admission)  Assessment & Plan  2/28 Zosyn initiated on admit.  3/1 IR drain placement.  3/4 Final cultures positive for E coli & Strep Intermedius  -WBC increased to 23.9, STAT CT shows decrease in abscess  -IR drain replaced after accidentally malpositioned   3/5 WBC trend down  -Continue antibiotics per ID  -Continue IR drain  -Repeat ABD CT 3/8         ____________________________________     DYLLAN Gomes    DD: 3/2/2022  9:08 AM

## 2022-03-08 ENCOUNTER — APPOINTMENT (OUTPATIENT)
Dept: RADIOLOGY | Facility: MEDICAL CENTER | Age: 72
DRG: 871 | End: 2022-03-08
Payer: MEDICARE

## 2022-03-08 PROBLEM — R33.8 BENIGN PROSTATIC HYPERPLASIA WITH URINARY RETENTION: Status: ACTIVE | Noted: 2022-03-08

## 2022-03-08 PROBLEM — H40.89 OTHER SPECIFIED GLAUCOMA: Status: ACTIVE | Noted: 2022-03-08

## 2022-03-08 PROBLEM — N40.1 BENIGN PROSTATIC HYPERPLASIA WITH URINARY RETENTION: Status: ACTIVE | Noted: 2022-03-08

## 2022-03-08 LAB
BASOPHILS # BLD AUTO: 0 % (ref 0–1.8)
BASOPHILS # BLD: 0 K/UL (ref 0–0.12)
DACRYOCYTES BLD QL SMEAR: NORMAL
EOSINOPHIL # BLD AUTO: 0.51 K/UL (ref 0–0.51)
EOSINOPHIL NFR BLD: 3.5 % (ref 0–6.9)
ERYTHROCYTE [DISTWIDTH] IN BLOOD BY AUTOMATED COUNT: 45.8 FL (ref 35.9–50)
HCT VFR BLD AUTO: 33.8 % (ref 42–52)
HGB BLD-MCNC: 10.8 G/DL (ref 14–18)
LYMPHOCYTES # BLD AUTO: 8.63 K/UL (ref 1–4.8)
LYMPHOCYTES NFR BLD: 59.1 % (ref 22–41)
MANUAL DIFF BLD: NORMAL
MCH RBC QN AUTO: 28 PG (ref 27–33)
MCHC RBC AUTO-ENTMCNC: 32 G/DL (ref 33.7–35.3)
MCV RBC AUTO: 87.6 FL (ref 81.4–97.8)
MONOCYTES # BLD AUTO: 0.13 K/UL (ref 0–0.85)
MONOCYTES NFR BLD AUTO: 0.9 % (ref 0–13.4)
MORPHOLOGY BLD-IMP: NORMAL
NEUTROPHILS # BLD AUTO: 5.33 K/UL (ref 1.82–7.42)
NEUTROPHILS NFR BLD: 36.5 % (ref 44–72)
NRBC # BLD AUTO: 0 K/UL
NRBC BLD-RTO: 0 /100 WBC
PATH REV: NORMAL
PATH REV: NORMAL
PLATELET # BLD AUTO: 423 K/UL (ref 164–446)
PLATELET BLD QL SMEAR: NORMAL
PMV BLD AUTO: 9.5 FL (ref 9–12.9)
POIKILOCYTOSIS BLD QL SMEAR: NORMAL
POLYCHROMASIA BLD QL SMEAR: NORMAL
RBC # BLD AUTO: 3.86 M/UL (ref 4.7–6.1)
RBC BLD AUTO: PRESENT
SMUDGE CELLS BLD QL SMEAR: NORMAL
WBC # BLD AUTO: 14.6 K/UL (ref 4.8–10.8)

## 2022-03-08 PROCEDURE — 700111 HCHG RX REV CODE 636 W/ 250 OVERRIDE (IP): Performed by: INTERNAL MEDICINE

## 2022-03-08 PROCEDURE — 85007 BL SMEAR W/DIFF WBC COUNT: CPT

## 2022-03-08 PROCEDURE — 36415 COLL VENOUS BLD VENIPUNCTURE: CPT

## 2022-03-08 PROCEDURE — 770006 HCHG ROOM/CARE - MED/SURG/GYN SEMI*

## 2022-03-08 PROCEDURE — A9270 NON-COVERED ITEM OR SERVICE: HCPCS | Performed by: INTERNAL MEDICINE

## 2022-03-08 PROCEDURE — 80503 PATH CLIN CONSLTJ SF 5-20: CPT

## 2022-03-08 PROCEDURE — 700111 HCHG RX REV CODE 636 W/ 250 OVERRIDE (IP): Performed by: HOSPITALIST

## 2022-03-08 PROCEDURE — 99232 SBSQ HOSP IP/OBS MODERATE 35: CPT | Performed by: GENERAL PRACTICE

## 2022-03-08 PROCEDURE — 700105 HCHG RX REV CODE 258: Performed by: HOSPITALIST

## 2022-03-08 PROCEDURE — 74177 CT ABD & PELVIS W/CONTRAST: CPT | Mod: MG

## 2022-03-08 PROCEDURE — 99232 SBSQ HOSP IP/OBS MODERATE 35: CPT | Performed by: INTERNAL MEDICINE

## 2022-03-08 PROCEDURE — 700102 HCHG RX REV CODE 250 W/ 637 OVERRIDE(OP): Performed by: INTERNAL MEDICINE

## 2022-03-08 PROCEDURE — 85027 COMPLETE CBC AUTOMATED: CPT

## 2022-03-08 PROCEDURE — 700117 HCHG RX CONTRAST REV CODE 255

## 2022-03-08 PROCEDURE — 99232 SBSQ HOSP IP/OBS MODERATE 35: CPT | Mod: FS

## 2022-03-08 RX ADMIN — OMEPRAZOLE 20 MG: 20 CAPSULE, DELAYED RELEASE ORAL at 06:08

## 2022-03-08 RX ADMIN — TAMSULOSIN HYDROCHLORIDE 0.4 MG: 0.4 CAPSULE ORAL at 21:16

## 2022-03-08 RX ADMIN — LATANOPROST 1 DROP: 50 SOLUTION OPHTHALMIC at 18:33

## 2022-03-08 RX ADMIN — FENOFIBRATE 134 MG: 67 CAPSULE ORAL at 06:08

## 2022-03-08 RX ADMIN — PIPERACILLIN AND TAZOBACTAM 3.38 G: 3; .375 INJECTION, POWDER, LYOPHILIZED, FOR SOLUTION INTRAVENOUS; PARENTERAL at 11:28

## 2022-03-08 RX ADMIN — ENOXAPARIN SODIUM 40 MG: 40 INJECTION SUBCUTANEOUS at 06:08

## 2022-03-08 RX ADMIN — DORZOLAMIDE HYDROCHLORIDE AND TIMOLOL MALEATE 1 DROP: 20; 5 SOLUTION OPHTHALMIC at 18:33

## 2022-03-08 RX ADMIN — PIPERACILLIN AND TAZOBACTAM 3.38 G: 3; .375 INJECTION, POWDER, LYOPHILIZED, FOR SOLUTION INTRAVENOUS; PARENTERAL at 21:16

## 2022-03-08 RX ADMIN — Medication 10 MG: at 21:16

## 2022-03-08 RX ADMIN — LEVOTHYROXINE SODIUM 75 MCG: 75 TABLET ORAL at 06:08

## 2022-03-08 RX ADMIN — IOHEXOL 100 ML: 350 INJECTION, SOLUTION INTRAVENOUS at 11:10

## 2022-03-08 RX ADMIN — DORZOLAMIDE HYDROCHLORIDE AND TIMOLOL MALEATE 1 DROP: 20; 5 SOLUTION OPHTHALMIC at 06:08

## 2022-03-08 RX ADMIN — PIPERACILLIN AND TAZOBACTAM 3.38 G: 3; .375 INJECTION, POWDER, LYOPHILIZED, FOR SOLUTION INTRAVENOUS; PARENTERAL at 06:15

## 2022-03-08 RX ADMIN — FINASTERIDE 5 MG: 5 TABLET, FILM COATED ORAL at 06:08

## 2022-03-08 ASSESSMENT — ENCOUNTER SYMPTOMS
COUGH: 0
DIARRHEA: 0
DIZZINESS: 0
BRUISES/BLEEDS EASILY: 0
MYALGIAS: 0
ABDOMINAL PAIN: 1
HEADACHES: 0
NAUSEA: 0
HEMOPTYSIS: 0
VOMITING: 0
ROS GI COMMENTS: IMPROVED
FEVER: 0
PALPITATIONS: 0
ABDOMINAL PAIN: 0
WEAKNESS: 0
BLURRED VISION: 0
SHORTNESS OF BREATH: 0
CHILLS: 0

## 2022-03-08 ASSESSMENT — FIBROSIS 4 INDEX: FIB4 SCORE: 0.84

## 2022-03-08 ASSESSMENT — PAIN DESCRIPTION - PAIN TYPE: TYPE: ACUTE PAIN

## 2022-03-08 NOTE — PROGRESS NOTES
Report received from DRISS Alfaro. Assumed care of pt. Pt resting comfortably in bed. A&Ox4.  Denies pain. IV abx infusing. CT scheduled for 1000 this AM. No complaints or concerns at this time. Bed alarm on and call light within reach.

## 2022-03-08 NOTE — PROGRESS NOTES
"Radiology Progress Note   Author: DYLLAN Soto Date & Time created: 3/8/2022  4:59 AM   Date of admission  2/28/2022  Note to reader: this note follows the APSO format rather than the historical SOAP format. Assessment and plan located at the top of the note for ease of use.    Chief Complaint  71 y.o. male admitted 2/28/2022 with   Chief Complaint   Patient presents with   • Other     Mass on right side of abdomen.        HPI  \" 71-year-old male with history of type 2 diabetes dyslipidemia hypertension admitted with right lower quadrant pain noted to have complex fluid collection concerning for an abscess in the right lower quadrant.  He was evaluated by surgery with recommendation for CT-guided drainage\"    Assessment/Plan  Interval History   Active Problems:    Diabetes mellitus type 2, noninsulin dependent (HCC)    Dyslipidemia    Cognitive impairment    Anemia    Essential hypertension    Right lower quadrant abdominal abscess (HCC)    Electrolyte abnormality      Plan IR  - Irrigate RLQ drain with 10 ml of sterile saline twice per shift   - Fluid cultures + Streptococcus intermedius, Escherichia coli  - ID and Surgery following, antibiotics per ID    - Continue drainage for 1-2 days then can remove drain once output is 10-15 ml, minus irrigations            IR:   3/1- RLQ drain placed   3/2- 130 ml purulent fluid, WBC improving since drain placed 13.3, 0/10 pain,   3/3- leukocytosis 16.6, 20 ml output in AM  3/4- Drain pulled back by accident overnight, Leukocytosis increasing 23.9, scant output   :Displaced RLQ drain. New 10 fr RLQ drain. Removal of the old, malpositioned drain.   3/5- 30 ml   3/6- 15 ml   3/7-  58 ml purulent fluid,  WBC trending down 13.4   3/8- CT shows interval decrease in the abscess involving the anterior right abdominal wall status post pigtail catheter placement, currently measures 0.9 x 3.4 cm previously 2.3 x 6 cm.     5 ml output in am. Reviewed and discussed case with " IR MD Canela               Review of Systems  Physical Exam   Review of Systems   Constitutional: Negative for chills and fever.   HENT: Negative for hearing loss.    Eyes: Negative for blurred vision.   Respiratory: Negative for cough, hemoptysis and shortness of breath.    Cardiovascular: Negative for chest pain and palpitations.   Gastrointestinal: Negative for abdominal pain and vomiting.   Genitourinary: Negative for dysuria.   Musculoskeletal: Negative for myalgias.   Skin: Negative for rash.   Neurological: Negative for dizziness, weakness and headaches.   Endo/Heme/Allergies: Does not bruise/bleed easily.   Psychiatric/Behavioral: Negative for suicidal ideas.      Vitals:    03/08/22 0332   BP: 108/58   Pulse: 63   Resp: 17   Temp: 36.1 °C (97 °F)   SpO2: 94%        Physical Exam  Constitutional:       Appearance: Normal appearance.   HENT:      Head: Normocephalic.      Nose: Nose normal.      Mouth/Throat:      Mouth: Mucous membranes are moist.   Eyes:      Pupils: Pupils are equal, round, and reactive to light.   Cardiovascular:      Rate and Rhythm: Normal rate.   Pulmonary:      Effort: Pulmonary effort is normal. No respiratory distress.   Abdominal:      Palpations: Abdomen is soft.      Tenderness: There is no abdominal tenderness.       Musculoskeletal:         General: No tenderness or deformity.      Cervical back: Normal range of motion.   Skin:     General: Skin is warm and dry.      Capillary Refill: Capillary refill takes less than 2 seconds.      Coloration: Skin is not jaundiced or pale.   Neurological:      General: No focal deficit present.      Mental Status: He is alert.      Motor: No weakness.   Psychiatric:         Mood and Affect: Affect is flat.         Behavior: Behavior is slowed. Behavior is cooperative.             Labs    Recent Labs     03/06/22  0428 03/07/22  0203 03/08/22  0315   WBC 15.7* 13.4* 14.6*   RBC 3.68* 3.62* 3.86*   HEMOGLOBIN 10.3* 10.1* 10.8*   HEMATOCRIT 31.7*  30.9* 33.8*   MCV 86.1 85.4 87.6   MCH 28.0 27.9 28.0   MCHC 32.5* 32.7* 32.0*   RDW 44.0 43.9 45.8   PLATELETCT 424 401 423   MPV 9.6 9.5 9.5     Recent Labs     03/05/22  0548 03/06/22  0428 03/07/22  0203   SODIUM 136 137 135   POTASSIUM 3.5* 3.6 3.9   CHLORIDE 102 102 102   CO2 24 23 22   GLUCOSE 121* 130* 131*   BUN 10 9 12   CREATININE 0.62 0.66 0.77   CALCIUM 8.5 8.9 9.0     Recent Labs     03/05/22  0548 03/06/22  0428 03/07/22  0203   CREATININE 0.62 0.66 0.77     CT-DRAIN-PERITONEAL   Final Result      1.  CT GUIDED EXCHANGE OF A RIGHT LOWER QUADRANT DRAINAGE CATHETER.      2.  THE CURRENT PLAN IS TO MONITOR DRAINAGE OUTPUT AND OBTAIN A FOLLOWUP CT SCAN IN 5-7 DAYS IF CLINICALLY INDICATED.      CT-ABDOMEN-PELVIS WITH   Final Result      1.  Interval decrease in the abscess involving the anterior right abdominal wall status post pigtail catheter placement. Inflammatory changes in the right lower quadrant are unchanged. Inflammation extends to the anterior right lateral bladder wall      2.  Right nephrolithiasis.      3.  Diverticulosis.      4.  Stable soft tissue mass in the anterior right lower chest, presumably benign.      CT-DRAIN-PERITONEAL   Final Result      1.  CT guided peritoneal RLQ catheter drainage.   2.  The current plan is to obtain a follow-up CT in 5-7 days..      CT-ABDOMEN-PELVIS WITH   Final Result   Addendum 1 of 1         ADDENDUM:   Please note that the phlegmonous process and extensive abscess in the    right lower quadrant could be due to an indolent previously ruptured    appendicitis.      Final      1.  There is a complex multiloculated fluid collection with heterogeneous peripheral enhancement and internal air lucencies consistent with abscess in the anterior right lower quadrant, etiology uncertain. This extends from the peritoneal cavity through    the musculature into the subcutaneous tissues. There is abnormal enhancement abutting the right abdominal wall musculature and  right psoas muscle with multiple smaller loculated fluid components.   2.  There is a questionable phlegmonous type process versus mass just inferior to the cecum.   3.  There are bilateral parapelvic cysts and renal cortical cysts which do not need further imaging follow-up.   4.  There is colonic diverticulosis with no acute diverticulitis.   5.  There is a tiny dependent gallstone.      CT-ABDOMEN-PELVIS WITH    (Results Pending)       INR   Date Value Ref Range Status   02/28/2022 1.35 (H) 0.87 - 1.13 Final     Comment:     INR - Non-therapeutic Reference Range: 0.87-1.13  INR - Therapeutic Reference Range: 2.0-4.0       No results found for: POCINR     Intake/Output Summary (Last 24 hours) at 3/2/2022 1347  Last data filed at 3/2/2022 1310  Gross per 24 hour   Intake 458 ml   Output 1225 ml   Net -767 ml      Labs not explicitly included in this progress note were reviewed by the author. Radiology/imaging not explicitly included in this progress note was reviewed by the author.     I have performed a physical exam and reviewed and updated ROS and Plan today (3/8/2022).     15 minutes in directly providing and coordinating care and extensive data review.  No time overlap and excludes procedures.

## 2022-03-08 NOTE — PROGRESS NOTES
A&Ox4, resting in bed. Denied pain. RLQ dressing CDI. Reinforced use of call light for assistance; pt able to make needs known. Bed alarm on.

## 2022-03-08 NOTE — PROGRESS NOTES
Infectious Disease Progress Note    Author: Rinku Llanos M.D. Date & Time of service: 3/8/2022  9:04 AM    Chief Complaint:  Follow-up for intra-abdominal abscess    Interval History:  3/2 afebrile, WBC 13.3.  Patient underwent drain placement yesterday.  Cultures are pending.  Feels well.  Tolerating IV antibiotics without any issues.  3/3 afebrile. Drain cultures growing Streptococcus intermedius and E. coli, 130 cc of drain output recorded yesterday. No new issues to report  3/5 afebrile WBC 14.4 repeat CT scan revealed interval decrease in the abscess of the right lower quadrant fluid collection.  Patient underwent CT-guided placement of new right lower quadrant drain yesterday patient doing well without any new issues.  3/7 patient remains afebrile, white count is down to 13.4 today, tolerating Zosyn thus far  3/8 patient remains afebrile, white count is 14.6 today, tolerating Zosyn.  Repeat CT scan today    Labs Reviewed, Medications Reviewed and Radiology Reviewed.    Review of Systems:  Review of Systems   Constitutional: Negative for chills and fever.   Respiratory: Negative for cough and shortness of breath.    Gastrointestinal: Positive for abdominal pain. Negative for diarrhea, nausea and vomiting.        Improved   Neurological: Negative for dizziness and headaches.       Hemodynamics:  Temp (24hrs), Av.3 °C (97.3 °F), Min:36.1 °C (97 °F), Max:36.4 °C (97.5 °F)  Temperature: 36.4 °C (97.5 °F)  Pulse  Av.8  Min: 56  Max: 107   Blood Pressure : 109/64       Physical Exam:  Physical Exam  Vitals and nursing note reviewed.   Constitutional:       Appearance: Normal appearance.   HENT:      Mouth/Throat:      Mouth: Mucous membranes are moist.   Eyes:      Extraocular Movements: Extraocular movements intact.      Pupils: Pupils are equal, round, and reactive to light.   Cardiovascular:      Rate and Rhythm: Normal rate.   Pulmonary:      Effort: Pulmonary effort is normal. No respiratory  distress.      Breath sounds: No wheezing.      Comments: Right anterior chest wall freely mobile lipoma, nontender  Abdominal:      General: There is no distension.      Palpations: Abdomen is soft.      Tenderness: There is no abdominal tenderness.      Comments: Right lower quadrant drain in place   Musculoskeletal:         General: Normal range of motion.   Skin:     General: Skin is warm and dry.   Neurological:      General: No focal deficit present.      Mental Status: He is alert and oriented to person, place, and time.   Psychiatric:         Behavior: Behavior normal.      Comments: Flat affect         Meds:    Current Facility-Administered Medications:   •  tamsulosin  •  dorzolamide-timolol  •  finasteride  •  latanoprost  •  levothyroxine  •  melatonin  •  omeprazole  •  senna-docusate **AND** polyethylene glycol/lytes **AND** magnesium hydroxide **AND** bisacodyl  •  enoxaparin  •  acetaminophen  •  [COMPLETED] piperacillin-tazobactam **AND** piperacillin-tazobactam  •  fenofibrate micronized    Labs:  Recent Labs     03/06/22 0428 03/07/22 0203 03/08/22  0315   WBC 15.7* 13.4* 14.6*   RBC 3.68* 3.62* 3.86*   HEMOGLOBIN 10.3* 10.1* 10.8*   HEMATOCRIT 31.7* 30.9* 33.8*   MCV 86.1 85.4 87.6   MCH 28.0 27.9 28.0   RDW 44.0 43.9 45.8   PLATELETCT 424 401 423   MPV 9.6 9.5 9.5   NEUTSPOLYS 39.30* 40.90* 36.50*   LYMPHOCYTES 58.90* 53.00* 59.10*   MONOCYTES 1.80 2.60 0.90   EOSINOPHILS 0.00 0.00 3.50   BASOPHILS 0.00 0.90 0.00   RBCMORPHOLO  --  Present Present     Recent Labs     03/06/22 0428 03/07/22  0203   SODIUM 137 135   POTASSIUM 3.6 3.9   CHLORIDE 102 102   CO2 23 22   GLUCOSE 130* 131*   BUN 9 12     Recent Labs     03/06/22 0428 03/07/22  0203   CREATININE 0.66 0.77       Imaging:  CT-ABDOMEN-PELVIS WITH    Addendum Date: 2/28/2022    ADDENDUM: Please note that the phlegmonous process and extensive abscess in the right lower quadrant could be due to an indolent previously ruptured  appendicitis.    Result Date: 2/28/2022 2/28/2022 4:49 PM HISTORY/REASON FOR EXAM:  RLQ abdominal pain (Age >= 14y). Palpable firm mass in the right lower abdomen TECHNIQUE/EXAM DESCRIPTION:   CT scan of the abdomen and pelvis with contrast. Contrast-enhanced helical scanning was obtained from the diaphragmatic domes through the pubic symphysis following the bolus administration of nonionic contrast without complication. 100 mL of Omnipaque 350 nonionic contrast was administered without complication. Low dose optimization technique was utilized for this CT exam including automated exposure control and adjustment of the mA and/or kV according to patient size. COMPARISON: 10/8/2020 FINDINGS: Lower Chest: Visualized lung bases demonstrate dependent atelectasis. No change in the previously biopsied 3.5 cm right benign breast mass. Liver: Normal. Spleen: Unremarkable. Pancreas: Unremarkable. Gallbladder: There is a tiny dependent gallstone.. Biliary: Nondilated. Adrenal glands: Normal. Kidneys: There is a nonobstructive 3 no other calcification the right superior pole kidney. There are left renal parapelvic cysts. There are simple appearing bilateral renal cortical cysts again seen. There may be right renal parapelvic cysts as well. There is minimal left perinephric stranding. Lymph nodes: No adenopathy. Vasculature: There is aortic atherosclerosis. Bowel: There is colonic diverticulosis. There is a questionable spiculated type masslike area in the right lower quadrant adjacent to the inferior cecum measuring 5.3 x 4.4 cm on the coronal reconstructions. There is abnormal enhancement abutting the rectus musculature and the right psoas musculature. Peritoneum: There is a complex heterogeneously enhancing multi loculated fluid collection in the right lower quadrant anteriorly with multiple internal air lucencies and ill-defined peripheral rim enhancement. This extends from the right peritoneum just posterior to the rectus  abdominis musculature at the level of the iliac crest and extends inferiorly into the midline and right lower pelvis extending through the right lateral abdominal wall musculature into the subcutaneous tissues. The origin is uncertain however may be related to the ill-defined masslike or phlegmon-type structure inferior to the cecum. The entire area measures an estimated 11.4 x 6.4 x 13.4 cm. There are numerous other smaller loculated enhancing fluid components extending into the soft tissues with associated subcutaneous edema. Pelvis: Bladder is unremarkable.. Musculoskeletal: No acute or destructive process. There are degenerative changes throughout the spine.     1.  There is a complex multiloculated fluid collection with heterogeneous peripheral enhancement and internal air lucencies consistent with abscess in the anterior right lower quadrant, etiology uncertain. This extends from the peritoneal cavity through the musculature into the subcutaneous tissues. There is abnormal enhancement abutting the right abdominal wall musculature and right psoas muscle with multiple smaller loculated fluid components. 2.  There is a questionable phlegmonous type process versus mass just inferior to the cecum. 3.  There are bilateral parapelvic cysts and renal cortical cysts which do not need further imaging follow-up. 4.  There is colonic diverticulosis with no acute diverticulitis. 5.  There is a tiny dependent gallstone.    CT-DRAIN-PERITONEAL    Result Date: 3/1/2022  3/1/2022 5:35 PM HISTORY/REASON FOR EXAM:  RLQ abscess x 3 weeks  -  high surgical risk; NPO, no thinners. Discussed With Dr Dudley PM 2/28. TECHNIQUE/EXAM DESCRIPTION: RLQ pelvic abscess drainage with CT guidance. Low dose optimization technique was utilized for this CT exam including automated exposure control and adjustment of the mA and/or kV according to patient size. PROCEDURE: Informed consent was obtained. Localizing CT images were obtained with the  "patient in supine position. The skin was prepped with Betadine and draped in a sterile fashion. Following local anesthesia with 1% Lidocaine, a 17 G guiding needle was placed and needle path confirmed with CT. An Amplatz guidewire was placed and following serial tract dilatation, a 10 biliary drainage catheter was placed to allow for additional sideholes to cross multiple aspects of the multiloculated abscess. A specimen was collected and submitted for culture and sensitivity and Gram stain. Total of 25 mL purulent fluid was drained. The catheter was secured to the skin and connected to suction bulb drainage. Final CT images were obtained documenting catheter position. The patient tolerated the procedure well with no evidence of complication. COMPARISON: CT abdomen pelvis dated 2/28/2022 FINDINGS: The final CT images show satisfactory catheter position within the target collection.     1.  CT guided peritoneal RLQ catheter drainage. 2.  The current plan is to obtain a follow-up CT in 5-7 days..      Micro:  Results     Procedure Component Value Units Date/Time    Blood Culture [[id number]] Collected: 02/28/22 1503    Order Status: Completed Specimen: Blood from Peripheral Updated: 03/05/22 1700     Significant Indicator NEG     Source BLD     Site PERIPHERAL     Culture Result No growth after 5 days of incubation.    Narrative:      1 of 2 for Blood Culture x 2 sites order. Per Hospital  Policy: Only change Specimen Src: to \"Line\" if specified by  physician order.  No site indicated    Blood Culture [[id number]] Collected: 02/28/22 1518    Order Status: Completed Specimen: Blood from Peripheral Updated: 03/05/22 1700     Significant Indicator NEG     Source BLD     Site PERIPHERAL     Culture Result No growth after 5 days of incubation.    Narrative:      2 of 2 blood culture x2  Sites order. Per Hospital Policy:  Only change Specimen Src: to \"Line\" if specified by physician  order.  Right AC    Urine Culture " [[id number]] Collected: 03/01/22 1134    Order Status: Completed Specimen: Urine, Clean Catch Updated: 03/03/22 2304     Significant Indicator NEG     Source UR     Site URINE, CLEAN CATCH     Culture Result No growth at 48 hours.    Narrative:      Indication for culture:->Evaluation for sepsis without a  clear source of infection  Indication for culture:->Evaluation for sepsis without a    Fungal Culture [[id number]] Collected: 03/01/22 1809    Order Status: Completed Specimen: Wound from Other Body Fluid Updated: 03/03/22 1403     Significant Indicator NEG     Source WND     Site Peritoneal Abscess Drain     Culture Result Culture in progress.     Fungal Smear Results No fungal elements seen.    Narrative:      Collected By: [id number] AWILDA MACHUCA  Intra-abdominal  abcsess  Collected By: [id number] AWILDA MACHUCA    CULTURE WOUND W/ GRAM STAIN [[id number]]  (Abnormal)  (Susceptibility) Collected: 03/01/22 1809    Order Status: Completed Specimen: Wound Updated: 03/03/22 1403     Significant Indicator POS     Source WND     Site Peritoneal Abscess Drain     Culture Result -     Gram Stain Result Many WBCs.  Many Gram positive cocci.       Culture Result Streptococcus intermedius  Moderate growth        Escherichia coli  Rare growth      Narrative:      Collected By: [id number] AWILDA MACHUCA  Intra-abdominal  abcsess  Collected By: [id number] AWILDA MACHUCA    Susceptibility     Escherichia coli (2)     Antibiotic Interpretation Microscan   Method Status    Ampicillin Resistant >16 mcg/mL DIO Final    Ceftriaxone Sensitive <=1 mcg/mL DIO Final    Cefazolin Intermediate 4 mcg/mL DIO Final    Ciprofloxacin Resistant >2 mcg/mL DIO Final    Cefepime Sensitive <=2 mcg/mL DIO Final    Cefuroxime Sensitive <=4 mcg/mL DIO Final    Ampicillin/sulbactam Resistant >16/8 mcg/mL DIO Final    Ertapenem Sensitive <=0.5 mcg/mL DIO Final    Tobramycin Sensitive <=2 mcg/mL DIO Final    Gentamicin Sensitive <=2 mcg/mL DIO Final    Minocycline  Sensitive <=4 mcg/mL DIO Final    Moxifloxacin Resistant >4 mcg/mL DIO Final    Pip/Tazobactam Sensitive <=8 mcg/mL DIO Final    Trimeth/Sulfa Resistant >2/38 mcg/mL DIO Final    Tigecycline Sensitive <=2 mcg/mL DIO Final                   Fungal Smear [[id number]] Collected: 03/01/22 1809    Order Status: Completed Specimen: Wound Updated: 03/02/22 1715     Significant Indicator NEG     Source WND     Site Peritoneal Abscess Drain     Fungal Smear Results No fungal elements seen.    Narrative:      Collected By: [id number] AWILDA MACHUCA  Intra-abdominal  abcsess  Collected By: [id number] AWILDA MACHUCA    GRAM STAIN [[id number]]  (Abnormal) Collected: 03/01/22 1809    Order Status: Completed Specimen: Wound Updated: 03/02/22 1714     Significant Indicator .     Source WND     Site Peritoneal Abscess Drain     Gram Stain Result Many WBCs.  Many Gram positive cocci.      Narrative:      Collected By: [id number] AWILDA MACHUCA  Intra-abdominal  abcsess  Collected By: [id number] AWILDA MACHUCA    FLUID CULTURE [[id number]] Collected: 03/01/22 1809    Order Status: Canceled Specimen: Other Body Fluid     FLUID CULTURE W/GRAM STAIN [[id number]] Collected: 03/01/22 1809    Order Status: Canceled Specimen: Other Body Fluid     AFB Culture [[id number]]     Order Status: Canceled Specimen: Respirate from Abscess     Urinalysis [[id number]] Collected: 03/01/22 1134    Order Status: Completed Specimen: Urine Updated: 03/01/22 1151     Color Yellow     Character Clear     Specific Gravity 1.020     Ph 6.0     Glucose Negative mg/dL      Ketones Negative mg/dL      Protein Negative mg/dL      Bilirubin Negative     Urobilinogen, Urine 1.0     Nitrite Negative     Leukocyte Esterase Negative     Occult Blood Negative     Micro Urine Req see below     Comment: Microscopic examination not performed when specimen is clear  and chemically negative for protein, blood, leukocyte esterase  and nitrite.         Narrative:      Indication for  culture:->Evaluation for sepsis without a  clear source of infection          Assessment:  71 y.o. man with a history of type 2 diabetes mellitus, hyperlipidemia and hypertension admitted on 2/28/2022 secondary to progressive right lower quadrant abdominal pain.  CT scan showed a complex multiloculated fluid collection in the anterior right lower quadrant, extending from the peritoneal cavity through the musculature into the subcutaneous tissues, etiology is uncertain.    Status post IR drainage on 3/1. Blood cultures on admission are negative to date.  He is currently on Zosyn.    Pertinent diagnoses:  Intra-abdominal abscess  Polymicrobial infection  Leukocytosis, improving  Type 2 diabetes mellitus, controlled.  Hemoglobin A1c 5.8  Cognitive impairment    Plan:  -Continue IV Zosyn 3.375 g every 8 hours for now  -Status post drain placement on 3/1.  Follow cultures-Streptococcus intermedius, E. Coli (Unasyn resistant, cefazolin intermediate, ciprofloxacin resistant).   -Monitor drain output  -Repeat CT scan on 3/4-rim-enhancing collection has decreased with the lateral aspect now measuring 4.2 x 1.8 cm in the medial aspect of the collection measuring 2.3 x 6 cm.   -Accidental displacement of IR drain, status post CT-guided new right lower quadrant drain placement on 3/4  -Repeat CT scan 5 days from new drain placement on 3/4 (today pending)  -Duration of antibiotics will depend on results of repeat CT scan    Discussed with internal medicine/Dr. Abdi

## 2022-03-08 NOTE — HOSPITAL COURSE
This is a 71 year old male with PMHx of hypertension, hyperlipidemia, type 2 diabetes, hypothyroidism and BPH who was admitted on 02/28 with abdominal pain.    CT imaging noted right lower quadrant abdominal abscess s/p IR drain placement on 3/01 and replacement after dislodgment on 03/04.Cultures grew Streptococcus intermedius and E. Coli, ID following with recommendations to continue with Zosyn.  ID recommends patient to continue with Omnicef and Flagyl with end date of 04/04/2022.  Patient will follow up with general surgery in about a month, for repeat CT imaging.    Patient had repeat CT imaging on 03/08, which showed interval decrease in the abscess.  Drain in place for an additional 2 days with minimal output, IR removed the drain on 03/10.    Patient set up with home health and walker and meds to beds for his antibiotics.

## 2022-03-08 NOTE — CARE PLAN
The patient is Watcher - Medium risk of patient condition declining or worsening    Shift Goals  Clinical Goals: Monitor MICHELLE drain  Patient Goals: comfort    Progress made toward(s) clinical / shift goals:  Dressing CDI. Drain flushed with NS 10mL.     Patient is not progressing towards the following goals:

## 2022-03-08 NOTE — FACE TO FACE
Face to Face Supporting Documentation - Home Health    The encounter with this patient was in whole or in part the primary reason for home health admission.    Date of encounter:   Patient:                    MRN:                       YOB: 2024  Lico Salinas  [id number]  1950     Home health to see patient for:  Skilled Nursing care for assessment, interventions & education, Home health aide, Physical Therapy evaluation and treatment and Occupational therapy evaluation and treatment    Skilled need for:  Surgical Aftercare abdominal drain management    Skilled nursing interventions to include:  Line/Drain/Airway education and care    Homebound status evidenced by:  Need the aid of supportive devices such as crutches, canes, wheelchairs or walkers. Leaving home requires a considerable and taxing effort. There is a normal inability to leave the home.    Community Physician to provide follow up care: Nayla Mclain M.D.     Optional Interventions? No      I certify the face to face encounter for this home health care referral meets the CMS requirements and the encounter/clinical assessment with the patient was, in whole, or in part, for the medical condition(s) listed above, which is the primary reason for home health care. Based on my clinical findings: the service(s) are medically necessary, support the need for home health care, and the homebound criteria are met.  I certify that this patient has had a face to face encounter by myself.  Terra Almodovar D.O. - NPI: [id number]

## 2022-03-08 NOTE — PROGRESS NOTES
0700- 1930    VSS on room air. Denies pain. Tolerating diet without nausea. Adequately voiding. MICHELLE drain intact and dressing CDI. Up SBA ambulating to restroom with FWW. Safety maintained.

## 2022-03-08 NOTE — DISCHARGE PLANNING
Anticipated Discharge Disposition: home vs snf    Action: Reviewed chart.   Per MD notes, pt has accidental displacement of IR drain, status post CT-guided new right lower quadrant drain placement on 3/4/21. Plan repeat CT scan 5 days from new drain placement on 3/4 (~3/9). Duration of antibiotics will depend on results of repeat CT scan.    Barriers to Discharge: not med cleared    Plan: may need LT IV ABX at home vs snf.

## 2022-03-08 NOTE — PROGRESS NOTES
"    DATE: 3/8/2022    Hospital Day 7 right lower quadrant abdominal abscess.    INTERVAL EVENTS:  IR drain output 40mL over last 24 hours  WBC remains stable while on Zosyn  Continue antibiotics per ID  Repeat abdominal CT pending    PHYSICAL EXAMINATION:  Vital Signs: /64   Pulse 64   Temp 36.4 °C (97.5 °F) (Temporal)   Resp 17   Ht 1.753 m (5' 9\")   Wt 78 kg (171 lb 15.3 oz)   SpO2 97%   Physical Exam     Abdominal soft, mass palpated in right lower quadrant  No abdominal tenderness  MICHELLE drain with thin tan tinged output  + bowel sounds  BM 3/6    LABORATORY VALUES:   Recent Labs     03/06/22  0428 03/07/22  0203 03/08/22  0315   WBC 15.7* 13.4* 14.6*   RBC 3.68* 3.62* 3.86*   HEMOGLOBIN 10.3* 10.1* 10.8*   HEMATOCRIT 31.7* 30.9* 33.8*   MCV 86.1 85.4 87.6   MCH 28.0 27.9 28.0   MCHC 32.5* 32.7* 32.0*   RDW 44.0 43.9 45.8   PLATELETCT 424 401 423   MPV 9.6 9.5 9.5     Recent Labs     03/06/22  0428 03/07/22  0203   SODIUM 137 135   POTASSIUM 3.6 3.9   CHLORIDE 102 102   CO2 23 22   GLUCOSE 130* 131*   BUN 9 12   CREATININE 0.66 0.77   CALCIUM 8.9 9.0                IMAGING:   CT-ABDOMEN-PELVIS WITH   Final Result      1.  Interval decrease in the abscess involving the anterior right abdominal wall status post pigtail catheter placement. Inflammatory changes in the right lower quadrant are unchanged. Inflammation extends to the anterior right lateral bladder wall.    Mildly improved inflammatory changes in the subcutaneous soft tissues.      2.  Right nephrolithiasis.      3.  Diverticulosis.         CT-DRAIN-PERITONEAL   Final Result      1.  CT GUIDED EXCHANGE OF A RIGHT LOWER QUADRANT DRAINAGE CATHETER.      2.  THE CURRENT PLAN IS TO MONITOR DRAINAGE OUTPUT AND OBTAIN A FOLLOWUP CT SCAN IN 5-7 DAYS IF CLINICALLY INDICATED.      CT-ABDOMEN-PELVIS WITH   Final Result      1.  Interval decrease in the abscess involving the anterior right abdominal wall status post pigtail catheter placement. " Inflammatory changes in the right lower quadrant are unchanged. Inflammation extends to the anterior right lateral bladder wall      2.  Right nephrolithiasis.      3.  Diverticulosis.      4.  Stable soft tissue mass in the anterior right lower chest, presumably benign.      CT-DRAIN-PERITONEAL   Final Result      1.  CT guided peritoneal RLQ catheter drainage.   2.  The current plan is to obtain a follow-up CT in 5-7 days..      CT-ABDOMEN-PELVIS WITH   Final Result   Addendum 1 of 1         ADDENDUM:   Please note that the phlegmonous process and extensive abscess in the    right lower quadrant could be due to an indolent previously ruptured    appendicitis.      Final      1.  There is a complex multiloculated fluid collection with heterogeneous peripheral enhancement and internal air lucencies consistent with abscess in the anterior right lower quadrant, etiology uncertain. This extends from the peritoneal cavity through    the musculature into the subcutaneous tissues. There is abnormal enhancement abutting the right abdominal wall musculature and right psoas muscle with multiple smaller loculated fluid components.   2.  There is a questionable phlegmonous type process versus mass just inferior to the cecum.   3.  There are bilateral parapelvic cysts and renal cortical cysts which do not need further imaging follow-up.   4.  There is colonic diverticulosis with no acute diverticulitis.   5.  There is a tiny dependent gallstone.          ASSESSMENT AND PLAN:   Right lower quadrant abdominal abscess (HCC)- (present on admission)  Assessment & Plan  2/28 Zosyn initiated on admit.  3/1 IR drain placement.  3/4 Final cultures positive for E coli & Strep Intermedius  -WBC increased to 23.9, STAT CT shows decrease in abscess  -IR drain replaced after accidentally malpositioned   3/5 WBC trend down  -Continue antibiotics per ID  -Continue IR drain  -Repeat ABD CT 3/8         ____________________________________      ZAC GomesPSHEA    DD: 3/2/2022  9:08 AM

## 2022-03-09 LAB
ERYTHROCYTE [DISTWIDTH] IN BLOOD BY AUTOMATED COUNT: 44.9 FL (ref 35.9–50)
HCT VFR BLD AUTO: 34.1 % (ref 42–52)
HGB BLD-MCNC: 11.1 G/DL (ref 14–18)
MCH RBC QN AUTO: 28 PG (ref 27–33)
MCHC RBC AUTO-ENTMCNC: 32.6 G/DL (ref 33.7–35.3)
MCV RBC AUTO: 86.1 FL (ref 81.4–97.8)
PLATELET # BLD AUTO: 407 K/UL (ref 164–446)
PMV BLD AUTO: 9.6 FL (ref 9–12.9)
RBC # BLD AUTO: 3.96 M/UL (ref 4.7–6.1)
WBC # BLD AUTO: 12.8 K/UL (ref 4.8–10.8)

## 2022-03-09 PROCEDURE — 770006 HCHG ROOM/CARE - MED/SURG/GYN SEMI*

## 2022-03-09 PROCEDURE — 700111 HCHG RX REV CODE 636 W/ 250 OVERRIDE (IP): Performed by: HOSPITALIST

## 2022-03-09 PROCEDURE — 700102 HCHG RX REV CODE 250 W/ 637 OVERRIDE(OP): Performed by: GENERAL PRACTICE

## 2022-03-09 PROCEDURE — 99233 SBSQ HOSP IP/OBS HIGH 50: CPT | Performed by: INTERNAL MEDICINE

## 2022-03-09 PROCEDURE — A9270 NON-COVERED ITEM OR SERVICE: HCPCS | Performed by: INTERNAL MEDICINE

## 2022-03-09 PROCEDURE — 700105 HCHG RX REV CODE 258: Performed by: HOSPITALIST

## 2022-03-09 PROCEDURE — 36415 COLL VENOUS BLD VENIPUNCTURE: CPT

## 2022-03-09 PROCEDURE — RXMED WILLOW AMBULATORY MEDICATION CHARGE: Performed by: GENERAL PRACTICE

## 2022-03-09 PROCEDURE — 99231 SBSQ HOSP IP/OBS SF/LOW 25: CPT | Performed by: SURGERY

## 2022-03-09 PROCEDURE — 85027 COMPLETE CBC AUTOMATED: CPT

## 2022-03-09 PROCEDURE — 700105 HCHG RX REV CODE 258: Performed by: GENERAL PRACTICE

## 2022-03-09 PROCEDURE — 700111 HCHG RX REV CODE 636 W/ 250 OVERRIDE (IP): Performed by: INTERNAL MEDICINE

## 2022-03-09 PROCEDURE — A9270 NON-COVERED ITEM OR SERVICE: HCPCS | Performed by: GENERAL PRACTICE

## 2022-03-09 PROCEDURE — 99232 SBSQ HOSP IP/OBS MODERATE 35: CPT | Performed by: GENERAL PRACTICE

## 2022-03-09 PROCEDURE — 700102 HCHG RX REV CODE 250 W/ 637 OVERRIDE(OP): Performed by: INTERNAL MEDICINE

## 2022-03-09 RX ORDER — DORZOLAMIDE HYDROCHLORIDE AND TIMOLOL MALEATE 20; 5 MG/ML; MG/ML
1 SOLUTION/ DROPS OPHTHALMIC 2 TIMES DAILY
Qty: 3 ML | Refills: 0 | Status: SHIPPED | OUTPATIENT
Start: 2022-03-09 | End: 2022-04-08

## 2022-03-09 RX ORDER — SODIUM CHLORIDE 9 MG/ML
500 INJECTION, SOLUTION INTRAVENOUS ONCE
Status: COMPLETED | OUTPATIENT
Start: 2022-03-09 | End: 2022-03-09

## 2022-03-09 RX ORDER — METRONIDAZOLE 500 MG/1
500 TABLET ORAL EVERY 8 HOURS
Status: DISCONTINUED | OUTPATIENT
Start: 2022-03-09 | End: 2022-03-11 | Stop reason: HOSPADM

## 2022-03-09 RX ORDER — METRONIDAZOLE 500 MG/1
500 TABLET ORAL EVERY 8 HOURS
Qty: 75 TABLET | Refills: 0 | Status: SHIPPED | OUTPATIENT
Start: 2022-03-10 | End: 2022-04-05

## 2022-03-09 RX ORDER — PHENOL 1.4 %
10 AEROSOL, SPRAY (ML) MUCOUS MEMBRANE
Qty: 30 TABLET | Refills: 0 | Status: SHIPPED | OUTPATIENT
Start: 2022-03-09 | End: 2022-04-10

## 2022-03-09 RX ORDER — LEVOTHYROXINE SODIUM 0.07 MG/1
75 TABLET ORAL
Qty: 30 TABLET | Refills: 0 | Status: SHIPPED | OUTPATIENT
Start: 2022-03-09 | End: 2022-04-08

## 2022-03-09 RX ORDER — FENOFIBRATE 134 MG/1
134 CAPSULE ORAL DAILY
Qty: 30 CAPSULE | Refills: 0 | Status: SHIPPED | OUTPATIENT
Start: 2022-03-10 | End: 2022-04-09

## 2022-03-09 RX ORDER — CEFDINIR 300 MG/1
300 CAPSULE ORAL EVERY 12 HOURS
Status: DISCONTINUED | OUTPATIENT
Start: 2022-03-09 | End: 2022-03-11 | Stop reason: HOSPADM

## 2022-03-09 RX ORDER — TAMSULOSIN HYDROCHLORIDE 0.4 MG/1
0.4 CAPSULE ORAL
Qty: 30 CAPSULE | Refills: 0 | Status: SHIPPED | OUTPATIENT
Start: 2022-03-09 | End: 2022-04-08

## 2022-03-09 RX ORDER — LATANOPROST 50 UG/ML
1 SOLUTION/ DROPS OPHTHALMIC EVERY EVENING
Qty: 2.5 ML | Refills: 0 | Status: SHIPPED | OUTPATIENT
Start: 2022-03-09 | End: 2022-04-08

## 2022-03-09 RX ORDER — OMEPRAZOLE 20 MG/1
20 CAPSULE, DELAYED RELEASE ORAL DAILY
Qty: 30 CAPSULE | Refills: 0 | Status: SHIPPED | OUTPATIENT
Start: 2022-03-10 | End: 2022-04-10

## 2022-03-09 RX ORDER — CEFDINIR 300 MG/1
300 CAPSULE ORAL EVERY 12 HOURS
Qty: 50 CAPSULE | Refills: 0 | Status: SHIPPED | OUTPATIENT
Start: 2022-03-10 | End: 2022-04-05

## 2022-03-09 RX ORDER — FINASTERIDE 5 MG/1
5 TABLET, FILM COATED ORAL DAILY
Qty: 30 TABLET | Refills: 0 | Status: SHIPPED | OUTPATIENT
Start: 2022-03-10 | End: 2022-04-09

## 2022-03-09 RX ADMIN — OMEPRAZOLE 20 MG: 20 CAPSULE, DELAYED RELEASE ORAL at 05:04

## 2022-03-09 RX ADMIN — ENOXAPARIN SODIUM 40 MG: 40 INJECTION SUBCUTANEOUS at 05:04

## 2022-03-09 RX ADMIN — SODIUM CHLORIDE 500 ML: 9 INJECTION, SOLUTION INTRAVENOUS at 15:49

## 2022-03-09 RX ADMIN — TAMSULOSIN HYDROCHLORIDE 0.4 MG: 0.4 CAPSULE ORAL at 21:28

## 2022-03-09 RX ADMIN — FINASTERIDE 5 MG: 5 TABLET, FILM COATED ORAL at 05:04

## 2022-03-09 RX ADMIN — CEFDINIR 300 MG: 300 CAPSULE ORAL at 14:29

## 2022-03-09 RX ADMIN — Medication 10 MG: at 21:28

## 2022-03-09 RX ADMIN — METRONIDAZOLE 500 MG: 500 TABLET ORAL at 21:28

## 2022-03-09 RX ADMIN — PIPERACILLIN AND TAZOBACTAM 3.38 G: 3; .375 INJECTION, POWDER, LYOPHILIZED, FOR SOLUTION INTRAVENOUS; PARENTERAL at 05:05

## 2022-03-09 RX ADMIN — METRONIDAZOLE 500 MG: 500 TABLET ORAL at 14:29

## 2022-03-09 RX ADMIN — DORZOLAMIDE HYDROCHLORIDE AND TIMOLOL MALEATE 1 DROP: 20; 5 SOLUTION OPHTHALMIC at 05:05

## 2022-03-09 RX ADMIN — LEVOTHYROXINE SODIUM 75 MCG: 75 TABLET ORAL at 05:04

## 2022-03-09 RX ADMIN — FENOFIBRATE 134 MG: 67 CAPSULE ORAL at 05:04

## 2022-03-09 ASSESSMENT — ENCOUNTER SYMPTOMS
COUGH: 0
ROS GI COMMENTS: IMPROVED
NAUSEA: 0
BRUISES/BLEEDS EASILY: 0
MYALGIAS: 0
CHILLS: 0
HEADACHES: 0
DIZZINESS: 0
SHORTNESS OF BREATH: 0
PALPITATIONS: 0
ABDOMINAL PAIN: 1
ABDOMINAL PAIN: 0
FEVER: 0
HEMOPTYSIS: 0
VOMITING: 0
DIARRHEA: 0
BLURRED VISION: 0
WEAKNESS: 0

## 2022-03-09 ASSESSMENT — FIBROSIS 4 INDEX: FIB4 SCORE: 0.87

## 2022-03-09 NOTE — DISCHARGE PLANNING
[facility]CN chart review completed. Collaborated with [facility] TCN noted DME and HH face to Face placed by D.O on 3/9/2022. Patient seen at bedside, very pleasant, enjoying lunch, no additional health plan-related questions. RLQ MICHELLE drain intact, dressing intact.  Current plan is awaiting decision oral versus IV antibiotic. Tentative DC plan for Wednesday accdg to [facility]. TCN will continue to follow and collaborate with discharge planning team as additional post acute needs arise. Thank you.      Previously completed:  - Choice forms: SNF (in case of need for ongoing IV abx)  - [facility] introduced (Y), referral (not sent-  followed by Dr. Mclain).

## 2022-03-09 NOTE — PROGRESS NOTES
"Radiology Progress Note   Author: DYLLAN Soto Date & Time created: 3/9/2022  1:16 PM   Date of admission  2/28/2022  Note to reader: this note follows the APSO format rather than the historical SOAP format. Assessment and plan located at the top of the note for ease of use.    Chief Complaint  71 y.o. male admitted 2/28/2022 with   Chief Complaint   Patient presents with   • Other     Mass on right side of abdomen.        HPI  \" 71-year-old male with history of type 2 diabetes dyslipidemia hypertension admitted with right lower quadrant pain noted to have complex fluid collection concerning for an abscess in the right lower quadrant.  He was evaluated by surgery with recommendation for CT-guided drainage\"    Assessment/Plan  Interval History   Principal Problem:    Right lower quadrant abdominal abscess (HCC)  Active Problems:    Hypothyroidism    Diabetes mellitus type 2, noninsulin dependent (HCC)    Dyslipidemia    Cognitive impairment    Anemia    Essential hypertension    Benign prostatic hyperplasia with urinary retention    Other specified glaucoma      Plan IR  - Irrigate RLQ drain with 10 ml of sterile saline twice per shift   - Fluid cultures + Streptococcus intermedius, Escherichia coli  - ID and Surgery following, antibiotics per ID    - ID to follow-up in 3 to 4 weeks  - Remove drain tomorrow 3/10, discussed with ID MD Servin            IR:   3/1- RLQ drain placed   3/2- 130 ml purulent fluid, WBC improving since drain placed 13.3, 0/10 pain,   3/3- leukocytosis 16.6, 20 ml output in AM  3/4- Drain pulled back by accident overnight, Leukocytosis increasing 23.9, scant output   :Displaced RLQ drain. New 10 fr RLQ drain. Removal of the old, malpositioned drain.   3/5- 30 ml   3/6- 15 ml   3/7-  58 ml purulent fluid,  WBC trending down 13.4   3/8- CT shows interval decrease in the abscess involving the anterior right abdominal wall status post pigtail catheter placement, currently " measures 0.9 x 3.4 cm previously 2.3 x 6 cm.     20 ml output in am. Reviewed and discussed case with IR MD Canela    3/9- 5 ml serous drain output with small amount of purulent fluid. WBC trending down 12.8             Review of Systems  Physical Exam   Review of Systems   Constitutional: Negative for chills and fever.   HENT: Negative for hearing loss.    Eyes: Negative for blurred vision.   Respiratory: Negative for cough, hemoptysis and shortness of breath.    Cardiovascular: Negative for chest pain and palpitations.   Gastrointestinal: Negative for abdominal pain and vomiting.   Genitourinary: Negative for dysuria.   Musculoskeletal: Negative for myalgias.   Skin: Negative for rash.   Neurological: Negative for dizziness, weakness and headaches.   Endo/Heme/Allergies: Does not bruise/bleed easily.   Psychiatric/Behavioral: Negative for suicidal ideas.      Vitals:    03/09/22 0657   BP: 100/47   Pulse: 63   Resp: 17   Temp: 36.1 °C (96.9 °F)   SpO2: 95%        Physical Exam  Constitutional:       Appearance: Normal appearance.   HENT:      Head: Normocephalic.      Nose: Nose normal.      Mouth/Throat:      Mouth: Mucous membranes are moist.   Eyes:      Pupils: Pupils are equal, round, and reactive to light.   Cardiovascular:      Rate and Rhythm: Normal rate.   Pulmonary:      Effort: Pulmonary effort is normal. No respiratory distress.   Abdominal:      Palpations: Abdomen is soft.      Tenderness: There is no abdominal tenderness.       Musculoskeletal:         General: No tenderness or deformity.      Cervical back: Normal range of motion.   Skin:     General: Skin is warm and dry.      Capillary Refill: Capillary refill takes less than 2 seconds.      Coloration: Skin is not jaundiced or pale.   Neurological:      General: No focal deficit present.      Mental Status: He is alert.      Motor: No weakness.   Psychiatric:         Mood and Affect: Affect is flat.         Behavior: Behavior is slowed. Behavior  is cooperative.             Labs    Recent Labs     03/07/22  0203 03/08/22  0315 03/09/22  0220   WBC 13.4* 14.6* 12.8*   RBC 3.62* 3.86* 3.96*   HEMOGLOBIN 10.1* 10.8* 11.1*   HEMATOCRIT 30.9* 33.8* 34.1*   MCV 85.4 87.6 86.1   MCH 27.9 28.0 28.0   MCHC 32.7* 32.0* 32.6*   RDW 43.9 45.8 44.9   PLATELETCT 401 423 407   MPV 9.5 9.5 9.6     Recent Labs     03/07/22  0203   SODIUM 135   POTASSIUM 3.9   CHLORIDE 102   CO2 22   GLUCOSE 131*   BUN 12   CREATININE 0.77   CALCIUM 9.0     Recent Labs     03/07/22  0203   CREATININE 0.77     CT-ABDOMEN-PELVIS WITH   Final Result      1.  Interval decrease in the abscess involving the anterior right abdominal wall status post pigtail catheter placement. Inflammatory changes in the right lower quadrant are unchanged. Inflammation extends to the anterior right lateral bladder wall.    Mildly improved inflammatory changes in the subcutaneous soft tissues.      2.  Right nephrolithiasis.      3.  Diverticulosis.         CT-DRAIN-PERITONEAL   Final Result      1.  CT GUIDED EXCHANGE OF A RIGHT LOWER QUADRANT DRAINAGE CATHETER.      2.  THE CURRENT PLAN IS TO MONITOR DRAINAGE OUTPUT AND OBTAIN A FOLLOWUP CT SCAN IN 5-7 DAYS IF CLINICALLY INDICATED.      CT-ABDOMEN-PELVIS WITH   Final Result      1.  Interval decrease in the abscess involving the anterior right abdominal wall status post pigtail catheter placement. Inflammatory changes in the right lower quadrant are unchanged. Inflammation extends to the anterior right lateral bladder wall      2.  Right nephrolithiasis.      3.  Diverticulosis.      4.  Stable soft tissue mass in the anterior right lower chest, presumably benign.      CT-DRAIN-PERITONEAL   Final Result      1.  CT guided peritoneal RLQ catheter drainage.   2.  The current plan is to obtain a follow-up CT in 5-7 days..      CT-ABDOMEN-PELVIS WITH   Final Result   Addendum 1 of 1         ADDENDUM:   Please note that the phlegmonous process and extensive abscess in  the    right lower quadrant could be due to an indolent previously ruptured    appendicitis.      Final      1.  There is a complex multiloculated fluid collection with heterogeneous peripheral enhancement and internal air lucencies consistent with abscess in the anterior right lower quadrant, etiology uncertain. This extends from the peritoneal cavity through    the musculature into the subcutaneous tissues. There is abnormal enhancement abutting the right abdominal wall musculature and right psoas muscle with multiple smaller loculated fluid components.   2.  There is a questionable phlegmonous type process versus mass just inferior to the cecum.   3.  There are bilateral parapelvic cysts and renal cortical cysts which do not need further imaging follow-up.   4.  There is colonic diverticulosis with no acute diverticulitis.   5.  There is a tiny dependent gallstone.          INR   Date Value Ref Range Status   02/28/2022 1.35 (H) 0.87 - 1.13 Final     Comment:     INR - Non-therapeutic Reference Range: 0.87-1.13  INR - Therapeutic Reference Range: 2.0-4.0       No results found for: POCINR     Intake/Output Summary (Last 24 hours) at 3/2/2022 1347  Last data filed at 3/2/2022 1310  Gross per 24 hour   Intake 458 ml   Output 1225 ml   Net -767 ml      Labs not explicitly included in this progress note were reviewed by the author. Radiology/imaging not explicitly included in this progress note was reviewed by the author.     I have performed a physical exam and reviewed and updated ROS and Plan today (3/9/2022).     15 minutes in directly providing and coordinating care and extensive data review.  No time overlap and excludes procedures.

## 2022-03-09 NOTE — PROGRESS NOTES
Pt. Received in bed awake, denies any complaint of pain at the time of assessment. Pt. Has intact MICHELLE drain on RLQ draining minimal serous output. PIV intact and running on IV abx. Bed alarm in place. Call light placed within reach. Needs attended.

## 2022-03-09 NOTE — PROGRESS NOTES
Hospital Medicine Daily Progress Note    Date of Service  3/8/2022    Chief Complaint  Lico Salinas is a 71 y.o. male admitted 2/28/2022 with abdominal pain    Hospital Course  This is a 71 year old male with PMHx of hypertension, hyperlipidemia, type 2 diabetes, hypothyroidism and BPH who was admitted on 02/28 with abdominal pain.    CT imaging noted right lower quadrant abdominal abscess s/p IR drain placement on 3/01 and replacement after dislodgment on 03/04.Cultures grew Streptococcus intermedius and E. Coli, ID following with recommendations to continue with Zosyn.    Patient had repeat CT imaging on 03/08, which showed interval decrease in the abscess.  IR recommends for drainage to remain in place for 1 to 2 days, once output is less than 10 to 15 mL, drain can be removed.    Interval Problem Update  Patient had repeat CT imaging on 03/08, which showed interval decrease in the abscess.  IR recommends for drainage to remain in place for 1 to 2 days, once output is less than 10 to 15 mL, drain can be removed.    Pending ID recommendations for antibiotic regimen.    I have personally seen and examined the patient at bedside. I discussed the plan of care with patient, bedside RN, charge RN,  and pharmacy.    Consultants/Specialty  GI and IR    Code Status  DNAR/DNI    Disposition  Patient is not medically cleared for discharge.   Anticipate discharge to Assisted living facility.  I have placed the appropriate orders for post-discharge needs.    Review of Systems  Review of Systems   All other systems reviewed and are negative.       Physical Exam  Temp:  [36.1 °C (97 °F)-36.4 °C (97.6 °F)] 36.4 °C (97.6 °F)  Pulse:  [63-65] 65  Resp:  [16-17] 16  BP: (106-109)/(54-64) 106/54  SpO2:  [93 %-97 %] 93 %    Physical Exam  Vitals and nursing note reviewed.   Constitutional:       General: He is not in acute distress.     Appearance: Normal appearance.   HENT:      Head: Normocephalic and atraumatic.    Eyes:      Extraocular Movements: Extraocular movements intact.      Conjunctiva/sclera: Conjunctivae normal.      Pupils: Pupils are equal, round, and reactive to light.   Cardiovascular:      Rate and Rhythm: Normal rate and regular rhythm.      Pulses: Normal pulses.      Heart sounds: No murmur heard.    No friction rub. No gallop.   Pulmonary:      Effort: Pulmonary effort is normal. No respiratory distress.      Breath sounds: Normal breath sounds. No wheezing, rhonchi or rales.   Abdominal:      General: Bowel sounds are normal. There is no distension.      Palpations: Abdomen is soft.      Tenderness: There is no abdominal tenderness.      Comments: Drain in place with output   Musculoskeletal:         General: No swelling or tenderness. Normal range of motion.      Cervical back: Normal range of motion and neck supple. No muscular tenderness.      Right lower leg: No edema.      Left lower leg: No edema.   Skin:     General: Skin is warm and dry.      Capillary Refill: Capillary refill takes less than 2 seconds.      Findings: No bruising, erythema or rash.   Neurological:      General: No focal deficit present.      Mental Status: He is alert and oriented to person, place, and time.         Fluids    Intake/Output Summary (Last 24 hours) at 3/8/2022 1948  Last data filed at 3/8/2022 1800  Gross per 24 hour   Intake 680 ml   Output 48 ml   Net 632 ml       Laboratory  Recent Labs     03/06/22  0428 03/07/22  0203 03/08/22  0315   WBC 15.7* 13.4* 14.6*   RBC 3.68* 3.62* 3.86*   HEMOGLOBIN 10.3* 10.1* 10.8*   HEMATOCRIT 31.7* 30.9* 33.8*   MCV 86.1 85.4 87.6   MCH 28.0 27.9 28.0   MCHC 32.5* 32.7* 32.0*   RDW 44.0 43.9 45.8   PLATELETCT 424 401 423   MPV 9.6 9.5 9.5     Recent Labs     03/06/22  0428 03/07/22  0203   SODIUM 137 135   POTASSIUM 3.6 3.9   CHLORIDE 102 102   CO2 23 22   GLUCOSE 130* 131*   BUN 9 12   CREATININE 0.66 0.77   CALCIUM 8.9 9.0                   Imaging  CT-ABDOMEN-PELVIS WITH    Final Result      1.  Interval decrease in the abscess involving the anterior right abdominal wall status post pigtail catheter placement. Inflammatory changes in the right lower quadrant are unchanged. Inflammation extends to the anterior right lateral bladder wall.    Mildly improved inflammatory changes in the subcutaneous soft tissues.      2.  Right nephrolithiasis.      3.  Diverticulosis.         CT-DRAIN-PERITONEAL   Final Result      1.  CT GUIDED EXCHANGE OF A RIGHT LOWER QUADRANT DRAINAGE CATHETER.      2.  THE CURRENT PLAN IS TO MONITOR DRAINAGE OUTPUT AND OBTAIN A FOLLOWUP CT SCAN IN 5-7 DAYS IF CLINICALLY INDICATED.      CT-ABDOMEN-PELVIS WITH   Final Result      1.  Interval decrease in the abscess involving the anterior right abdominal wall status post pigtail catheter placement. Inflammatory changes in the right lower quadrant are unchanged. Inflammation extends to the anterior right lateral bladder wall      2.  Right nephrolithiasis.      3.  Diverticulosis.      4.  Stable soft tissue mass in the anterior right lower chest, presumably benign.      CT-DRAIN-PERITONEAL   Final Result      1.  CT guided peritoneal RLQ catheter drainage.   2.  The current plan is to obtain a follow-up CT in 5-7 days..      CT-ABDOMEN-PELVIS WITH   Final Result   Addendum 1 of 1         ADDENDUM:   Please note that the phlegmonous process and extensive abscess in the    right lower quadrant could be due to an indolent previously ruptured    appendicitis.      Final      1.  There is a complex multiloculated fluid collection with heterogeneous peripheral enhancement and internal air lucencies consistent with abscess in the anterior right lower quadrant, etiology uncertain. This extends from the peritoneal cavity through    the musculature into the subcutaneous tissues. There is abnormal enhancement abutting the right abdominal wall musculature and right psoas muscle with multiple smaller loculated fluid components.    2.  There is a questionable phlegmonous type process versus mass just inferior to the cecum.   3.  There are bilateral parapelvic cysts and renal cortical cysts which do not need further imaging follow-up.   4.  There is colonic diverticulosis with no acute diverticulitis.   5.  There is a tiny dependent gallstone.           Assessment/Plan  * Right lower quadrant abdominal abscess (HCC)- (present on admission)  Assessment & Plan  CT imaging noted right lower quadrant abdominal abscess s/p IR drain placement on 3/01 and replacement after dislodgment on 03/04.  Cultures grew Streptococcus intermedius and E. Coli  ID following with recommendations to continue with Zosyn.    Patient had repeat CT imaging on 03/08, which showed interval decrease in the abscess.    IR recommends for drainage to remain in place for 1 to 2 days, once output is less than 10 to 15 mL, drain can be removed.    Pending ID recs for abx regimen.     Other specified glaucoma  Assessment & Plan  Continue with patient's eyedrops    Benign prostatic hyperplasia with urinary retention  Assessment & Plan  Resume patient's Proscar and Flomax    Essential hypertension- (present on admission)  Assessment & Plan  History of  Patient is normotensive throughout this admission    Anemia- (present on admission)  Assessment & Plan  Hemoglobin stable  Continue to monitor    Cognitive impairment- (present on admission)  Assessment & Plan  Stable   Frequent orientation and monitor for delirium    Dyslipidemia- (present on admission)  Assessment & Plan  Continue fenofibrate    Diabetes mellitus type 2, noninsulin dependent (HCC)- (present on admission)  Assessment & Plan  Controlled  Last A1c noted in 2021 of 5.8    Hypothyroidism- (present on admission)  Assessment & Plan  Resume patient's Synthroid       VTE prophylaxis: SCDs/TEDs and enoxaparin ppx    I have performed a physical exam and reviewed and updated ROS and Plan today (3/8/2022). In review of  yesterday's note (3/7/2022), there are no changes except as documented above.

## 2022-03-09 NOTE — CARE PLAN
The patient is Watcher - Medium risk of patient condition declining or worsening    Shift Goals  Clinical Goals: monitor MICHELLE output  Patient Goals: comfort  Family Goals: MARTY    Progress made toward(s) clinical / shift goals:  15ml output from MICHELLE today. Mainly serous. Pt denied pain this shift. Turning self in bed.     Patient is not progressing towards the following goals:

## 2022-03-09 NOTE — PROGRESS NOTES
"    DATE: 3/9/2022      INTERVAL EVENTS:  Repeat CT abdomen yesterday showed interval decrease in size of abscess.  WBC trending down.    PHYSICAL EXAMINATION:  Vital Signs: /47   Pulse 63   Temp 36.1 °C (96.9 °F) (Temporal)   Resp 17   Ht 1.753 m (5' 9\")   Wt 77.8 kg (171 lb 8.3 oz)   SpO2 95%     NAD.  RRR  CTA B  Abd soft, ND. Minimally TTP RLQ. MICHELLE serous.     LABORATORY VALUES:   Recent Labs     03/07/22  0203 03/08/22  0315 03/09/22  0220   WBC 13.4* 14.6* 12.8*   RBC 3.62* 3.86* 3.96*   HEMOGLOBIN 10.1* 10.8* 11.1*   HEMATOCRIT 30.9* 33.8* 34.1*   MCV 85.4 87.6 86.1   MCH 27.9 28.0 28.0   MCHC 32.7* 32.0* 32.6*   RDW 43.9 45.8 44.9   PLATELETCT 401 423 407   MPV 9.5 9.5 9.6     Recent Labs     03/07/22  0203   SODIUM 135   POTASSIUM 3.9   CHLORIDE 102   CO2 22   GLUCOSE 131*   BUN 12   CREATININE 0.77   CALCIUM 9.0                IMAGING:   CT-ABDOMEN-PELVIS WITH   Final Result      1.  Interval decrease in the abscess involving the anterior right abdominal wall status post pigtail catheter placement. Inflammatory changes in the right lower quadrant are unchanged. Inflammation extends to the anterior right lateral bladder wall.    Mildly improved inflammatory changes in the subcutaneous soft tissues.      2.  Right nephrolithiasis.      3.  Diverticulosis.         CT-DRAIN-PERITONEAL   Final Result      1.  CT GUIDED EXCHANGE OF A RIGHT LOWER QUADRANT DRAINAGE CATHETER.      2.  THE CURRENT PLAN IS TO MONITOR DRAINAGE OUTPUT AND OBTAIN A FOLLOWUP CT SCAN IN 5-7 DAYS IF CLINICALLY INDICATED.      CT-ABDOMEN-PELVIS WITH   Final Result      1.  Interval decrease in the abscess involving the anterior right abdominal wall status post pigtail catheter placement. Inflammatory changes in the right lower quadrant are unchanged. Inflammation extends to the anterior right lateral bladder wall      2.  Right nephrolithiasis.      3.  Diverticulosis.      4.  Stable soft tissue mass in the anterior right lower " chest, presumably benign.      CT-DRAIN-PERITONEAL   Final Result      1.  CT guided peritoneal RLQ catheter drainage.   2.  The current plan is to obtain a follow-up CT in 5-7 days..      CT-ABDOMEN-PELVIS WITH   Final Result   Addendum 1 of 1         ADDENDUM:   Please note that the phlegmonous process and extensive abscess in the    right lower quadrant could be due to an indolent previously ruptured    appendicitis.      Final      1.  There is a complex multiloculated fluid collection with heterogeneous peripheral enhancement and internal air lucencies consistent with abscess in the anterior right lower quadrant, etiology uncertain. This extends from the peritoneal cavity through    the musculature into the subcutaneous tissues. There is abnormal enhancement abutting the right abdominal wall musculature and right psoas muscle with multiple smaller loculated fluid components.   2.  There is a questionable phlegmonous type process versus mass just inferior to the cecum.   3.  There are bilateral parapelvic cysts and renal cortical cysts which do not need further imaging follow-up.   4.  There is colonic diverticulosis with no acute diverticulitis.   5.  There is a tiny dependent gallstone.          ASSESSMENT AND PLAN:   * Right lower quadrant abdominal abscess (HCC)- (present on admission)  Assessment & Plan  2/28 Zosyn initiated on admit.  3/1 IR drain placement.  3/4 Final cultures positive for E coli & Strep Intermedius  -WBC increased to 23.9, STAT CT shows decrease in abscess  -IR drain replaced after accidentally malpositioned   3/5 WBC trend down  -Continue antibiotics per ID  -Continue IR drain  -Repeat ABD CT 3/8    No new recs. Once drain dries up can be removed.   Encourage high protein nutrition.  Prophylactic Lovenox ok.   Following.        ____________________________________     Bj Aponte M.D.    DD: 3/9/2022  10:30 AM

## 2022-03-09 NOTE — PROGRESS NOTES
Infectious Disease Progress Note    Author: Rinku Llanos M.D. Date & Time of service: 3/9/2022  8:32 AM    Chief Complaint:  Follow-up for intra-abdominal abscess    Interval History:  3/2 afebrile, WBC 13.3.  Patient underwent drain placement yesterday.  Cultures are pending.  Feels well.  Tolerating IV antibiotics without any issues.  3/3 afebrile. Drain cultures growing Streptococcus intermedius and E. coli, 130 cc of drain output recorded yesterday. No new issues to report  3/5 afebrile WBC 14.4 repeat CT scan revealed interval decrease in the abscess of the right lower quadrant fluid collection.  Patient underwent CT-guided placement of new right lower quadrant drain yesterday patient doing well without any new issues.  3/7 patient remains afebrile, white count is down to 13.4 today, tolerating Zosyn thus far  3/8 patient remains afebrile, white count is 14.6 today, tolerating Zosyn.  Repeat CT scan today  3/9 patient is afebrile, white count down to 12.8 today, tolerating Zosyn.  Repeat CT scan as below.  Plan as below.  Resting comfortably this morning.    Labs Reviewed, Medications Reviewed and Radiology Reviewed.    Review of Systems:  Review of Systems   Constitutional: Negative for chills and fever.   Respiratory: Negative for cough and shortness of breath.    Gastrointestinal: Positive for abdominal pain. Negative for diarrhea, nausea and vomiting.        Improved   Neurological: Negative for dizziness and headaches.       Hemodynamics:  Temp (24hrs), Av.3 °C (97.4 °F), Min:36.1 °C (96.9 °F), Max:36.6 °C (97.8 °F)  Temperature: 36.1 °C (96.9 °F)  Pulse  Av.3  Min: 56  Max: 107   Blood Pressure : 100/47       Physical Exam:  Physical Exam  Vitals and nursing note reviewed.   Constitutional:       Appearance: Normal appearance.   HENT:      Mouth/Throat:      Mouth: Mucous membranes are moist.   Eyes:      Extraocular Movements: Extraocular movements intact.      Pupils: Pupils are equal,  round, and reactive to light.   Cardiovascular:      Rate and Rhythm: Normal rate.   Pulmonary:      Effort: Pulmonary effort is normal. No respiratory distress.      Breath sounds: No wheezing.      Comments: Right anterior chest wall freely mobile lipoma, nontender  Abdominal:      General: There is no distension.      Palpations: Abdomen is soft.      Tenderness: There is no abdominal tenderness.      Comments: Right lower quadrant drain in place   Musculoskeletal:         General: Normal range of motion.   Skin:     General: Skin is warm and dry.   Neurological:      General: No focal deficit present.      Mental Status: He is alert and oriented to person, place, and time.   Psychiatric:         Behavior: Behavior normal.      Comments: Flat affect         Meds:    Current Facility-Administered Medications:   •  tamsulosin  •  dorzolamide-timolol  •  finasteride  •  latanoprost  •  levothyroxine  •  melatonin  •  omeprazole  •  senna-docusate **AND** polyethylene glycol/lytes **AND** magnesium hydroxide **AND** bisacodyl  •  enoxaparin  •  acetaminophen  •  [COMPLETED] piperacillin-tazobactam **AND** piperacillin-tazobactam  •  fenofibrate micronized    Labs:  Recent Labs     03/07/22  0203 03/08/22  0315 03/09/22  0220   WBC 13.4* 14.6* 12.8*   RBC 3.62* 3.86* 3.96*   HEMOGLOBIN 10.1* 10.8* 11.1*   HEMATOCRIT 30.9* 33.8* 34.1*   MCV 85.4 87.6 86.1   MCH 27.9 28.0 28.0   RDW 43.9 45.8 44.9   PLATELETCT 401 423 407   MPV 9.5 9.5 9.6   NEUTSPOLYS 40.90* 36.50*  --    LYMPHOCYTES 53.00* 59.10*  --    MONOCYTES 2.60 0.90  --    EOSINOPHILS 0.00 3.50  --    BASOPHILS 0.90 0.00  --    RBCMORPHOLO Present Present  --      Recent Labs     03/07/22  0203   SODIUM 135   POTASSIUM 3.9   CHLORIDE 102   CO2 22   GLUCOSE 131*   BUN 12     Recent Labs     03/07/22  0203   CREATININE 0.77       Imaging:  CT-ABDOMEN-PELVIS WITH    Addendum Date: 2/28/2022    ADDENDUM: Please note that the phlegmonous process and extensive  abscess in the right lower quadrant could be due to an indolent previously ruptured appendicitis.    Result Date: 2/28/2022 2/28/2022 4:49 PM HISTORY/REASON FOR EXAM:  RLQ abdominal pain (Age >= 14y). Palpable firm mass in the right lower abdomen TECHNIQUE/EXAM DESCRIPTION:   CT scan of the abdomen and pelvis with contrast. Contrast-enhanced helical scanning was obtained from the diaphragmatic domes through the pubic symphysis following the bolus administration of nonionic contrast without complication. 100 mL of Omnipaque 350 nonionic contrast was administered without complication. Low dose optimization technique was utilized for this CT exam including automated exposure control and adjustment of the mA and/or kV according to patient size. COMPARISON: 10/8/2020 FINDINGS: Lower Chest: Visualized lung bases demonstrate dependent atelectasis. No change in the previously biopsied 3.5 cm right benign breast mass. Liver: Normal. Spleen: Unremarkable. Pancreas: Unremarkable. Gallbladder: There is a tiny dependent gallstone.. Biliary: Nondilated. Adrenal glands: Normal. Kidneys: There is a nonobstructive 3 no other calcification the right superior pole kidney. There are left renal parapelvic cysts. There are simple appearing bilateral renal cortical cysts again seen. There may be right renal parapelvic cysts as well. There is minimal left perinephric stranding. Lymph nodes: No adenopathy. Vasculature: There is aortic atherosclerosis. Bowel: There is colonic diverticulosis. There is a questionable spiculated type masslike area in the right lower quadrant adjacent to the inferior cecum measuring 5.3 x 4.4 cm on the coronal reconstructions. There is abnormal enhancement abutting the rectus musculature and the right psoas musculature. Peritoneum: There is a complex heterogeneously enhancing multi loculated fluid collection in the right lower quadrant anteriorly with multiple internal air lucencies and ill-defined peripheral  rim enhancement. This extends from the right peritoneum just posterior to the rectus abdominis musculature at the level of the iliac crest and extends inferiorly into the midline and right lower pelvis extending through the right lateral abdominal wall musculature into the subcutaneous tissues. The origin is uncertain however may be related to the ill-defined masslike or phlegmon-type structure inferior to the cecum. The entire area measures an estimated 11.4 x 6.4 x 13.4 cm. There are numerous other smaller loculated enhancing fluid components extending into the soft tissues with associated subcutaneous edema. Pelvis: Bladder is unremarkable.. Musculoskeletal: No acute or destructive process. There are degenerative changes throughout the spine.     1.  There is a complex multiloculated fluid collection with heterogeneous peripheral enhancement and internal air lucencies consistent with abscess in the anterior right lower quadrant, etiology uncertain. This extends from the peritoneal cavity through the musculature into the subcutaneous tissues. There is abnormal enhancement abutting the right abdominal wall musculature and right psoas muscle with multiple smaller loculated fluid components. 2.  There is a questionable phlegmonous type process versus mass just inferior to the cecum. 3.  There are bilateral parapelvic cysts and renal cortical cysts which do not need further imaging follow-up. 4.  There is colonic diverticulosis with no acute diverticulitis. 5.  There is a tiny dependent gallstone.    CT-DRAIN-PERITONEAL    Result Date: 3/1/2022  3/1/2022 5:35 PM HISTORY/REASON FOR EXAM:  RLQ abscess x 3 weeks  -  high surgical risk; NPO, no thinners. Discussed With Dr Dudley PM 2/28. TECHNIQUE/EXAM DESCRIPTION: RLQ pelvic abscess drainage with CT guidance. Low dose optimization technique was utilized for this CT exam including automated exposure control and adjustment of the mA and/or kV according to patient size.  PROCEDURE: Informed consent was obtained. Localizing CT images were obtained with the patient in supine position. The skin was prepped with Betadine and draped in a sterile fashion. Following local anesthesia with 1% Lidocaine, a 17 G guiding needle was placed and needle path confirmed with CT. An Amplatz guidewire was placed and following serial tract dilatation, a 10 biliary drainage catheter was placed to allow for additional sideholes to cross multiple aspects of the multiloculated abscess. A specimen was collected and submitted for culture and sensitivity and Gram stain. Total of 25 mL purulent fluid was drained. The catheter was secured to the skin and connected to suction bulb drainage. Final CT images were obtained documenting catheter position. The patient tolerated the procedure well with no evidence of complication. COMPARISON: CT abdomen pelvis dated 2/28/2022 FINDINGS: The final CT images show satisfactory catheter position within the target collection.     1.  CT guided peritoneal RLQ catheter drainage. 2.  The current plan is to obtain a follow-up CT in 5-7 days..      Micro:  Results     Procedure Component Value Units Date/Time    Fungal Culture [[id number]] Collected: 03/01/22 1809    Order Status: Completed Specimen: Wound from Other Body Fluid Updated: 03/08/22 0957     Significant Indicator NEG     Source WND     Site Peritoneal Abscess Drain     Culture Result No fungal growth to date.     Fungal Smear Results No fungal elements seen.    Narrative:      Collected By: [id number] AWILDA MACHUCA  Intra-abdominal  abcsess  Collected By: [id number] AWILDA MACHUCA    CULTURE WOUND W/ GRAM STAIN [[id number]]  (Abnormal)  (Susceptibility) Collected: 03/01/22 1809    Order Status: Completed Specimen: Wound Updated: 03/08/22 0957     Significant Indicator POS     Source WND     Site Peritoneal Abscess Drain     Culture Result -     Gram Stain Result Many WBCs.  Many Gram positive cocci.       Culture Result  "Streptococcus intermedius  Moderate growth        Escherichia coli  Rare growth      Narrative:      Collected By: [id number] AWILDA MACHUCA  Intra-abdominal  abcsess  Collected By: [id number] AWILDA MACHUCA    Susceptibility     Escherichia coli (2)     Antibiotic Interpretation Microscan   Method Status    Ampicillin Resistant >16 mcg/mL DIO Final    Ceftriaxone Sensitive <=1 mcg/mL DIO Final    Cefazolin Intermediate 4 mcg/mL DIO Final    Ciprofloxacin Resistant >2 mcg/mL DIO Final    Cefepime Sensitive <=2 mcg/mL DIO Final    Cefuroxime Sensitive <=4 mcg/mL DIO Final    Ampicillin/sulbactam Resistant >16/8 mcg/mL DIO Final    Ertapenem Sensitive <=0.5 mcg/mL DIO Final    Tobramycin Sensitive <=2 mcg/mL DIO Final    Gentamicin Sensitive <=2 mcg/mL DIO Final    Minocycline Sensitive <=4 mcg/mL DIO Final    Moxifloxacin Resistant >4 mcg/mL DIO Final    Pip/Tazobactam Sensitive <=8 mcg/mL DIO Final    Trimeth/Sulfa Resistant >2/38 mcg/mL DIO Final    Tigecycline Sensitive <=2 mcg/mL DIO Final                   Blood Culture [[id number]] Collected: 02/28/22 1503    Order Status: Completed Specimen: Blood from Peripheral Updated: 03/05/22 1700     Significant Indicator NEG     Source BLD     Site PERIPHERAL     Culture Result No growth after 5 days of incubation.    Narrative:      1 of 2 for Blood Culture x 2 sites order. Per Hospital  Policy: Only change Specimen Src: to \"Line\" if specified by  physician order.  No site indicated    Blood Culture [[id number]] Collected: 02/28/22 1518    Order Status: Completed Specimen: Blood from Peripheral Updated: 03/05/22 1700     Significant Indicator NEG     Source BLD     Site PERIPHERAL     Culture Result No growth after 5 days of incubation.    Narrative:      2 of 2 blood culture x2  Sites order. Per Hospital Policy:  Only change Specimen Src: to \"Line\" if specified by physician  order.  Right AC    Urine Culture [[id number]] Collected: 03/01/22 1134    Order Status: Completed " Specimen: Urine, Clean Catch Updated: 03/03/22 2304     Significant Indicator NEG     Source UR     Site URINE, CLEAN CATCH     Culture Result No growth at 48 hours.    Narrative:      Indication for culture:->Evaluation for sepsis without a  clear source of infection  Indication for culture:->Evaluation for sepsis without a    Fungal Smear [[id number]] Collected: 03/01/22 1809    Order Status: Completed Specimen: Wound Updated: 03/02/22 1715     Significant Indicator NEG     Source WND     Site Peritoneal Abscess Drain     Fungal Smear Results No fungal elements seen.    Narrative:      Collected By: [id number] AWILDA MACHUCA  Intra-abdominal  abcsess  Collected By: [id number] AWILDA MACHUCA    GRAM STAIN [[id number]]  (Abnormal) Collected: 03/01/22 1809    Order Status: Completed Specimen: Wound Updated: 03/02/22 1714     Significant Indicator .     Source WND     Site Peritoneal Abscess Drain     Gram Stain Result Many WBCs.  Many Gram positive cocci.      Narrative:      Collected By: [id number] AWILDA MACHUCA  Intra-abdominal  abcsess  Collected By: [id number] AWILDA MACHUCA          Assessment:  71 y.o. man with a history of type 2 diabetes mellitus, hyperlipidemia and hypertension admitted on 2/28/2022 secondary to progressive right lower quadrant abdominal pain.  CT scan showed a complex multiloculated fluid collection in the anterior right lower quadrant, extending from the peritoneal cavity through the musculature into the subcutaneous tissues, etiology is uncertain.    Status post IR drainage on 3/1. Blood cultures on admission are negative to date.  He is currently on Zosyn.    Pertinent diagnoses:  Intra-abdominal abscess  Polymicrobial infection  Leukocytosis, improving  Type 2 diabetes mellitus, controlled.  Hemoglobin A1c 5.8  Cognitive impairment    Plan:  -Continue IV Zosyn 3.375 g every 8 hours while inpatient  -Status post drain placement on 3/1.  Follow cultures-Streptococcus intermedius, E. Coli (Unasyn  resistant, cefazolin intermediate, ciprofloxacin resistant).   -Repeat CT scan on 3/4-rim-enhancing collection has decreased with the lateral aspect now measuring 4.2 x 1.8 cm in the medial aspect of the collection measuring 2.3 x 6 cm.   -Accidental displacement of IR drain, status post CT-guided new right lower quadrant drain placement on 3/4  -Repeat CT scan 3/8 with good improvement in size of the abscesses.  One collection measured medial aspect of collection anterior superior to the bladder at 0.9 x 3.4 cm that was previously 2.3 x 6 cm. Inflammation surrounding right lower quadrant extends to the right rectus abdominis muscle and abuts the bladder, extends into the inguinal canal on the right.  -On discharge, recommend an additional 4 weeks of antibiotics with p.o. cefdinir 300 mg twice daily + p.o. Flagyl 500 mg 3 times daily through 4/4/2022  -Will need to reassess in the outpatient setting to decide if repeat imaging or additional antibiotics may be required  -Drain management per IR, surgical management per surgery    ID clinic follow-up in 3 to 4 weeks.  Please call us back if any surgical intervention is performed in the meantime    ID will sign off.  Please call with questions.

## 2022-03-09 NOTE — FACE TO FACE
Face to Face Note  -  Durable Medical Equipment    Terra Almodovar D.O. - NPI: [id number]  I certify that this patient is under my care and that they had a durable medical equipment(DME)face to face encounter by myself that meets the physician DME face-to-face encounter requirements with this patient on:    Date of encounter:   Patient:                    MRN:                       YOB: 2024  Lico Salinas  [id number]  1950     The encounter with the patient was in whole, or in part, for the following medical condition, which is the primary reason for durable medical equipment:  Other - Unsteady gait    I certify that, based on my findings, the following durable medical equipment is medically necessary:  Walkers.    HOME O2 Saturation Measurements:(Values must be present for Home Oxygen orders)         ,     ,         My Clinical findings support the need for the above equipment due to:  Abnormal Gait    Supporting Symptoms: N/A    If patient feels more short of breath, they can go up to 6 liters per minute and contact healthcare provider.

## 2022-03-09 NOTE — FACE TO FACE
Face to Face Supporting Documentation - Home Health    The encounter with this patient was in whole or in part the primary reason for home health admission.    Date of encounter:   Patient:                    MRN:                       YOB: 2024  Lico Salinas  [id number]  1950     Home health to see patient for:  Skilled Nursing care for assessment, interventions & education, Home health aide, Physical Therapy evaluation and treatment and Occupational therapy evaluation and treatment    Skilled need for:  Surgical Aftercare abdominal drain management    Skilled nursing interventions to include:  Comment: PT/OT    Homebound status evidenced by:  Need the aid of supportive devices such as crutches, canes, wheelchairs or walkers. Leaving home requires a considerable and taxing effort. There is a normal inability to leave the home.    Community Physician to provide follow up care: Nayla Mclain M.D.     Optional Interventions? No      I certify the face to face encounter for this home health care referral meets the CMS requirements and the encounter/clinical assessment with the patient was, in whole, or in part, for the medical condition(s) listed above, which is the primary reason for home health care. Based on my clinical findings: the service(s) are medically necessary, support the need for home health care, and the homebound criteria are met.  I certify that this patient has had a face to face encounter by myself.  Terra Almodovar D.O. - LINSEYI: [id number]

## 2022-03-09 NOTE — PROGRESS NOTES
Hospital Medicine Daily Progress Note    Date of Service  3/9/2022    Chief Complaint  Lico Salinas is a 71 y.o. male admitted 2/28/2022 with abdominal pain    Hospital Course  This is a 71 year old male with PMHx of hypertension, hyperlipidemia, type 2 diabetes, hypothyroidism and BPH who was admitted on 02/28 with abdominal pain.    CT imaging noted right lower quadrant abdominal abscess s/p IR drain placement on 3/01 and replacement after dislodgment on 03/04.Cultures grew Streptococcus intermedius and E. Coli, ID following with recommendations to continue with Zosyn.    Patient had repeat CT imaging on 03/08, which showed interval decrease in the abscess.  IR recommends for drainage to remain in place for 1 to 2 days, once output is less than 10 to 15 mL, drain can be removed.    Interval Problem Update  I discussed the case today with IR, they will remove the drain tomorrow.    I discussed the case with infectious disease, they recommend patient to continue with Omnicef and Flagyl until April 4, 2022.    We anticipate discharge tomorrow after drain removal patient discharged with meds to beds, home health, and a walker.    I have personally seen and examined the patient at bedside. I discussed the plan of care with patient, bedside RN, charge RN,  and pharmacy.    Consultants/Specialty  GI and IR    Code Status  DNAR/DNI    Disposition  Patient is not medically cleared for discharge.   Anticipate discharge to Assisted living facility.  I have placed the appropriate orders for post-discharge needs.    Review of Systems  Review of Systems   All other systems reviewed and are negative.       Physical Exam  Temp:  [36.1 °C (96.9 °F)-36.6 °C (97.8 °F)] 36.1 °C (96.9 °F)  Pulse:  [63-65] 63  Resp:  [16-17] 17  BP: (100-106)/(47-54) 100/47  SpO2:  [93 %-95 %] 95 %    Physical Exam  Vitals and nursing note reviewed.   Constitutional:       General: He is not in acute distress.     Appearance: Normal  appearance.   HENT:      Head: Normocephalic and atraumatic.   Eyes:      Extraocular Movements: Extraocular movements intact.      Conjunctiva/sclera: Conjunctivae normal.      Pupils: Pupils are equal, round, and reactive to light.   Cardiovascular:      Rate and Rhythm: Normal rate and regular rhythm.      Pulses: Normal pulses.      Heart sounds: No murmur heard.    No friction rub. No gallop.   Pulmonary:      Effort: Pulmonary effort is normal. No respiratory distress.      Breath sounds: Normal breath sounds. No wheezing, rhonchi or rales.   Abdominal:      General: Bowel sounds are normal. There is no distension.      Palpations: Abdomen is soft.      Tenderness: There is no abdominal tenderness.      Comments: Drain in place with output   Musculoskeletal:         General: No swelling or tenderness. Normal range of motion.      Cervical back: Normal range of motion and neck supple. No muscular tenderness.      Right lower leg: No edema.      Left lower leg: No edema.   Skin:     General: Skin is warm and dry.      Capillary Refill: Capillary refill takes less than 2 seconds.      Findings: No bruising, erythema or rash.   Neurological:      General: No focal deficit present.      Mental Status: He is alert and oriented to person, place, and time.         Fluids    Intake/Output Summary (Last 24 hours) at 3/9/2022 1434  Last data filed at 3/9/2022 1333  Gross per 24 hour   Intake 680 ml   Output 20 ml   Net 660 ml       Laboratory  Recent Labs     03/07/22  0203 03/08/22  0315 03/09/22  0220   WBC 13.4* 14.6* 12.8*   RBC 3.62* 3.86* 3.96*   HEMOGLOBIN 10.1* 10.8* 11.1*   HEMATOCRIT 30.9* 33.8* 34.1*   MCV 85.4 87.6 86.1   MCH 27.9 28.0 28.0   MCHC 32.7* 32.0* 32.6*   RDW 43.9 45.8 44.9   PLATELETCT 401 423 407   MPV 9.5 9.5 9.6     Recent Labs     03/07/22  0203   SODIUM 135   POTASSIUM 3.9   CHLORIDE 102   CO2 22   GLUCOSE 131*   BUN 12   CREATININE 0.77   CALCIUM 9.0                    Imaging  CT-ABDOMEN-PELVIS WITH   Final Result      1.  Interval decrease in the abscess involving the anterior right abdominal wall status post pigtail catheter placement. Inflammatory changes in the right lower quadrant are unchanged. Inflammation extends to the anterior right lateral bladder wall.    Mildly improved inflammatory changes in the subcutaneous soft tissues.      2.  Right nephrolithiasis.      3.  Diverticulosis.         CT-DRAIN-PERITONEAL   Final Result      1.  CT GUIDED EXCHANGE OF A RIGHT LOWER QUADRANT DRAINAGE CATHETER.      2.  THE CURRENT PLAN IS TO MONITOR DRAINAGE OUTPUT AND OBTAIN A FOLLOWUP CT SCAN IN 5-7 DAYS IF CLINICALLY INDICATED.      CT-ABDOMEN-PELVIS WITH   Final Result      1.  Interval decrease in the abscess involving the anterior right abdominal wall status post pigtail catheter placement. Inflammatory changes in the right lower quadrant are unchanged. Inflammation extends to the anterior right lateral bladder wall      2.  Right nephrolithiasis.      3.  Diverticulosis.      4.  Stable soft tissue mass in the anterior right lower chest, presumably benign.      CT-DRAIN-PERITONEAL   Final Result      1.  CT guided peritoneal RLQ catheter drainage.   2.  The current plan is to obtain a follow-up CT in 5-7 days..      CT-ABDOMEN-PELVIS WITH   Final Result   Addendum 1 of 1         ADDENDUM:   Please note that the phlegmonous process and extensive abscess in the    right lower quadrant could be due to an indolent previously ruptured    appendicitis.      Final      1.  There is a complex multiloculated fluid collection with heterogeneous peripheral enhancement and internal air lucencies consistent with abscess in the anterior right lower quadrant, etiology uncertain. This extends from the peritoneal cavity through    the musculature into the subcutaneous tissues. There is abnormal enhancement abutting the right abdominal wall musculature and right psoas muscle with multiple  smaller loculated fluid components.   2.  There is a questionable phlegmonous type process versus mass just inferior to the cecum.   3.  There are bilateral parapelvic cysts and renal cortical cysts which do not need further imaging follow-up.   4.  There is colonic diverticulosis with no acute diverticulitis.   5.  There is a tiny dependent gallstone.           Assessment/Plan  * Right lower quadrant abdominal abscess (HCC)- (present on admission)  Assessment & Plan  CT imaging noted right lower quadrant abdominal abscess s/p IR drain placement on 3/01 and replacement after dislodgment on 03/04.  Cultures grew Streptococcus intermedius and E. Coli  ID following with recommendations to continue with Zosyn.    Patient had repeat CT imaging on 03/08, which showed interval decrease in the abscess.    IR recommends for drainage to remain in place for 1 to 2 days, once output is less than 10 to 15 mL, drain can be removed.    Pending ID recs for abx regimen.     Other specified glaucoma  Assessment & Plan  Continue with patient's eyedrops    Benign prostatic hyperplasia with urinary retention  Assessment & Plan  Resume patient's Proscar and Flomax    Essential hypertension- (present on admission)  Assessment & Plan  History of  Patient is normotensive throughout this admission    Anemia- (present on admission)  Assessment & Plan  Hemoglobin stable  Continue to monitor    Cognitive impairment- (present on admission)  Assessment & Plan  Stable   Frequent orientation and monitor for delirium    Dyslipidemia- (present on admission)  Assessment & Plan  Continue fenofibrate    Diabetes mellitus type 2, noninsulin dependent (HCC)- (present on admission)  Assessment & Plan  Controlled  Last A1c noted in 2021 of 5.8    Hypothyroidism- (present on admission)  Assessment & Plan  Resume patient's Synthroid       VTE prophylaxis: SCDs/TEDs and enoxaparin ppx    I have performed a physical exam and reviewed and updated ROS and Plan  today (3/9/2022). In review of yesterday's note (3/8/2022), there are no changes except as documented above.

## 2022-03-09 NOTE — FACE TO FACE
Face to Face Supporting Documentation - Home Health    The encounter with this patient was in whole or in part the primary reason for home health admission.    Date of encounter:   Patient:                    MRN:                       YOB: 2024  Lico Salinas  [id number]  1950     Home health to see patient for:  Skilled Nursing care for assessment, interventions & education, Home health aide, Physical Therapy evaluation and treatment and Occupational therapy evaluation and treatment    Skilled need for:  Surgical Aftercare abdominal drain management    Skilled nursing interventions to include:  Line/Drain/Airway education and care    Homebound status evidenced by:  Need the aid of supportive devices such as crutches, canes, wheelchairs or walkers. Leaving home requires a considerable and taxing effort. There is a normal inability to leave the home.    Community Physician to provide follow up care: Nayla Mclain M.D.     Optional Interventions? No      I certify the face to face encounter for this home health care referral meets the CMS requirements and the encounter/clinical assessment with the patient was, in whole, or in part, for the medical condition(s) listed above, which is the primary reason for home health care. Based on my clinical findings: the service(s) are medically necessary, support the need for home health care, and the homebound criteria are met.  I certify that this patient has had a face to face encounter by myself.  Terra Almodovar D.O. - NPI: [id number]

## 2022-03-09 NOTE — CARE PLAN
The patient is Stable - Low risk of patient condition declining or worsening    Shift Goals  Clinical Goals: monitor MICHELLE drain output      Progress made toward(s) clinical / shift goals:  MICHELLE drain has decreased in output. Intact.     Problem: Knowledge Deficit - Standard  Goal: Patient and family/care givers will demonstrate understanding of plan of care, disease process/condition, diagnostic tests and medications  Outcome: Progressing     Problem: Skin Integrity  Goal: Skin integrity is maintained or improved  Outcome: Progressing     Problem: Pain - Standard  Goal: Alleviation of pain or a reduction in pain to the patient’s comfort goal  Outcome: Progressing

## 2022-03-10 ENCOUNTER — HOME HEALTH ADMISSION (OUTPATIENT)
Dept: HOME HEALTH SERVICES | Facility: HOME HEALTHCARE | Age: 72
End: 2022-03-10
Payer: MEDICARE

## 2022-03-10 LAB
ERYTHROCYTE [DISTWIDTH] IN BLOOD BY AUTOMATED COUNT: 46.1 FL (ref 35.9–50)
FLUAV RNA SPEC QL NAA+PROBE: NEGATIVE
FLUBV RNA SPEC QL NAA+PROBE: NEGATIVE
HCT VFR BLD AUTO: 34.8 % (ref 42–52)
HGB BLD-MCNC: 11.1 G/DL (ref 14–18)
MCH RBC QN AUTO: 27.8 PG (ref 27–33)
MCHC RBC AUTO-ENTMCNC: 31.9 G/DL (ref 33.7–35.3)
MCV RBC AUTO: 87.2 FL (ref 81.4–97.8)
PLATELET # BLD AUTO: 377 K/UL (ref 164–446)
PMV BLD AUTO: 9.9 FL (ref 9–12.9)
RBC # BLD AUTO: 3.99 M/UL (ref 4.7–6.1)
RSV RNA SPEC QL NAA+PROBE: NEGATIVE
SARS-COV-2 RNA RESP QL NAA+PROBE: NOTDETECTED
SPECIMEN SOURCE: NORMAL
WBC # BLD AUTO: 13.3 K/UL (ref 4.8–10.8)

## 2022-03-10 PROCEDURE — A9270 NON-COVERED ITEM OR SERVICE: HCPCS | Performed by: GENERAL PRACTICE

## 2022-03-10 PROCEDURE — 85027 COMPLETE CBC AUTOMATED: CPT

## 2022-03-10 PROCEDURE — 770006 HCHG ROOM/CARE - MED/SURG/GYN SEMI*

## 2022-03-10 PROCEDURE — 0241U HCHG SARS-COV-2 COVID-19 NFCT DS RESP RNA 4 TRGT MIC: CPT

## 2022-03-10 PROCEDURE — 36415 COLL VENOUS BLD VENIPUNCTURE: CPT

## 2022-03-10 PROCEDURE — A9270 NON-COVERED ITEM OR SERVICE: HCPCS | Performed by: INTERNAL MEDICINE

## 2022-03-10 PROCEDURE — 700111 HCHG RX REV CODE 636 W/ 250 OVERRIDE (IP): Performed by: INTERNAL MEDICINE

## 2022-03-10 PROCEDURE — 99231 SBSQ HOSP IP/OBS SF/LOW 25: CPT | Mod: FS | Performed by: NURSE PRACTITIONER

## 2022-03-10 PROCEDURE — 700102 HCHG RX REV CODE 250 W/ 637 OVERRIDE(OP): Performed by: GENERAL PRACTICE

## 2022-03-10 PROCEDURE — 700102 HCHG RX REV CODE 250 W/ 637 OVERRIDE(OP): Performed by: INTERNAL MEDICINE

## 2022-03-10 PROCEDURE — C9803 HOPD COVID-19 SPEC COLLECT: HCPCS | Performed by: GENERAL PRACTICE

## 2022-03-10 PROCEDURE — 99232 SBSQ HOSP IP/OBS MODERATE 35: CPT | Performed by: GENERAL PRACTICE

## 2022-03-10 RX ADMIN — METRONIDAZOLE 500 MG: 500 TABLET ORAL at 05:20

## 2022-03-10 RX ADMIN — FENOFIBRATE 134 MG: 67 CAPSULE ORAL at 05:21

## 2022-03-10 RX ADMIN — ENOXAPARIN SODIUM 40 MG: 40 INJECTION SUBCUTANEOUS at 05:20

## 2022-03-10 RX ADMIN — METRONIDAZOLE 500 MG: 500 TABLET ORAL at 22:00

## 2022-03-10 RX ADMIN — CEFDINIR 300 MG: 300 CAPSULE ORAL at 18:24

## 2022-03-10 RX ADMIN — Medication 10 MG: at 20:37

## 2022-03-10 RX ADMIN — TAMSULOSIN HYDROCHLORIDE 0.4 MG: 0.4 CAPSULE ORAL at 20:37

## 2022-03-10 RX ADMIN — DORZOLAMIDE HYDROCHLORIDE AND TIMOLOL MALEATE 1 DROP: 20; 5 SOLUTION OPHTHALMIC at 05:20

## 2022-03-10 RX ADMIN — OMEPRAZOLE 20 MG: 20 CAPSULE, DELAYED RELEASE ORAL at 05:22

## 2022-03-10 RX ADMIN — LEVOTHYROXINE SODIUM 75 MCG: 75 TABLET ORAL at 05:20

## 2022-03-10 RX ADMIN — SENNOSIDES AND DOCUSATE SODIUM 2 TABLET: 50; 8.6 TABLET ORAL at 18:24

## 2022-03-10 RX ADMIN — FINASTERIDE 5 MG: 5 TABLET, FILM COATED ORAL at 05:20

## 2022-03-10 RX ADMIN — METRONIDAZOLE 500 MG: 500 TABLET ORAL at 16:14

## 2022-03-10 RX ADMIN — LATANOPROST 1 DROP: 50 SOLUTION OPHTHALMIC at 18:24

## 2022-03-10 RX ADMIN — DORZOLAMIDE HYDROCHLORIDE AND TIMOLOL MALEATE 1 DROP: 20; 5 SOLUTION OPHTHALMIC at 18:24

## 2022-03-10 RX ADMIN — CEFDINIR 300 MG: 300 CAPSULE ORAL at 05:21

## 2022-03-10 ASSESSMENT — ENCOUNTER SYMPTOMS
BRUISES/BLEEDS EASILY: 0
MYALGIAS: 0
VOMITING: 0
ROS GI COMMENTS: BM 3/9
CHILLS: 0
NAUSEA: 0
DIARRHEA: 0
HEMOPTYSIS: 0
COUGH: 0
HEADACHES: 0
WEAKNESS: 0
DIZZINESS: 0
PALPITATIONS: 0
FEVER: 0
ABDOMINAL PAIN: 0
BLURRED VISION: 0
SHORTNESS OF BREATH: 0

## 2022-03-10 ASSESSMENT — PAIN DESCRIPTION - PAIN TYPE: TYPE: ACUTE PAIN

## 2022-03-10 NOTE — DISCHARGE PLANNING
DIANA following. HTH/SCP chart review completed. Collaborated with ZANA Ellison. Patient seen at bedside. Plan is Group Home with HH vs. Home with Spouse with HH. Home Health accepted 3/10/2022.      Previously completed:  - Choice forms: SNF (in case of need for ongoing IV abx)  - [facility] introduced (Y), referral (not sent-  followed by Dr. Mclain).

## 2022-03-10 NOTE — DISCHARGE PLANNING
Anticipated Discharge Disposition: Group Home with HH vs. Home with Spouse with HH    Action: HH and walker order in place. RN CM went to patient's room to discuss home health and walker. Patient sleeping and did not wake. RN CM called patient's spouse Pina. Per Pina patient already has a walker. Choice received for: [facility]. Choice for faxed to [facility].     Per Pina the patient has been living at [facility] for the past year on [address]. Per Pina she would like her spouse to come home with her on discharge, but it is up to him if he would like to go back to the group home. Per Pina The group home owner is Nat. Group home phone number is [phone].    Barriers to Discharge: home health acceptance, home vs. Group home     Plan: Follow up with patient and group home.     Addendum:  1420  Transportation time could not be set up till 2000 today with T. This time is too late for acceptance at the group home. Transportation set for tomorrow morning at 0900 with T. MD and bedside RN updated.     RN CM called group home and spoke with Kevin and notified him of tomorrow discharge and transportation. Covid test to be faxed to [phone]. Covid results pending.

## 2022-03-10 NOTE — CARE PLAN
The patient is Stable - Low risk of patient condition declining or worsening    Shift Goals  Clinical Goals: comfort  Patient Goals: pt will be able to rest and sleep comfortably  Family Goals:     Progress made toward(s) clinical / shift goals:      Patient is not progressing towards the following goals:

## 2022-03-10 NOTE — CARE PLAN
Problem: Knowledge Deficit - Standard  Goal: Patient and family/care givers will demonstrate understanding of plan of care, disease process/condition, diagnostic tests and medications  Outcome: Progressing     Problem: Hemodynamics  Goal: Patient's hemodynamics, fluid balance and neurologic status will be stable or improve  Outcome: Progressing     Problem: Fluid Volume  Goal: Fluid volume balance will be maintained  Outcome: Progressing     Problem: Urinary - Renal Perfusion  Goal: Ability to achieve and maintain adequate renal perfusion and functioning will improve  Outcome: Progressing     The patient is Watcher - Medium risk of patient condition declining or worsening    Shift Goals  Clinical Goals: monitor MICHELLE drain output, ambulate patient  Patient Goals: comfort  Family Goals: closer steps to remove MICHELLE drain    Progress made toward(s) clinical / shift goals:  drainage from MICHELLE drain decreased    Patient is not progressing towards the following goals:

## 2022-03-10 NOTE — CARE PLAN
The patient is Stable - Low risk of patient condition declining or worsening    Shift Goals  Clinical Goals: discharge today  Patient Goals: discahrge today  Family Goals: closer steps to remove NOMAN drain    Progress made toward(s) clinical / shift goals: noman drain has been removed, discharge pending covid results    Patient is not progressing towards the following goals:

## 2022-03-10 NOTE — DISCHARGE PLANNING
Anticipated Discharge Disposition: Group Home with Home Health    Action: SW spoke to patient about discharging and going back to the group home.  Patient stated he isn't sure if he wants to or go with his spouse.  Patient stated he will go to the group home and see if he can go home with her.      PC to [facility] [place], [phone];, can take patient back anytime today.  Requested they be called when the COVID test results are back    BELEN faxed transport communication form to [facility] for a 4:00 transport time to:  [address] [facility]    Barriers to Discharge: n/a    Plan: n/a

## 2022-03-10 NOTE — PROGRESS NOTES
This RN has assumed care of this pt, VSS on RA, abdominal drain has been removed by provider, transparent dressing in place CDI, pt denies pain, plan to DC today

## 2022-03-10 NOTE — DISCHARGE SUMMARY
Discharge Summary    CHIEF COMPLAINT ON ADMISSION  Chief Complaint   Patient presents with   • Other     Mass on right side of abdomen.        Reason for Admission  Sent by MD     Admission Date  2/28/2022    CODE STATUS  DNAR/DNI    HPI & HOSPITAL COURSE  This is a 71 year old male with PMHx of hypertension, hyperlipidemia, type 2 diabetes, hypothyroidism and BPH who was admitted on 02/28 with abdominal pain.    CT imaging noted right lower quadrant abdominal abscess s/p IR drain placement on 3/01 and replacement after dislodgment on 03/04.Cultures grew Streptococcus intermedius and E. Coli, ID following with recommendations to continue with Zosyn.  ID recommends patient to continue with Omnicef and Flagyl with end date of 04/04/2022.  Patient will follow up with general surgery in about a month, for repeat CT imaging.    Patient had repeat CT imaging on 03/08, which showed interval decrease in the abscess.  Drain in place for an additional 2 days with minimal output, IR removed the drain on 03/10.    Patient set up with home health and walker and meds to beds for his antibiotics.     Therefore, he is discharged in good and stable condition to home with organized home healthcare and close outpatient follow-up.    The patient met 2-midnight criteria for an inpatient stay at the time of discharge.    Discharge Date  03/10/2022    FOLLOW UP ITEMS POST DISCHARGE  Primary care physician  General surgery    DISCHARGE DIAGNOSES  Principal Problem:    Right lower quadrant abdominal abscess (HCC) POA: Yes  Active Problems:    Hypothyroidism POA: Yes    Diabetes mellitus type 2, noninsulin dependent (HCC) POA: Yes    Dyslipidemia POA: Yes    Cognitive impairment POA: Yes    Anemia POA: Yes    Essential hypertension POA: Yes    Benign prostatic hyperplasia with urinary retention POA: Unknown    Other specified glaucoma POA: Unknown  Resolved Problems:    Sepsis (HCC) POA: Yes    Hyponatremia POA: Unknown      FOLLOW  UP  Bj Aponte M.D.  [address]  [address]  [place] [address] [phone]    In 1 month  call office, will need to arrange out patient CT abdomen pelvis with IV contrast.    Nayla Mclain M.D.  [address] [address]  [place] [id number] [phone]            MEDICATIONS ON DISCHARGE     Medication List      START taking these medications      Instructions   cefdinir 300 MG Caps  Commonly known as: OMNICEF   Take 1 Capsule by mouth every 12 hours for 25 days.  Dose: 300 mg     fenofibrate micronized 134 MG capsule  Commonly known as: LOFIBRA  Replaces: fenofibrate 145 MG Tabs   Take 1 Capsule by mouth every day for 30 days.  Dose: 134 mg     metroNIDAZOLE 500 MG Tabs  Commonly known as: FLAGYL   Take 1 Tablet by mouth every 8 hours for 25 days.  Dose: 500 mg     tamsulosin 0.4 MG capsule  Commonly known as: FLOMAX  Replaces: alfuzosin 10 MG SR tablet   Take 1 Capsule by mouth at bedtime for 30 days.  Dose: 0.4 mg        CONTINUE taking these medications      Instructions   acetaminophen 500 MG Tabs  Commonly known as: TYLENOL   Take 500 mg by mouth 2 times a day.  Dose: 500 mg     Centrum Silver Adult 50+ Tabs   Take 1 Tablet by mouth every day.  Dose: 1 Tablet     CoQ10 100 MG Caps   Take 100 mg by mouth every day.  Dose: 100 mg     Cyanocobalamin 5000 MCG Tbdp   Take 5,000 mcg by mouth every day.  Dose: 5,000 mcg     dorzolamide-timolol 22.3-6.8 MG/ML Soln  Commonly known as: COSOPT   Administer 1 Drop into both eyes 2 times a day for 30 days.  Dose: 1 Drop     finasteride 5 MG Tabs  Commonly known as: PROSCAR   Take 1 Tablet by mouth every day for 30 days.  Dose: 5 mg     Glucosamine HCl 1500 MG Tabs   Take 1,500 mg by mouth every day.  Dose: 1,500 mg     latanoprost 0.005 % Soln  Commonly known as: XALATAN   Administer 1 Drop into both eyes every evening for 30 days.  Dose: 1 Drop     levothyroxine 75 MCG Tabs  Commonly known as: SYNTHROID   Take 1 Tablet by mouth every morning on an empty stomach for  30 days.  Dose: 75 mcg     Melatonin 10 MG Tabs   Take 1 tablet by mouth at bedtime for 30 days.  Dose: 10 mg     omeprazole 20 MG delayed-release capsule  Commonly known as: PRILOSEC   Take 1 Capsule by mouth every day for 30 days.  Dose: 20 mg     sennosides-docusate sodium 8.6-50 MG tablet  Commonly known as: SENOKOT-S   Take 1 tablet by mouth 2 times a day.  Dose: 1 Tablet     VITAMIN D3 ADULT GUMMIES PO   Take 2,000 Units by mouth every day.  Dose: 2,000 Units        STOP taking these medications    alfuzosin 10 MG SR tablet  Commonly known as: UROXATRAL  Replaced by: tamsulosin 0.4 MG capsule     fenofibrate 145 MG Tabs  Commonly known as: TRICOR  Replaced by: fenofibrate micronized 134 MG capsule            Allergies  Allergies   Allergen Reactions   • Pcn [Penicillins] Unspecified     As a child, unknown reaction  Tolerated Zosyn 3/2022       DIET  Orders Placed This Encounter   Procedures   • Diet Order Diet: Consistent CHO (Diabetic)     Standing Status:   Standing     Number of Occurrences:   1     Order Specific Question:   Diet:     Answer:   Consistent CHO (Diabetic) [4]       ACTIVITY  As tolerated.  Weight bearing as tolerated    CONSULTATIONS  General surgery  Interventional radiology  Infectious disease    PROCEDURES  Drain placement and removal    LABORATORY  Lab Results   Component Value Date    SODIUM 135 03/07/2022    POTASSIUM 3.9 03/07/2022    CHLORIDE 102 03/07/2022    CO2 22 03/07/2022    GLUCOSE 131 (H) 03/07/2022    BUN 12 03/07/2022    CREATININE 0.77 03/07/2022        Lab Results   Component Value Date    WBC 13.3 (H) 03/10/2022    HEMOGLOBIN 11.1 (L) 03/10/2022    HEMATOCRIT 34.8 (L) 03/10/2022    PLATELETCT 377 03/10/2022      CT-ABDOMEN-PELVIS WITH   Final Result      1.  Interval decrease in the abscess involving the anterior right abdominal wall status post pigtail catheter placement. Inflammatory changes in the right lower quadrant are unchanged. Inflammation extends to the  anterior right lateral bladder wall.    Mildly improved inflammatory changes in the subcutaneous soft tissues.      2.  Right nephrolithiasis.      3.  Diverticulosis.         CT-DRAIN-PERITONEAL   Final Result      1.  CT GUIDED EXCHANGE OF A RIGHT LOWER QUADRANT DRAINAGE CATHETER.      2.  THE CURRENT PLAN IS TO MONITOR DRAINAGE OUTPUT AND OBTAIN A FOLLOWUP CT SCAN IN 5-7 DAYS IF CLINICALLY INDICATED.      CT-ABDOMEN-PELVIS WITH   Final Result      1.  Interval decrease in the abscess involving the anterior right abdominal wall status post pigtail catheter placement. Inflammatory changes in the right lower quadrant are unchanged. Inflammation extends to the anterior right lateral bladder wall      2.  Right nephrolithiasis.      3.  Diverticulosis.      4.  Stable soft tissue mass in the anterior right lower chest, presumably benign.      CT-DRAIN-PERITONEAL   Final Result      1.  CT guided peritoneal RLQ catheter drainage.   2.  The current plan is to obtain a follow-up CT in 5-7 days..      CT-ABDOMEN-PELVIS WITH   Final Result   Addendum 1 of 1         ADDENDUM:   Please note that the phlegmonous process and extensive abscess in the    right lower quadrant could be due to an indolent previously ruptured    appendicitis.      Final      1.  There is a complex multiloculated fluid collection with heterogeneous peripheral enhancement and internal air lucencies consistent with abscess in the anterior right lower quadrant, etiology uncertain. This extends from the peritoneal cavity through    the musculature into the subcutaneous tissues. There is abnormal enhancement abutting the right abdominal wall musculature and right psoas muscle with multiple smaller loculated fluid components.   2.  There is a questionable phlegmonous type process versus mass just inferior to the cecum.   3.  There are bilateral parapelvic cysts and renal cortical cysts which do not need further imaging follow-up.   4.  There is colonic  diverticulosis with no acute diverticulitis.   5.  There is a tiny dependent gallstone.        Total time of the discharge process exceeds 45 minutes.

## 2022-03-10 NOTE — PROGRESS NOTES
"Radiology Progress Note   Author: DYLLAN Soto Date & Time created: 3/10/2022  7:53 AM   Date of admission  2/28/2022  Note to reader: this note follows the APSO format rather than the historical SOAP format. Assessment and plan located at the top of the note for ease of use.    Chief Complaint  71 y.o. male admitted 2/28/2022 with   Chief Complaint   Patient presents with   • Other     Mass on right side of abdomen.        HPI  \" 71-year-old male with history of type 2 diabetes dyslipidemia hypertension admitted with right lower quadrant pain noted to have complex fluid collection concerning for an abscess in the right lower quadrant.  He was evaluated by surgery with recommendation for CT-guided drainage\"    Assessment/Plan  Interval History   Principal Problem:    Right lower quadrant abdominal abscess (HCC)  Active Problems:    Hypothyroidism    Diabetes mellitus type 2, noninsulin dependent (HCC)    Dyslipidemia    Cognitive impairment    Anemia    Essential hypertension    Benign prostatic hyperplasia with urinary retention    Other specified glaucoma      Plan IR  - Scant output overnight, minimal serous drian output in MICHELLE bulb this AM   - Drain removed   - Wound education provided to patient   - Fluid cultures + Streptococcus intermedius, Escherichia coli  - Antibiotics per ID    - ID to follow-up in 3 to 4 weeks  -Thank you for allowing Interventional Radiology team to participate in the patients care, if any additional care or requests are needed in the future please do not hesitate call or place IR order. IR signing off             IR:   3/1- RLQ drain placed   3/2- 130 ml purulent fluid, WBC improving since drain placed 13.3, 0/10 pain,   3/3- leukocytosis 16.6, 20 ml output in AM  3/4- Drain pulled back by accident overnight, Leukocytosis increasing 23.9, scant output   :Displaced RLQ drain. New 10 fr RLQ drain. Removal of the old, malpositioned drain.   3/5- 30 ml   3/6- 15 ml   3/7-  58 " ml purulent fluid,  WBC trending down 13.4   3/8- CT shows interval decrease in the abscess involving the anterior right abdominal wall status post pigtail catheter placement, currently measures 0.9 x 3.4 cm previously 2.3 x 6 cm.     20 ml output in am. Reviewed and discussed case with IR MD Canela    3/9- 5 ml serous drain output with small amount of purulent fluid. WBC trending down 12.8             Review of Systems  Physical Exam   Review of Systems   Constitutional: Negative for chills and fever.   HENT: Negative for hearing loss.    Eyes: Negative for blurred vision.   Respiratory: Negative for cough, hemoptysis and shortness of breath.    Cardiovascular: Negative for chest pain and palpitations.   Gastrointestinal: Negative for abdominal pain and vomiting.   Genitourinary: Negative for dysuria.   Musculoskeletal: Negative for myalgias.   Skin: Negative for rash.   Neurological: Negative for dizziness, weakness and headaches.   Endo/Heme/Allergies: Does not bruise/bleed easily.   Psychiatric/Behavioral: Negative for suicidal ideas.      Vitals:    03/10/22 0653   BP: 112/66   Pulse: 62   Resp: 18   Temp: 36 °C (96.8 °F)   SpO2: 93%        Physical Exam  Constitutional:       Appearance: Normal appearance.   HENT:      Head: Normocephalic.      Nose: Nose normal.      Mouth/Throat:      Mouth: Mucous membranes are moist.   Eyes:      Pupils: Pupils are equal, round, and reactive to light.   Cardiovascular:      Rate and Rhythm: Normal rate.   Pulmonary:      Effort: Pulmonary effort is normal. No respiratory distress.   Abdominal:      Palpations: Abdomen is soft.      Tenderness: There is no abdominal tenderness.   Musculoskeletal:         General: No tenderness or deformity.      Cervical back: Normal range of motion.   Skin:     General: Skin is warm and dry.      Capillary Refill: Capillary refill takes less than 2 seconds.      Coloration: Skin is not jaundiced or pale.   Neurological:      General: No  focal deficit present.      Mental Status: He is alert.      Motor: No weakness.   Psychiatric:         Mood and Affect: Affect is flat.         Behavior: Behavior is slowed. Behavior is cooperative.             Labs    Recent Labs     03/08/22  0315 03/09/22  0220 03/10/22  0042   WBC 14.6* 12.8* 13.3*   RBC 3.86* 3.96* 3.99*   HEMOGLOBIN 10.8* 11.1* 11.1*   HEMATOCRIT 33.8* 34.1* 34.8*   MCV 87.6 86.1 87.2   MCH 28.0 28.0 27.8   MCHC 32.0* 32.6* 31.9*   RDW 45.8 44.9 46.1   PLATELETCT 423 407 377   MPV 9.5 9.6 9.9         No results for input(s): ALBUMIN, TBILIRUBIN, ALKPHOSPHAT, TOTPROTEIN, ALTSGPT, ASTSGOT, CREATININE in the last 72 hours.  CT-ABDOMEN-PELVIS WITH   Final Result      1.  Interval decrease in the abscess involving the anterior right abdominal wall status post pigtail catheter placement. Inflammatory changes in the right lower quadrant are unchanged. Inflammation extends to the anterior right lateral bladder wall.    Mildly improved inflammatory changes in the subcutaneous soft tissues.      2.  Right nephrolithiasis.      3.  Diverticulosis.         CT-DRAIN-PERITONEAL   Final Result      1.  CT GUIDED EXCHANGE OF A RIGHT LOWER QUADRANT DRAINAGE CATHETER.      2.  THE CURRENT PLAN IS TO MONITOR DRAINAGE OUTPUT AND OBTAIN A FOLLOWUP CT SCAN IN 5-7 DAYS IF CLINICALLY INDICATED.      CT-ABDOMEN-PELVIS WITH   Final Result      1.  Interval decrease in the abscess involving the anterior right abdominal wall status post pigtail catheter placement. Inflammatory changes in the right lower quadrant are unchanged. Inflammation extends to the anterior right lateral bladder wall      2.  Right nephrolithiasis.      3.  Diverticulosis.      4.  Stable soft tissue mass in the anterior right lower chest, presumably benign.      CT-DRAIN-PERITONEAL   Final Result      1.  CT guided peritoneal RLQ catheter drainage.   2.  The current plan is to obtain a follow-up CT in 5-7 days..      CT-ABDOMEN-PELVIS WITH    Final Result   Addendum 1 of 1         ADDENDUM:   Please note that the phlegmonous process and extensive abscess in the    right lower quadrant could be due to an indolent previously ruptured    appendicitis.      Final      1.  There is a complex multiloculated fluid collection with heterogeneous peripheral enhancement and internal air lucencies consistent with abscess in the anterior right lower quadrant, etiology uncertain. This extends from the peritoneal cavity through    the musculature into the subcutaneous tissues. There is abnormal enhancement abutting the right abdominal wall musculature and right psoas muscle with multiple smaller loculated fluid components.   2.  There is a questionable phlegmonous type process versus mass just inferior to the cecum.   3.  There are bilateral parapelvic cysts and renal cortical cysts which do not need further imaging follow-up.   4.  There is colonic diverticulosis with no acute diverticulitis.   5.  There is a tiny dependent gallstone.          INR   Date Value Ref Range Status   02/28/2022 1.35 (H) 0.87 - 1.13 Final     Comment:     INR - Non-therapeutic Reference Range: 0.87-1.13  INR - Therapeutic Reference Range: 2.0-4.0       No results found for: POCINR     Intake/Output Summary (Last 24 hours) at 3/2/2022 1347  Last data filed at 3/2/2022 1310  Gross per 24 hour   Intake 458 ml   Output 1225 ml   Net -767 ml      Labs not explicitly included in this progress note were reviewed by the author. Radiology/imaging not explicitly included in this progress note was reviewed by the author.     I have performed a physical exam and reviewed and updated ROS and Plan today (3/10/2022).     10 minutes in directly providing and coordinating care and extensive data review.  No time overlap and excludes procedures.

## 2022-03-10 NOTE — DISCHARGE PLANNING
DC Transport Scheduled    Received request at: 1327    Transport Company Scheduled:  [facility]  [facility] was unable to accommodate  Original transport  Request on 3/10/2022    Scheduled Date: 03/11/2022 (Friday)  Scheduled Time: 0900    Destination: [address] [facility]    Notified care team of scheduled transport via Voalte.     If there are any changes needed to the DC transportation scheduled, please contact [facility] at ext. [phone] between the hours of 7892-0280 Mon-Fri. If outside those hours, contact the ED Case Manager at ext. [phone].

## 2022-03-10 NOTE — PROGRESS NOTES
"      DATE: 3/10/2022    Hospital Day 10 right lower quadrant abdominal abscess.    INTERVAL EVENTS:  IR drain removed  Tolerating diet  Adequate pain control  Cleared for discharge from surgical perspective    REVIEW OF SYSTEMS:  Review of Systems   Constitutional: Negative for chills, fever and malaise/fatigue.   Respiratory: Negative for cough.    Gastrointestinal: Negative for diarrhea, nausea and vomiting.        BM 3/9   Genitourinary: Negative for dysuria.        Voiding   Neurological: Negative for dizziness and headaches.   All other systems reviewed and are negative.      PHYSICAL EXAMINATION:  Vital Signs: /66   Pulse 62   Temp 36 °C (96.8 °F) (Temporal)   Resp 18   Ht 1.753 m (5' 9\")   Wt 77.8 kg (171 lb 8.3 oz)   SpO2 93%   Physical Exam  Vitals and nursing note reviewed.   Constitutional:       General: He is not in acute distress.     Appearance: He is normal weight.   Pulmonary:      Effort: Pulmonary effort is normal. No respiratory distress.      Breath sounds: Normal breath sounds.   Abdominal:      General: There is no distension.      Palpations: Abdomen is soft.      Tenderness: There is no abdominal tenderness.   Skin:     General: Skin is warm.      Capillary Refill: Capillary refill takes less than 2 seconds.   Neurological:      Mental Status: He is alert.   Psychiatric:         Mood and Affect: Mood normal.         LABORATORY VALUES:   Recent Labs     03/08/22  0315 03/09/22  0220 03/10/22  0042   WBC 14.6* 12.8* 13.3*   RBC 3.86* 3.96* 3.99*   HEMOGLOBIN 10.8* 11.1* 11.1*   HEMATOCRIT 33.8* 34.1* 34.8*   MCV 87.6 86.1 87.2   MCH 28.0 28.0 27.8   MCHC 32.0* 32.6* 31.9*   RDW 45.8 44.9 46.1   PLATELETCT 423 407 377   MPV 9.5 9.6 9.9                    IMAGING:   CT-ABDOMEN-PELVIS WITH   Final Result      1.  Interval decrease in the abscess involving the anterior right abdominal wall status post pigtail catheter placement. Inflammatory changes in the right lower quadrant are " unchanged. Inflammation extends to the anterior right lateral bladder wall.    Mildly improved inflammatory changes in the subcutaneous soft tissues.      2.  Right nephrolithiasis.      3.  Diverticulosis.         CT-DRAIN-PERITONEAL   Final Result      1.  CT GUIDED EXCHANGE OF A RIGHT LOWER QUADRANT DRAINAGE CATHETER.      2.  THE CURRENT PLAN IS TO MONITOR DRAINAGE OUTPUT AND OBTAIN A FOLLOWUP CT SCAN IN 5-7 DAYS IF CLINICALLY INDICATED.      CT-ABDOMEN-PELVIS WITH   Final Result      1.  Interval decrease in the abscess involving the anterior right abdominal wall status post pigtail catheter placement. Inflammatory changes in the right lower quadrant are unchanged. Inflammation extends to the anterior right lateral bladder wall      2.  Right nephrolithiasis.      3.  Diverticulosis.      4.  Stable soft tissue mass in the anterior right lower chest, presumably benign.      CT-DRAIN-PERITONEAL   Final Result      1.  CT guided peritoneal RLQ catheter drainage.   2.  The current plan is to obtain a follow-up CT in 5-7 days..      CT-ABDOMEN-PELVIS WITH   Final Result   Addendum 1 of 1         ADDENDUM:   Please note that the phlegmonous process and extensive abscess in the    right lower quadrant could be due to an indolent previously ruptured    appendicitis.      Final      1.  There is a complex multiloculated fluid collection with heterogeneous peripheral enhancement and internal air lucencies consistent with abscess in the anterior right lower quadrant, etiology uncertain. This extends from the peritoneal cavity through    the musculature into the subcutaneous tissues. There is abnormal enhancement abutting the right abdominal wall musculature and right psoas muscle with multiple smaller loculated fluid components.   2.  There is a questionable phlegmonous type process versus mass just inferior to the cecum.   3.  There are bilateral parapelvic cysts and renal cortical cysts which do not need further  imaging follow-up.   4.  There is colonic diverticulosis with no acute diverticulitis.   5.  There is a tiny dependent gallstone.          ASSESSMENT AND PLAN:   Follow up with [facility] or primary care provider in one month and recommend a repeat CT abd/pelvis with IV contrast    Also recommend follow up with GI for colonoscopy.     Discussed patient condition with RN, Patient and Dr. Aponte.

## 2022-03-10 NOTE — PROGRESS NOTES
Hospital Medicine Daily Progress Note    Date of Service  3/10/2022    Chief Complaint  Lico Salinas is a 71 y.o. male admitted 2/28/2022 with abdominal pain    Hospital Course  This is a 71 year old male with PMHx of hypertension, hyperlipidemia, type 2 diabetes, hypothyroidism and BPH who was admitted on 02/28 with abdominal pain.    CT imaging noted right lower quadrant abdominal abscess s/p IR drain placement on 3/01 and replacement after dislodgment on 03/04.Cultures grew Streptococcus intermedius and E. Coli, ID following with recommendations to continue with Zosyn.  ID recommends patient to continue with Omnicef and Flagyl with end date of 04/04/2022.  Patient will follow up with general surgery in about a month, for repeat CT imaging.    Patient had repeat CT imaging on 03/08, which showed interval decrease in the abscess.  Drain in place for an additional 2 days with minimal output, IR removed the drain on 03/10.    Patient set up with home health and walker and meds to beds for his antibiotics.     Interval Problem Update  IR removed the drain on 03/10.    ID recommends patient to continue with Omnicef and Flagyl with end date of 04/04/2022.     Discharge delayed today due to group [place] not being able to accept patient after 5pm (transportation delayed).     Transport arranged for tomorrow AM.     COVID swabbed as per request from group home.     I have personally seen and examined the patient at bedside. I discussed the plan of care with patient, bedside RN, charge RN,  and pharmacy.    Consultants/Specialty  GI and IR    Code Status  DNAR/DNI    Disposition  Patient is medically cleared for discharge.   Anticipate discharge to Assisted living facility.  I have placed the appropriate orders for post-discharge needs.    Review of Systems  Review of Systems   All other systems reviewed and are negative.       Physical Exam  Temp:  [36 °C (96.8 °F)-36.5 °C (97.7 °F)] 36 °C (96.8 °F)  Pulse:   [62-72] 62  Resp:  [18] 18  BP: (100-121)/(50-76) 112/66  SpO2:  [93 %-98 %] 93 %    Physical Exam  Vitals and nursing note reviewed.   Constitutional:       General: He is not in acute distress.     Appearance: Normal appearance.   HENT:      Head: Normocephalic and atraumatic.   Eyes:      Extraocular Movements: Extraocular movements intact.      Conjunctiva/sclera: Conjunctivae normal.      Pupils: Pupils are equal, round, and reactive to light.   Cardiovascular:      Rate and Rhythm: Normal rate and regular rhythm.      Pulses: Normal pulses.      Heart sounds: No murmur heard.    No friction rub. No gallop.   Pulmonary:      Effort: Pulmonary effort is normal. No respiratory distress.      Breath sounds: Normal breath sounds. No wheezing, rhonchi or rales.   Abdominal:      General: Bowel sounds are normal. There is no distension.      Palpations: Abdomen is soft.      Tenderness: There is no abdominal tenderness.      Comments: Drain in place with output   Musculoskeletal:         General: No swelling or tenderness. Normal range of motion.      Cervical back: Normal range of motion and neck supple. No muscular tenderness.      Right lower leg: No edema.      Left lower leg: No edema.   Skin:     General: Skin is warm and dry.      Capillary Refill: Capillary refill takes less than 2 seconds.      Findings: No bruising, erythema or rash.   Neurological:      General: No focal deficit present.      Mental Status: He is alert and oriented to person, place, and time.         Fluids    Intake/Output Summary (Last 24 hours) at 3/10/2022 1552  Last data filed at 3/10/2022 1322  Gross per 24 hour   Intake 850 ml   Output 5 ml   Net 845 ml       Laboratory  Recent Labs     03/08/22  0315 03/09/22  0220 03/10/22  0042   WBC 14.6* 12.8* 13.3*   RBC 3.86* 3.96* 3.99*   HEMOGLOBIN 10.8* 11.1* 11.1*   HEMATOCRIT 33.8* 34.1* 34.8*   MCV 87.6 86.1 87.2   MCH 28.0 28.0 27.8   MCHC 32.0* 32.6* 31.9*   RDW 45.8 44.9 46.1    PLATELETCT 423 407 377   MPV 9.5 9.6 9.9                       Imaging  CT-ABDOMEN-PELVIS WITH   Final Result      1.  Interval decrease in the abscess involving the anterior right abdominal wall status post pigtail catheter placement. Inflammatory changes in the right lower quadrant are unchanged. Inflammation extends to the anterior right lateral bladder wall.    Mildly improved inflammatory changes in the subcutaneous soft tissues.      2.  Right nephrolithiasis.      3.  Diverticulosis.         CT-DRAIN-PERITONEAL   Final Result      1.  CT GUIDED EXCHANGE OF A RIGHT LOWER QUADRANT DRAINAGE CATHETER.      2.  THE CURRENT PLAN IS TO MONITOR DRAINAGE OUTPUT AND OBTAIN A FOLLOWUP CT SCAN IN 5-7 DAYS IF CLINICALLY INDICATED.      CT-ABDOMEN-PELVIS WITH   Final Result      1.  Interval decrease in the abscess involving the anterior right abdominal wall status post pigtail catheter placement. Inflammatory changes in the right lower quadrant are unchanged. Inflammation extends to the anterior right lateral bladder wall      2.  Right nephrolithiasis.      3.  Diverticulosis.      4.  Stable soft tissue mass in the anterior right lower chest, presumably benign.      CT-DRAIN-PERITONEAL   Final Result      1.  CT guided peritoneal RLQ catheter drainage.   2.  The current plan is to obtain a follow-up CT in 5-7 days..      CT-ABDOMEN-PELVIS WITH   Final Result   Addendum 1 of 1         ADDENDUM:   Please note that the phlegmonous process and extensive abscess in the    right lower quadrant could be due to an indolent previously ruptured    appendicitis.      Final      1.  There is a complex multiloculated fluid collection with heterogeneous peripheral enhancement and internal air lucencies consistent with abscess in the anterior right lower quadrant, etiology uncertain. This extends from the peritoneal cavity through    the musculature into the subcutaneous tissues. There is abnormal enhancement abutting the right  abdominal wall musculature and right psoas muscle with multiple smaller loculated fluid components.   2.  There is a questionable phlegmonous type process versus mass just inferior to the cecum.   3.  There are bilateral parapelvic cysts and renal cortical cysts which do not need further imaging follow-up.   4.  There is colonic diverticulosis with no acute diverticulitis.   5.  There is a tiny dependent gallstone.           Assessment/Plan  * Right lower quadrant abdominal abscess (HCC)- (present on admission)  Assessment & Plan  CT imaging noted right lower quadrant abdominal abscess s/p IR drain placement on 3/01 and replacement after dislodgment on 03/04.  Cultures grew Streptococcus intermedius and E. Coli  ID following with recommendations to continue with Zosyn.     Patient had repeat CT imaging on 03/08, which showed interval decrease in the abscess.  Drain in place for an additional 2 days with minimal output, IR removed the drain on 03/10.  ID recommends patient to continue with Omnicef and Flagyl with end date of 04/04/2022.     Other specified glaucoma  Assessment & Plan  Continue with patient's eyedrops    Benign prostatic hyperplasia with urinary retention  Assessment & Plan  Resume patient's Proscar and Flomax    Essential hypertension- (present on admission)  Assessment & Plan  History of  Patient is normotensive throughout this admission    Anemia- (present on admission)  Assessment & Plan  Hemoglobin stable  Continue to monitor    Cognitive impairment- (present on admission)  Assessment & Plan  Stable   Frequent orientation and monitor for delirium    Dyslipidemia- (present on admission)  Assessment & Plan  Continue fenofibrate    Diabetes mellitus type 2, noninsulin dependent (HCC)- (present on admission)  Assessment & Plan  Controlled  Last A1c noted in 2021 of 5.8    Hypothyroidism- (present on admission)  Assessment & Plan  Resume patient's Synthroid       VTE prophylaxis: SCDs/TEDs and  enoxaparin ppx    I have performed a physical exam and reviewed and updated ROS and Plan today (3/10/2022). In review of yesterday's note (3/9/2022), there are no changes except as documented above.

## 2022-03-10 NOTE — DISCHARGE PLANNING
Received Choice form at 4375  Agency/Facility Name: [facility]  Referral sent per Choice form @ 9510

## 2022-03-10 NOTE — DISCHARGE PLANNING
ATTN: Case Management  RE: Referral for Home Health    As of 03/10/2022, we have accepted the Home Health referral for the patient listed above.    A [facility] clinician will be out to see the patient within 48 hours. If you have any questions or concerns regarding the patient's transition to Home Health, please do not hesitate to contact us at [phone].      We look forward to collaborating with you,  [facility] Home Health Team

## 2022-03-11 ENCOUNTER — PHARMACY VISIT (OUTPATIENT)
Dept: PHARMACY | Facility: MEDICAL CENTER | Age: 72
End: 2022-03-11
Payer: COMMERCIAL

## 2022-03-11 VITALS
SYSTOLIC BLOOD PRESSURE: 118 MMHG | DIASTOLIC BLOOD PRESSURE: 63 MMHG | TEMPERATURE: 97.8 F | HEIGHT: 69 IN | WEIGHT: 171.52 LBS | RESPIRATION RATE: 18 BRPM | BODY MASS INDEX: 25.4 KG/M2 | OXYGEN SATURATION: 96 % | HEART RATE: 58 BPM

## 2022-03-11 LAB
ERYTHROCYTE [DISTWIDTH] IN BLOOD BY AUTOMATED COUNT: 48.6 FL (ref 35.9–50)
HCT VFR BLD AUTO: 36.3 % (ref 42–52)
HGB BLD-MCNC: 11.6 G/DL (ref 14–18)
MCH RBC QN AUTO: 28.4 PG (ref 27–33)
MCHC RBC AUTO-ENTMCNC: 32 G/DL (ref 33.7–35.3)
MCV RBC AUTO: 88.8 FL (ref 81.4–97.8)
PLATELET # BLD AUTO: 391 K/UL (ref 164–446)
PMV BLD AUTO: 9.9 FL (ref 9–12.9)
RBC # BLD AUTO: 4.09 M/UL (ref 4.7–6.1)
WBC # BLD AUTO: 14.8 K/UL (ref 4.8–10.8)

## 2022-03-11 PROCEDURE — 99239 HOSP IP/OBS DSCHRG MGMT >30: CPT | Performed by: GENERAL PRACTICE

## 2022-03-11 PROCEDURE — 85027 COMPLETE CBC AUTOMATED: CPT

## 2022-03-11 PROCEDURE — A9270 NON-COVERED ITEM OR SERVICE: HCPCS | Performed by: GENERAL PRACTICE

## 2022-03-11 PROCEDURE — 36415 COLL VENOUS BLD VENIPUNCTURE: CPT

## 2022-03-11 PROCEDURE — A9270 NON-COVERED ITEM OR SERVICE: HCPCS | Performed by: INTERNAL MEDICINE

## 2022-03-11 PROCEDURE — 700102 HCHG RX REV CODE 250 W/ 637 OVERRIDE(OP): Performed by: GENERAL PRACTICE

## 2022-03-11 PROCEDURE — 700102 HCHG RX REV CODE 250 W/ 637 OVERRIDE(OP): Performed by: INTERNAL MEDICINE

## 2022-03-11 RX ADMIN — LEVOTHYROXINE SODIUM 75 MCG: 75 TABLET ORAL at 05:21

## 2022-03-11 RX ADMIN — DORZOLAMIDE HYDROCHLORIDE AND TIMOLOL MALEATE 1 DROP: 20; 5 SOLUTION OPHTHALMIC at 05:21

## 2022-03-11 RX ADMIN — METRONIDAZOLE 500 MG: 500 TABLET ORAL at 05:21

## 2022-03-11 RX ADMIN — FINASTERIDE 5 MG: 5 TABLET, FILM COATED ORAL at 05:21

## 2022-03-11 RX ADMIN — FENOFIBRATE 134 MG: 67 CAPSULE ORAL at 05:21

## 2022-03-11 RX ADMIN — CEFDINIR 300 MG: 300 CAPSULE ORAL at 05:21

## 2022-03-11 RX ADMIN — OMEPRAZOLE 20 MG: 20 CAPSULE, DELAYED RELEASE ORAL at 05:21

## 2022-03-11 ASSESSMENT — PAIN DESCRIPTION - PAIN TYPE: TYPE: ACUTE PAIN

## 2022-03-11 NOTE — DISCHARGE PLANNING
H[facility] TCN chart review completed. Collaborated with ZANA Ellison. Patient seen at bedside. Current plan is DC to     [facility] . No additional TCN at this time .    Previously completed:  - Choice forms: SNF (in case of need for ongoing IV abx)  - [facility] introduced (Y), referral (not sent-  followed by Dr. Mclain).

## 2022-03-11 NOTE — DISCHARGE INSTRUCTIONS
Discharge Instructions    Discharged to group home by medical transportation with escort. Discharged via wheelchair, hospital escort: Yes.  Special equipment needed: Not Applicable    Be sure to schedule a follow-up appointment with your primary care doctor or any specialists as instructed.     Discharge Plan:   Diet Plan: Discussed  Activity Level: Discussed  Confirmed Follow up Appointment: Patient to Call and Schedule Appointment  Confirmed Symptoms Management: Discussed  Medication Reconciliation Updated: Yes  Influenza Vaccine Indication: Patient Refuses    I understand that a diet low in cholesterol, fat, and sodium is recommended for good health. Unless I have been given specific instructions below for another diet, I accept this instruction as my diet prescription.   Other diet: diabetic    Special Instructions: None    · Is patient discharged on Warfarin / Coumadin?   No     Depression / Suicide Risk    As you are [facility] from [facility], it is important to learn how to keep safe from harming yourself.    Recognize the warning signs:  · Abrupt changes in personality, positive or negative- including increase in energy   · Giving away possessions  · Change in eating patterns- significant weight changes-  positive or negative  · Change in sleeping patterns- unable to sleep or sleeping all the time   · Unwillingness or inability to communicate  · Depression  · Unusual sadness, discouragement and loneliness  · Talk of wanting to die  · Neglect of personal appearance   · Rebelliousness- reckless behavior  · Withdrawal from people/activities they love  · Confusion- inability to concentrate     If you or a loved one observes any of these behaviors or has concerns about self-harm, here's what you can do:  · Talk about it- your feelings and reasons for harming yourself  · Remove any means that you might use to hurt yourself (examples: pills, rope, extension cords, firearm)  · Get professional help  from the community (Mental Health, Substance Abuse, psychological counseling)  · Do not be alone:Call your Safe Contact- someone whom you trust who will be there for you.  · Call your local CRISIS HOTLINE 000-0366 or 693-719-9376  · Call your local Children's Mobile Crisis Response Team [place] (926) 816-2978 or www.United Mobile Apps  · Call the toll free National Suicide Prevention Hotlines   · National Suicide Prevention Lifeline 843-916-UHHB (0303)  · National sportif225 Line Network 800-SUICIDE (587-4869)    Recommend Colonoscopy in approximately one month. Please establish with gastroenterology.

## 2022-03-11 NOTE — PROGRESS NOTES
Drain was removed today, pt was supposed to DC however the accepting facility was requesting a negative covid test prior, test has since been completed and resulted negative, per case management pt will DC tomorrow morning

## 2022-03-11 NOTE — PROGRESS NOTES
Pt has been discharged to a group home, pt has left via wheelchair with [facility] transportation, meds and dc paperwork provided to pt

## 2022-03-11 NOTE — DISCHARGE SUMMARY
Discharge Summary    CHIEF COMPLAINT ON ADMISSION  Chief Complaint   Patient presents with   • Other     Mass on right side of abdomen.        Reason for Admission  Sent by MD     Admission Date  2/28/2022    CODE STATUS  DNAR/DNI    HPI & HOSPITAL COURSE  This is a 71 year old male with PMHx of hypertension, hyperlipidemia, type 2 diabetes, hypothyroidism and BPH who was admitted on 02/28 with abdominal pain.    CT imaging noted right lower quadrant abdominal abscess s/p IR drain placement on 3/01 and replacement after dislodgment on 03/04.Cultures grew Streptococcus intermedius and E. Coli, ID following with recommendations to continue with Zosyn.  ID recommends patient to continue with Omnicef and Flagyl with end date of 04/04/2022.  Patient will follow up with general surgery in about a month, for repeat CT imaging.    Patient had repeat CT imaging on 03/08, which showed interval decrease in the abscess.  Drain in place for an additional 2 days with minimal output, IR removed the drain on 03/10.    Patient set up with home health and walker and meds to beds for his antibiotics.     Therefore, he is discharged in good and stable condition to home with organized home healthcare and close outpatient follow-up.    The patient met 2-midnight criteria for an inpatient stay at the time of discharge.    Discharge Date  03/10/2022    FOLLOW UP ITEMS POST DISCHARGE  Primary care physician  General surgery    DISCHARGE DIAGNOSES  Principal Problem:    Right lower quadrant abdominal abscess (HCC) POA: Yes  Active Problems:    Hypothyroidism POA: Yes    Diabetes mellitus type 2, noninsulin dependent (HCC) POA: Yes    Dyslipidemia POA: Yes    Cognitive impairment POA: Yes    Anemia POA: Yes    Essential hypertension POA: Yes    Benign prostatic hyperplasia with urinary retention POA: Unknown    Other specified glaucoma POA: Unknown  Resolved Problems:    Sepsis (HCC) POA: Yes    Hyponatremia POA: Unknown      FOLLOW  UP  Bj Aponte M.D.  [address]  [address]  [place] [address] [phone]    In 1 month  call office, will need to arrange out patient CT abdomen pelvis with IV contrast.    Nayla Mclain M.D.  [address] [address]  [place] [id number] [phone]          [facility]  [address]  [place] [address]  [phone]          MEDICATIONS ON DISCHARGE     Medication List      START taking these medications      Instructions   cefdinir 300 MG Caps  Commonly known as: OMNICEF   Take 1 Capsule by mouth every 12 hours for 25 days.  Dose: 300 mg     fenofibrate micronized 134 MG capsule  Commonly known as: LOFIBRA  Replaces: fenofibrate 145 MG Tabs   Take 1 Capsule by mouth every day for 30 days.  Dose: 134 mg     metroNIDAZOLE 500 MG Tabs  Commonly known as: FLAGYL   Take 1 Tablet by mouth every 8 hours for 25 days.  Dose: 500 mg     tamsulosin 0.4 MG capsule  Commonly known as: FLOMAX  Replaces: alfuzosin 10 MG SR tablet   Take 1 Capsule by mouth at bedtime for 30 days.  Dose: 0.4 mg        CONTINUE taking these medications      Instructions   acetaminophen 500 MG Tabs  Commonly known as: TYLENOL   Take 500 mg by mouth 2 times a day.  Dose: 500 mg     Centrum Silver Adult 50+ Tabs   Take 1 Tablet by mouth every day.  Dose: 1 Tablet     CoQ10 100 MG Caps   Take 100 mg by mouth every day.  Dose: 100 mg     Cyanocobalamin 5000 MCG Tbdp   Take 5,000 mcg by mouth every day.  Dose: 5,000 mcg     dorzolamide-timolol 22.3-6.8 MG/ML Soln  Commonly known as: COSOPT   Administer 1 Drop into both eyes 2 times a day for 30 days.  Dose: 1 Drop     finasteride 5 MG Tabs  Commonly known as: PROSCAR   Take 1 Tablet by mouth every day for 30 days.  Dose: 5 mg     Glucosamine HCl 1500 MG Tabs   Take 1,500 mg by mouth every day.  Dose: 1,500 mg     latanoprost 0.005 % Soln  Commonly known as: XALATAN   Administer 1 Drop into both eyes every evening for 30 days.  Dose: 1 Drop     levothyroxine 75 MCG  Tabs  Commonly known as: SYNTHROID   Take 1 Tablet by mouth every morning on an empty stomach for 30 days.  Dose: 75 mcg     Melatonin 10 MG Tabs   Take 1 tablet by mouth at bedtime for 30 days.  Dose: 10 mg     omeprazole 20 MG delayed-release capsule  Commonly known as: PRILOSEC   Take 1 Capsule by mouth every day for 30 days.  Dose: 20 mg     sennosides-docusate sodium 8.6-50 MG tablet  Commonly known as: SENOKOT-S   Take 1 tablet by mouth 2 times a day.  Dose: 1 Tablet     VITAMIN D3 ADULT GUMMIES PO   Take 2,000 Units by mouth every day.  Dose: 2,000 Units        STOP taking these medications    alfuzosin 10 MG SR tablet  Commonly known as: UROXATRAL  Replaced by: tamsulosin 0.4 MG capsule     fenofibrate 145 MG Tabs  Commonly known as: TRICOR  Replaced by: fenofibrate micronized 134 MG capsule            Allergies  Allergies   Allergen Reactions   • Pcn [Penicillins] Unspecified     As a child, unknown reaction  Tolerated Zosyn 3/2022       DIET  Orders Placed This Encounter   Procedures   • Diet Order Diet: Consistent CHO (Diabetic)     Standing Status:   Standing     Number of Occurrences:   1     Order Specific Question:   Diet:     Answer:   Consistent CHO (Diabetic) [4]       ACTIVITY  As tolerated.  Weight bearing as tolerated    CONSULTATIONS  General surgery  Interventional radiology  Infectious disease    PROCEDURES  Drain placement and removal    LABORATORY  Lab Results   Component Value Date    SODIUM 135 03/07/2022    POTASSIUM 3.9 03/07/2022    CHLORIDE 102 03/07/2022    CO2 22 03/07/2022    GLUCOSE 131 (H) 03/07/2022    BUN 12 03/07/2022    CREATININE 0.77 03/07/2022        Lab Results   Component Value Date    WBC 14.8 (H) 03/11/2022    HEMOGLOBIN 11.6 (L) 03/11/2022    HEMATOCRIT 36.3 (L) 03/11/2022    PLATELETCT 391 03/11/2022      CT-ABDOMEN-PELVIS WITH   Final Result      1.  Interval decrease in the abscess involving the anterior right abdominal wall status post pigtail catheter placement.  Inflammatory changes in the right lower quadrant are unchanged. Inflammation extends to the anterior right lateral bladder wall.    Mildly improved inflammatory changes in the subcutaneous soft tissues.      2.  Right nephrolithiasis.      3.  Diverticulosis.         CT-DRAIN-PERITONEAL   Final Result      1.  CT GUIDED EXCHANGE OF A RIGHT LOWER QUADRANT DRAINAGE CATHETER.      2.  THE CURRENT PLAN IS TO MONITOR DRAINAGE OUTPUT AND OBTAIN A FOLLOWUP CT SCAN IN 5-7 DAYS IF CLINICALLY INDICATED.      CT-ABDOMEN-PELVIS WITH   Final Result      1.  Interval decrease in the abscess involving the anterior right abdominal wall status post pigtail catheter placement. Inflammatory changes in the right lower quadrant are unchanged. Inflammation extends to the anterior right lateral bladder wall      2.  Right nephrolithiasis.      3.  Diverticulosis.      4.  Stable soft tissue mass in the anterior right lower chest, presumably benign.      CT-DRAIN-PERITONEAL   Final Result      1.  CT guided peritoneal RLQ catheter drainage.   2.  The current plan is to obtain a follow-up CT in 5-7 days..      CT-ABDOMEN-PELVIS WITH   Final Result   Addendum 1 of 1         ADDENDUM:   Please note that the phlegmonous process and extensive abscess in the    right lower quadrant could be due to an indolent previously ruptured    appendicitis.      Final      1.  There is a complex multiloculated fluid collection with heterogeneous peripheral enhancement and internal air lucencies consistent with abscess in the anterior right lower quadrant, etiology uncertain. This extends from the peritoneal cavity through    the musculature into the subcutaneous tissues. There is abnormal enhancement abutting the right abdominal wall musculature and right psoas muscle with multiple smaller loculated fluid components.   2.  There is a questionable phlegmonous type process versus mass just inferior to the cecum.   3.  There are bilateral parapelvic cysts and  renal cortical cysts which do not need further imaging follow-up.   4.  There is colonic diverticulosis with no acute diverticulitis.   5.  There is a tiny dependent gallstone.        Total time of the discharge process exceeds 45 minutes.

## 2022-03-16 ENCOUNTER — HOME CARE VISIT (OUTPATIENT)
Dept: HOME HEALTH SERVICES | Facility: HOME HEALTHCARE | Age: 72
End: 2022-03-16
Payer: MEDICARE

## 2022-03-16 PROCEDURE — G0493 RN CARE EA 15 MIN HH/HOSPICE: HCPCS

## 2022-03-16 PROCEDURE — 665001 SOC-HOME HEALTH

## 2022-03-16 ASSESSMENT — FIBROSIS 4 INDEX: FIB4 SCORE: 0.91

## 2022-03-17 ENCOUNTER — DOCUMENTATION (OUTPATIENT)
Dept: MEDICAL GROUP | Facility: PHYSICIAN GROUP | Age: 72
End: 2022-03-17
Payer: MEDICARE

## 2022-03-17 ENCOUNTER — HOME CARE VISIT (OUTPATIENT)
Dept: HOME HEALTH SERVICES | Facility: HOME HEALTHCARE | Age: 72
End: 2022-03-17
Payer: MEDICARE

## 2022-03-17 VITALS
OXYGEN SATURATION: 96 % | HEART RATE: 76 BPM | SYSTOLIC BLOOD PRESSURE: 118 MMHG | HEIGHT: 69 IN | BODY MASS INDEX: 25.33 KG/M2 | RESPIRATION RATE: 16 BRPM | TEMPERATURE: 97.5 F | WEIGHT: 171 LBS | DIASTOLIC BLOOD PRESSURE: 70 MMHG

## 2022-03-17 ASSESSMENT — ENCOUNTER SYMPTOMS
HIGHEST PAIN SEVERITY IN PAST 24 HOURS: 0/10
DENIES PAIN: 1
LOWEST PAIN SEVERITY IN PAST 24 HOURS: 0/10
VOMITING: DENIES
NAUSEA: DENIES
SHORTNESS OF BREATH: T
SUBJECTIVE PAIN PROGRESSION: UNCHANGED
PAIN SEVERITY GOAL: 0/10

## 2022-03-17 ASSESSMENT — PATIENT HEALTH QUESTIONNAIRE - PHQ9
2. FEELING DOWN, DEPRESSED, IRRITABLE, OR HOPELESS: 00
CLINICAL INTERPRETATION OF PHQ2 SCORE: 0
1. LITTLE INTEREST OR PLEASURE IN DOING THINGS: 00

## 2022-03-17 ASSESSMENT — ACTIVITIES OF DAILY LIVING (ADL): OASIS_M1830: 03

## 2022-03-17 NOTE — PROGRESS NOTES
Medication chart review for [facility] services    PCP:  Nalya Mclain M.D.  [address] [address]  [place] [address]  Fax: [phone]    Current medication list     Current Outpatient Medications:   •  fenofibrate, 145 mg, Oral, DAILY  •  latanoprost, 1 Drop, Both Eyes, Q EVENING (Patient taking differently: 1 Drop, Both Eyes, EVERY EVENING, Indications: Open-Angle Glaucoma)  •  levothyroxine, 75 mcg, Oral, AM ES (Patient taking differently: 75 mcg, Oral, EACH MORNING ON EMPTY STOMACH, Indications: Hypothyroidism)  •  Melatonin, 10 mg, Oral, QHS (Patient taking differently: 10 mg, Oral, EVERY BEDTIME, Indications: insomina)  •  tamsulosin, 0.4 mg, Oral, QHS (Patient taking differently: 0.4 mg, Oral, EVERY BEDTIME, Indications: Benign Prostatic Hypertrophy)  •  dorzolamide-timolol, 1 Drop, Both Eyes, BID (Patient taking differently: 1 Drop, Both Eyes, 2 TIMES DAILY, Indications: Increased Intraocular Pressure)  •  fenofibrate micronized, 134 mg, Oral, DAILY (Patient taking differently: 134 mg, Oral, DAILY, Indications: Hyperlipidemia)  •  finasteride, 5 mg, Oral, DAILY (Patient taking differently: 5 mg, Oral, DAILY, Indications: Benign Prostatic Hypertrophy)  •  omeprazole, 20 mg, Oral, DAILY (Patient taking differently: 20 mg, Oral, DAILY, Indications: Heartburn)  •  cefdinir, 300 mg, Oral, Q12HRS (Patient taking differently: 300 mg, Oral, EVERY 12 HOURS, Indications: Uncomplicated Skin and Skin Structure Infection)  •  metroNIDAZOLE, 500 mg, Oral, Q8HRS (Patient taking differently: 500 mg, Oral, EVERY 8 HOURS, Indications: Intra-Abdominal Abscess)  •  acetaminophen, 500 mg, Oral, BID  •  Cyanocobalamin, 5,000 mcg, Oral, DAILY  •  Centrum Silver Adult 50+, 1 Tablet, Oral, DAILY  •  CoQ10, 100 mg, Oral, DAILY  •  Cholecalciferol (VITAMIN D3 ADULT GUMMIES PO), 2,000 Units, Oral, DAILY  •  Glucosamine HCl, 1,500 mg, Oral, DAILY  •  sennosides-docusate sodium, 1 Tablet, Oral, BID    Allergies   Allergen Reactions    • Pcn [Penicillins] Unspecified     As a child, unknown reaction  Tolerated Zosyn 3/2022       Labs     Lab Results   Component Value Date/Time    SODIUM 135 03/07/2022 02:03 AM    POTASSIUM 3.9 03/07/2022 02:03 AM    CHLORIDE 102 03/07/2022 02:03 AM    CO2 22 03/07/2022 02:03 AM    GLUCOSE 131 (H) 03/07/2022 02:03 AM    BUN 12 03/07/2022 02:03 AM    CREATININE 0.77 03/07/2022 02:03 AM     Lab Results   Component Value Date/Time    ALKPHOSPHAT 53 03/01/2022 04:02 AM    ASTSGOT 20 03/01/2022 04:02 AM    ALTSGPT 17 03/01/2022 04:02 AM    TBILIRUBIN 0.3 03/01/2022 04:02 AM    INR 1.35 (H) 02/28/2022 06:48 PM    ALBUMIN 2.7 (L) 03/01/2022 04:02 AM        Assessment and Plan:   • Received referral from [facility]. Medications reviewed.       Say hCe, PharmD, MS, BCACP, LCC  [facility]  Phone [phone] fax [phone]    This note was created using voice recognition software (Dragon). The accuracy of the dictation is limited by the abilities of the software. I have reviewed the note prior to signing, however some errors in grammar and context are still possible. If you have any questions related to this note please do not hesitate to contact our office.

## 2022-03-18 ENCOUNTER — HOME CARE VISIT (OUTPATIENT)
Dept: HOME HEALTH SERVICES | Facility: HOME HEALTHCARE | Age: 72
End: 2022-03-18
Payer: MEDICARE

## 2022-03-18 VITALS
OXYGEN SATURATION: 98 % | DIASTOLIC BLOOD PRESSURE: 60 MMHG | TEMPERATURE: 97.6 F | RESPIRATION RATE: 18 BRPM | HEART RATE: 72 BPM | SYSTOLIC BLOOD PRESSURE: 112 MMHG

## 2022-03-18 PROCEDURE — G0151 HHCP-SERV OF PT,EA 15 MIN: HCPCS

## 2022-03-18 PROCEDURE — G0495 RN CARE TRAIN/EDU IN HH: HCPCS

## 2022-03-18 ASSESSMENT — ACTIVITIES OF DAILY LIVING (ADL)
AMBULATION ASSISTANCE ON FLAT SURFACES: 1
AMBULATION_DISTANCE/DURATION_TOLERATED: 80 FEET

## 2022-03-18 ASSESSMENT — ENCOUNTER SYMPTOMS
LOWEST PAIN SEVERITY IN PAST 24 HOURS: 0/10
POOR JUDGMENT: 1
NAUSEA: DENIES
DENIES PAIN: 1
VOMITING: DENIES
SUBJECTIVE PAIN PROGRESSION: UNCHANGED
LIMITED RANGE OF MOTION: 1
DIFFICULTY THINKING: 1
PERSON REPORTING PAIN: PATIENT
HIGHEST PAIN SEVERITY IN PAST 24 HOURS: 0/10
MUSCLE WEAKNESS: 1
PAIN SEVERITY GOAL: 0/10
DENIES PAIN: 1

## 2022-03-20 VITALS
SYSTOLIC BLOOD PRESSURE: 118 MMHG | DIASTOLIC BLOOD PRESSURE: 60 MMHG | TEMPERATURE: 97.7 F | HEART RATE: 76 BPM | RESPIRATION RATE: 16 BRPM | OXYGEN SATURATION: 97 %

## 2022-03-20 ASSESSMENT — ENCOUNTER SYMPTOMS: PERSON REPORTING PAIN: PATIENT

## 2022-03-21 ENCOUNTER — HOSPITAL ENCOUNTER (OUTPATIENT)
Facility: MEDICAL CENTER | Age: 72
End: 2022-03-21
Attending: INTERNAL MEDICINE
Payer: MEDICARE

## 2022-03-21 PROCEDURE — 84439 ASSAY OF FREE THYROXINE: CPT

## 2022-03-21 PROCEDURE — 84443 ASSAY THYROID STIM HORMONE: CPT

## 2022-03-22 ENCOUNTER — HOME CARE VISIT (OUTPATIENT)
Dept: HOME HEALTH SERVICES | Facility: HOME HEALTHCARE | Age: 72
End: 2022-03-22
Payer: MEDICARE

## 2022-03-22 VITALS
SYSTOLIC BLOOD PRESSURE: 118 MMHG | DIASTOLIC BLOOD PRESSURE: 72 MMHG | TEMPERATURE: 97.6 F | HEART RATE: 72 BPM | OXYGEN SATURATION: 96 % | RESPIRATION RATE: 18 BRPM

## 2022-03-22 LAB
T4 FREE SERPL-MCNC: 1.1 NG/DL (ref 0.93–1.7)
TSH SERPL DL<=0.005 MIU/L-ACNC: 0.88 UIU/ML (ref 0.38–5.33)

## 2022-03-22 PROCEDURE — G0152 HHCP-SERV OF OT,EA 15 MIN: HCPCS

## 2022-03-22 PROCEDURE — G0493 RN CARE EA 15 MIN HH/HOSPICE: HCPCS

## 2022-03-22 ASSESSMENT — ENCOUNTER SYMPTOMS
DIFFICULTY THINKING: 1
SUBJECTIVE PAIN PROGRESSION: UNCHANGED
LOWEST PAIN SEVERITY IN PAST 24 HOURS: 0/10
PAIN SEVERITY GOAL: 0/10
HIGHEST PAIN SEVERITY IN PAST 24 HOURS: 0/10
PERSON REPORTING PAIN: PATIENT
DENIES PAIN: 1
POOR JUDGMENT: 1

## 2022-03-22 ASSESSMENT — ACTIVITIES OF DAILY LIVING (ADL)
AMBULATION ASSISTANCE: 1
FEEDING ASSESSED: 1
GROOMING_CURRENT_FUNCTION: SUPERVISION
BATHING_CURRENT_FUNCTION: MINIMUM ASSIST
DRESSING_UB_CURRENT_FUNCTION: STAND BY ASSIST
BATHING_CURRENT_FUNCTION: MODERATE ASSIST
GROOMING ASSESSED: 1
CURRENT_FUNCTION: SUPERVISION
AMBULATION ASSISTANCE: SUPERVISION
TOILETING: INDEPENDENT
FEEDING: INDEPENDENT
PHYSICAL TRANSFERS ASSESSED: 1
DRESSING_LB_CURRENT_FUNCTION: MINIMUM ASSIST
TOILETING: 1
GROOMING EQUIPMENT USED: STANDING AT SINK
BATHING ASSESSED: 1

## 2022-03-23 ENCOUNTER — HOME CARE VISIT (OUTPATIENT)
Dept: HOME HEALTH SERVICES | Facility: HOME HEALTHCARE | Age: 72
End: 2022-03-23
Payer: MEDICARE

## 2022-03-23 VITALS
SYSTOLIC BLOOD PRESSURE: 118 MMHG | DIASTOLIC BLOOD PRESSURE: 72 MMHG | OXYGEN SATURATION: 96 % | TEMPERATURE: 97.6 F | RESPIRATION RATE: 18 BRPM | HEART RATE: 72 BPM

## 2022-03-23 ASSESSMENT — ENCOUNTER SYMPTOMS
VOMITING: DENIES
PAIN SEVERITY GOAL: 0/10
DENIES PAIN: 1
MUSCLE WEAKNESS: 1
PERSON REPORTING PAIN: PATIENT
HIGHEST PAIN SEVERITY IN PAST 24 HOURS: 0/10
NAUSEA: DENIES
LOWEST PAIN SEVERITY IN PAST 24 HOURS: 0/10
SUBJECTIVE PAIN PROGRESSION: UNCHANGED

## 2022-03-23 NOTE — CASE COMMUNICATION
Quality Review for SOC OASIS by LUCERO Bradley, DRISS on  March 23, 2022    Edits completed by LUCERO Bradley RN:  1. Per narrative that patient needs supervision and a walker for safety, the following changes were made: , ,  are 2;  is 3; [id number], F, I, J, K are 4  2.  is no, there is no documentation of a clinically significant medication issue identified  3.  is 2 per care plan therapy sets.  4.  is 3  per functional limitations of dyspnea with minimal exertion  5. Completed F2F information  6.  2 is yes, pt is diabetic

## 2022-03-24 NOTE — CASE COMMUNICATION
I agree with these changes  ----- Message -----  From: Dyan Bradley R.N.  Sent: 3/23/2022   8:37 AM PDT  To: Arianna Smart R.N.      Quality Review for SOC OASIS by LUCERO Bradley, DRISS on  March 23, 2022    Edits completed by LUCERO Bradley RN:  1. Per narrative that patient needs supervision and a walker for safety, the following changes were made: , ,  are 2;  is 3; [id number], F, I, J, K are 4  2.  i s no, there is no documentation of a clinically significant medication issue identified  3.  is 2 per care plan therapy sets.  4.  is 3 per functional limitations of dyspnea with minimal exertion  5. Completed F2F information  6.  2 is yes, pt is diabetic

## 2022-03-25 ENCOUNTER — HOME CARE VISIT (OUTPATIENT)
Dept: HOME HEALTH SERVICES | Facility: HOME HEALTHCARE | Age: 72
End: 2022-03-25
Payer: MEDICARE

## 2022-03-25 VITALS
HEART RATE: 78 BPM | DIASTOLIC BLOOD PRESSURE: 62 MMHG | SYSTOLIC BLOOD PRESSURE: 104 MMHG | RESPIRATION RATE: 16 BRPM | TEMPERATURE: 97.5 F | OXYGEN SATURATION: 96 %

## 2022-03-25 PROCEDURE — G0495 RN CARE TRAIN/EDU IN HH: HCPCS

## 2022-03-25 ASSESSMENT — ENCOUNTER SYMPTOMS
DENIES PAIN: 1
MUSCLE WEAKNESS: 1
LOWEST PAIN SEVERITY IN PAST 24 HOURS: 0/10
PAIN SEVERITY GOAL: 0/10
DEPRESSED MOOD: 1
HIGHEST PAIN SEVERITY IN PAST 24 HOURS: 0/10
NAUSEA: DENIES
VOMITING: DENIES
SUBJECTIVE PAIN PROGRESSION: UNCHANGED

## 2022-03-25 ASSESSMENT — ACTIVITIES OF DAILY LIVING (ADL)
AMBULATION ASSISTANCE: STAND BY ASSIST
CURRENT_FUNCTION: STAND BY ASSIST

## 2022-03-28 ENCOUNTER — HOME CARE VISIT (OUTPATIENT)
Dept: HOME HEALTH SERVICES | Facility: HOME HEALTHCARE | Age: 72
End: 2022-03-28
Payer: MEDICARE

## 2022-03-28 VITALS
HEART RATE: 70 BPM | SYSTOLIC BLOOD PRESSURE: 110 MMHG | DIASTOLIC BLOOD PRESSURE: 70 MMHG | OXYGEN SATURATION: 97 % | RESPIRATION RATE: 18 BRPM | TEMPERATURE: 98.2 F

## 2022-03-28 PROCEDURE — G0493 RN CARE EA 15 MIN HH/HOSPICE: HCPCS

## 2022-03-28 ASSESSMENT — ENCOUNTER SYMPTOMS
NAUSEA: DENIES
MUSCLE WEAKNESS: 1
HIGHEST PAIN SEVERITY IN PAST 24 HOURS: 0/10
PERSON REPORTING PAIN: PATIENT
PAIN SEVERITY GOAL: 0/10
VOMITING: DENIES
DENIES PAIN: 1
LOWEST PAIN SEVERITY IN PAST 24 HOURS: 0/10
SUBJECTIVE PAIN PROGRESSION: UNCHANGED

## 2022-03-29 LAB
FUNGUS SPEC CULT: NORMAL
FUNGUS SPEC FUNGUS STN: NORMAL
SIGNIFICANT IND 70042: NORMAL
SITE SITE: NORMAL
SOURCE SOURCE: NORMAL

## 2022-03-31 ENCOUNTER — HOME CARE VISIT (OUTPATIENT)
Dept: HOME HEALTH SERVICES | Facility: HOME HEALTHCARE | Age: 72
End: 2022-03-31
Payer: MEDICARE

## 2022-03-31 VITALS
HEART RATE: 68 BPM | DIASTOLIC BLOOD PRESSURE: 60 MMHG | RESPIRATION RATE: 18 BRPM | SYSTOLIC BLOOD PRESSURE: 100 MMHG | OXYGEN SATURATION: 96 % | TEMPERATURE: 98.2 F

## 2022-03-31 PROCEDURE — G0493 RN CARE EA 15 MIN HH/HOSPICE: HCPCS

## 2022-03-31 ASSESSMENT — ENCOUNTER SYMPTOMS
NAUSEA: DENIES
PAIN SEVERITY GOAL: 0/10
PERSON REPORTING PAIN: PATIENT
MUSCLE WEAKNESS: 1
SUBJECTIVE PAIN PROGRESSION: UNCHANGED
LOWEST PAIN SEVERITY IN PAST 24 HOURS: 0/10
VOMITING: DENIES
DENIES PAIN: 1
HIGHEST PAIN SEVERITY IN PAST 24 HOURS: 0/10

## 2022-04-07 ENCOUNTER — HOME CARE VISIT (OUTPATIENT)
Dept: HOME HEALTH SERVICES | Facility: HOME HEALTHCARE | Age: 72
End: 2022-04-07
Payer: MEDICARE

## 2022-04-07 VITALS
SYSTOLIC BLOOD PRESSURE: 100 MMHG | RESPIRATION RATE: 18 BRPM | TEMPERATURE: 98.4 F | HEART RATE: 72 BPM | DIASTOLIC BLOOD PRESSURE: 65 MMHG | OXYGEN SATURATION: 95 %

## 2022-04-07 PROCEDURE — G0493 RN CARE EA 15 MIN HH/HOSPICE: HCPCS

## 2022-04-07 ASSESSMENT — ENCOUNTER SYMPTOMS
SUBJECTIVE PAIN PROGRESSION: UNCHANGED
LOWEST PAIN SEVERITY IN PAST 24 HOURS: 0/10
HIGHEST PAIN SEVERITY IN PAST 24 HOURS: 0/10
DENIES PAIN: 1
PERSON REPORTING PAIN: PATIENT
PAIN SEVERITY GOAL: 0/10

## 2022-04-07 ASSESSMENT — PATIENT HEALTH QUESTIONNAIRE - PHQ9: CLINICAL INTERPRETATION OF PHQ2 SCORE: 0

## 2022-04-08 ENCOUNTER — HOME CARE VISIT (OUTPATIENT)
Dept: HOME HEALTH SERVICES | Facility: HOME HEALTHCARE | Age: 72
End: 2022-04-08
Payer: MEDICARE

## 2022-04-08 ASSESSMENT — ACTIVITIES OF DAILY LIVING (ADL)
OASIS_M1830: 02
HOME_HEALTH_OASIS: 01

## 2022-04-08 NOTE — CASE COMMUNICATION
Quality Review for 4.7.22 [place] OASIS performed on by ANIVAL Ortega RN on 4.8.2022:    Edits completed by ANIVAL Ortega RN:  1. Changed  to yes, SOC was in March which is flu season. Answered  as 3 per Epic immunization data.  2. Changed  to 0 per pain assessment and last visit reporting no pain  3. Changed  to 1, per new guidance option #2 should only be chosen if the pt will be on service with other HHA.   4. Changed  E  to na per POC  5. Changed  to 2 per [id number] E response of 4  6. Changed  F to A per T8247i and CG note

## 2022-04-08 NOTE — Clinical Note
I agree with changes.  ----- Message -----  From: Nelly Ortega R.N.  Sent: 4/8/2022   7:06 AM PDT  To: Sherri Stahl R.N.      Quality Review for 4.7.22 [place] OASIS performed on by ANIVAL Ortega RN on 4.8.2022:    Edits completed by ANIVAL Ortega RN:  1. Changed  to yes, SOC was in March which is flu season. Answered  as 3 per Epic immunization data.  2. Changed  to 0 per pain assessment and last visit reporting no pain  3. Changed  to 1, per new guidance option #2 should only be chosen if the pt will be on service with other HHA.   4. Changed  E to na per POC  5. Changed  to 2 per [id number] E response of 4  6. Changed  F to A per M8263a and CG note

## 2022-04-11 ENCOUNTER — TELEPHONE (OUTPATIENT)
Dept: HEALTH INFORMATION MANAGEMENT | Facility: OTHER | Age: 72
End: 2022-04-11
Payer: MEDICARE

## 2022-04-12 NOTE — CASE COMMUNICATION
I agree with changes.  ----- Message -----  From: Nelly Ortega R.N.  Sent: 4/8/2022   1:59 PM PDT  To: Sherri Stahl R.N.  Subject: RE:                                                NOEMI Lozadanne, please try signing and saving ot chart it. It did not save. Thank you  ----- Message -----  From: Sherri Stahl R.N.  Sent: 4/8/2022  12:59 PM PDT  To: Nelly Ortega R.N.  Subject: RE:                                               I agree with changes.  ----- Message -----  From: Nelly Ortega R.N.  Sent: 4/8/2022   7:06 AM PDT  To: Sherri Stahl R.N.      Quality Review for 4.7.22 [place] OASIS performed on by ANIVAL Ortega RN on 4.8.2022:    Edits completed by ANIVAL Ortega RN:  1. Changed  to yes, SOC was in March which is flu season. Answered  as 3 per Epic immunization data.  2. Changed  to 0 per pain assessment and last visit re porting no pain  3. Changed  to 1, per new guidance option #2 should only be chosen if the pt will be on service with other HHA.   4. Changed  E to na per POC  5. Changed  to 2 per [id number] E response of 4  6. Changed  F to A per T6346p and CG note

## 2022-06-15 ENCOUNTER — PATIENT MESSAGE (OUTPATIENT)
Dept: MEDICAL GROUP | Facility: MEDICAL CENTER | Age: 72
End: 2022-06-15
Payer: MEDICARE

## 2022-06-15 DIAGNOSIS — E03.9 HYPOTHYROIDISM, UNSPECIFIED TYPE: ICD-10-CM

## 2022-06-15 DIAGNOSIS — E11.9 DIABETES MELLITUS TYPE 2, NONINSULIN DEPENDENT (HCC): ICD-10-CM

## 2022-06-15 DIAGNOSIS — E78.5 DYSLIPIDEMIA: ICD-10-CM

## 2022-06-15 DIAGNOSIS — D64.9 ANEMIA, UNSPECIFIED TYPE: ICD-10-CM

## 2022-06-15 DIAGNOSIS — R74.8 ELEVATED CPK: ICD-10-CM

## 2022-06-15 NOTE — PATIENT COMMUNICATION
[facility]

## 2022-06-21 ENCOUNTER — HOSPITAL ENCOUNTER (OUTPATIENT)
Dept: LAB | Facility: MEDICAL CENTER | Age: 72
End: 2022-06-21
Attending: FAMILY MEDICINE
Payer: MEDICARE

## 2022-06-21 DIAGNOSIS — E03.9 HYPOTHYROIDISM, UNSPECIFIED TYPE: ICD-10-CM

## 2022-06-21 DIAGNOSIS — E78.5 DYSLIPIDEMIA: ICD-10-CM

## 2022-06-21 DIAGNOSIS — R74.8 ELEVATED CPK: ICD-10-CM

## 2022-06-21 DIAGNOSIS — E11.9 DIABETES MELLITUS TYPE 2, NONINSULIN DEPENDENT (HCC): ICD-10-CM

## 2022-06-21 DIAGNOSIS — D64.9 ANEMIA, UNSPECIFIED TYPE: ICD-10-CM

## 2022-06-21 LAB
ALBUMIN SERPL BCP-MCNC: 4.3 G/DL (ref 3.2–4.9)
ALBUMIN/GLOB SERPL: 1.5 G/DL
ALP SERPL-CCNC: 46 U/L (ref 30–99)
ALT SERPL-CCNC: 11 U/L (ref 2–50)
ANION GAP SERPL CALC-SCNC: 12 MMOL/L (ref 7–16)
AST SERPL-CCNC: 16 U/L (ref 12–45)
BASOPHILS # BLD AUTO: 0.5 % (ref 0–1.8)
BASOPHILS # BLD: 0.05 K/UL (ref 0–0.12)
BILIRUB SERPL-MCNC: 0.2 MG/DL (ref 0.1–1.5)
BUN SERPL-MCNC: 18 MG/DL (ref 8–22)
CALCIUM SERPL-MCNC: 9.3 MG/DL (ref 8.5–10.5)
CHLORIDE SERPL-SCNC: 102 MMOL/L (ref 96–112)
CHOLEST SERPL-MCNC: 168 MG/DL (ref 100–199)
CK SERPL-CCNC: 80 U/L (ref 0–154)
CO2 SERPL-SCNC: 24 MMOL/L (ref 20–33)
CREAT SERPL-MCNC: 0.98 MG/DL (ref 0.5–1.4)
CREAT UR-MCNC: 120.43 MG/DL
EOSINOPHIL # BLD AUTO: 0.16 K/UL (ref 0–0.51)
EOSINOPHIL NFR BLD: 1.7 % (ref 0–6.9)
ERYTHROCYTE [DISTWIDTH] IN BLOOD BY AUTOMATED COUNT: 44 FL (ref 35.9–50)
EST. AVERAGE GLUCOSE BLD GHB EST-MCNC: 137 MG/DL
GFR SERPLBLD CREATININE-BSD FMLA CKD-EPI: 81 ML/MIN/1.73 M 2
GLOBULIN SER CALC-MCNC: 2.9 G/DL (ref 1.9–3.5)
GLUCOSE SERPL-MCNC: 118 MG/DL (ref 65–99)
HBA1C MFR BLD: 6.4 % (ref 4–5.6)
HCT VFR BLD AUTO: 47.7 % (ref 42–52)
HDLC SERPL-MCNC: 50 MG/DL
HGB BLD-MCNC: 15.8 G/DL (ref 14–18)
IMM GRANULOCYTES # BLD AUTO: 0.04 K/UL (ref 0–0.11)
IMM GRANULOCYTES NFR BLD AUTO: 0.4 % (ref 0–0.9)
LDLC SERPL CALC-MCNC: 100 MG/DL
LYMPHOCYTES # BLD AUTO: 4.52 K/UL (ref 1–4.8)
LYMPHOCYTES NFR BLD: 49 % (ref 22–41)
MCH RBC QN AUTO: 29.9 PG (ref 27–33)
MCHC RBC AUTO-ENTMCNC: 33.1 G/DL (ref 33.7–35.3)
MCV RBC AUTO: 90.3 FL (ref 81.4–97.8)
MICROALBUMIN UR-MCNC: 1.3 MG/DL
MICROALBUMIN/CREAT UR: 11 MG/G (ref 0–30)
MONOCYTES # BLD AUTO: 0.43 K/UL (ref 0–0.85)
MONOCYTES NFR BLD AUTO: 4.7 % (ref 0–13.4)
NEUTROPHILS # BLD AUTO: 4.03 K/UL (ref 1.82–7.42)
NEUTROPHILS NFR BLD: 43.7 % (ref 44–72)
NRBC # BLD AUTO: 0 K/UL
NRBC BLD-RTO: 0 /100 WBC
PLATELET # BLD AUTO: 229 K/UL (ref 164–446)
PMV BLD AUTO: 11.1 FL (ref 9–12.9)
POTASSIUM SERPL-SCNC: 4.1 MMOL/L (ref 3.6–5.5)
PROT SERPL-MCNC: 7.2 G/DL (ref 6–8.2)
RBC # BLD AUTO: 5.28 M/UL (ref 4.7–6.1)
SODIUM SERPL-SCNC: 138 MMOL/L (ref 135–145)
TRIGL SERPL-MCNC: 90 MG/DL (ref 0–149)
TSH SERPL DL<=0.005 MIU/L-ACNC: 3.4 UIU/ML (ref 0.38–5.33)
WBC # BLD AUTO: 9.2 K/UL (ref 4.8–10.8)

## 2022-06-21 PROCEDURE — 36415 COLL VENOUS BLD VENIPUNCTURE: CPT

## 2022-06-21 PROCEDURE — 83036 HEMOGLOBIN GLYCOSYLATED A1C: CPT

## 2022-06-21 PROCEDURE — 85025 COMPLETE CBC W/AUTO DIFF WBC: CPT

## 2022-06-21 PROCEDURE — 80061 LIPID PANEL: CPT

## 2022-06-21 PROCEDURE — 82550 ASSAY OF CK (CPK): CPT

## 2022-06-21 PROCEDURE — 80053 COMPREHEN METABOLIC PANEL: CPT

## 2022-06-21 PROCEDURE — 84443 ASSAY THYROID STIM HORMONE: CPT

## 2022-06-21 PROCEDURE — 82570 ASSAY OF URINE CREATININE: CPT

## 2022-06-21 PROCEDURE — 82043 UR ALBUMIN QUANTITATIVE: CPT

## 2022-06-24 SDOH — ECONOMIC STABILITY: INCOME INSECURITY: HOW HARD IS IT FOR YOU TO PAY FOR THE VERY BASICS LIKE FOOD, HOUSING, MEDICAL CARE, AND HEATING?: NOT HARD AT ALL

## 2022-06-24 SDOH — ECONOMIC STABILITY: TRANSPORTATION INSECURITY
IN THE PAST 12 MONTHS, HAS LACK OF RELIABLE TRANSPORTATION KEPT YOU FROM MEDICAL APPOINTMENTS, MEETINGS, WORK OR FROM GETTING THINGS NEEDED FOR DAILY LIVING?: NO

## 2022-06-24 SDOH — ECONOMIC STABILITY: HOUSING INSECURITY: IN THE LAST 12 MONTHS, HOW MANY PLACES HAVE YOU LIVED?: 2

## 2022-06-24 SDOH — HEALTH STABILITY: PHYSICAL HEALTH: ON AVERAGE, HOW MANY DAYS PER WEEK DO YOU ENGAGE IN MODERATE TO STRENUOUS EXERCISE (LIKE A BRISK WALK)?: 0 DAYS

## 2022-06-24 SDOH — ECONOMIC STABILITY: HOUSING INSECURITY
IN THE LAST 12 MONTHS, WAS THERE A TIME WHEN YOU DID NOT HAVE A STEADY PLACE TO SLEEP OR SLEPT IN A SHELTER (INCLUDING NOW)?: NO

## 2022-06-24 SDOH — ECONOMIC STABILITY: INCOME INSECURITY: IN THE LAST 12 MONTHS, WAS THERE A TIME WHEN YOU WERE NOT ABLE TO PAY THE MORTGAGE OR RENT ON TIME?: NO

## 2022-06-24 SDOH — ECONOMIC STABILITY: FOOD INSECURITY: WITHIN THE PAST 12 MONTHS, YOU WORRIED THAT YOUR FOOD WOULD RUN OUT BEFORE YOU GOT MONEY TO BUY MORE.: NEVER TRUE

## 2022-06-24 SDOH — HEALTH STABILITY: PHYSICAL HEALTH: ON AVERAGE, HOW MANY MINUTES DO YOU ENGAGE IN EXERCISE AT THIS LEVEL?: 0 MIN

## 2022-06-24 SDOH — HEALTH STABILITY: MENTAL HEALTH
STRESS IS WHEN SOMEONE FEELS TENSE, NERVOUS, ANXIOUS, OR CAN'T SLEEP AT NIGHT BECAUSE THEIR MIND IS TROUBLED. HOW STRESSED ARE YOU?: NOT AT ALL

## 2022-06-24 SDOH — ECONOMIC STABILITY: FOOD INSECURITY: WITHIN THE PAST 12 MONTHS, THE FOOD YOU BOUGHT JUST DIDN'T LAST AND YOU DIDN'T HAVE MONEY TO GET MORE.: NEVER TRUE

## 2022-06-24 ASSESSMENT — SOCIAL DETERMINANTS OF HEALTH (SDOH)
WITHIN THE PAST 12 MONTHS, YOU WORRIED THAT YOUR FOOD WOULD RUN OUT BEFORE YOU GOT THE MONEY TO BUY MORE: NEVER TRUE
HOW OFTEN DO YOU ATTEND CHURCH OR RELIGIOUS SERVICES?: MORE THAN 4 TIMES PER YEAR
IN A TYPICAL WEEK, HOW MANY TIMES DO YOU TALK ON THE PHONE WITH FAMILY, FRIENDS, OR NEIGHBORS?: TWICE A WEEK
HOW OFTEN DO YOU ATTEND CHURCH OR RELIGIOUS SERVICES?: MORE THAN 4 TIMES PER YEAR
HOW OFTEN DO YOU HAVE SIX OR MORE DRINKS ON ONE OCCASION: NEVER
DO YOU BELONG TO ANY CLUBS OR ORGANIZATIONS SUCH AS CHURCH GROUPS UNIONS, FRATERNAL OR ATHLETIC GROUPS, OR SCHOOL GROUPS?: NO
IN A TYPICAL WEEK, HOW MANY TIMES DO YOU TALK ON THE PHONE WITH FAMILY, FRIENDS, OR NEIGHBORS?: TWICE A WEEK
HOW HARD IS IT FOR YOU TO PAY FOR THE VERY BASICS LIKE FOOD, HOUSING, MEDICAL CARE, AND HEATING?: NOT HARD AT ALL
HOW OFTEN DO YOU GET TOGETHER WITH FRIENDS OR RELATIVES?: ONCE A WEEK
DO YOU BELONG TO ANY CLUBS OR ORGANIZATIONS SUCH AS CHURCH GROUPS UNIONS, FRATERNAL OR ATHLETIC GROUPS, OR SCHOOL GROUPS?: NO
HOW OFTEN DO YOU HAVE A DRINK CONTAINING ALCOHOL: MONTHLY OR LESS
HOW OFTEN DO YOU ATTENT MEETINGS OF THE CLUB OR ORGANIZATION YOU BELONG TO?: NEVER
HOW OFTEN DO YOU GET TOGETHER WITH FRIENDS OR RELATIVES?: ONCE A WEEK
HOW MANY DRINKS CONTAINING ALCOHOL DO YOU HAVE ON A TYPICAL DAY WHEN YOU ARE DRINKING: PATIENT DOES NOT DRINK
HOW OFTEN DO YOU ATTENT MEETINGS OF THE CLUB OR ORGANIZATION YOU BELONG TO?: NEVER

## 2022-06-24 ASSESSMENT — LIFESTYLE VARIABLES
HOW MANY STANDARD DRINKS CONTAINING ALCOHOL DO YOU HAVE ON A TYPICAL DAY: PATIENT DOES NOT DRINK
HOW OFTEN DO YOU HAVE A DRINK CONTAINING ALCOHOL: MONTHLY OR LESS
AUDIT-C TOTAL SCORE: 1
SKIP TO QUESTIONS 9-10: 1
HOW OFTEN DO YOU HAVE SIX OR MORE DRINKS ON ONE OCCASION: NEVER

## 2022-06-27 ENCOUNTER — OFFICE VISIT (OUTPATIENT)
Dept: MEDICAL GROUP | Facility: MEDICAL CENTER | Age: 72
End: 2022-06-27
Payer: MEDICARE

## 2022-06-27 VITALS
RESPIRATION RATE: 20 BRPM | SYSTOLIC BLOOD PRESSURE: 94 MMHG | HEIGHT: 69 IN | DIASTOLIC BLOOD PRESSURE: 60 MMHG | HEART RATE: 76 BPM | BODY MASS INDEX: 27.74 KG/M2 | OXYGEN SATURATION: 92 % | TEMPERATURE: 97.2 F | WEIGHT: 187.28 LBS

## 2022-06-27 DIAGNOSIS — R35.0 URINARY FREQUENCY: ICD-10-CM

## 2022-06-27 DIAGNOSIS — K65.1 RIGHT LOWER QUADRANT ABDOMINAL ABSCESS (HCC): ICD-10-CM

## 2022-06-27 DIAGNOSIS — R63.4 UNINTENDED WEIGHT LOSS: ICD-10-CM

## 2022-06-27 DIAGNOSIS — R59.0 HILAR LYMPHADENOPATHY: ICD-10-CM

## 2022-06-27 DIAGNOSIS — E11.9 DIABETES MELLITUS TYPE 2, NONINSULIN DEPENDENT (HCC): ICD-10-CM

## 2022-06-27 DIAGNOSIS — R41.89 COGNITIVE IMPAIRMENT: ICD-10-CM

## 2022-06-27 DIAGNOSIS — R74.8 ELEVATED CPK: ICD-10-CM

## 2022-06-27 PROBLEM — D64.9 ANEMIA: Status: RESOLVED | Noted: 2020-11-23 | Resolved: 2022-06-27

## 2022-06-27 PROBLEM — R40.0 SOMNOLENCE, DAYTIME: Status: RESOLVED | Noted: 2020-12-02 | Resolved: 2022-06-27

## 2022-06-27 PROBLEM — J30.9 ALLERGIC RHINITIS: Status: RESOLVED | Noted: 2020-10-05 | Resolved: 2022-06-27

## 2022-06-27 PROCEDURE — 99214 OFFICE O/P EST MOD 30 MIN: CPT | Performed by: FAMILY MEDICINE

## 2022-06-27 RX ORDER — TAMSULOSIN HYDROCHLORIDE 0.4 MG/1
0.4 CAPSULE ORAL DAILY
COMMUNITY
Start: 2022-05-19

## 2022-06-27 RX ORDER — NICOTINE POLACRILEX 4 MG/1
GUM, CHEWING ORAL
COMMUNITY
Start: 2022-05-22 | End: 2022-11-16

## 2022-06-27 RX ORDER — FENOFIBRATE 134 MG/1
134 CAPSULE ORAL DAILY
COMMUNITY
Start: 2022-05-19 | End: 2023-04-25 | Stop reason: SDUPTHER

## 2022-06-27 RX ORDER — LOPERAMIDE HYDROCHLORIDE 2 MG/1
2 CAPSULE ORAL
COMMUNITY
End: 2022-09-20

## 2022-06-27 ASSESSMENT — FIBROSIS 4 INDEX: FIB4 SCORE: 1.56

## 2022-06-27 NOTE — PROGRESS NOTES
[facility]  Progress Note  Established Patient    Subjective:   Lico Salinas is a 72 y.o. male here today with a chief complaint of sugars. The patient is accompanied by his wife.     Urinary frequency  Now under the care of urology. States has recently seen urology.     Unintended weight loss  Resolved. Denies fever, chills, night sweats.     Right lower quadrant abdominal abscess (HCC)  Treated in hospital with drainage and abx.     Hilar lymphadenopathy  Mild. Incidentally noted on 2020 CT. Previously ordered f/u PET but this wasn't done.     Elevated CPK  Resolved.     Diabetes mellitus type 2, noninsulin dependent (HCC)  Diet controlled, now in prediabetes territory.     Cognitive impairment  The patient was previously suffering from abrupt cognitive change including anxiety, depression, tremulousness, grimacing, echolalia and shuffling.  He was following with neurology and psychiatry and has undergone an extensive work-up.  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 This ultimately culminated in a psychiatric hospitalization and, later, 14 months in a group home.  Today, the patient is doing much better.  He still experiences amotivation but has a very good memory and denies significant depression.  He denies suicidal ideation.    ROS: negative for CP, SOB, dizziness, lightheadedness.     Current Outpatient Medications on File Prior to Visit   Medication Sig Dispense Refill   • loperamide (IMODIUM) 2 MG Cap Take 2 mg by mouth.     • tamsulosin (FLOMAX) 0.4 MG capsule Take 0.4 mg by mouth every day.     • fenofibrate micronized (LOFIBRA) 134 MG capsule Take 134 mg by mouth every day.     • omeprazole (PRILOSEC) 20 MG Tablet Delayed Response delayed-release tablet      • finasteride (PROSCAR) 5 MG Tab Take 5 mg by mouth every day.     • latanoprost (XALATAN) 0.005 % Solution Administer 1 Drop into both eyes at bedtime.     • levothyroxine  "(SYNTHROID) 75 MCG Tab Take 75 mcg by mouth every morning on an empty stomach.     • Melatonin 10 MG Tab Take 1 Tablet by mouth at bedtime.     • acetaminophen (TYLENOL) 500 MG Tab Take 500 mg by mouth 2 times a day. Indications: Pain     • Cyanocobalamin 5000 MCG TABLET DISPERSIBLE Take 5,000 mcg by mouth every day. Indications: Suppliment     • Multiple Vitamins-Minerals (CENTRUM SILVER ADULT 50+) Tab Take 1 Tablet by mouth every day. Indications: Suppliment     • Coenzyme Q10 (COQ10) 100 MG Cap Take 100 mg by mouth every day. Indications: Suppliment     • Cholecalciferol (VITAMIN D3 ADULT GUMMIES PO) Take 2,000 Units by mouth every day. Indications: Suppliment     • Glucosamine HCl 1500 MG Tab Take 1,500 mg by mouth every day. Indications: Suppliment     • sennosides-docusate sodium (SENOKOT-S) 8.6-50 MG tablet Take 1 Tablet by mouth 2 times a day. Indications: Constipation       No current facility-administered medications on file prior to visit.          Objective:     Vitals:    06/27/22 0956   BP: (!) 94/60   BP Location: Left arm   Patient Position: Sitting   BP Cuff Size: Adult long   Pulse: 76   Resp: 20   Temp: 36.2 °C (97.2 °F)   TempSrc: Temporal   SpO2: 92%   Weight: 84.9 kg (187 lb 4.5 oz)   Height: 1.753 m (5' 9\")       Physical Exam:  General: alert in no apparent distress.   Cardio: RRR no m/r/g.   Resp: CTAB.   Neuropsychiatric: Slightly avoidant eye contact but, overall, significantly improved from when I saw him in April 2021.  No overt anxiety, tremulousness, shuffling gait or echolalia.        Assessment and Plan:     1. Urinary frequency  - f/u urology.     2. Unintended weight loss  - resolved.     3. Right lower quadrant abdominal abscess (HCC)  - message out to confirm gen surg f/u.     4. Hilar lymphadenopathy  I reviewed the CT scan findings with the patient and suggested a follow-up CT scan of the chest to confirm no growth as this might indicate a more concerning process.  Malignancy " is always a consideration.  Patient declines further evaluation at this time.    5. Elevated CPK  - resolved.     6. Diabetes mellitus type 2, noninsulin dependent (HCC)  Now in the prediabetes realm.  -Monitor.    7. Cognitive impairment  The etiology of the patient's symptoms remains uncertain to me.  Conceivably, the symptoms could have simply been manifestations of severe depression.  Alternatively, a neurologic process is also a consideration.  In any case, I am pleased to see that the patient is doing so much better.  I am hopeful he will continue to recover nicely.  To help the amotivation I recommended he set a goal each day to get out and get some small amount of exercise, such as walking down the block.  I suggested referral to psychiatry and neurology.  I offered to send the patient to the [facility] neuro immunology department.  He declines referrals and will follow-up with his new PCP.    The patient is aware that I am leaving [facility] at the end of July.  I recommend he establish with a new PCP for them.      Followup: Return if symptoms worsen or fail to improve.

## 2022-06-27 NOTE — ASSESSMENT & PLAN NOTE
The patient was previously suffering from abrupt cognitive change including anxiety, depression, tremulousness, grimacing, echolalia and shuffling.  He was following with neurology and psychiatry and has undergone an extensive work-up.  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 This ultimately culminated in a psychiatric hospitalization and, later, 14 months in a group home.  Today, the patient is doing much better.  He still experiences amotivation but has a very good memory and denies significant depression.  He denies suicidal ideation.

## 2022-07-07 ENCOUNTER — PATIENT MESSAGE (OUTPATIENT)
Dept: MEDICAL GROUP | Facility: MEDICAL CENTER | Age: 72
End: 2022-07-07
Payer: MEDICARE

## 2022-07-07 DIAGNOSIS — B35.1 ONYCHOMYCOSIS: ICD-10-CM

## 2022-07-09 PROBLEM — E11.39 DIABETIC GLAUCOMA (HCC): Status: ACTIVE | Noted: 2022-07-09

## 2022-07-09 PROBLEM — E78.5 HYPERLIPIDEMIA ASSOCIATED WITH TYPE 2 DIABETES MELLITUS (HCC): Status: ACTIVE | Noted: 2022-07-09

## 2022-07-09 PROBLEM — E11.69 HYPERLIPIDEMIA ASSOCIATED WITH TYPE 2 DIABETES MELLITUS (HCC): Status: ACTIVE | Noted: 2022-07-09

## 2022-07-09 PROBLEM — H42 DIABETIC GLAUCOMA (HCC): Status: ACTIVE | Noted: 2022-07-09

## 2022-07-09 PROBLEM — I70.0 ATHEROSCLEROSIS OF AORTA (HCC): Status: ACTIVE | Noted: 2022-07-09

## 2022-07-09 PROBLEM — G31.9 MILD CEREBRAL ATROPHY (HCC): Status: ACTIVE | Noted: 2022-07-09

## 2022-09-20 ENCOUNTER — OFFICE VISIT (OUTPATIENT)
Dept: MEDICAL GROUP | Facility: PHYSICIAN GROUP | Age: 72
End: 2022-09-20
Payer: MEDICARE

## 2022-09-20 VITALS
RESPIRATION RATE: 14 BRPM | HEART RATE: 74 BPM | DIASTOLIC BLOOD PRESSURE: 66 MMHG | HEIGHT: 69 IN | TEMPERATURE: 97.2 F | OXYGEN SATURATION: 96 % | WEIGHT: 196.2 LBS | SYSTOLIC BLOOD PRESSURE: 112 MMHG | BODY MASS INDEX: 29.06 KG/M2

## 2022-09-20 DIAGNOSIS — Z23 NEED FOR VACCINATION: ICD-10-CM

## 2022-09-20 DIAGNOSIS — R33.8 BENIGN PROSTATIC HYPERPLASIA WITH URINARY RETENTION: ICD-10-CM

## 2022-09-20 DIAGNOSIS — Z76.89 ESTABLISHING CARE WITH NEW DOCTOR, ENCOUNTER FOR: ICD-10-CM

## 2022-09-20 DIAGNOSIS — E11.9 DIABETES MELLITUS TYPE 2, NONINSULIN DEPENDENT (HCC): ICD-10-CM

## 2022-09-20 DIAGNOSIS — E03.9 HYPOTHYROIDISM, UNSPECIFIED TYPE: ICD-10-CM

## 2022-09-20 DIAGNOSIS — E78.5 DYSLIPIDEMIA: ICD-10-CM

## 2022-09-20 DIAGNOSIS — N40.1 BENIGN PROSTATIC HYPERPLASIA WITH URINARY RETENTION: ICD-10-CM

## 2022-09-20 PROBLEM — Z00.00 HEALTHCARE MAINTENANCE: Status: RESOLVED | Noted: 2019-12-20 | Resolved: 2022-09-20

## 2022-09-20 PROCEDURE — 99203 OFFICE O/P NEW LOW 30 MIN: CPT | Mod: 25 | Performed by: FAMILY MEDICINE

## 2022-09-20 PROCEDURE — 90662 IIV NO PRSV INCREASED AG IM: CPT | Performed by: FAMILY MEDICINE

## 2022-09-20 PROCEDURE — G0008 ADMIN INFLUENZA VIRUS VAC: HCPCS | Performed by: FAMILY MEDICINE

## 2022-09-20 RX ORDER — ROSUVASTATIN CALCIUM 10 MG/1
10 TABLET, COATED ORAL EVERY EVENING
Qty: 90 TABLET | Refills: 1 | Status: SHIPPED | OUTPATIENT
Start: 2022-09-20 | End: 2023-05-25

## 2022-09-20 ASSESSMENT — FIBROSIS 4 INDEX: FIB4 SCORE: 1.56

## 2022-09-20 NOTE — ASSESSMENT & PLAN NOTE
Chronic medical diagnosis.  Diet controlled.  Last A1c from June was at 6.4%.  Previously was on metformin 500 mg twice a day.   Used to weight 250 pounds and has lost weight in 2020 . Wife states that no one know why he lost so much weight. Recently saw Dr Cano for glaucoma surgery. Will request records.

## 2022-09-20 NOTE — LETTER
[facility]  Aurelia Zayas M.D.  [address]  [place] [address]  Fax: [phone]   Authorization for Release/Disclosure of   Protected Health Information   Name: LICO ALSTON : 1950 SSN: [id number]   Address: [address] [place] [address]  [place] [address] Phone:    [phone] (home)    I authorize the entity listed below to release/disclose the PHI below to:   [facility]/Aurelia Zayas M.D. and Aurelia Zayas M.D.   Provider or Entity Name:  Ophthalmology/ Dr. Urias   Address   City, [facility]   Phone:      Fax:     Reason for request: continuity of care   Information to be released:    [  ] LAST COLONOSCOPY,  including any PATH REPORT and follow-up  [  ] LAST FIT/COLOGUARD RESULT [  ] LAST DEXA  [  ] LAST MAMMOGRAM  [  ] LAST PAP  [  ] LAST LABS [ X ] RETINA EXAM REPORT  [  ] IMMUNIZATION RECORDS  [  ] Release all info      [  ] Check here and initial the line next to each item to release ALL health information INCLUDING  _____ Care and treatment for drug and / or alcohol abuse  _____ HIV testing, infection status, or AIDS  _____ Genetic Testing    DATES OF SERVICE OR TIME PERIOD TO BE DISCLOSED: _____________  I understand and acknowledge that:  * This Authorization may be revoked at any time by you in writing, except if your health information has already been used or disclosed.  * Your health information that will be used or disclosed as a result of you signing this authorization could be re-disclosed by the recipient. If this occurs, your re-disclosed health information may no longer be protected by State or Federal laws.  * You may refuse to sign this Authorization. Your refusal will not affect your ability to obtain treatment.  * This Authorization becomes effective upon signing and will  on (date) __________.      If no date is indicated, this Authorization will  one (1) year from the signature date.    Name: Lico Mellorohit    Continuity of care    Date:     2022        PLEASE FAX REQUESTED RECORDS BACK TO: [phone]

## 2022-09-20 NOTE — ASSESSMENT & PLAN NOTE
Chronic medical diagnosis.  Currently not on a statin.  Records reviewed and his Zocor and niacin were discontinued in 2015.  Currently taking fenofibrate 134 mg daily.  The 10-year ASCVD risk score (Milwaukeemirian FOX Jr., et al., 2013) is: 31.5%      Latest Reference Range & Units 6/21/22 09:49   Cholesterol,Tot 100 - 199 mg/dL 168   Triglycerides 0 - 149 mg/dL 90   HDL >=40 mg/dL 50   LDL <100 mg/dL 100 (H)   (H): Data is abnormally high

## 2022-09-20 NOTE — ASSESSMENT & PLAN NOTE
Chronic.  Follows up with urology  [facility]  Continues to take proscar and flomax.

## 2022-09-20 NOTE — PROGRESS NOTES
CC:    Chief Complaint   Patient presents with    Establish Care       HISTORY OF THE PRESENT ILLNESS: Patient is a 72 y.o. male. This pleasant patient is here today to establish care. His prior PCP was Grayson Teresa.  He was discharged from a group home in June.  Has been there for approximately a year.  Continues to follow-up with neurology as needed.    Benign prostatic hyperplasia with urinary retention  Chronic.  Follows up with urology  Wakes up 1-2 times at night to go to the [facility].  Continues to take proscar and flomax.     Hypothyroidism  Chronic.  Takes levoxyl 75mcg.  In the past, was f/u jil, Dr Stevens.    Diabetes mellitus type 2, noninsulin dependent (HCC)  Chronic medical diagnosis.  Diet controlled.  Last A1c from June was at 6.4%.  Previously was on metformin 500 mg twice a day.   Used to weight 250 pounds and has lost weight in 2020 . Wife states that no one know why he lost so much weight. Recently saw Dr Cano for glaucoma surgery. Will request records.     Dyslipidemia  Chronic medical diagnosis.  Currently not on a statin.  Records reviewed and his Zocor and niacin were discontinued in 2015.  Currently taking fenofibrate 134 mg daily.  The 10-year ASCVD risk score (Stafford DC Jr., et al., 2013) is: 31.5%      Latest Reference Range & Units 6/21/22 09:49   Cholesterol,Tot 100 - 199 mg/dL 168   Triglycerides 0 - 149 mg/dL 90   HDL >=40 mg/dL 50   LDL <100 mg/dL 100 (H)   (H): Data is abnormally high      Allergies: Pcn [penicillins]    Current Outpatient Medications Ordered in Epic   Medication Sig Dispense Refill    B Complex Vitamins (VITAMIN-B COMPLEX PO) Take  by mouth.      rosuvastatin (CRESTOR) 10 MG Tab Take 1 Tablet by mouth every evening. 90 Tablet 1    tamsulosin (FLOMAX) 0.4 MG capsule Take 0.4 mg by mouth every day.      fenofibrate micronized (LOFIBRA) 134 MG capsule Take 134 mg by mouth every day.      omeprazole (PRILOSEC) 20 MG Tablet Delayed Response delayed-release tablet        finasteride (PROSCAR) 5 MG Tab Take 5 mg by mouth every day.      latanoprost (XALATAN) 0.005 % Solution Administer 1 Drop into both eyes at bedtime.      levothyroxine (SYNTHROID) 75 MCG Tab Take 75 mcg by mouth every morning on an empty stomach.      Melatonin 10 MG Tab Take 1 Tablet by mouth at bedtime.      acetaminophen (TYLENOL) 500 MG Tab Take 500 mg by mouth 2 times a day. Indications: Pain      Cyanocobalamin 5000 MCG TABLET DISPERSIBLE Take 5,000 mcg by mouth every day. Indications: Suppliment      Multiple Vitamins-Minerals (CENTRUM SILVER ADULT 50+) Tab Take 1 Tablet by mouth every day. Indications: Suppliment      Coenzyme Q10 (COQ10) 100 MG Cap Take 100 mg by mouth every day. Indications: Suppliment      Cholecalciferol (VITAMIN D3 ADULT GUMMIES PO) Take 2,000 Units by mouth every day. Indications: Suppliment      Glucosamine HCl 1500 MG Tab Take 1,500 mg by mouth every day. Indications: Suppliment      sennosides-docusate sodium (SENOKOT-S) 8.6-50 MG tablet Take 1 Tablet by mouth 2 times a day. Indications: Constipation       No current Epic-ordered facility-administered medications on file.       Past Medical History:   Diagnosis Date    Anxiety     Diabetes (HCC) 07-10-15    Glaucoma     OU    High cholesterol 07-10-15    hx of, took self off simvastin    Hypertension 07-10-15    hx of, took himself off lisinopril and HTCZ    Hypothyroidism     Neuropathy, peripheral     Skin cancer     Snoring     Thyroid disease        Past Surgical History:   Procedure Laterality Date    CARPAL TUNNEL ENDOSCOPIC Left 9/5/2017    Procedure: CARPAL TUNNEL ENDOSCOPIC VERSUS;  Surgeon: Frank Alvarado M.D.;  Location: SURGERY [facility];  Service: Orthopedics    LYMPH NODE EXCISION Left 7/21/2015    Procedure: LYMPH NODE EXCISION DEEP CERVICAL;  Surgeon: Michele Gan M.D.;  Location: SURGERY SAME DAY [facility];  Service:     LUMBAR DISC REPLACEMENT         Social History     Tobacco Use    Smoking  "status: Former     Packs/day: 1.00     Years: 20.00     Pack years: 20.00     Types: Cigarettes     Quit date: 1980     Years since quittin.7    Smokeless tobacco: Never   Vaping Use    Vaping Use: Never used   Substance Use Topics    Alcohol use: Not Currently     Alcohol/week: 0.5 oz     Types: 1 Cans of beer per week     Comment: has not been drinking lately     Drug use: No       Social History     Social History Narrative    ,    No kids,    Retired manfacturing       Family History   Problem Relation Age of Onset    Heart Disease Mother     Alcohol abuse Mother     Lung Disease Father     Heart Disease Father     Drug abuse Sister     No Known Problems Brother        Health Maintenance: Flu vaccine today.  Will request  records from Dr Cano for retinal scan.       Objective:     Exam:   /66   Pulse 74   Temp 36.2 °C (97.2 °F) (Temporal)   Resp 14   Ht 1.753 m (5' 9\")   Wt 89 kg (196 lb 3.2 oz)   SpO2 96%   BMI 28.97 kg/m²   Body mass index is 28.97 kg/m².  Wt Readings from Last 4 Encounters:   22 89 kg (196 lb 3.2 oz)   22 83.5 kg (184 lb)   22 84.9 kg (187 lb 4.5 oz)   22 77.6 kg (171 lb)     General: Normal appearing. No distress. Appropriately groomed.  HEENT: Normocephalic. Eyes conjunctiva clear lids without ptosis,   Neck: Supple, Thyroid is not enlarged.   Pulmonary: Clear to ausculation.  Normal effort. No rales, ronchi, or wheezing.  Cardiovascular: Regular rate and rhythm, no lower extremity edema  Neurologic: Grossly nonfocal  Skin: Warm and dry.  No obvious lesions.  Musculoskeletal: Normal gait. No extremity cyanosis, clubbing, or edema.  Psych: Normal mood and affect. Alert and oriented x3. Judgment and insight is normal.      Assessment & Plan:   72 y.o. male with the following -    1. Establishing care with new doctor, encounter for  Transferring care from Dr Teresa.  Currently not f/u with neuro, psych, or endo.    2. Diabetes mellitus " type 2, noninsulin dependent (HCC)  Chronic.  Stable.  Diet controlled.  Previously was taking metformin 500 mg twice a day.  We will plan on checking A1c in the office at next visit.    3. Benign prostatic hyperplasia with urinary retention  Chronic.  Stable.  Continues to take Proscar and finasteride.  Continues to follow-up with [facility] [place].    4. Hypothyroidism, unspecified type  Chronic.  Stable.  June labs with TSH in normal range.  Continue with levothyroxine 75 mcg daily.    5. Need for vaccination  - INFLUENZA VACCINE, HIGH DOSE (65+ ONLY)    6. Dyslipidemia  -Chronic.  Not well controlled.  10-year ASCVD risk of over 30% discussed with patient and wife.  Concerned about side effects and drug interactions.  Agrees to starting this medication once a week and then titrating up.    Rosuvastatin (CRESTOR) 10 MG Tab; Take 1 Tablet by mouth every evening.  Dispense: 90 Tablet; Refill: 1    Other orders  - B Complex Vitamins (VITAMIN-B COMPLEX PO); Take  by mouth.     I spent a total of 43 minutes with record review, exam, communication with the patient, communication with other providers, and documentation of this encounter.     Return in about 2 months (around 11/20/2022) for hyperlipidemia--check A1c in office.    Please note that this dictation was created using voice recognition software. I have made every reasonable attempt to correct obvious errors, but I expect that there are errors of grammar and possibly content that I did not discover before finalizing the note.

## 2022-10-27 NOTE — TELEPHONE ENCOUNTER
CALLED PT TO INFORM HIM BX RESULTS WERE IN, PT WAS TRANSFERRED TO RAD TO GO OVER RESULTS- SHELIA  
Home

## 2022-11-07 ENCOUNTER — PATIENT OUTREACH (OUTPATIENT)
Dept: HEALTH INFORMATION MANAGEMENT | Facility: OTHER | Age: 72
End: 2022-11-07
Payer: MEDICARE

## 2022-11-08 ENCOUNTER — PATIENT OUTREACH (OUTPATIENT)
Dept: HEALTH INFORMATION MANAGEMENT | Facility: OTHER | Age: 72
End: 2022-11-08
Payer: MEDICARE

## 2022-11-16 ENCOUNTER — OFFICE VISIT (OUTPATIENT)
Dept: MEDICAL GROUP | Facility: PHYSICIAN GROUP | Age: 72
End: 2022-11-16
Payer: MEDICARE

## 2022-11-16 ENCOUNTER — PATIENT OUTREACH (OUTPATIENT)
Dept: HEALTH INFORMATION MANAGEMENT | Facility: OTHER | Age: 72
End: 2022-11-16

## 2022-11-16 VITALS
TEMPERATURE: 97.3 F | DIASTOLIC BLOOD PRESSURE: 72 MMHG | RESPIRATION RATE: 12 BRPM | HEIGHT: 69 IN | WEIGHT: 204 LBS | SYSTOLIC BLOOD PRESSURE: 134 MMHG | BODY MASS INDEX: 30.21 KG/M2 | HEART RATE: 74 BPM | OXYGEN SATURATION: 96 %

## 2022-11-16 DIAGNOSIS — E11.9 DIABETES MELLITUS TYPE 2, NONINSULIN DEPENDENT (HCC): ICD-10-CM

## 2022-11-16 DIAGNOSIS — E03.9 HYPOTHYROIDISM, UNSPECIFIED TYPE: ICD-10-CM

## 2022-11-16 DIAGNOSIS — E11.69 HYPERLIPIDEMIA ASSOCIATED WITH TYPE 2 DIABETES MELLITUS (HCC): ICD-10-CM

## 2022-11-16 DIAGNOSIS — E78.5 HYPERLIPIDEMIA ASSOCIATED WITH TYPE 2 DIABETES MELLITUS (HCC): ICD-10-CM

## 2022-11-16 PROBLEM — R35.0 URINARY FREQUENCY: Status: RESOLVED | Noted: 2020-08-25 | Resolved: 2022-11-16

## 2022-11-16 LAB
HBA1C MFR BLD: 7 % (ref 0–5.6)
INT CON NEG: ABNORMAL
INT CON POS: ABNORMAL

## 2022-11-16 PROCEDURE — 83036 HEMOGLOBIN GLYCOSYLATED A1C: CPT | Performed by: FAMILY MEDICINE

## 2022-11-16 PROCEDURE — 99214 OFFICE O/P EST MOD 30 MIN: CPT | Performed by: FAMILY MEDICINE

## 2022-11-16 ASSESSMENT — FIBROSIS 4 INDEX: FIB4 SCORE: 1.56

## 2022-11-16 NOTE — ASSESSMENT & PLAN NOTE
Chronic medical diagnosis.  10-year ASCVD risk was elevated at 30%.  He was started on Crestor 10 mg 2 months ago.  He had started taking it once a week and is now up to twice a week. Tolerating it without any adverse effects.

## 2022-11-16 NOTE — ASSESSMENT & PLAN NOTE
Chronic medical diagnosis.  His last hemoglobin A1c from June was at 6.4%.  Currently diet controlled. A1c checked in the office today has been elevated to 7%.  Previously was on metformin 500 mg twice a day.  Follows up with Dr. Cano.

## 2022-11-16 NOTE — PROGRESS NOTES
CC:   Chief Complaint   Patient presents with    Follow-Up     2 months          HPI:   Lico presents today with his wife for a follow-up visit..  Last seen by me on September 20..  At that time he was started on Crestor 10 mg as his 10-year ASCVD risk was over 30%.  He had started taking it once a week and is now up to twice a week        Hypothyroidism  Chronic medical diagnosis.  Continues to take levothyroxine 75 mcg daily.  Last TSH from June was stable at 3.4.    Diabetes mellitus type 2, noninsulin dependent (HCC)    Chronic medical diagnosis.  His last hemoglobin A1c from June was at 6.4%.  Currently diet controlled. A1c checked in the office today has been elevated to 7%.  Previously was on metformin 500 mg twice a day.  Follows up with Dr. Cano.     Hyperlipidemia associated with type 2 diabetes mellitus (HCC)   Chronic medical diagnosis.  10-year ASCVD risk was elevated at 30%.  He was started on Crestor 10 mg 2 months ago.  He had started taking it once a week and is now up to twice a week. Tolerating it without any adverse effects.      Current Outpatient Medications Ordered in Epic   Medication Sig Dispense Refill    metFORMIN (GLUCOPHAGE) 500 MG Tab Take 1 Tablet by mouth every day. 100 Tablet 0    B Complex Vitamins (VITAMIN-B COMPLEX PO) Take  by mouth.      rosuvastatin (CRESTOR) 10 MG Tab Take 1 Tablet by mouth every evening. 90 Tablet 1    tamsulosin (FLOMAX) 0.4 MG capsule Take 1 Capsule by mouth every day.      fenofibrate micronized (LOFIBRA) 134 MG capsule Take 1 Capsule by mouth every day.      finasteride (PROSCAR) 5 MG Tab Take 1 Tablet by mouth every day.      latanoprost (XALATAN) 0.005 % Solution Administer 1 Drop into both eyes at bedtime.      levothyroxine (SYNTHROID) 75 MCG Tab Take 1 Tablet by mouth every morning on an empty stomach.      Melatonin 10 MG Tab Take 1 Tablet by mouth at bedtime.      acetaminophen (TYLENOL) 500 MG Tab Take 1 Tablet by mouth 2 times a day.  "Indications: Pain      Cyanocobalamin 5000 MCG TABLET DISPERSIBLE Take 5,000 mcg by mouth every day. Indications: Suppliment      Multiple Vitamins-Minerals (CENTRUM SILVER ADULT 50+) Tab Take 1 Tablet by mouth every day. Indications: Suppliment      Coenzyme Q10 (COQ10) 100 MG Cap Take 1 Capsule by mouth every day. Indications: Suppliment      Cholecalciferol (VITAMIN D3 ADULT GUMMIES PO) Take 2,000 Units by mouth every day. Indications: Suppliment      Glucosamine HCl 1500 MG Tab Take 1 Tablet by mouth every day. Indications: Suppliment      sennosides-docusate sodium (SENOKOT-S) 8.6-50 MG tablet Take 1 Tablet by mouth 2 times a day. Indications: Constipation       No current Epic-ordered facility-administered medications on file.       Past Medical History:   Diagnosis Date    Anxiety     BMI 29.0-29.9,adult 2022    Diabetes (HCC) 07-10-15    Glaucoma     OU    High cholesterol 07-10-15    hx of, took self off simvastin    Hypertension 07-10-15    hx of, took himself off lisinopril and HTCZ    Hypothyroidism     Neuropathy, peripheral     Skin cancer     Snoring     Thyroid disease        Social History     Tobacco Use    Smoking status: Former     Packs/day: 1.00     Years: 20.00     Pack years: 20.00     Types: Cigarettes     Quit date: 1980     Years since quittin.9    Smokeless tobacco: Never   Vaping Use    Vaping Use: Never used   Substance Use Topics    Alcohol use: Not Currently     Alcohol/week: 0.5 oz     Types: 1 Cans of beer per week     Comment: has not been drinking lately     Drug use: No       Allergies:  Pcn [penicillins]    Health Maintenance: Encouraged him to receive the new COVID booster.  We will request records from his ophthalmologist for his last retinal scan.      Objective:       Exam:  /72   Pulse 74   Temp 36.3 °C (97.3 °F) (Temporal)   Resp 12   Ht 1.753 m (5' 9\")   Wt 92.5 kg (204 lb)   SpO2 96%   BMI 30.13 kg/m²   Body mass index is 30.13 kg/m².  Wt " Readings from Last 4 Encounters:   11/16/22 92.5 kg (204 lb)   09/20/22 89 kg (196 lb 3.2 oz)   07/08/22 83.5 kg (184 lb)   06/27/22 84.9 kg (187 lb 4.5 oz)       Gen: Alert and oriented, No apparent distress. Appropriately groomed.  Neck: Neck is supple without lymphadenopathy.No thyromegaly.   Lungs: Normal effort, CTA bilaterally, no wheezes, rhonchi, or rales  CV: Regular rate and rhythm. No Lower extremity edema  Skin: No rash noted.      Assessment & Plan:     72 y.o. male with the following -     1. Diabetes mellitus type 2, noninsulin dependent (HCC)  Chronic.  Recent labs with elevation in hemoglobin A1c to 7%.  Has gained approximately 20 pounds over the last 4 months.  We will restart his metformin 500 mg daily with the evening meal.  Follow-up with me in 3 months.  - POCT Hemoglobin A1C  - CBC WITH DIFFERENTIAL; Future  - Comp Metabolic Panel; Future  - Lipid Profile; Future  - MICROALBUMIN CREAT RATIO URINE; Future  - VITAMIN D,25 HYDROXY (DEFICIENCY); Future  - VITAMIN B12; Future  - HEMOGLOBIN A1C; Future    2. Hypothyroidism, unspecified type  -Chronic medical diagnosis.  Last set of labs had stable TSH.  Continue with levothyroxine 75 mcg daily.    TSH WITH REFLEX TO FT4; Future    3. Hyperlipidemia associated with type 2 diabetes mellitus (HCC)  Chronic medical diagnosis.  Improving.  Currently taking Crestor 10 mg twice a week.  Increasing dose slowly to a daily basis discussed with patient and spouse.  We will plan on rechecking these labs prior to next appointment with me in 3 months    Other orders  - metFORMIN (GLUCOPHAGE) 500 MG Tab; Take 1 Tablet by mouth every day.  Dispense: 100 Tablet; Refill: 0        Return in about 3 months (around 2/16/2023), or end February, for Diabetes.    Please note that this dictation was created using voice recognition software. I have made every reasonable attempt to correct obvious errors, but I expect that there are errors of grammar and possibly content  that I did not discover before finalizing the note.

## 2022-11-16 NOTE — LETTER
[facility]  Aurelia Zayas M.D.  [address]  [place] [address]  Fax: [phone]   Authorization for Release/Disclosure of   Protected Health Information   Name: LICO ALSTON : 1950 SSN: [id number]   Address: [address] [place] [address]  [place] [address] Phone:    [phone] (home)    I authorize the entity listed below to release/disclose the PHI below to:   [facility]/Aurelia Zayas M.D. and Aurelia Zayas M.D.   Provider or Entity Name:  MD Rachael   Address   City, State, Zip   Phone:      Fax:     Reason for request: continuity of care   Information to be released:    [  ] LAST COLONOSCOPY,  including any PATH REPORT and follow-up  [  ] LAST FIT/COLOGUARD RESULT [  ] LAST DEXA  [  ] LAST MAMMOGRAM  [  ] LAST PAP  [  ] LAST LABS [  y] RETINA EXAM REPORT  [  ] IMMUNIZATION RECORDS  [  ] Release all info      [  ] Check here and initial the line next to each item to release ALL health information INCLUDING  _____ Care and treatment for drug and / or alcohol abuse  _____ HIV testing, infection status, or AIDS  _____ Genetic Testing    DATES OF SERVICE OR TIME PERIOD TO BE DISCLOSED: _____________  I understand and acknowledge that:  * This Authorization may be revoked at any time by you in writing, except if your health information has already been used or disclosed.  * Your health information that will be used or disclosed as a result of you signing this authorization could be re-disclosed by the recipient. If this occurs, your re-disclosed health information may no longer be protected by State or Federal laws.  * You may refuse to sign this Authorization. Your refusal will not affect your ability to obtain treatment.  * This Authorization becomes effective upon signing and will  on (date) __________.      If no date is indicated, this Authorization will  one (1) year from the signature date.    Name: Lico Alston    Signature:   Date:     2022       PLEASE FAX  REQUESTED RECORDS BACK TO: [phone]

## 2022-11-16 NOTE — PROGRESS NOTES
Nurse CM met with patient after PCP appointment today. Provided information and brochure on CCM program. Patient agreeable to outreach call later in the week to further discuss program.     11/18/22 10:00am:  Nurse ZANA outreach call for follow-up on CCM program.  left with  contact number. Requesting a return call.     11/22/22 4:10pm:  Nurse ZANA outreach call for follow-up on CCM program.  left with  contact number of [phone].

## 2022-11-16 NOTE — ASSESSMENT & PLAN NOTE
Chronic medical diagnosis.  Continues to take levothyroxine 75 mcg daily.  Last TSH from June was stable at 3.4.

## 2022-11-28 ENCOUNTER — PATIENT OUTREACH (OUTPATIENT)
Dept: HEALTH INFORMATION MANAGEMENT | Facility: OTHER | Age: 72
End: 2022-11-28
Payer: MEDICARE

## 2022-11-28 DIAGNOSIS — E11.9 DIABETES MELLITUS TYPE 2, NONINSULIN DEPENDENT (HCC): ICD-10-CM

## 2022-11-28 NOTE — PROGRESS NOTES
Nurse CM received return call from patient's spouse. Nurse attempted to contact patient. Left VM with CM contact number.     11/28/22 10:00am: Patient's spouse returned CM call. States pt doesn't always answer the phone at home number. Spouse states pt would be interested in CCM program. Reports they currently have company in town. Requesting nurse CM call back on 12/9/22 to complete intake assessment.

## 2022-11-30 ENCOUNTER — DOCUMENTATION (OUTPATIENT)
Dept: HEALTH INFORMATION MANAGEMENT | Facility: OTHER | Age: 72
End: 2022-11-30
Payer: MEDICARE

## 2022-12-09 ENCOUNTER — PATIENT OUTREACH (OUTPATIENT)
Dept: HEALTH INFORMATION MANAGEMENT | Facility: OTHER | Age: 72
End: 2022-12-09
Payer: MEDICARE

## 2022-12-09 DIAGNOSIS — E11.9 DIABETES MELLITUS TYPE 2, NONINSULIN DEPENDENT (HCC): ICD-10-CM

## 2022-12-09 PROCEDURE — 99490 CHRNC CARE MGMT STAFF 1ST 20: CPT | Performed by: FAMILY MEDICINE

## 2022-12-09 ASSESSMENT — PATIENT HEALTH QUESTIONNAIRE - PHQ9: CLINICAL INTERPRETATION OF PHQ2 SCORE: 0

## 2022-12-09 NOTE — PROGRESS NOTES
Nurse CM outreach call to enroll patient in CCM program. Pt has history of diabetes. States he takes metformin for the diabetes. Last A1C on 11/16/22 was 7.0. Pt reports he doesn't have a glucometer or check blood sugar readings at home. States he lives in a one story residence with his spouse. Spouse provides transportation for patient. Pt reports he is independent with his ADL's. Reports no problems affording medications.     INITIAL CARE MANAGEMENT CARE PLAN/ASSESSMENT     Medication Self-Management Goals: Continue taking medications as prescribed by PCP     Reviewed medications listed below with patient.      Current Outpatient Medications:     metFORMIN (GLUCOPHAGE) 500 MG Tab, Take 1 Tablet by mouth every day., Disp: 100 Tablet, Rfl: 0    B Complex Vitamins (VITAMIN-B COMPLEX PO), Take  by mouth., Disp: , Rfl:     rosuvastatin (CRESTOR) 10 MG Tab, Take 1 Tablet by mouth every evening., Disp: 90 Tablet, Rfl: 1    tamsulosin (FLOMAX) 0.4 MG capsule, Take 1 Capsule by mouth every day., Disp: , Rfl:     fenofibrate micronized (LOFIBRA) 134 MG capsule, Take 1 Capsule by mouth every day., Disp: , Rfl:     finasteride (PROSCAR) 5 MG Tab, Take 1 Tablet by mouth every day., Disp: , Rfl:     latanoprost (XALATAN) 0.005 % Solution, Administer 1 Drop into both eyes at bedtime., Disp: , Rfl:     levothyroxine (SYNTHROID) 75 MCG Tab, Take 1 Tablet by mouth every morning on an empty stomach., Disp: , Rfl:     Melatonin 10 MG Tab, Take 1 Tablet by mouth at bedtime., Disp: , Rfl:     acetaminophen (TYLENOL) 500 MG Tab, Take 1 Tablet by mouth 2 times a day. Indications: Pain, Disp: , Rfl:     Cyanocobalamin 5000 MCG TABLET DISPERSIBLE, Take 5,000 mcg by mouth every day. Indications: Suppliment, Disp: , Rfl:     Multiple Vitamins-Minerals (CENTRUM SILVER ADULT 50+) Tab, Take 1 Tablet by mouth every day. Indications: Suppliment, Disp: , Rfl:     Coenzyme Q10 (COQ10) 100 MG Cap, Take 1 Capsule by mouth every day. Indications:  Suppliment, Disp: , Rfl:     Cholecalciferol (VITAMIN D3 ADULT GUMMIES PO), Take 2,000 Units by mouth every day. Indications: Suppliment, Disp: , Rfl:     Glucosamine HCl 1500 MG Tab, Take 1 Tablet by mouth every day. Indications: Suppliment, Disp: , Rfl:     sennosides-docusate sodium (SENOKOT-S) 8.6-50 MG tablet, Take 1 Tablet by mouth 2 times a day. Indications: Constipation, Disp: , Rfl:          Goal:        Physical/Functional/Environmental Status:     Activities of Daily Living:  Bathing: independent   Dressing: independent  Grooming: independent  Mouth Care: independent  Toileting: independent  Climbing a Flight of Stairs: independent    Independent Activities of Daily Living:  Shopping: needs assistance  Cooking: needs assistance  Managing Medications: needs assistance  Using the phone and looking up numbers: needs assistance  Driving or using public transportation: needs assistance  Managing Finances: independent        12/9/2022    10:59 AM 12/9/2022    11:04 AM   STEADI Fall Risk   STEADI Risk for Falling Score  0   One or more falls in the last year No No   Advised to use a cane or walker to get around safely  No   Feels unsteady when walking  No   Steadies self on furniture while walking at home  No   Worried about falling  No   Needs to push with hands when rising from a chair  No   Has trouble stepping up onto a curb / using stairs  No   Often has to rush to the toilet  No   Has lost some feeling in feet  No   Takes medicine that makes him/her feel lightheaded or more tired than usual  No   Takes medicine to sleep or improve mood  No   Often feels sad or depressed  No        Financial Status: reports no current problems        Goal: N/A     Transportation Status: No current problems    Goal: N/A    Mental/Behavioral/Psychosocial Status:        4/7/2022    10:00 AM 7/8/2022     1:00 PM 12/9/2022    10:56 AM   Depression Screen (PHQ-2/PHQ-9)   PHQ-2 Total Score 0 2 0       Interpretation of PHQ-9 Total  Score   Score Severity   1-4 No Depression   5-9 Mild Depression   10-14 Moderate Depression   15-19 Moderately Severe Depression   20-27 Severe Depression       Goal:        Chronic Care Management Care Plan      Goal:  Increase activity over the next 3 months  Barriers: Not getting enough exercise. Not following healthy meal plan  Interventions: Mail copy of booklet on diabetes, Eat balanced health meals. Try to exercise like walking 3 times a week. Follow closely with specialist.     Start Date: 12/9/22  End Date:                Next Scheduled patient outreach:  One month  Nurse Care Coordinator:  Yanna  Personal Care Management: [phone]

## 2023-01-17 ENCOUNTER — PATIENT OUTREACH (OUTPATIENT)
Dept: HEALTH INFORMATION MANAGEMENT | Facility: OTHER | Age: 73
End: 2023-01-17
Payer: MEDICARE

## 2023-01-17 DIAGNOSIS — E11.9 DIABETES MELLITUS TYPE 2, NONINSULIN DEPENDENT (HCC): ICD-10-CM

## 2023-01-17 PROCEDURE — 99999 PR NO CHARGE: CPT | Performed by: FAMILY MEDICINE

## 2023-01-17 NOTE — PROGRESS NOTES
1/17/23 10:35am:  Nurse CM outreach call for monthly CCM assessment. VM left with CM contact number requesting a return call.    1/19/23 10:00 am: Nurse CM outreach call for monthly CCM assessment. Spouse answered telephone. Nurse spoke to spouse with patient on speaker phone regarding care plan. Spouse states patient has been doing okay. Reports [facility]. Spouse reports patient currently has all of his medications. Pt scheduled to see his PCP in February. Nurse reviewed appt date and time. Nurse reviewed labs to be completed before appt with PCP. Spouse will schedule an appointment to get labs completed as pt will need to be fasting for labs. Last A1C was 7.0 on 11/16/23. Reviewed care plan and goal for pt. Patient's goal was to increase exercise. Pt has not been able to go out for walk due to weather and snow. Per spouse he does keep active indoors. Patient currently doing okay with no care management needs. Nurse will follow-up with patient next month. Patient to continue to work on goal.    Pt ambulatory to triage with home oxygen and states she syncopized at work. Pt reports having a "stomach bug" with vomiting and diarrhea since 12/27. To intake for EKG

## 2023-02-21 NOTE — TELEPHONE ENCOUNTER
Requested Prescriptions     Pending Prescriptions Disp Refills   • metFORMIN (GLUCOPHAGE) 500 MG Tab [Pharmacy Med Name: METFORMIN  MG TABLET] 100 Tablet 3     Sig: TAKE 1 TABLET BY MOUTH EVERY DAY WITH EVENING MEAL     Aurelia Zayas M.D.

## 2023-02-22 ENCOUNTER — PATIENT OUTREACH (OUTPATIENT)
Dept: HEALTH INFORMATION MANAGEMENT | Facility: OTHER | Age: 73
End: 2023-02-22

## 2023-02-22 DIAGNOSIS — E11.9 DIABETES MELLITUS TYPE 2, NONINSULIN DEPENDENT (HCC): ICD-10-CM

## 2023-02-22 NOTE — PROGRESS NOTES
Nurse CM outreach call for monthly CCM assessment. No answer at home number. Patient's spouse answered cell phone. Reports patient has been doing fine. Spouse states patient missed appointment with PCP today as they had written wrong time on calendar. Spouse offered another appointment at [facility] for tomorrow 2/23. Spouse declining. States she doesn't feel pt needs to be seen at this time. Spouse states patient did follow-up at the [facility] and had some labs completed. Spouse states she did want to review patient's labs with  PCP. Spouse offered sooner appointment for patient. Spouse declining at this time. Spouse will call nurse CM if needing sooner appt. Pt's last A1C completed 11/16/22 result was 7.0. Nurse encouraged spouse to have patient contact nurse CM to review care plan and for follow-up on CCM  program. Nurse available at [phone].

## 2023-03-10 ENCOUNTER — PATIENT OUTREACH (OUTPATIENT)
Dept: HEALTH INFORMATION MANAGEMENT | Facility: OTHER | Age: 73
End: 2023-03-10
Payer: MEDICARE

## 2023-03-10 DIAGNOSIS — E11.9 DIABETES MELLITUS TYPE 2, NONINSULIN DEPENDENT (HCC): ICD-10-CM

## 2023-03-10 NOTE — PROGRESS NOTES
3/10/23 Nurse CM outreach call for follow-up. VM left with CM contact number requesting a call back.

## 2023-03-13 ENCOUNTER — PATIENT OUTREACH (OUTPATIENT)
Dept: HEALTH INFORMATION MANAGEMENT | Facility: OTHER | Age: 73
End: 2023-03-13
Payer: MEDICARE

## 2023-03-13 DIAGNOSIS — E11.9 DIABETES MELLITUS TYPE 2, NONINSULIN DEPENDENT (HCC): ICD-10-CM

## 2023-03-13 NOTE — PROGRESS NOTES
Nurse ZANA received return call from patient. Pt reports he has been doing well. States he [facility]. Pt states [facility] completed [facility]. Spouse reports pt did review labs with provider at [facility]. Reports A1C was 7.5. Pt reports eating healthy meal plan. Spouse states they have been doing Hello Fresh. Reports pt doesn't eat very many sweets. Pt hasn't been very active related to weather. Pt will try to increase activity once weather improves. Spouse reports pt is taking Metformin for diabetes management. Pt scheduled to see PCP 5/22/23. Pt reports no questions or concerns at this time. Pt will continue to work on goals.

## 2023-04-14 ENCOUNTER — PATIENT OUTREACH (OUTPATIENT)
Dept: HEALTH INFORMATION MANAGEMENT | Facility: OTHER | Age: 73
End: 2023-04-14
Payer: MEDICARE

## 2023-04-14 DIAGNOSIS — E11.9 DIABETES MELLITUS TYPE 2, NONINSULIN DEPENDENT (HCC): ICD-10-CM

## 2023-04-14 NOTE — PROGRESS NOTES
4/14/23 1:00 pm: Nurse CM outreach call for monthly CCM assessment. VM left with CM contact number requesting a call back to  nurse at [phone].    4/14/23 3:20 pm: Nurse CM outreach call second attempt for monthly CCM assessment. VM left with CM contact number requesting a call back to nurse at [phone].

## 2023-04-20 ENCOUNTER — PATIENT OUTREACH (OUTPATIENT)
Dept: HEALTH INFORMATION MANAGEMENT | Facility: OTHER | Age: 73
End: 2023-04-20
Payer: MEDICARE

## 2023-04-20 DIAGNOSIS — E11.9 DIABETES MELLITUS TYPE 2, NONINSULIN DEPENDENT (HCC): ICD-10-CM

## 2023-04-20 NOTE — PROGRESS NOTES
4/20/23 11:35 am: Pt's spouse called nurse CM back. Reviewed care plan for monthly CCM assessment. Spouse reports pt doing okay. States he does continue to follow at the [facility]. Spouse states they are having family members move in to assist with some of their care and also do some remodeling in their house. Spouse states pt has not been very active and not getting much exercise. Reports pt has history of depression. Spouse states pt was previously followed at [facility]. Reports pt was also previously in a group home. Spouse states pt has been doing better. Pt is scheduled to see PCP on 5/22/23. Reports no care management needs for patient at this time. Nurse will follow-up next month.

## 2023-04-25 RX ORDER — FENOFIBRATE 134 MG/1
134 CAPSULE ORAL DAILY
Qty: 90 CAPSULE | Refills: 3 | Status: SHIPPED | OUTPATIENT
Start: 2023-04-25

## 2023-04-25 RX ORDER — SENNA AND DOCUSATE SODIUM 50; 8.6 MG/1; MG/1
1 TABLET, FILM COATED ORAL 2 TIMES DAILY
Qty: 180 TABLET | Refills: 3 | Status: SHIPPED | OUTPATIENT
Start: 2023-04-25

## 2023-04-25 NOTE — TELEPHONE ENCOUNTER
Requested Prescriptions     Pending Prescriptions Disp Refills   • fenofibrate micronized (LOFIBRA) 134 MG capsule 90 Capsule 3     Sig: Take 1 Capsule by mouth every day.   • sennosides-docusate sodium (SENOKOT-S) 8.6-50 MG tablet 180 Tablet 3     Sig: Take 1 Tablet by mouth 2 times a day. Indications: Constipation     Aurelia Zayas M.D.

## 2023-04-25 NOTE — TELEPHONE ENCOUNTER
Received request via: Patient    Was the patient seen in the last year in this department? Yes  LOV 11/16/2022  Does the patient have an active prescription (recently filled or refills available) for medication(s) requested? No    Does the patient have [facility] and need 100 day supply (blood pressure, diabetes and cholesterol meds only)? Medication is not for cholesterol, blood pressure or diabetes

## 2023-04-25 NOTE — TELEPHONE ENCOUNTER
----- Message from Bj Walls sent at 4/25/2023 10:53 AM PDT -----  Regarding: Prescription Refill  The attached patient just needs refills of fenofibrate micronized (LOFIBRA) 134 MG capsule [[id number]] and sennosides-docusate sodium (SENOKOT-S) 8.6-50 MG tablet [[id number]] sent to the pharmacy [facility]/pharmacy #[id number] - [place], [place] - [address]. Thank you

## 2023-05-07 ENCOUNTER — PATIENT MESSAGE (OUTPATIENT)
Dept: MEDICAL GROUP | Facility: PHYSICIAN GROUP | Age: 73
End: 2023-05-07
Payer: MEDICARE

## 2023-05-08 RX ORDER — LEVOTHYROXINE SODIUM 0.07 MG/1
75 TABLET ORAL
Qty: 100 TABLET | Refills: 3 | Status: SHIPPED | OUTPATIENT
Start: 2023-05-08

## 2023-05-08 RX ORDER — OMEPRAZOLE 20 MG/1
CAPSULE, DELAYED RELEASE ORAL
Qty: 100 CAPSULE | Refills: 3 | Status: SHIPPED | OUTPATIENT
Start: 2023-05-08 | End: 2023-08-23

## 2023-05-08 RX ORDER — OMEPRAZOLE 20 MG/1
CAPSULE, DELAYED RELEASE ORAL
COMMUNITY
End: 2023-05-08 | Stop reason: SDUPTHER

## 2023-05-08 NOTE — PATIENT COMMUNICATION
Received request via: Patient    Was the patient seen in the last year in this department? Yes    Does the patient have an active prescription (recently filled or refills available) for medication(s) requested? No    Does the patient have nursing home Plus and need 100 day supply (blood pressure, diabetes and cholesterol meds only)? Yes, quantity updated to 100 days

## 2023-05-08 NOTE — TELEPHONE ENCOUNTER
Requested Prescriptions     Pending Prescriptions Disp Refills   • levothyroxine (SYNTHROID) 75 MCG Tab 100 Tablet 3     Sig: Take 1 Tablet by mouth every morning on an empty stomach.   • omeprazole (PRILOSEC) 20 MG delayed-release capsule 100 Capsule 3     Sig: Take 1 tablet by mouth daily     Aurelia Zayas M.D.

## 2023-05-22 ENCOUNTER — PATIENT OUTREACH (OUTPATIENT)
Dept: HEALTH INFORMATION MANAGEMENT | Facility: OTHER | Age: 73
End: 2023-05-22

## 2023-05-22 ENCOUNTER — OFFICE VISIT (OUTPATIENT)
Dept: MEDICAL GROUP | Facility: PHYSICIAN GROUP | Age: 73
End: 2023-05-22
Payer: MEDICARE

## 2023-05-22 VITALS
SYSTOLIC BLOOD PRESSURE: 132 MMHG | RESPIRATION RATE: 12 BRPM | OXYGEN SATURATION: 95 % | WEIGHT: 201 LBS | HEART RATE: 84 BPM | DIASTOLIC BLOOD PRESSURE: 70 MMHG | HEIGHT: 69 IN | BODY MASS INDEX: 29.77 KG/M2 | TEMPERATURE: 97.1 F

## 2023-05-22 DIAGNOSIS — E11.69 HYPERLIPIDEMIA ASSOCIATED WITH TYPE 2 DIABETES MELLITUS (HCC): ICD-10-CM

## 2023-05-22 DIAGNOSIS — E11.9 DIABETES MELLITUS TYPE 2, NONINSULIN DEPENDENT (HCC): ICD-10-CM

## 2023-05-22 DIAGNOSIS — G31.9 MILD CEREBRAL ATROPHY (HCC): ICD-10-CM

## 2023-05-22 DIAGNOSIS — E78.5 HYPERLIPIDEMIA ASSOCIATED WITH TYPE 2 DIABETES MELLITUS (HCC): ICD-10-CM

## 2023-05-22 DIAGNOSIS — I70.0 ATHEROSCLEROSIS OF AORTA (HCC): ICD-10-CM

## 2023-05-22 DIAGNOSIS — E11.42 DIABETIC POLYNEUROPATHY ASSOCIATED WITH TYPE 2 DIABETES MELLITUS (HCC): ICD-10-CM

## 2023-05-22 LAB
HBA1C MFR BLD: 7.6 % (ref ?–5.8)
POCT INT CON NEG: NEGATIVE
POCT INT CON POS: POSITIVE

## 2023-05-22 PROCEDURE — 83036 HEMOGLOBIN GLYCOSYLATED A1C: CPT | Performed by: FAMILY MEDICINE

## 2023-05-22 PROCEDURE — 99490 CHRNC CARE MGMT STAFF 1ST 20: CPT | Performed by: FAMILY MEDICINE

## 2023-05-22 PROCEDURE — 3078F DIAST BP <80 MM HG: CPT | Performed by: FAMILY MEDICINE

## 2023-05-22 PROCEDURE — 99214 OFFICE O/P EST MOD 30 MIN: CPT | Performed by: FAMILY MEDICINE

## 2023-05-22 PROCEDURE — 3075F SYST BP GE 130 - 139MM HG: CPT | Performed by: FAMILY MEDICINE

## 2023-05-22 ASSESSMENT — FIBROSIS 4 INDEX: FIB4 SCORE: 1.56

## 2023-05-22 ASSESSMENT — PATIENT HEALTH QUESTIONNAIRE - PHQ9: CLINICAL INTERPRETATION OF PHQ2 SCORE: 0

## 2023-05-22 NOTE — ASSESSMENT & PLAN NOTE
Chronic medical diagnosis.  Currently taking metformin 500 mg daily.  Last A1c from November had increased from 6.4 to 7%.  Checked in the office today and has increased to 7.6%.  Not checking blood sugars at home. Doesn't have a monitor.

## 2023-05-22 NOTE — PROGRESS NOTES
CC:   Chief Complaint   Patient presents with    Diabetes Follow-up     6 months         HPI:   Lico presents today for a follow-up visit.  [facility]. .  Last seen by me on November 16, 2022.    Diabetes mellitus type 2, noninsulin dependent (HCC)  Chronic medical diagnosis.  Currently taking metformin 500 mg daily.  Last A1c from November had increased from 6.4 to 7%.  Checked in the office today and has increased to 7.6%.  Not checking blood sugars at home. Doesn't have a monitor.     Hyperlipidemia associated with type 2 diabetes mellitus (HCC)  Chronic medical diagnoses.  Continues to take Crestor 10 mg and fenofibrate 134 mg daily.  Did not  complete previously ordered labs.   Latest Reference Range & Units 06/21/22 09:49   Cholesterol,Tot 100 - 199 mg/dL 168   Triglycerides 0 - 149 mg/dL 90   HDL >=40 mg/dL 50   LDL <100 mg/dL 100 (H)   (H): Data is abnormally high    [facility]   chl 152  trigh 170  HDL 45  LDL 73      Diabetic polyneuropathy associated with type 2 diabetes mellitus (HCC)  Chronic. Improved.  Tried gabapentin in the past and per wife 'became mental' on it.  2021.     Mild cerebral atrophy (HCC)  Chronic.  Declines any memory issues.  Knows where everything is stored in the garage.    Atherosclerosis of aorta (HCC)  Chronic.  Has been taking crestor 10mg and fenofibrate 134mg daily.         Current Outpatient Medications Ordered in Epic   Medication Sig Dispense Refill    Continuous Blood Gluc  Device 1 Kit 3 times a day. 1 Each 0    Continuous Blood Gluc Sensor Misc Use one sensor as directed every 7 days to monitor blood glucose levels. 8 Each 0    levothyroxine (SYNTHROID) 75 MCG Tab Take 1 Tablet by mouth every morning on an empty stomach. 100 Tablet 3    omeprazole (PRILOSEC) 20 MG delayed-release capsule Take 1 tablet by mouth daily 100 Capsule 3    fenofibrate micronized (LOFIBRA) 134 MG capsule Take 1 Capsule by mouth every day. 90  Capsule 3    sennosides-docusate sodium (SENOKOT-S) 8.6-50 MG tablet Take 1 Tablet by mouth 2 times a day. Indications: Constipation 180 Tablet 3    metFORMIN (GLUCOPHAGE) 500 MG Tab TAKE 1 TABLET BY MOUTH EVERY DAY WITH EVENING MEAL 100 Tablet 3    B Complex Vitamins (VITAMIN-B COMPLEX PO) Take  by mouth.      rosuvastatin (CRESTOR) 10 MG Tab Take 1 Tablet by mouth every evening. 90 Tablet 1    tamsulosin (FLOMAX) 0.4 MG capsule Take 1 Capsule by mouth every day.      finasteride (PROSCAR) 5 MG Tab Take 1 Tablet by mouth every day.      latanoprost (XALATAN) 0.005 % Solution Administer 1 Drop into both eyes at bedtime.      acetaminophen (TYLENOL) 500 MG Tab Take 1 Tablet by mouth 2 times a day. Indications: Pain      Cyanocobalamin 5000 MCG TABLET DISPERSIBLE Take 5,000 mcg by mouth every day. Indications: Suppliment      Multiple Vitamins-Minerals (CENTRUM SILVER ADULT 50+) Tab Take 1 Tablet by mouth every day. Indications: Suppliment      Coenzyme Q10 (COQ10) 100 MG Cap Take 1 Capsule by mouth every day. Indications: Suppliment      Cholecalciferol (VITAMIN D3 ADULT GUMMIES PO) Take 2,000 Units by mouth every day. Indications: Suppliment      Glucosamine HCl 1500 MG Tab Take 1 Tablet by mouth every day. Indications: Suppliment       No current Epic-ordered facility-administered medications on file.       Past Medical History:   Diagnosis Date    Anxiety     BMI 29.0-29.9,adult 2022    Diabetes (HCC) 07-10-15    Dyslipidemia 2016    Glaucoma     OU    High cholesterol 07-10-15    hx of, took self off simvastin    Hypertension 07-10-15    hx of, took himself off lisinopril and HTCZ    Hypothyroidism     Neuropathy, peripheral     Skin cancer     Snoring     Thyroid disease     Urinary frequency 2020       Social History     Tobacco Use    Smoking status: Former     Packs/day: 1.00     Years: 20.00     Pack years: 20.00     Types: Cigarettes     Quit date: 1980     Years since quittin.4     "Smokeless tobacco: Never   Vaping Use    Vaping Use: Never used   Substance Use Topics    Alcohol use: Not Currently     Alcohol/week: 0.5 oz     Types: 1 Cans of beer per week     Comment: has not been drinking lately     Drug use: No       Allergies:  Pcn [penicillins]      Objective:       Exam:  /70   Pulse 84   Temp 36.2 °C (97.1 °F) (Temporal)   Resp 12   Ht 1.753 m (5' 9\")   Wt 91.2 kg (201 lb)   SpO2 95%   BMI 29.68 kg/m²   Body mass index is 29.68 kg/m².  Wt Readings from Last 4 Encounters:   05/22/23 91.2 kg (201 lb)   11/16/22 92.5 kg (204 lb)   09/20/22 89 kg (196 lb 3.2 oz)   07/08/22 83.5 kg (184 lb)       Gen: Alert and oriented, No apparent distress. Appropriately groomed.  Neck: Neck is supple without lymphadenopathy.No thyromegaly.   Lungs: Normal effort, CTA bilaterally, no wheezes, rhonchi, or rales  CV: Regular rate and rhythm. No Lower extremity edema  Skin: No rash noted.      Assessment & Plan:     72 y.o. male with the following -     1. Diabetes mellitus type 2, noninsulin dependent (HCC)  - POCT Hemoglobin A1C  - Continuous Blood Gluc  Device; 1 Kit 3 times a day.  Dispense: 1 Each; Refill: 0  - Continuous Blood Gluc Sensor Misc; Use one sensor as directed every 7 days to monitor blood glucose levels.  Dispense: 8 Each; Refill: 0  2. Hyperlipidemia associated with type 2 diabetes mellitus (HCC)  - Lipid Profile; Future  3. Atherosclerosis of aorta (HCC)  4. Diabetic polyneuropathy associated with type 2 diabetes mellitus (HCC)  5. Mild cerebral atrophy (HCC)  HCC Gap Form    Diagnosis to address: I70.0 - Atherosclerosis of aorta (HCC)  Assessment and plan: Chronic, stable. Continue with Crestor 10 mg and fenofibrate 134 mg daily.  Diagnosis: E11.69, E78.5 - Hyperlipidemia associated with type 2 diabetes mellitus (HCC)  Assessment and plan: Chronic, stable. Continue with Crestor 10 mg and fenofibrate 134 mg daily.  Diagnosis: E11.42 - Diabetic polyneuropathy associated " with type 2 diabetes mellitus (HCC)  Assessment and plan: Chronic, improving. Off meds. Had tried gabapentin in the past. 2021.   Diagnosis: G31.9 - Mild cerebral atrophy (HCC)  Assessment and plan: Chronic, stable.denies any memory issues.   Noted on MRI completed in November 2020  Last edited 05/22/23 15:55 PDT by Aurelia Zayas M.D.            Return in about 3 months (around 8/22/2023) for Diabetes, A1c in office. .    Please note that this dictation was created using voice recognition software. I have made every reasonable attempt to correct obvious errors, but I expect that there are errors of grammar and possibly content that I did not discover before finalizing the note.

## 2023-05-22 NOTE — PROGRESS NOTES
Nurse ZANA met with pt and spouse after PCP appointment today. Monthly CCM assessment completed.    Assessment    Pt reports he has been doing okay. Spouse states pt not getting much exercise or activities. Spouse states they have been trying to eat healthier and have been ordering Hello Fresh. Spouse states patient's  A1C has gone up to 7.6. Previous A1C  six months ago was 7.0.  Spouse reports pt  needs to get labs completed. Spouse states their niece will be moving in to help them coordinate some of their care.     Education    Discussed getting some activities such as going for short walks. Continue to follow healthy meal plan. Take medications as directed.     Plan of Care and Goals    Reviewed care plan    Barriers:    Limited exercise or activity    Progress:    Continue to work on goals.    Next outreach: One month  Nurse Care Coordinator:  Yanna Banks  [phone]

## 2023-05-22 NOTE — ASSESSMENT & PLAN NOTE
Chronic medical diagnoses.  Continues to take Crestor 10 mg and fenofibrate 134 mg daily.  Did not  complete previously ordered labs.   Latest Reference Range & Units 06/21/22 09:49   Cholesterol,Tot 100 - 199 mg/dL 168   Triglycerides 0 - 149 mg/dL 90   HDL >=40 mg/dL 50   LDL <100 mg/dL 100 (H)   (H): Data is abnormally high    [facility]   chl 152  trigh 170  HDL 45  LDL 73

## 2023-05-23 RX ORDER — PROCHLORPERAZINE 25 MG/1
SUPPOSITORY RECTAL
Qty: 9 EACH | Refills: 0 | Status: SHIPPED | OUTPATIENT
Start: 2023-05-23 | End: 2023-10-25

## 2023-05-23 RX ORDER — PROCHLORPERAZINE 25 MG/1
SUPPOSITORY RECTAL
Qty: 1 EACH | Refills: 0 | Status: SHIPPED | OUTPATIENT
Start: 2023-05-23 | End: 2023-10-25

## 2023-05-23 NOTE — TELEPHONE ENCOUNTER
Requested Prescriptions     Pending Prescriptions Disp Refills   • Continuous Blood Gluc Sensor (DEXCOM G6 SENSOR) Misc [Pharmacy Med Name: DEXCOM G6 SENSOR] 9 Each 0     Sig: USE ONE SENSOR AS DIRECTED EVERY 7 DAYS TO MONITOR BLOOD GLUCOSE LEVELS.   • Continuous Blood Gluc  (DEXCOM G6 ) Device [Pharmacy Med Name: DEXCOM G6 ] 1 Each 0     Sig: USE 1 KIT 3 TIMES A DAY TO MONITOR BLOOD GLUCOSE LEVELS     Aurelia Zayas M.D.

## 2023-05-25 DIAGNOSIS — E78.5 DYSLIPIDEMIA: ICD-10-CM

## 2023-05-25 RX ORDER — ROSUVASTATIN CALCIUM 10 MG/1
TABLET, COATED ORAL
Qty: 100 TABLET | Refills: 3 | Status: SHIPPED | OUTPATIENT
Start: 2023-05-25

## 2023-05-25 NOTE — TELEPHONE ENCOUNTER
Received request via: Patient    Was the patient seen in the last year in this department? Yes    Does the patient have an active prescription (recently filled or refills available) for medication(s) requested? No    Does the [facility] and need 100 day supply (blood pressure, diabetes and cholesterol meds only)? Medication is not for cholesterol, blood pressure or diabetes

## 2023-05-25 NOTE — TELEPHONE ENCOUNTER
Requested Prescriptions     Pending Prescriptions Disp Refills   • rosuvastatin (CRESTOR) 10 MG Tab [Pharmacy Med Name: ROSUVASTATIN CALCIUM 10 MG TAB] 100 Tablet 3     Sig: TAKE 1 TABLET BY MOUTH EVERY DAY IN THE EVENING   Aurelia Zayas M.D.

## 2023-06-07 ENCOUNTER — DOCUMENTATION (OUTPATIENT)
Dept: HEALTH INFORMATION MANAGEMENT | Facility: OTHER | Age: 73
End: 2023-06-07
Payer: MEDICARE

## 2023-06-16 ENCOUNTER — PATIENT OUTREACH (OUTPATIENT)
Dept: HEALTH INFORMATION MANAGEMENT | Facility: OTHER | Age: 73
End: 2023-06-16
Payer: MEDICARE

## 2023-06-16 DIAGNOSIS — E11.9 DIABETES MELLITUS TYPE 2, NONINSULIN DEPENDENT (HCC): ICD-10-CM

## 2023-06-16 PROCEDURE — 99999 PR NO CHARGE: CPT | Performed by: FAMILY MEDICINE

## 2023-06-16 NOTE — PROGRESS NOTES
6/16/23 2:00pm: Nurse CM outreach call for monthly CCM assessment.  left with CM number requesting a return call to nurse at [phone].    6/19/23 3:55pm: Nurse CM outreach call second attempt to patient for monthly CCM assessment.    Assessment  Spouse answered telephone. Reports pt doing okay. Spouse put pt on telephone. Pt quiet in responses to nurse. States no current problems or concerns. Spouse came back on telephone. Reports pt didn't receive the Dexcom glucometer that PCP ordered. Pt is not on insulin for diabetes management. Nurse spoke to [facility] representative. Pt will not qualify for Dexcom CGM. Pt is currently on metformin for diabetes management. Pt doesn't have a glucometer to check blood sugars. Freestyle or Contour glucometer are listed under benefit resources for [facility]. Pt's last A1C was 7.6 on 5/22/23.  Nurse will route message to PCP to order glucometer either Contour glucometer or Freestyle Lite glucometer to monitor blood sugars     Education    Discussed healthy eating limiting sweets and carbohydrates. Keep active and exercise as tolerated.    Plan of Care and Goals    Reviewed care plan and goals    Barriers:    Not following healthy meal plan    Progress:    Continue to work on progress    Next outreach: One month  Nurse Care Coordinator:  Yanna Banks  [phone]

## 2023-06-19 DIAGNOSIS — E11.9 DIABETES MELLITUS TYPE 2, NONINSULIN DEPENDENT (HCC): ICD-10-CM

## 2023-06-19 RX ORDER — LANCETS 30 GAUGE
EACH MISCELLANEOUS
Qty: 100 EACH | Refills: 0 | Status: SHIPPED | OUTPATIENT
Start: 2023-06-19

## 2023-07-24 ENCOUNTER — PATIENT OUTREACH (OUTPATIENT)
Dept: HEALTH INFORMATION MANAGEMENT | Facility: OTHER | Age: 73
End: 2023-07-24
Payer: MEDICARE

## 2023-07-24 DIAGNOSIS — E11.9 DIABETES MELLITUS TYPE 2, NONINSULIN DEPENDENT (HCC): ICD-10-CM

## 2023-07-24 PROCEDURE — 99490 CHRNC CARE MGMT STAFF 1ST 20: CPT | Performed by: FAMILY MEDICINE

## 2023-07-24 NOTE — PROGRESS NOTES
7/24/23 2:00 pm: Nurse CM outreach call for monthly CCM assessment. VM left with CM contact number requesting a return call to nurse CM at [phone].    7/31/23 12:45 pm: Nurse CM outreach call for monthly CCM assessment. Spouse answered telephone.     Assessment    Spouse reports pt doing okay. States they have the new glucometer to check pt's blood sugars but haven't been able to use it  yet. Spouse states she had trouble operating glucometer and doesn't feel she put pt's blood sample on the right area of the strip. States she will either take glucometer to pharmacy or will contact nurse if she would like additional instruction on use of meter. Spouse states pt did have a rash in his right groin area. States she put some [employer] body powder on the area and it improved. Reports pt continues to sleep a lot. Spouse will have patient get labs completed before next PCP appt on 8/23.     Education    Follow healthy meal plan. Limit sweets. Stay active, exercise as tolerated.    Plan of Care and Goals    Reviewed care plan and goals    Barriers:    fatigue    Progress:    Continue to work on progress    Next outreach:  One month  Nurse Care Coordinator:  Yanna Banks  [phone]

## 2023-07-26 ENCOUNTER — PATIENT OUTREACH (OUTPATIENT)
Dept: HEALTH INFORMATION MANAGEMENT | Facility: OTHER | Age: 73
End: 2023-07-26
Payer: MEDICARE

## 2023-07-26 DIAGNOSIS — E11.9 DIABETES MELLITUS TYPE 2, NONINSULIN DEPENDENT (HCC): ICD-10-CM

## 2023-07-26 NOTE — PROGRESS NOTES
Returned wife's call, she was returning Yanna's (Care Coordinator) call from yesterday.   I will have Yanna reach out to patient next week. In the meantime patient's doctor ordered a blood sugar machine for the patient, blood sugar is to be tested in the morning before breakfast, patient has already eaten breakfast today.  Wife has not tested patient's blood sugar because she does not know how to work the machine.  I advised her to go to the pharmacy to have them show her how it works.

## 2023-08-14 ENCOUNTER — HOSPITAL ENCOUNTER (OUTPATIENT)
Dept: LAB | Facility: MEDICAL CENTER | Age: 73
End: 2023-08-14
Attending: FAMILY MEDICINE
Payer: MEDICARE

## 2023-08-14 DIAGNOSIS — E11.69 HYPERLIPIDEMIA ASSOCIATED WITH TYPE 2 DIABETES MELLITUS (HCC): ICD-10-CM

## 2023-08-14 DIAGNOSIS — E78.5 HYPERLIPIDEMIA ASSOCIATED WITH TYPE 2 DIABETES MELLITUS (HCC): ICD-10-CM

## 2023-08-14 DIAGNOSIS — E11.9 DIABETES MELLITUS TYPE 2, NONINSULIN DEPENDENT (HCC): ICD-10-CM

## 2023-08-14 DIAGNOSIS — E03.9 HYPOTHYROIDISM, UNSPECIFIED TYPE: ICD-10-CM

## 2023-08-14 LAB
25(OH)D3 SERPL-MCNC: 24 NG/ML (ref 30–100)
ALBUMIN SERPL BCP-MCNC: 4.5 G/DL (ref 3.2–4.9)
ALBUMIN/GLOB SERPL: 1.7 G/DL
ALP SERPL-CCNC: 40 U/L (ref 30–99)
ALT SERPL-CCNC: 18 U/L (ref 2–50)
ANION GAP SERPL CALC-SCNC: 16 MMOL/L (ref 7–16)
AST SERPL-CCNC: 23 U/L (ref 12–45)
BASOPHILS # BLD AUTO: 0.8 % (ref 0–1.8)
BASOPHILS # BLD: 0.1 K/UL (ref 0–0.12)
BILIRUB SERPL-MCNC: 0.3 MG/DL (ref 0.1–1.5)
BUN SERPL-MCNC: 18 MG/DL (ref 8–22)
CALCIUM ALBUM COR SERPL-MCNC: 9.2 MG/DL (ref 8.5–10.5)
CALCIUM SERPL-MCNC: 9.6 MG/DL (ref 8.5–10.5)
CHLORIDE SERPL-SCNC: 103 MMOL/L (ref 96–112)
CHOLEST SERPL-MCNC: 109 MG/DL (ref 100–199)
CO2 SERPL-SCNC: 21 MMOL/L (ref 20–33)
COMMENT NL1176: NORMAL
CREAT SERPL-MCNC: 1.12 MG/DL (ref 0.5–1.4)
CREAT UR-MCNC: 140.8 MG/DL
EOSINOPHIL # BLD AUTO: 0 K/UL (ref 0–0.51)
EOSINOPHIL NFR BLD: 0 % (ref 0–6.9)
ERYTHROCYTE [DISTWIDTH] IN BLOOD BY AUTOMATED COUNT: 46.4 FL (ref 35.9–50)
GFR SERPLBLD CREATININE-BSD FMLA CKD-EPI: 68 ML/MIN/1.73 M 2
GLOBULIN SER CALC-MCNC: 2.7 G/DL (ref 1.9–3.5)
GLUCOSE SERPL-MCNC: 161 MG/DL (ref 65–99)
HCT VFR BLD AUTO: 44.1 % (ref 42–52)
HDLC SERPL-MCNC: 33 MG/DL
HGB BLD-MCNC: 14.4 G/DL (ref 14–18)
LDLC SERPL CALC-MCNC: 43 MG/DL
LYMPHOCYTES # BLD AUTO: 6.66 K/UL (ref 1–4.8)
LYMPHOCYTES NFR BLD: 53.7 % (ref 22–41)
MANUAL DIFF BLD: NORMAL
MCH RBC QN AUTO: 29.5 PG (ref 27–33)
MCHC RBC AUTO-ENTMCNC: 32.7 G/DL (ref 32.3–36.5)
MCV RBC AUTO: 90.4 FL (ref 81.4–97.8)
MICROALBUMIN UR-MCNC: <1.2 MG/DL
MICROALBUMIN/CREAT UR: NORMAL MG/G (ref 0–30)
MONOCYTES # BLD AUTO: 0.31 K/UL (ref 0–0.85)
MONOCYTES NFR BLD AUTO: 2.5 % (ref 0–13.4)
MORPHOLOGY BLD-IMP: NORMAL
NEUTROPHILS # BLD AUTO: 5.33 K/UL (ref 1.82–7.42)
NEUTROPHILS NFR BLD: 43 % (ref 44–72)
NRBC # BLD AUTO: 0 K/UL
NRBC BLD-RTO: 0 /100 WBC (ref 0–0.2)
PLATELET # BLD AUTO: 200 K/UL (ref 164–446)
PLATELET BLD QL SMEAR: NORMAL
PMV BLD AUTO: 10.9 FL (ref 9–12.9)
POTASSIUM SERPL-SCNC: 3.9 MMOL/L (ref 3.6–5.5)
PROT SERPL-MCNC: 7.2 G/DL (ref 6–8.2)
RBC # BLD AUTO: 4.88 M/UL (ref 4.7–6.1)
RBC BLD AUTO: PRESENT
SMUDGE CELLS BLD QL SMEAR: NORMAL
SODIUM SERPL-SCNC: 140 MMOL/L (ref 135–145)
TRIGL SERPL-MCNC: 164 MG/DL (ref 0–149)
TSH SERPL DL<=0.005 MIU/L-ACNC: 3.61 UIU/ML (ref 0.38–5.33)
VIT B12 SERPL-MCNC: 1487 PG/ML (ref 211–911)
WBC # BLD AUTO: 12.4 K/UL (ref 4.8–10.8)

## 2023-08-14 PROCEDURE — 82570 ASSAY OF URINE CREATININE: CPT

## 2023-08-14 PROCEDURE — 36415 COLL VENOUS BLD VENIPUNCTURE: CPT

## 2023-08-14 PROCEDURE — 82043 UR ALBUMIN QUANTITATIVE: CPT

## 2023-08-14 PROCEDURE — 85025 COMPLETE CBC W/AUTO DIFF WBC: CPT

## 2023-08-14 PROCEDURE — 80053 COMPREHEN METABOLIC PANEL: CPT

## 2023-08-14 PROCEDURE — 82607 VITAMIN B-12: CPT

## 2023-08-14 PROCEDURE — 83036 HEMOGLOBIN GLYCOSYLATED A1C: CPT

## 2023-08-14 PROCEDURE — 80061 LIPID PANEL: CPT

## 2023-08-14 PROCEDURE — 82306 VITAMIN D 25 HYDROXY: CPT

## 2023-08-14 PROCEDURE — 85007 BL SMEAR W/DIFF WBC COUNT: CPT

## 2023-08-14 PROCEDURE — 84443 ASSAY THYROID STIM HORMONE: CPT

## 2023-08-15 LAB
EST. AVERAGE GLUCOSE BLD GHB EST-MCNC: 189 MG/DL
HBA1C MFR BLD: 8.2 % (ref 4–5.6)

## 2023-08-23 ENCOUNTER — OFFICE VISIT (OUTPATIENT)
Dept: MEDICAL GROUP | Facility: PHYSICIAN GROUP | Age: 73
End: 2023-08-23
Payer: MEDICARE

## 2023-08-23 VITALS
BODY MASS INDEX: 30.67 KG/M2 | RESPIRATION RATE: 14 BRPM | SYSTOLIC BLOOD PRESSURE: 122 MMHG | TEMPERATURE: 97.2 F | DIASTOLIC BLOOD PRESSURE: 68 MMHG | HEIGHT: 69 IN | HEART RATE: 71 BPM | WEIGHT: 207.1 LBS | OXYGEN SATURATION: 94 %

## 2023-08-23 DIAGNOSIS — D72.820 LYMPHOCYTOSIS: ICD-10-CM

## 2023-08-23 DIAGNOSIS — E11.69 HYPERLIPIDEMIA ASSOCIATED WITH TYPE 2 DIABETES MELLITUS (HCC): ICD-10-CM

## 2023-08-23 DIAGNOSIS — E78.5 HYPERLIPIDEMIA ASSOCIATED WITH TYPE 2 DIABETES MELLITUS (HCC): ICD-10-CM

## 2023-08-23 DIAGNOSIS — E55.9 VITAMIN D DEFICIENCY: ICD-10-CM

## 2023-08-23 DIAGNOSIS — R22.9 SUBCUTANEOUS NODULE: ICD-10-CM

## 2023-08-23 DIAGNOSIS — K29.60 EROSIVE GASTRITIS: ICD-10-CM

## 2023-08-23 DIAGNOSIS — R74.8 ELEVATED VITAMIN B12 LEVEL: ICD-10-CM

## 2023-08-23 DIAGNOSIS — E03.9 HYPOTHYROIDISM, UNSPECIFIED TYPE: ICD-10-CM

## 2023-08-23 DIAGNOSIS — E11.65 TYPE 2 DIABETES MELLITUS WITH HYPERGLYCEMIA, WITHOUT LONG-TERM CURRENT USE OF INSULIN (HCC): ICD-10-CM

## 2023-08-23 PROBLEM — R79.89 ELEVATED VITAMIN B12 LEVEL: Status: ACTIVE | Noted: 2023-08-23

## 2023-08-23 PROCEDURE — 3074F SYST BP LT 130 MM HG: CPT | Performed by: FAMILY MEDICINE

## 2023-08-23 PROCEDURE — 99215 OFFICE O/P EST HI 40 MIN: CPT | Performed by: FAMILY MEDICINE

## 2023-08-23 PROCEDURE — 3078F DIAST BP <80 MM HG: CPT | Performed by: FAMILY MEDICINE

## 2023-08-23 ASSESSMENT — ENCOUNTER SYMPTOMS
FEVER: 0
HEARTBURN: 0
SHORTNESS OF BREATH: 0
MEMORY LOSS: 0
COUGH: 0
CHILLS: 0
DIARRHEA: 0

## 2023-08-23 ASSESSMENT — FIBROSIS 4 INDEX: FIB4 SCORE: 2.03

## 2023-08-23 NOTE — PROGRESS NOTES
Subjective:     CC:   Chief Complaint   Patient presents with    Diabetes Follow-up     3 months     Lab Results    Medication Problem     Would like to discontinue some medications.          HPI:   Lico presents today with wife for a follow-up visit and to discuss recent lab results..  Last seen by me on May 22.  Denies any  memory issues.'s.  States that previous memory issues for side effects to 2 medications he was on.    Since our last appointment, has been seen by:  [facility]ht[facility]- Dr Cano-end of June.  We will request records.      Problem   Elevated Vitamin B12 Level      Recent August labs with vitamin B12 level elevated at 1487.  Had been taking OTC B12     Vitamin D Deficiency    Recent labs with vitamin D level decreased at 24.  Taking OTC vitamin D- wife unsure of dose.     Lymphocytosis    New issue.  Recent labs with elevated white blood cell count at 12.4 and decrease in neutrophils and elevation in lymphocytes.  Denies any rashes.  No f/c/coughing.  No dysuria.     Hyperlipidemia Associated With Type 2 Diabetes Mellitus (Hcc)    Chronic medical diagnosis.  Recent labs with improvement in total cholesterol and LDL levels improved from 100 to 43.  Currently taking rosuvastatin 10 mg daily   Latest Reference Range & Units 08/14/23 09:21   Cholesterol,Tot 100 - 199 mg/dL 109   Triglycerides 0 - 149 mg/dL 164 (H)   HDL >=40 mg/dL 33 !   LDL <100 mg/dL 43   (H): Data is abnormally high  !: Data is abnormal     Erosive Gastritis    Chronic.  Started on omeprazole by previous pcp.  Progress notes, erosive gastritis was noted on previous EGD.  Denies any heartburn.  Spouse is wondering if he needs to continue with this medication.  Will d/c it and monitor.      Subcutaneous Nodule    New issue.   has noticed a Midchest nodule- not healing. Has been applying OTC topical antibiotics. [facility].  Wife thinks that he does have an appointment coming up.     Hypothyroidism    Chronic medical  diagnosis.  Currently taking levoxyl 75mcg daily.   Last set of labs had TSH at normal range in August.      Type 2 Diabetes Mellitus With Hyperglycemia, Without Long-Term Current Use of Insulin (Hcc)    Chronic medical diagnosis.  Recent labs have A1c increased from 7.6% in May to 8.2% with recent August labs.  Currently taking metformin 500 mg bid since may.   Will add metformin 1000mg bid.    Continues to f/u [facility].  Had a f/u with them a few weeks ago. Will request records.         Current Outpatient Medications Ordered in Epic   Medication Sig Dispense Refill    metFORMIN (GLUCOPHAGE) 500 MG Tab Take 2 Tablets by mouth 2 times a day with meals. 100 Tablet 3    Blood Glucose Test Strips Use one  strip to test blood sugar once daily early morning before first meal. 100 Strip 3    Blood Glucose Meter Kit Test blood sugar as recommended by provider. contour blood glucose monitoring kit. 1 Kit 0    Lancets Use one  lancet to test blood sugar once daily early morning before first meal. 100 Each 0    rosuvastatin (CRESTOR) 10 MG Tab TAKE 1 TABLET BY MOUTH EVERY DAY IN THE EVENING 100 Tablet 3    Continuous Blood Gluc  (DEXCOM G6 ) Device USE 1 KIT 3 TIMES A DAY TO MONITOR BLOOD GLUCOSE LEVELS 1 Each 0    levothyroxine (SYNTHROID) 75 MCG Tab Take 1 Tablet by mouth every morning on an empty stomach. 100 Tablet 3    fenofibrate micronized (LOFIBRA) 134 MG capsule Take 1 Capsule by mouth every day. 90 Capsule 3    sennosides-docusate sodium (SENOKOT-S) 8.6-50 MG tablet Take 1 Tablet by mouth 2 times a day. Indications: Constipation 180 Tablet 3    B Complex Vitamins (VITAMIN-B COMPLEX PO) Take  by mouth.      tamsulosin (FLOMAX) 0.4 MG capsule Take 1 Capsule by mouth every day.      finasteride (PROSCAR) 5 MG Tab Take 1 Tablet by mouth every day.      latanoprost (XALATAN) 0.005 % Solution Administer 1 Drop into both eyes at bedtime.      acetaminophen (TYLENOL) 500 MG Tab Take 1 Tablet by mouth 2 times a  "day. Indications: Pain      Cyanocobalamin 5000 MCG TABLET DISPERSIBLE Take 3,000 mcg by mouth every day. Indications: Suppliment      Multiple Vitamins-Minerals (CENTRUM SILVER ADULT 50+) Tab Take 1 Tablet by mouth every day. Indications: Suppliment      Coenzyme Q10 (COQ10) 100 MG Cap Take 1 Capsule by mouth every day. Indications: Suppliment      Cholecalciferol (VITAMIN D3 ADULT GUMMIES PO) Take 2,000 Units by mouth every day. Indications: Suppliment      Glucosamine HCl 1500 MG Tab Take 1 Tablet by mouth every day. Indications: Suppliment      Continuous Blood Gluc Sensor (DEXCOM G6 SENSOR) Misc USE ONE SENSOR AS DIRECTED EVERY 7 DAYS TO MONITOR BLOOD GLUCOSE LEVELS. 9 Each 0     No current [facility]-ordered facility-administered medications on file.       Health Maintenance: Vaccines discussed with patient. Encouraged to receive the shingrix, flu and covid vaccines.     ROS:  Review of Systems   Constitutional:  Negative for chills and fever.   Respiratory:  Negative for cough and shortness of breath.    Cardiovascular:  Negative for chest pain.   Gastrointestinal:  Negative for diarrhea and heartburn.   Skin:  Negative for rash.   Psychiatric/Behavioral:  Negative for memory loss.        Objective:     Exam:  /68   Pulse 71   Temp 36.2 °C (97.2 °F) (Temporal)   Resp 14   Ht 1.753 m (5' 9\")   Wt 93.9 kg (207 lb 1.6 oz)   SpO2 94%   BMI 30.58 kg/m²  Body mass index is 30.58 kg/m².    Physical Exam  Constitutional:       Appearance: Normal appearance.   Eyes:      Extraocular Movements: Extraocular movements intact.   Cardiovascular:      Rate and Rhythm: Normal rate and regular rhythm.   Pulmonary:      Effort: Pulmonary effort is normal.      Breath sounds: Normal breath sounds.   Skin:     Comments: Bilateral great toe onychomychosis  1 cm erythematous nodule mid chest-nonhealing. nontender   Neurological:      Mental Status: He is alert.   Psychiatric:         Mood and Affect: Mood normal.         " Behavior: Behavior normal.         Monofilament examination shows intact sensation in 6 over 6 areas tested bilaterally.   Visual examination revels no lesions, ulcers. Pulses 2+ and symmetrical -DP and PT. Good capillary refill, less than 2 seconds         Labs: see above    Assessment & Plan:     73 y.o. male with the following -     1. Type 2 diabetes mellitus with hyperglycemia, without long-term current use of insulin (HCC)  Chronic medical diagnosis.  Not well controlled.  We will increase his metformin to 1000 mg twice a day.  Discussed with spouse to increase it to 2000 mg in the mornings for a week and then after a week she can also increase the evening dose to 1000 mg.  GI side effects discussed.  Encouraged to keep me updated  - metFORMIN (GLUCOPHAGE) 500 MG Tab; Take 2 Tablets by mouth 2 times a day with meals.  Dispense: 100 Tablet; Refill: 3  - Diabetic Monofilament Lower Extremity Exam  - CBC WITH DIFFERENTIAL; Future    2. Hyperlipidemia associated with type 2 diabetes mellitus (HCC)  Chronic medical diagnosis.  Improving.  Continue with Crestor 10 mg daily.    3. Elevated vitamin B12 level  New medical diagnosis.  Recent labs with elevation in B12 levels.  We will have him discontinue B12.    4. Hypothyroidism, unspecified type  Chronic medical diagnosis.  Recent labs with stable TSH.  Continue with levothyroxine 75 mcg daily.    5. Vitamin D deficiency  Chronic medical diagnosis.  Not well controlled.  Currently taking over-the-counter vitamin D.  We discussed increasing this dose.    6. Subcutaneous nodule  New medical diagnosis.  Not improving with topical antibiotic.  [facility]  Patient's spouse mentions that he does have an upcoming appointment.    7. Lymphocytosis  New medical diagnosis.  Denies any infections.  We will repeat labs in the next 1 to 2 weeks.    8. Erosive gastritis  Chronic medical diagnosis.  Asymptomatic.  Would like to do a trial off  of omeprazole.  We will keep me updated.    HCC Gap Form    Last edited 08/23/23 09:56 PDT by Aurelia Zayas M.D.           I spent a total of 52 minutes with record review, exam, communication with the patient, communication with other providers, and documentation of this encounter.      Return in about 6 weeks (around 10/4/2023).    Please note that this dictation was created using voice recognition software. I have made every reasonable attempt to correct obvious errors, but I expect that there are errors of grammar and possibly content that I did not discover before finalizing the note.

## 2023-08-31 ENCOUNTER — PATIENT OUTREACH (OUTPATIENT)
Dept: HEALTH INFORMATION MANAGEMENT | Facility: OTHER | Age: 73
End: 2023-08-31
Payer: MEDICARE

## 2023-08-31 DIAGNOSIS — E11.65 TYPE 2 DIABETES MELLITUS WITH HYPERGLYCEMIA, WITHOUT LONG-TERM CURRENT USE OF INSULIN (HCC): ICD-10-CM

## 2023-08-31 DIAGNOSIS — E11.9 DIABETES MELLITUS TYPE 2, NONINSULIN DEPENDENT (HCC): ICD-10-CM

## 2023-08-31 PROCEDURE — 99490 CHRNC CARE MGMT STAFF 1ST 20: CPT | Performed by: FAMILY MEDICINE

## 2023-08-31 NOTE — TELEPHONE ENCOUNTER
Nurse CM notified patient's spouse of new prescription sent to pharmacy. Nurse updated spouse that PCP sent prescription for metformin 1000 mg tablets  to pharmacy. Reviewed PCP order to take one tablet two times daily with food. Spouse verbalized understanding.

## 2023-08-31 NOTE — PROGRESS NOTES
Nurse CM outreach call for monthly CCM assessment. Pt's spouse answered telephone.    Assessment    Spouse reports pt doing okay. Reports he is taking metformin 500 mg tablets, two tablets twice daily. Spouse states they are almost out of medication. Reports she contacted pharmacy and was told the order has been cancelled. Spouse requesting PCP send order to pharmacy for metformin tablets with increased dosage to [facility] pharmacy. Spouse states pt has not had any diarrhea or abdominal pain. Reports pt tolerating increase in metformin dose.  Reports pt was previously on 1 tablet of metformin two times a day.     Education    Continue to work on following healthy meal plan. Monitor blood sugar readings with home monitor. Increase activity as tolerated.    Plan of Care and Goals    Reviewed care plan and goals    Barriers:    Chronic health conditions    Progress:    Continue to work on progress    Next outreach:  One month  Nurse Care Coordinator:  Yanna Banks  [phone]

## 2023-09-27 ENCOUNTER — PATIENT OUTREACH (OUTPATIENT)
Dept: HEALTH INFORMATION MANAGEMENT | Facility: OTHER | Age: 73
End: 2023-09-27
Payer: MEDICARE

## 2023-09-27 ENCOUNTER — HOSPITAL ENCOUNTER (OUTPATIENT)
Dept: LAB | Facility: MEDICAL CENTER | Age: 73
End: 2023-09-27
Attending: PHYSICIAN ASSISTANT
Payer: MEDICARE

## 2023-09-27 DIAGNOSIS — E11.65 TYPE 2 DIABETES MELLITUS WITH HYPERGLYCEMIA, WITHOUT LONG-TERM CURRENT USE OF INSULIN (HCC): ICD-10-CM

## 2023-09-27 LAB — PSA SERPL-MCNC: 1.66 NG/ML (ref 0–4)

## 2023-09-27 PROCEDURE — 36415 COLL VENOUS BLD VENIPUNCTURE: CPT

## 2023-09-27 PROCEDURE — 84153 ASSAY OF PSA TOTAL: CPT

## 2023-09-27 PROCEDURE — 99999 PR NO CHARGE: CPT | Performed by: FAMILY MEDICINE

## 2023-09-27 NOTE — PROGRESS NOTES
9/27/23 11:10 am: Nurse CM outreach call for monthly CCM assessment. VM left for spouse requesting return call to nurse at [phone].    9/27/23 11:15 am: Nurse received return call from pt's spouse. Monthly CCM assessment completed.    Assessment    Patient's spouse states pt has been doing better since episode of having nausea and vomiting a week ago. Spouse states she dropped off blood sugar readings at [facility] on 9/20. States highest blood sugar reading was 275. Reports pt did have the vomiting episode several days after starting increased dose of metformin. Reports no further problems. Spouse states blood sugar this am before breakfast was 143.  Spouse states she also sent a message regarding a possible fungal infection in pt's belly button area. States the problem has improved since she has cleaned umbilical area with alcohol. Pt scheduled to see PCP on 10/25. Spouse will discuss her concerns regarding fungal infections at this appt. Nurse also encouraged spouse to bring updated blood sugar log.     Education    Continue to monitor blood sugars. Have pt limit sweets in diet. Follow heart healthy meal plan.     Plan of Care and Goals    Reviewed care plan and goals    Barriers:    Not exercising on a regular basis    Progress:    Continue to work on progress    Next outreach:  One month  Nurse Care Coordinator:  Yanna Banks  [phone]

## 2023-10-20 ENCOUNTER — HOSPITAL ENCOUNTER (OUTPATIENT)
Dept: LAB | Facility: MEDICAL CENTER | Age: 73
End: 2023-10-20
Attending: FAMILY MEDICINE
Payer: MEDICARE

## 2023-10-20 DIAGNOSIS — E11.65 TYPE 2 DIABETES MELLITUS WITH HYPERGLYCEMIA, WITHOUT LONG-TERM CURRENT USE OF INSULIN (HCC): ICD-10-CM

## 2023-10-20 LAB
BASOPHILS # BLD AUTO: 0 % (ref 0–1.8)
BASOPHILS # BLD: 0 K/UL (ref 0–0.12)
BURR CELLS BLD QL SMEAR: NORMAL
COMMENT NL1176: NORMAL
EOSINOPHIL # BLD AUTO: 0.11 K/UL (ref 0–0.51)
EOSINOPHIL NFR BLD: 0.8 % (ref 0–6.9)
ERYTHROCYTE [DISTWIDTH] IN BLOOD BY AUTOMATED COUNT: 47.4 FL (ref 35.9–50)
HCT VFR BLD AUTO: 46.3 % (ref 42–52)
HGB BLD-MCNC: 15.1 G/DL (ref 14–18)
LYMPHOCYTES # BLD AUTO: 7.52 K/UL (ref 1–4.8)
LYMPHOCYTES NFR BLD: 54.9 % (ref 22–41)
MANUAL DIFF BLD: NORMAL
MCH RBC QN AUTO: 29.5 PG (ref 27–33)
MCHC RBC AUTO-ENTMCNC: 32.6 G/DL (ref 32.3–36.5)
MCV RBC AUTO: 90.6 FL (ref 81.4–97.8)
MONOCYTES # BLD AUTO: 0.67 K/UL (ref 0–0.85)
MONOCYTES NFR BLD AUTO: 4.9 % (ref 0–13.4)
MORPHOLOGY BLD-IMP: NORMAL
NEUTROPHILS # BLD AUTO: 5.4 K/UL (ref 1.82–7.42)
NEUTROPHILS NFR BLD: 39.4 % (ref 44–72)
NRBC # BLD AUTO: 0 K/UL
NRBC BLD-RTO: 0 /100 WBC (ref 0–0.2)
PLATELET # BLD AUTO: 225 K/UL (ref 164–446)
PLATELET BLD QL SMEAR: NORMAL
PMV BLD AUTO: 10.9 FL (ref 9–12.9)
POIKILOCYTOSIS BLD QL SMEAR: NORMAL
RBC # BLD AUTO: 5.11 M/UL (ref 4.7–6.1)
RBC BLD AUTO: PRESENT
SMUDGE CELLS BLD QL SMEAR: NORMAL
WBC # BLD AUTO: 13.7 K/UL (ref 4.8–10.8)

## 2023-10-20 PROCEDURE — 85027 COMPLETE CBC AUTOMATED: CPT

## 2023-10-20 PROCEDURE — 36415 COLL VENOUS BLD VENIPUNCTURE: CPT

## 2023-10-20 PROCEDURE — 85007 BL SMEAR W/DIFF WBC COUNT: CPT

## 2023-10-25 ENCOUNTER — OFFICE VISIT (OUTPATIENT)
Dept: MEDICAL GROUP | Facility: PHYSICIAN GROUP | Age: 73
End: 2023-10-25
Payer: MEDICARE

## 2023-10-25 ENCOUNTER — PATIENT OUTREACH (OUTPATIENT)
Dept: HEALTH INFORMATION MANAGEMENT | Facility: OTHER | Age: 73
End: 2023-10-25

## 2023-10-25 VITALS
SYSTOLIC BLOOD PRESSURE: 106 MMHG | OXYGEN SATURATION: 95 % | WEIGHT: 204 LBS | RESPIRATION RATE: 16 BRPM | BODY MASS INDEX: 30.21 KG/M2 | TEMPERATURE: 97.5 F | HEIGHT: 69 IN | DIASTOLIC BLOOD PRESSURE: 68 MMHG | HEART RATE: 72 BPM

## 2023-10-25 DIAGNOSIS — G47.10 HYPERSOMNOLENCE: ICD-10-CM

## 2023-10-25 DIAGNOSIS — E11.9 DIABETES MELLITUS TYPE 2, NONINSULIN DEPENDENT (HCC): ICD-10-CM

## 2023-10-25 DIAGNOSIS — D72.820 LYMPHOCYTOSIS: ICD-10-CM

## 2023-10-25 DIAGNOSIS — E11.65 TYPE 2 DIABETES MELLITUS WITH HYPERGLYCEMIA, WITHOUT LONG-TERM CURRENT USE OF INSULIN (HCC): ICD-10-CM

## 2023-10-25 PROCEDURE — 99490 CHRNC CARE MGMT STAFF 1ST 20: CPT | Performed by: FAMILY MEDICINE

## 2023-10-25 PROCEDURE — 99214 OFFICE O/P EST MOD 30 MIN: CPT | Performed by: FAMILY MEDICINE

## 2023-10-25 PROCEDURE — 3074F SYST BP LT 130 MM HG: CPT | Performed by: FAMILY MEDICINE

## 2023-10-25 PROCEDURE — 3078F DIAST BP <80 MM HG: CPT | Performed by: FAMILY MEDICINE

## 2023-10-25 ASSESSMENT — ENCOUNTER SYMPTOMS
SHORTNESS OF BREATH: 0
CHILLS: 0
NAUSEA: 0
FEVER: 0
ABDOMINAL PAIN: 0

## 2023-10-25 ASSESSMENT — FIBROSIS 4 INDEX: FIB4 SCORE: 1.807050663032288118

## 2023-10-25 NOTE — PROGRESS NOTES
Nurse ZANA met with patient at spouse after PCP appt today.     Assessment    Pt here for follow-up with PCP. Spouse accompanied pt to appt. Pt reports he has been doing okay. States he hasn't been doing much activity. Spouse reports they recently went to a wedding and pt did some dancing. Pt keeping busy with family. Spouse states they have family living with them. Nurse reviewed PCP recommendations from today's visit. Pt was referred to pharmacotherapy services. Spouse reports pt has been checking blood sugars at home. States they reviewed blood sugars with PCP today. Spouse reports blood sugars have been in a good range.     Education    Continue to work on increasing activity.  Encouraged pt to do some walking.     Plan of Care and Goals    Reviewed care plan and goals.     Barriers:    Not getting enough exercise    Progress:    Continue to work on progress    Next outreach:  One month  Nurse Care Coordinator:  Yanna Banks  [phone]

## 2023-10-25 NOTE — PROGRESS NOTES
Subjective:     CC:   Chief Complaint   Patient presents with    Follow-Up     2 months     Lab Results    Loose Stools     Started a couple months ago when metformin was increased.          HPI:   Lico presents today with his wife for a f/u visit.  Last seen by me on 8/23/23. .    Since our last appointment, has been seen by:  10/6- urology    Problem   Hypersomnolence    New issue.  Wife concerned that he sleeps all the time.  Has never been tested for sleep apnea.   Stop bang score of  2.  Patient states that he is bored.  Has never been tested for sleep apnea     Lymphocytosis    Chronic.  Noted since 2020  No dysuria, no coughing, no f/c  No recent URIs       Type 2 Diabetes Mellitus With Hyperglycemia, Without Long-Term Current Use of Insulin ([facility])    Chronic medical diagnosis.  August labs with A1c increased to 8.2%.  Had been taking metformin 500 mg twice a day but this dose was increased to 1000 mg twice a day.  Has noticed more loose stools since starting this medication.  Has lost 3 pounds over the last 2 months.  Wife states that he does have good appetite.             Current Outpatient Medications Ordered in Epic   Medication Sig Dispense Refill    metFORMIN (GLUCOPHAGE) 1000 MG tablet Take 1 Tablet by mouth 2 times a day with meals. 200 Tablet 3    Blood Glucose Test Strips Use one  strip to test blood sugar once daily early morning before first meal. 100 Strip 3    Blood Glucose Meter Kit Test blood sugar as recommended by provider. contour blood glucose monitoring kit. 1 Kit 0    Lancets Use one  lancet to test blood sugar once daily early morning before first meal. 100 Each 0    rosuvastatin (CRESTOR) 10 MG Tab TAKE 1 TABLET BY MOUTH EVERY DAY IN THE EVENING 100 Tablet 3    levothyroxine (SYNTHROID) 75 MCG Tab Take 1 Tablet by mouth every morning on an empty stomach. 100 Tablet 3    fenofibrate micronized (LOFIBRA) 134 MG capsule Take 1 Capsule by mouth every day. 90 Capsule 3     "sennosides-docusate sodium (SENOKOT-S) 8.6-50 MG tablet Take 1 Tablet by mouth 2 times a day. Indications: Constipation 180 Tablet 3    B Complex Vitamins (VITAMIN-B COMPLEX PO) Take  by mouth.      tamsulosin (FLOMAX) 0.4 MG capsule Take 1 Capsule by mouth every day.      finasteride (PROSCAR) 5 MG Tab Take 1 Tablet by mouth every day.      latanoprost (XALATAN) 0.005 % Solution Administer 1 Drop into both eyes at bedtime.      acetaminophen (TYLENOL) 500 MG Tab Take 1 Tablet by mouth 2 times a day. Indications: Pain      Cyanocobalamin 5000 MCG TABLET DISPERSIBLE Take 3,000 mcg by mouth every day. Indications: Suppliment      Multiple Vitamins-Minerals (CENTRUM SILVER ADULT 50+) Tab Take 1 Tablet by mouth every day. Indications: Suppliment      Coenzyme Q10 (COQ10) 100 MG Cap Take 1 Capsule by mouth every day. Indications: Suppliment      Cholecalciferol (VITAMIN D3 ADULT GUMMIES PO) Take 2,000 Units by mouth every day. Indications: Suppliment      Glucosamine HCl 1500 MG Tab Take 1 Tablet by mouth every day. Indications: Suppliment       No current Epic-ordered facility-administered medications on file.       Health Maintenance: encouraged to receive the covid vaccine.     ROS:  Review of Systems   Constitutional:  Negative for chills and fever.   Respiratory:  Negative for shortness of breath.    Cardiovascular:  Negative for chest pain.   Gastrointestinal:  Negative for abdominal pain and nausea.   Genitourinary:  Negative for dysuria.       Objective:     Exam:  /68   Pulse 72   Temp 36.4 °C (97.5 °F) (Temporal)   Resp 16   Ht 1.753 m (5' 9\")   Wt 92.5 kg (204 lb)   SpO2 95%   BMI 30.13 kg/m²  Body mass index is 30.13 kg/m².    Physical Exam  Constitutional:       Appearance: Normal appearance.   Eyes:      Extraocular Movements: Extraocular movements intact.   Cardiovascular:      Rate and Rhythm: Normal rate and regular rhythm.   Pulmonary:      Effort: Pulmonary effort is normal.      Breath " sounds: Normal breath sounds.   Abdominal:      General: Bowel sounds are normal.      Palpations: Abdomen is soft.      Tenderness: There is no abdominal tenderness.   Neurological:      Mental Status: He is alert.   Psychiatric:         Mood and Affect: Mood normal.         Behavior: Behavior normal.             Labs: see above    Assessment & Plan:     73 y.o. male with the following -     1. Type 2 diabetes mellitus with hyperglycemia, without long-term current use of insulin (HCC)  Chronic. Not well controlled.  Not tolerating the higher dose of metformin 1000,mg bid.   We discussed switching over to another medication such as Jardiance or Ozempic.  Concerned about cost.  We will have him follow-up with pharmacotherapy.  Plan on checking A1c through lab in 3 weeks, after November 14.    - Referral to Pharmacotherapy Service  - HEMOGLOBIN A1C; Future    2. Lymphocytosis  Chronic medical diagnosis.  Not well controlled.  Plan is to have elevated lymphocytes since 2022.  Denies any frequent infections.  Plan on rechecking these labs prior to next appointment with me and at that point decide on further evaluation with hematologist.  - CBC WITH DIFFERENTIAL; Future    3. Hypersomnolence  Chronic medical diagnosis.  Not well controlled.  Sleep hygiene discussed patient and spouse.  Stop bang score of 2.  States that he is just bored.  We will continue to monitor closely and have him follow-up with me in 1 month          I spent a total of 33 minutes which included time preparing to see the patient (reviewing my last note, records and recent lab results)  I also performed a medically appropriate examination, counseled and educated the patient, ordered tests.  and documentation of this encounter.      Return in about 1 month (around 11/25/2023) for Diabetes, leukocytosis.    Please note that this dictation was created using voice recognition software. I have made every reasonable attempt to correct obvious errors, but  I expect that there are errors of grammar and possibly content that I did not discover before finalizing the note.

## 2023-11-13 ENCOUNTER — NON-PROVIDER VISIT (OUTPATIENT)
Dept: VASCULAR LAB | Facility: MEDICAL CENTER | Age: 73
End: 2023-11-13
Attending: INTERNAL MEDICINE
Payer: MEDICARE

## 2023-11-13 DIAGNOSIS — E11.65 TYPE 2 DIABETES MELLITUS WITH HYPERGLYCEMIA, WITHOUT LONG-TERM CURRENT USE OF INSULIN (HCC): ICD-10-CM

## 2023-11-13 LAB
HBA1C MFR BLD: 7.7 % (ref ?–5.8)
POCT INT CON NEG: NEGATIVE
POCT INT CON POS: POSITIVE

## 2023-11-13 PROCEDURE — 99213 OFFICE O/P EST LOW 20 MIN: CPT

## 2023-11-13 PROCEDURE — 83036 HEMOGLOBIN GLYCOSYLATED A1C: CPT

## 2023-11-13 NOTE — PROGRESS NOTES
Patient Consult Note    Primary care physician: Aurelia Zayas M.D.    Reason for consult: Management of Uncontrolled Type 2 Diabetes    HPI:  Lico Salinas is a 73 y.o. old patient who comes in today for evaluation of above stated problem.    Allergies  Pcn [penicillins]    Current Diabetes Medication Regimen  Metformin IR: 1000 mg BID    Previous Diabetes Medications and Reason for Discontinuation  Metformin - GI side effects    Potential Barriers to Care:  Adherence: denies missed doses  Side effects: diarrhea associated w/ metformin  Affordability: yes    SMBG  Pt has home glucometer and proper testing technique -   Discussed BG Goals: FBG 80 - 130, 2hPP < 180, A1c < 7%    Pt reports blood sugars:   Before Breakfast: 117-170s    Hypoglycemia  Hypoglycemia awareness: No   Nocturnal hypoglycemia: None  Hypoglycemia:  None  Pt's treatment of Hypoglycemia  Discussed 15:15 Rule    Lifestyle  Current Exercise - minimal exercise  Exercise Goal - increase walking as tolerated    Dietary -   Breakfast - eggs, meat, cereal, half an english muffin  Lunch - soup, quesadillas  Dinner - pasta, macoroni, salmon  Snacks - no snacks  Drinks - water, coke-zero    Preventative Management  BP regimen (ACEi/ARB): No  Statin: rosuvastatin (Crestor) 10 mg daily  LDL <100: Yes  Lab Results   Component Value Date/Time    CHOLSTRLTOT 109 08/14/2023 09:21 AM    LDL 43 08/14/2023 09:21 AM    HDL 33 (A) 08/14/2023 09:21 AM    TRIGLYCERIDE 164 (H) 08/14/2023 09:21 AM       Last Microalbumin/Cr:  Lab Results   Component Value Date/Time    MALBCRT see below 08/14/2023 09:21 AM    MICROALBUR <1.2 08/14/2023 09:21 AM     Last A1c:  Lab Results   Component Value Date/Time    HBA1C 7.7 (A) 11/13/2023 12:00 AM      Last Retinal Scan: 11/1/23    Monofilament exam: 8/23/23   Monofilament testing with a 10 gram force: sensation intact: decreased bilaterally.    Visual Inspection: Feet without maceration, ulcers, fissures.  Feet dry.  Pedal  pulses: intact bilaterally    Updated caregaps     Labs  Lab Results   Component Value Date/Time    SODIUM 140 08/14/2023 09:21 AM    POTASSIUM 3.9 08/14/2023 09:21 AM    CHLORIDE 103 08/14/2023 09:21 AM    CO2 21 08/14/2023 09:21 AM    GLUCOSE 161 (H) 08/14/2023 09:21 AM    BUN 18 08/14/2023 09:21 AM    CREATININE 1.12 08/14/2023 09:21 AM     Lab Results   Component Value Date/Time    ALKPHOSPHAT 40 08/14/2023 09:21 AM    ASTSGOT 23 08/14/2023 09:21 AM    ALTSGPT 18 08/14/2023 09:21 AM    TBILIRUBIN 0.3 08/14/2023 09:21 AM    INR 1.35 (H) 02/28/2022 06:48 PM    ALBUMIN 4.5 08/14/2023 09:21 AM        Physical Examination:  Vital signs: There were no vitals taken for this visit. There is no height or weight on file to calculate BMI.    Assessment and Plan:    1. DM2  Last a1c drawn on 11/13/23 was 7.7%, which is not at goal  Discussed with patient and his wife about the medications that could be added to help control his blood sugars. Discussed GLP-1 agonists with their benefits and side effects. SGLT-2i and their benefits and side effects. At this time patient does not want to take weekly injections or add another pill to his regimen. Advised them that should they change their mind to schedule another appointment with our clinic.   Pt reported having some episodes of diarrhea associated with the metformin. Discussed that eating lower carb meals can help with the GI effects of metformin. Pt and wife verbalized understanding.    - Medication changes:  None   - Continue:  Metformin 1000 mg BID     - Lifestyle changes:  Eat lower carb meals along with smaller meals to help with metformin side effects.  Increase weekly exercise, start walking a few times a week and increase as tolerated.    Follow Up:  No follow-up schedule at this time as patient does not want to add anything else to their treatment regimen.    Mynor Flaherty, PharmD    CC:   Aurelia Zayas M.D.   Thiago Basurto M.D.

## 2023-11-20 ENCOUNTER — DOCUMENTATION (OUTPATIENT)
Dept: VASCULAR LAB | Facility: MEDICAL CENTER | Age: 73
End: 2023-11-20
Payer: MEDICARE

## 2023-11-20 NOTE — PROGRESS NOTES
Received DM referral.    Patient declined to continue care w/ our clinic (see visit on 11/13). Will defer management to PCP.    Will 'close' referral.    Simona Pérez, PharmD

## 2023-11-27 ENCOUNTER — PATIENT OUTREACH (OUTPATIENT)
Dept: HEALTH INFORMATION MANAGEMENT | Facility: OTHER | Age: 73
End: 2023-11-27
Payer: MEDICARE

## 2023-11-27 DIAGNOSIS — E11.65 TYPE 2 DIABETES MELLITUS WITH HYPERGLYCEMIA, WITHOUT LONG-TERM CURRENT USE OF INSULIN (HCC): ICD-10-CM

## 2023-11-27 PROCEDURE — 99999 PR NO CHARGE: CPT | Performed by: FAMILY MEDICINE

## 2023-11-27 NOTE — PROGRESS NOTES
Nurse CM outreach call for monthly CCM assessment. Spouse answered telephone.    Assessment    Spouse reports pt has a cold. States symptoms of cough, congestion and outer eyelids being red. Spouse states pt started getting sick on Thanksgiving day after getting respiratory infection  from spouse. Spouse reports pt eating and taking fluids. Pt came on telephone. Reports he is feeling better today. Spouse states pt's blood sugar this am was 135. Spouse questioning if provider thinks pt needs antibiotics. Pt is scheduled to see PCP this Wednesday 11/29. Declining virtual appt. Spouse reports she did have pt do COVID test and results were negative. Nurse will route update to PCP.     Education    Get plenty of rest. Stay well hydrated. Continue to monitor blood sugar readings.  Follow-up with provider as scheduled. If any worsening respiratory symptoms go to urgent care for evaluation.     Plan of Care and Goals    Reviewed care plan and goals    Barriers:    Chronic health conditions.     Progress:    Continue to work on progress    Next outreach: One month

## 2023-11-29 ENCOUNTER — OFFICE VISIT (OUTPATIENT)
Dept: MEDICAL GROUP | Facility: PHYSICIAN GROUP | Age: 73
End: 2023-11-29
Payer: MEDICARE

## 2023-11-29 VITALS
DIASTOLIC BLOOD PRESSURE: 80 MMHG | OXYGEN SATURATION: 94 % | WEIGHT: 204 LBS | BODY MASS INDEX: 30.21 KG/M2 | TEMPERATURE: 96.8 F | SYSTOLIC BLOOD PRESSURE: 140 MMHG | HEIGHT: 69 IN | HEART RATE: 85 BPM

## 2023-11-29 DIAGNOSIS — E11.65 TYPE 2 DIABETES MELLITUS WITH HYPERGLYCEMIA, WITHOUT LONG-TERM CURRENT USE OF INSULIN (HCC): ICD-10-CM

## 2023-11-29 DIAGNOSIS — R05.1 ACUTE COUGH: ICD-10-CM

## 2023-11-29 LAB
FLUAV RNA SPEC QL NAA+PROBE: NEGATIVE
FLUBV RNA SPEC QL NAA+PROBE: NEGATIVE
RSV RNA SPEC QL NAA+PROBE: NEGATIVE
SARS-COV-2 RNA RESP QL NAA+PROBE: NEGATIVE

## 2023-11-29 PROCEDURE — 99213 OFFICE O/P EST LOW 20 MIN: CPT | Performed by: FAMILY MEDICINE

## 2023-11-29 PROCEDURE — 0241U POCT CEPHEID COV-2, FLU A/B, RSV - PCR: CPT | Performed by: FAMILY MEDICINE

## 2023-11-29 PROCEDURE — 3077F SYST BP >= 140 MM HG: CPT | Performed by: FAMILY MEDICINE

## 2023-11-29 PROCEDURE — 3079F DIAST BP 80-89 MM HG: CPT | Performed by: FAMILY MEDICINE

## 2023-11-29 ASSESSMENT — FIBROSIS 4 INDEX: FIB4 SCORE: 1.807050663032288118

## 2023-11-29 NOTE — PROGRESS NOTES
Subjective:     CC:   Chief Complaint   Patient presents with    Coronavirus Screening     X 6 Days Wife was sick feeling better         HPI:   Lico presents today with wife.  Last s for further evaluation cold-like symptoms. Last  seen by me on October 25.    Since our last appointment, has been seen by:  [facility]      Problem   Acute Cough    New medical diagnosis.  Patient's spouse was ill last week with similar symptoms.  She took a home COVID test which was negative. Last covid vaccine was given June 2022. Complaining of URI symptoms and cough.     Type 2 Diabetes Mellitus With Hyperglycemia, Without Long-Term Current Use of Insulin (Hcc)    Chronic medical diagnosis.  A1c checked earlier this month had improved to 7.7%  Continues to take metformin 1000 mg twice a day    10/25/23 Chronic medical diagnosis.  August labs with A1c increased to 8.2%.  Had been taking metformin 500 mg twice a day but this dose was increased to 1000 mg twice a day.  Has noticed more loose stools since starting this medication.  Has lost 3 pounds over the last 2 months.  Wife states that he does have good appetite.             Current Outpatient Medications Ordered in Epic   Medication Sig Dispense Refill    metFORMIN (GLUCOPHAGE) 1000 MG tablet Take 1 Tablet by mouth 2 times a day with meals. 200 Tablet 3    Blood Glucose Test Strips Use one  strip to test blood sugar once daily early morning before first meal. 100 Strip 3    Blood Glucose Meter Kit Test blood sugar as recommended by provider. contour blood glucose monitoring kit. 1 Kit 0    Lancets Use one  lancet to test blood sugar once daily early morning before first meal. 100 Each 0    rosuvastatin (CRESTOR) 10 MG Tab TAKE 1 TABLET BY MOUTH EVERY DAY IN THE EVENING 100 Tablet 3    levothyroxine (SYNTHROID) 75 MCG Tab Take 1 Tablet by mouth every morning on an empty stomach. 100 Tablet 3    fenofibrate micronized (LOFIBRA) 134 MG capsule Take 1 Capsule by mouth every day.  "90 Capsule 3    sennosides-docusate sodium (SENOKOT-S) 8.6-50 MG tablet Take 1 Tablet by mouth 2 times a day. Indications: Constipation 180 Tablet 3    B Complex Vitamins (VITAMIN-B COMPLEX PO) Take  by mouth.      tamsulosin (FLOMAX) 0.4 MG capsule Take 1 Capsule by mouth every day.      finasteride (PROSCAR) 5 MG Tab Take 1 Tablet by mouth every day.      latanoprost (XALATAN) 0.005 % Solution Administer 1 Drop into both eyes at bedtime.      acetaminophen (TYLENOL) 500 MG Tab Take 1 Tablet by mouth 2 times a day. Indications: Pain      Cyanocobalamin 5000 MCG TABLET DISPERSIBLE Take 3,000 mcg by mouth every day. Indications: Suppliment      Multiple Vitamins-Minerals (CENTRUM SILVER ADULT 50+) Tab Take 1 Tablet by mouth every day. Indications: Suppliment      Coenzyme Q10 (COQ10) 100 MG Cap Take 1 Capsule by mouth every day. Indications: Suppliment      Cholecalciferol (VITAMIN D3 ADULT GUMMIES PO) Take 2,000 Units by mouth every day. Indications: Suppliment      Glucosamine HCl 1500 MG Tab Take 1 Tablet by mouth every day. Indications: Suppliment       No current Epic-ordered facility-administered medications on file.           ROS:  Review of Systems   Respiratory:  Positive for cough. Negative for wheezing.        Objective:     Exam:  BP (!) 140/80 (BP Location: Right arm, Patient Position: Sitting, BP Cuff Size: Adult)   Pulse 85   Temp 36 °C (96.8 °F) (Temporal)   Ht 1.753 m (5' 9\")   Wt 92.5 kg (204 lb)   SpO2 94%   BMI 30.13 kg/m²  Body mass index is 30.13 kg/m².    Physical Exam  Constitutional:       Appearance: Normal appearance.   Eyes:      Extraocular Movements: Extraocular movements intact.   Cardiovascular:      Rate and Rhythm: Normal rate and regular rhythm.   Pulmonary:      Effort: Pulmonary effort is normal.      Breath sounds: Normal breath sounds. No wheezing.   Neurological:      Mental Status: He is alert.   Psychiatric:         Mood and Affect: Mood normal.         Behavior: " Behavior normal.             Labs: cepheid neg    Assessment & Plan:     73 y.o. male with the following -     1. Type 2 diabetes mellitus with hyperglycemia, without long-term current use of insulin (HCC)  Chronic. Improving. Recent labs with A1c down to 7.7% c/w metformin 1000mg bid.      2. Acute cough  New issue. Patient's covid, flu, RSV were negative. Most likely has viral URI which has wife had. Has been using OTC coricidin .  - POCT Cepheid CoV-2, Flu A/B, RSV - PCR     HCC Gap Form    Last edited 11/30/23 13:12 PST by Aurelia Zayas M.D.             No follow-ups on file.    Please note that this dictation was created using voice recognition software. I have made every reasonable attempt to correct obvious errors, but I expect that there are errors of grammar and possibly content that I did not discover before finalizing the note.

## 2023-11-30 ENCOUNTER — RESEARCH ENCOUNTER (OUTPATIENT)
Dept: RESEARCH | Facility: MEDICAL CENTER | Age: 73
End: 2023-11-30
Payer: MEDICARE

## 2023-11-30 PROBLEM — R05.1 ACUTE COUGH: Status: ACTIVE | Noted: 2023-11-30

## 2023-11-30 ASSESSMENT — ENCOUNTER SYMPTOMS
WHEEZING: 0
COUGH: 1

## 2023-12-28 ENCOUNTER — PATIENT OUTREACH (OUTPATIENT)
Dept: HEALTH INFORMATION MANAGEMENT | Facility: OTHER | Age: 73
End: 2023-12-28
Payer: MEDICARE

## 2023-12-28 DIAGNOSIS — E11.65 TYPE 2 DIABETES MELLITUS WITH HYPERGLYCEMIA, WITHOUT LONG-TERM CURRENT USE OF INSULIN (HCC): ICD-10-CM

## 2023-12-28 DIAGNOSIS — R41.89 COGNITIVE IMPAIRMENT: ICD-10-CM

## 2023-12-28 PROCEDURE — 99490 CHRNC CARE MGMT STAFF 1ST 20: CPT | Performed by: FAMILY MEDICINE

## 2023-12-28 NOTE — PROGRESS NOTES
12/28/23 3:45 pm: Nurse CM outreach call for monthly CCM assessment. VM left requesting return call to nurse at [phone].    12/29/23 4:40 pm: Nurse CM outreach call to pt for monthly CCM assessment. Spouse answered telephone.     Assessment    Spouse reports pt doing well. States no further problems related to respiratory infection. Reports pt had a recent cold but is now better. Reports pt's blood sugars have been in a good range. Last A1C on 11/13/23 was 7.7. Spouse reports pt currently has all of his medications. Spouse is primary caregiver. Reports she assists pt with his medication management and reminders on personal care.     Education    Work on increasing activity. Follow heart healthy meal plan.     Plan of Care and Goals    Reviewed care plan and goals. Pt has met goals and will be discharged from personal care management program. Spouse agreeable with plan. Spouse will call nurse CM if needing additional resources.     Barriers:    Chronic health conditions

## 2024-04-02 ENCOUNTER — HOSPITAL ENCOUNTER (OUTPATIENT)
Dept: LAB | Facility: MEDICAL CENTER | Age: 74
End: 2024-04-02
Attending: FAMILY MEDICINE
Payer: MEDICARE

## 2024-04-02 DIAGNOSIS — D72.820 LYMPHOCYTOSIS: ICD-10-CM

## 2024-04-02 DIAGNOSIS — E11.65 TYPE 2 DIABETES MELLITUS WITH HYPERGLYCEMIA, WITHOUT LONG-TERM CURRENT USE OF INSULIN (HCC): ICD-10-CM

## 2024-04-02 LAB
BASOPHILS # BLD AUTO: 0 % (ref 0–1.8)
BASOPHILS # BLD: 0 K/UL (ref 0–0.12)
COMMENT NL1176: NORMAL
EOSINOPHIL # BLD AUTO: 0.22 K/UL (ref 0–0.51)
EOSINOPHIL NFR BLD: 1.6 % (ref 0–6.9)
ERYTHROCYTE [DISTWIDTH] IN BLOOD BY AUTOMATED COUNT: 45.5 FL (ref 35.9–50)
EST. AVERAGE GLUCOSE BLD GHB EST-MCNC: 189 MG/DL
HBA1C MFR BLD: 8.2 % (ref 4–5.6)
HCT VFR BLD AUTO: 43.7 % (ref 42–52)
HGB BLD-MCNC: 14.7 G/DL (ref 14–18)
LYMPHOCYTES # BLD AUTO: 7.68 K/UL (ref 1–4.8)
LYMPHOCYTES NFR BLD: 56.9 % (ref 22–41)
MANUAL DIFF BLD: NORMAL
MCH RBC QN AUTO: 29.9 PG (ref 27–33)
MCHC RBC AUTO-ENTMCNC: 33.6 G/DL (ref 32.3–36.5)
MCV RBC AUTO: 89 FL (ref 81.4–97.8)
MICROCYTES BLD QL SMEAR: ABNORMAL
MONOCYTES # BLD AUTO: 0.55 K/UL (ref 0–0.85)
MONOCYTES NFR BLD AUTO: 4.1 % (ref 0–13.4)
MORPHOLOGY BLD-IMP: NORMAL
NEUTROPHILS # BLD AUTO: 5.05 K/UL (ref 1.82–7.42)
NEUTROPHILS NFR BLD: 37.4 % (ref 44–72)
NRBC # BLD AUTO: 0 K/UL
NRBC BLD-RTO: 0 /100 WBC (ref 0–0.2)
PLATELET # BLD AUTO: 219 K/UL (ref 164–446)
PLATELET BLD QL SMEAR: NORMAL
PMV BLD AUTO: 10.7 FL (ref 9–12.9)
RBC # BLD AUTO: 4.91 M/UL (ref 4.7–6.1)
RBC BLD AUTO: PRESENT
SMUDGE CELLS BLD QL SMEAR: NORMAL
WBC # BLD AUTO: 13.5 K/UL (ref 4.8–10.8)

## 2024-04-02 PROCEDURE — 85007 BL SMEAR W/DIFF WBC COUNT: CPT

## 2024-04-02 PROCEDURE — 83036 HEMOGLOBIN GLYCOSYLATED A1C: CPT

## 2024-04-02 PROCEDURE — 85027 COMPLETE CBC AUTOMATED: CPT

## 2024-04-02 PROCEDURE — 36415 COLL VENOUS BLD VENIPUNCTURE: CPT

## 2024-04-16 RX ORDER — FENOFIBRATE 134 MG/1
134 CAPSULE ORAL DAILY
Qty: 100 CAPSULE | Refills: 3 | Status: SHIPPED | OUTPATIENT
Start: 2024-04-16

## 2024-04-16 RX ORDER — LEVOTHYROXINE SODIUM 0.07 MG/1
75 TABLET ORAL
Qty: 100 TABLET | Refills: 3 | Status: SHIPPED | OUTPATIENT
Start: 2024-04-16

## 2024-04-16 NOTE — TELEPHONE ENCOUNTER
Received request via: Patient    Was the patient seen in the last year in this department? Yes    Does the patient have an active prescription (recently filled or refills available) for medication(s) requested? No    [facility]    Does the patient have care home Plus and need 100 day supply (blood pressure, diabetes and cholesterol meds only)? Yes, quantity updated to 100 days

## 2024-04-16 NOTE — TELEPHONE ENCOUNTER
Requested Prescriptions     Pending Prescriptions Disp Refills    levothyroxine (SYNTHROID) 75 MCG Tab [Pharmacy Med Name: LEVOTHYROXINE 75 MCG TABLET] 100 Tablet 3     Sig: TAKE 1 TABLET BY MOUTH EVERY DAY IN THE MORNING ON AN EMPTY STOMACH    fenofibrate micronized (LOFIBRA) 134 MG capsule [Pharmacy Med Name: FENOFIBRATE 134 MG CAPSULE] 100 Capsule 3     Sig: TAKE 1 CAPSULE BY MOUTH EVERY DAY    Aurelia Zayas M.D.

## 2024-04-23 ENCOUNTER — OFFICE VISIT (OUTPATIENT)
Dept: MEDICAL GROUP | Facility: PHYSICIAN GROUP | Age: 74
End: 2024-04-23
Payer: MEDICARE

## 2024-04-23 VITALS
WEIGHT: 203 LBS | SYSTOLIC BLOOD PRESSURE: 110 MMHG | RESPIRATION RATE: 14 BRPM | BODY MASS INDEX: 30.07 KG/M2 | HEIGHT: 69 IN | TEMPERATURE: 97.7 F | OXYGEN SATURATION: 94 % | DIASTOLIC BLOOD PRESSURE: 60 MMHG | HEART RATE: 78 BPM

## 2024-04-23 DIAGNOSIS — G31.9 MILD CEREBRAL ATROPHY (HCC): ICD-10-CM

## 2024-04-23 DIAGNOSIS — E78.5 HYPERLIPIDEMIA ASSOCIATED WITH TYPE 2 DIABETES MELLITUS (HCC): ICD-10-CM

## 2024-04-23 DIAGNOSIS — D72.820 LYMPHOCYTOSIS: ICD-10-CM

## 2024-04-23 DIAGNOSIS — I70.0 ATHEROSCLEROSIS OF AORTA (HCC): ICD-10-CM

## 2024-04-23 DIAGNOSIS — E11.65 TYPE 2 DIABETES MELLITUS WITH HYPERGLYCEMIA, WITHOUT LONG-TERM CURRENT USE OF INSULIN (HCC): ICD-10-CM

## 2024-04-23 DIAGNOSIS — E11.69 HYPERLIPIDEMIA ASSOCIATED WITH TYPE 2 DIABETES MELLITUS (HCC): ICD-10-CM

## 2024-04-23 DIAGNOSIS — B35.6 TINEA CRURIS: ICD-10-CM

## 2024-04-23 PROBLEM — R05.1 ACUTE COUGH: Status: RESOLVED | Noted: 2023-11-30 | Resolved: 2024-04-23

## 2024-04-23 PROCEDURE — 99214 OFFICE O/P EST MOD 30 MIN: CPT | Performed by: FAMILY MEDICINE

## 2024-04-23 PROCEDURE — 3074F SYST BP LT 130 MM HG: CPT | Performed by: FAMILY MEDICINE

## 2024-04-23 PROCEDURE — 3078F DIAST BP <80 MM HG: CPT | Performed by: FAMILY MEDICINE

## 2024-04-23 ASSESSMENT — ENCOUNTER SYMPTOMS
SHORTNESS OF BREATH: 0
WEIGHT LOSS: 0
FEVER: 0
COUGH: 0
CHILLS: 0

## 2024-04-23 ASSESSMENT — FIBROSIS 4 INDEX: FIB4 SCORE: 1.86

## 2024-04-23 NOTE — PROGRESS NOTES
Verbal consent was acquired by the patient to use [employer] listening note generation during this visit         History of Present Illness  The patient is here for a follow-up visit. He was last seen by me 5 months ago. He is accompanied by his wife.    The patient's wife reports that he presents with a rash localized to his right groin region, accompanied by erythema in his umbilical region. This condition, a condition he has previously encountered, was successfully managed with medication, nystatin and triamcinolone, prescribed by his urologist in 10/2023. The rash, which initially appeared on 10/23/2023, has shown more severe severity today compared to the previous one. Despite the application of [place] ointment twice daily, his wife has been applying nystatin and triamcinolone cream to the rash. The patient denies experiencing any pruritus or discomfort associated with the rash.    The patient's wife reports that his metformin medication has been recalled.  The patient was previously on metformin 1000 mg twice daily, which was discontinued in 2021. His fasting blood glucose levels have been in the 140s to 180s. He was previously on gabapentin for neuropathy by Dr. Mynor Teresa, which was discontinued due to cognitive issues associated with COVID-19. The patient's feet are in good condition, with the exception of his big toenails, which his wife expresses concern about. The patient's diet consists of a late breakfast, skipping lunch, and consuming smaller, more frequent meals for dinner around 3 or 4 pm. He consumes low-sugar, low-carb cereals. The patient's white blood cell count has been elevated since 2021, the cause of which is unknown. He has not consulted a hematologist for this condition. He initially experienced unintentional weight loss in 2020, dropping from 200 to 140. He denies experiencing cough, respiratory distress, or leg pain. He experiences edema in his legs when wearing diabetic  socks, which resolves upon wearing diabetic socks. He experiences restlessness at night, sleeps extensively, and recently started talking in his sleep, a new behavior for him. His memory remains intact, but his behavior and personality have changed. He uses a cane for ambulation, which expires in 06/2024.      Review of Systems   Constitutional:  Negative for chills, fever and weight loss.   Respiratory:  Negative for cough and shortness of breath.    Cardiovascular:  Negative for leg swelling.   Skin:  Positive for rash (periumbilical and right groin).       Outpatient Medications Marked as Taking for the 4/23/24 encounter (Office Visit) with Aurelia Zayas M.D.   Medication Sig Dispense Refill    nystatin/triamcinolone (MYCOLOG) 322713-5.1 UNIT/GM-% Cream Apply 1 Application topically 2 times a day. 60 g 1    levothyroxine (SYNTHROID) 75 MCG Tab TAKE 1 TABLET BY MOUTH EVERY DAY IN THE MORNING ON AN EMPTY STOMACH 100 Tablet 3    fenofibrate micronized (LOFIBRA) 134 MG capsule TAKE 1 CAPSULE BY MOUTH EVERY  Capsule 3    metFORMIN (GLUCOPHAGE) 1000 MG tablet Take 1 Tablet by mouth 2 times a day with meals. 200 Tablet 3    Blood Glucose Meter Kit Test blood sugar as recommended by provider. contour blood glucose monitoring kit. 1 Kit 0    Lancets Use one  lancet to test blood sugar once daily early morning before first meal. 100 Each 0    rosuvastatin (CRESTOR) 10 MG Tab TAKE 1 TABLET BY MOUTH EVERY DAY IN THE EVENING 100 Tablet 3    sennosides-docusate sodium (SENOKOT-S) 8.6-50 MG tablet Take 1 Tablet by mouth 2 times a day. Indications: Constipation 180 Tablet 3    B Complex Vitamins (VITAMIN-B COMPLEX PO) Take  by mouth.      tamsulosin (FLOMAX) 0.4 MG capsule Take 1 Capsule by mouth every day.      finasteride (PROSCAR) 5 MG Tab Take 1 Tablet by mouth every day.      latanoprost (XALATAN) 0.005 % Solution Administer 1 Drop into both eyes at bedtime.      acetaminophen (TYLENOL) 500 MG Tab Take 1 Tablet by  "mouth 2 times a day. Indications: Pain      Multiple Vitamins-Minerals (CENTRUM SILVER ADULT 50+) Tab Take 1 Tablet by mouth every day. Indications: Suppliment      Coenzyme Q10 (COQ10) 100 MG Cap Take 1 Capsule by mouth every day. Indications: Suppliment      Cholecalciferol (VITAMIN D3 ADULT GUMMIES PO) Take 2,000 Units by mouth every day. Indications: Suppliment      Glucosamine HCl 1500 MG Tab Take 1 Tablet by mouth every day. Indications: Suppliment         /60 (BP Location: Left arm, Patient Position: Sitting, BP Cuff Size: Adult)   Pulse 78   Temp 36.5 °C (97.7 °F) (Temporal)   Resp 14   Ht 1.753 m (5' 9\")   Wt 92.1 kg (203 lb)   SpO2 94% , Body mass index is 29.98 kg/m².        Physical Exam  Constitutional:       Appearance: Normal appearance.   Eyes:      Extraocular Movements: Extraocular movements intact.   Cardiovascular:      Rate and Rhythm: Normal rate and regular rhythm.   Pulmonary:      Effort: Pulmonary effort is normal.      Breath sounds: Normal breath sounds.   Neurological:      Mental Status: He is alert.   Psychiatric:         Mood and Affect: Mood normal.         Behavior: Behavior normal.         Results  Laboratory Studies  White blood cell count was a little bit high. A1c is 8.2.       Assessment & Plan  1. Type 2 diabetes (HCC) and  2  hyperlipidemia. (HCC)  3. Atherosclerosis of the aorta (HCC) and   4 mild cerebral atrophy (HCC).  HCC Gap Form    Diagnosis to address: I70.0 - Atherosclerosis of aorta (HCC)  Assessment and plan: Chronic, stable. Noted on ct a/p completed 2022. Continue with crestor.    Diagnosis: E11.69, E78.5 - Hyperlipidemia associated with type 2 diabetes mellitus (HCC)  Assessment and plan: Chronic, stable. Continue with crestor 10mg and fenofibrate 134mg daily  Diagnosis: E11.65 - Type 2 diabetes mellitus with hyperglycemia, without long-term current use of insulin (HCC)  Assessment and plan: Chronic, exacerbated. A1c 8.2%  Metformin 1000mg bid. Wife " concerned that metformin was recalled.     Diagnosis: G31.9 - Mild cerebral atrophy (HCC)  Assessment and plan: Chronic, stable.   No memory issues. Noted on MRI 2020.  Last edited 04/23/24 16:33 PDT by Aurelia Zayas M.D.          5. Lymphocytosis.  This condition has been ongoing since 2020. The patient has not been evaluated by a hematologist. . A new referral has been placed today. The patient's condition will be closely monitored.    6. Tinea cruris  New issue. Not improving. Nystatin with   Triamcinolone refilled.  Encouraged to lower A1c to reduce the risk of recurrence.     Orders Placed This Encounter    Referral to Pharmacotherapy Service    Referral to Hematology Oncology    nystatin/triamcinolone (MYCOLOG) 830739-7.1 UNIT/GM-% Cream        HCC Gap Form    Diagnosis to address: I70.0 - Atherosclerosis of aorta (HCC)  Assessment and plan: Chronic, stable. Noted on ct a/p completed 2022. Continue with crestor.    Diagnosis: E11.69, E78.5 - Hyperlipidemia associated with type 2 diabetes mellitus (HCC)  Assessment and plan: Chronic, stable. Continue with crestor 10mg and fenofibrate 134mg daily  Diagnosis: E11.65 - Type 2 diabetes mellitus with hyperglycemia, without long-term current use of insulin (HCC)  Assessment and plan: Chronic, exacerbated. A1c 8.2%  Metformin 1000mg bid. Wife concerned that metformin was recalled.     Diagnosis: G31.9 - Mild cerebral atrophy (HCC)  Assessment and plan: Chronic, stable.   No memory issues. Noted on MRI 2020.  Last edited 04/23/24 16:33 PDT by Aurelia Zayas M.D.          This note was created using voice recognition software (Dragon). The accuracy of the dictation is limited by the abilities of the software. I have reviewed the note prior to signing, however some errors in grammar and context are still possible. If you have any questions related to this note please do not hesitate to contact our office.

## 2024-04-29 ENCOUNTER — TELEPHONE (OUTPATIENT)
Dept: HEALTH INFORMATION MANAGEMENT | Facility: OTHER | Age: 74
End: 2024-04-29
Payer: MEDICARE

## 2024-05-16 NOTE — TELEPHONE ENCOUNTER
ESTABLISHED PATIENT PRE-VISIT PLANNING     Patient was NOT contacted to complete PVP.     Note: Patient will not be contacted if there is no indication to call.     1.  Reviewed notes from the last few office visits within the medical group: Yes    2.  If any orders were placed at last visit or intended to be done for this visit (i.e. 6 mos follow-up), do we have Results/Consult Notes?        •  Labs - Labs ordered, NOT completed. Patient advised to complete prior to next appointment.   Note: If patient appointment is for lab review and patient did not complete labs, check with provider if OK to reschedule patient until labs completed.       •  Imaging - Imaging was not ordered at last office visit.       •  Referrals - No referrals were ordered at last office visit.    3. Is this appointment scheduled as a Hospital Follow-Up? No    4.  Immunizations were updated in Epic using WebIZ?: Epic matches WebIZ       •  Web Iz Recommendations: HEPATITIS A , HEPATITIS B, MMR , TD and SHINGRIX (Shingles)    5.  Patient is due for the following Health Maintenance Topics:   Health Maintenance Due   Topic Date Due   • Annual Wellness Visit  1950   • IMM HEP B VACCINE (1 of 3 - Risk 3-dose series) 06/19/1967   • IMM ZOSTER VACCINES (2 of 3) 05/27/2017   • SERUM CREATININE  03/24/2019   • DIABETES MONOFILAMENT / LE EXAM  04/03/2019   • A1C SCREENING  04/05/2019       6. Orders for overdue Health Maintenance topics pended in Pre-Charting? NO    7.  AHA (MDX) form printed for Provider? YES    8.  Patient was NOT informed to arrive 15 min prior to their scheduled appointment and bring in their medication bottles.   No

## 2024-06-07 DIAGNOSIS — E11.9 DIABETES MELLITUS TYPE 2, NONINSULIN DEPENDENT (HCC): ICD-10-CM

## 2024-06-07 NOTE — TELEPHONE ENCOUNTER
Patients wife called, stating the pharmacy told her we pulled the scrips for his test strips so he can longer get them. I returned call to celio and left  saying we will have a new RX sent in. Thank you    Please prescribe test strips as needed.

## 2024-06-24 ENCOUNTER — TELEPHONE (OUTPATIENT)
Dept: HEALTH INFORMATION MANAGEMENT | Facility: OTHER | Age: 74
End: 2024-06-24

## 2024-07-06 DIAGNOSIS — E78.5 DYSLIPIDEMIA: ICD-10-CM

## 2024-07-09 RX ORDER — ROSUVASTATIN CALCIUM 10 MG/1
TABLET, COATED ORAL
Qty: 100 TABLET | Refills: 4 | Status: SHIPPED | OUTPATIENT
Start: 2024-07-09

## 2024-07-24 ENCOUNTER — APPOINTMENT (OUTPATIENT)
Dept: MEDICAL GROUP | Facility: PHYSICIAN GROUP | Age: 74
End: 2024-07-24
Payer: MEDICARE

## 2024-07-24 VITALS
BODY MASS INDEX: 29.53 KG/M2 | TEMPERATURE: 97.1 F | OXYGEN SATURATION: 95 % | WEIGHT: 199.4 LBS | HEART RATE: 72 BPM | RESPIRATION RATE: 14 BRPM | DIASTOLIC BLOOD PRESSURE: 78 MMHG | SYSTOLIC BLOOD PRESSURE: 120 MMHG | HEIGHT: 69 IN

## 2024-07-24 DIAGNOSIS — E11.65 TYPE 2 DIABETES MELLITUS WITH HYPERGLYCEMIA, WITHOUT LONG-TERM CURRENT USE OF INSULIN (HCC): ICD-10-CM

## 2024-07-24 DIAGNOSIS — D72.820 LYMPHOCYTOSIS: ICD-10-CM

## 2024-07-24 DIAGNOSIS — R22.9 SUBCUTANEOUS NODULE: ICD-10-CM

## 2024-07-24 PROBLEM — K65.1 RIGHT LOWER QUADRANT ABDOMINAL ABSCESS (HCC): Status: RESOLVED | Noted: 2022-02-28 | Resolved: 2024-07-24

## 2024-07-24 LAB
HBA1C MFR BLD: 7.1 % (ref ?–5.8)
POCT INT CON NEG: NEGATIVE
POCT INT CON POS: POSITIVE

## 2024-07-24 PROCEDURE — 3078F DIAST BP <80 MM HG: CPT | Performed by: FAMILY MEDICINE

## 2024-07-24 PROCEDURE — 3074F SYST BP LT 130 MM HG: CPT | Performed by: FAMILY MEDICINE

## 2024-07-24 PROCEDURE — 83036 HEMOGLOBIN GLYCOSYLATED A1C: CPT | Performed by: FAMILY MEDICINE

## 2024-07-24 PROCEDURE — 99214 OFFICE O/P EST MOD 30 MIN: CPT | Performed by: FAMILY MEDICINE

## 2024-07-24 ASSESSMENT — FIBROSIS 4 INDEX: FIB4 SCORE: 1.88

## 2024-07-24 ASSESSMENT — PATIENT HEALTH QUESTIONNAIRE - PHQ9: CLINICAL INTERPRETATION OF PHQ2 SCORE: 0

## 2024-07-24 ASSESSMENT — ENCOUNTER SYMPTOMS
ABDOMINAL PAIN: 0
FALLS: 0
SHORTNESS OF BREATH: 0
HEARTBURN: 0
DEPRESSION: 0
INSOMNIA: 0

## 2024-08-27 DIAGNOSIS — E11.9 DIABETES MELLITUS TYPE 2, NONINSULIN DEPENDENT (HCC): ICD-10-CM

## 2024-08-27 NOTE — TELEPHONE ENCOUNTER
Received request via: Pharmacy    Was the patient seen in the last year in this department? Yes    Does the patient have an active prescription (recently filled or refills available) for medication(s) requested? No    Does the patient have retirement Plus and need 100-day supply? (This applies to ALL medications) Yes, quantity updated to 100 days

## 2024-08-28 ENCOUNTER — PATIENT MESSAGE (OUTPATIENT)
Dept: MEDICAL GROUP | Facility: PHYSICIAN GROUP | Age: 74
End: 2024-08-28
Payer: MEDICARE

## 2024-08-28 DIAGNOSIS — D72.820 LYMPHOCYTOSIS: ICD-10-CM

## 2024-09-03 ENCOUNTER — PATIENT MESSAGE (OUTPATIENT)
Dept: MEDICAL GROUP | Facility: PHYSICIAN GROUP | Age: 74
End: 2024-09-03
Payer: MEDICARE

## 2024-09-03 DIAGNOSIS — E11.9 DIABETES MELLITUS TYPE 2, NONINSULIN DEPENDENT (HCC): ICD-10-CM

## 2024-09-03 RX ORDER — LANCETS 30 GAUGE
EACH MISCELLANEOUS
Qty: 100 EACH | Refills: 0 | Status: SHIPPED | OUTPATIENT
Start: 2024-09-03

## 2024-09-03 NOTE — PATIENT COMMUNICATION
Was the patient seen in the last year in this department? Yes   Does patient have an active prescription for medications requested? No   Received Request Via: Patient  [facility] for [facility] and insurance denied it.

## 2024-09-13 ENCOUNTER — TELEPHONE (OUTPATIENT)
Dept: MEDICAL GROUP | Facility: PHYSICIAN GROUP | Age: 74
End: 2024-09-13
Payer: MEDICARE

## 2024-09-14 DIAGNOSIS — E11.9 DIABETES MELLITUS TYPE 2, NONINSULIN DEPENDENT (HCC): ICD-10-CM

## 2024-09-16 DIAGNOSIS — E11.9 DIABETES MELLITUS TYPE 2, NONINSULIN DEPENDENT (HCC): ICD-10-CM

## 2024-09-18 DIAGNOSIS — Z71.9 ENCOUNTER FOR CONSULTATION: ICD-10-CM

## 2024-09-18 DIAGNOSIS — D72.820 LYMPHOCYTOSIS: ICD-10-CM

## 2024-09-19 ENCOUNTER — E-CONSULT (OUTPATIENT)
Dept: HEMATOLOGY ONCOLOGY | Facility: MEDICAL CENTER | Age: 74
End: 2024-09-19
Payer: MEDICARE

## 2024-09-19 DIAGNOSIS — Z71.9 ENCOUNTER FOR CONSULTATION: ICD-10-CM

## 2024-09-19 NOTE — PROGRESS NOTES
E-Consult Response     After careful review of the patient's information available in the medical record, the following are my findings and recommendations:    Reason for consult:      lymphocytosis since 2020. Please review labs and let us know if he needs to be seen in your office. thanks     Information Reviewed:    Patients WBC is 13.5.  ALC is  7680 (up from 7520 and 6660)     Lymphocytosis is defined as lymphs which from adults corresponds with >4000.     Additionally asplenia can cause polyclonal lymphocytosis.  Patient has no report of previous splenectomy.    Smoking may also lead to lymphocytosis; however, I do not see that the patient is a smoker.      Thymoma can cause lymphocytosis typically with levels much higher than seen in this patient roughly 7000-24,000.  I do not see any other concerns at this patient has a thymoma     Additionally, lymphocytosis can exist on the spectrum and from premalignant disease, to lymphomas and leukemias.     Monoclonal B lymphocytosis refers to monoclonal population of B cells that are less than 5000 cells per microliter in the peripheral blood.  Generally these patients do not have associated clinical findings of an autoimmune condition, infectious disease, or lymphoproliferative disorder.     Another concern could be large granular lymphocyte leukemia, which is characterized by increased filtration of peripheral blood and bone marrow biclonal LDLs.  Patient is normally have splenomegaly and cytopenias.  Most of the time patient is present with neutropenia as well which the patient's does not have.  A lot of the time patients also have autoimmune manifestations. I dont see any evidence of this on chart review.     Additionally CLL is a chronic lymphoproliferative neoplasm is defined as having greater than 5000 be lymphocytes.     Sometimes we see a lymphoma including follicular lymphoma, mantle cell lymphoma, sezory syndrome with increased lymphocytes.     Looking at  the patient's differential I do not see increased eosinophils or basophils that can accompany leukocytosis may be due to infections, inflammation or allergic issues.       Recommendations:   -Rule out active or prior infections or inflammatory processes such as arthritis or rash  -r/o lymphoproliferative disorder example Ensure patient has no lymphadenopathy, splenomegaly, hepatomegaly, fever, weight loss, sweats  -consider flow cytometry.  This will help determine if lymphocytosis is clonal, and therefore CLL or a premalignant disorder (monoclonal B lymphocytosis) could be a a concern              -If clonality is detected can refer to hematology (Dr. DENNY Fitzgerald)              -If counts are stable or decreasing can monitor with a CBC in 6 months    E-Consult Time: 22 minutes were spent with >50% of the total time spent reviewing items outlined in the summary of data reviewed (Use code [id number])    Chelo Ghotra M.D.

## 2024-09-22 DIAGNOSIS — E11.65 TYPE 2 DIABETES MELLITUS WITH HYPERGLYCEMIA, WITHOUT LONG-TERM CURRENT USE OF INSULIN (HCC): ICD-10-CM

## 2024-09-23 ENCOUNTER — HOSPITAL ENCOUNTER (OUTPATIENT)
Dept: LAB | Facility: MEDICAL CENTER | Age: 74
End: 2024-09-23
Attending: PHYSICIAN ASSISTANT
Payer: MEDICARE

## 2024-09-23 LAB — PSA SERPL-MCNC: 1.78 NG/ML (ref 0–4)

## 2024-09-23 PROCEDURE — 36415 COLL VENOUS BLD VENIPUNCTURE: CPT

## 2024-09-23 PROCEDURE — 84153 ASSAY OF PSA TOTAL: CPT

## 2024-09-23 NOTE — TELEPHONE ENCOUNTER
Received request via: Pharmacy    Was the patient seen in the last year in this department? Yes    Does the patient have an active prescription (recently filled or refills available) for medication(s) requested? No    [facility]    Does the patient have detention Plus and need 100-day supply? (This applies to ALL medications) Yes, quantity updated to 100 days

## 2024-10-22 NOTE — ED NOTES
Maternal Fetal Medicine follow up consult    SUBJECTIVE:     Krupa Walker is a 31 y.o.  female with IUP at 24w3d who is seen in follow up consultation by MFM.  Pregnancy complications include:   Problem   Chronic Hypertension Affecting Pregnancy   Autoimmune Disease   Obesity During Pregnancy in Second Trimester     Previous notes reviewed.   No changes to medical, surgical, family, social, or obstetric history.    Interval history since last M visit:   Patient has no complaints today. She is overall feeling well.  Patient denies any contractions/cramping, vaginal bleeding or leakage of fluid.  She reports good fetal movement.  Patient denies any headaches, blurry vision or RUQ pain.     Medications:  Current Outpatient Medications   Medication Instructions    aspirin 81 mg, Daily    ketoconazole (NIZORAL) 2 % shampoo Shampoo scalp 1-2 times weekly. Lather x 5 minutes.    multivit with calcium,iron,min (WOMEN'S DAILY MULTIVITAMIN ORAL) Women's Daily Multivitamin    NIFEdipine (PROCARDIA-XL) 30 mg, Oral, Daily    prochlorperazine (COMPAZINE) 5 mg, Oral, Every 6 hours PRN    sumatriptan (IMITREX) 100 MG tablet Take at onset of migraine, can repeat in 2 hrs if needed.  No more than twice per day or 3 days/wk.     Care team members:  [facility] CNM - Primary OB providers     OBJECTIVE:   /86 Comment: No procardia yet  LMP 2024 (Exact Date)     Physical Exam:  Deferred    Ultrasound performed. See viewpoint for full ultrasound report.  [place]onnell live IUP.  Some of the previous suboptimal views are seen today and appear unremarkable. Some of the anatomic structures remain suboptimally imaged (see above), but no abnormalities are suspected. Visualization of suboptimally imaged structures is unlikely to improve with advancing gestational age.   Interval fetal growth has been normal ( g (62%), AC 70%).  MVP is normal.     Significant labs/imaging:  Protein Creatinine Ratios:  Prot/Creat Ratio, Urine  Report to [facility]     Date Value Ref Range Status   2024 0.09 0.00 - 0.20 Final     Lab Results   Component Value Date    ALT 12 2024    AST 16 2024    ALKPHOS 61 2024    BILITOT 0.2 2024     Lab Results   Component Value Date    WBC 13.46 (H) 2024    HGB 12.9 2024    HCT 38.3 2024    MCV 97 2024     2024     ASSESSMENT/PLAN:     31 y.o.  female with IUP at 24w3d    Autoimmune disease  Patient with possible unspecified autoimmune disease (see initial consult HPI and problem list for full description/counseling/recommendations).  Plan to approach her pregnancy similarly to that of a patient with lupus/non-specified autoimmune disease.     Recommendations:  Continue close Rheumatology follow up. - Dr. Ventura  Medications per Rheumatology (none currently)  Additional recommendations per CHTN header      Chronic hypertension affecting pregnancy  Patient diagnosed with CHTN given multiple mild-range BP measurements noted on [facility] prior to 20wga.  Currently on Procardia 30XL     Today we reviewed the maternal/fetal risks associated with CHTN during pregnancy. Risks include but not limited to fetal growth restriction, miscarriage, abruption, maternal end organ disease (renal failure, MI, and stroke),  delivery, development of superimposed preeclampsia, and eclampsia. She was counseled on the recommendations for blood pressure control, serial ultrasound for fetal growth assessment and  testing, and timing of delivery.     Recommendations (Please refer to [facility] guidelines):  Continue aspirin 81 mg daily for preeclampsia risk reduction  Continue current medications: Procardia 30XL  Baseline evaluation with primary OB per prior recommendations - ECHO needs to be ordered.  Serial fetal growth ultrasounds every 4 weeks, beginning at 28 weeks.   Continued close observation of patient's blood pressures. Avoid hypotension as this has been  associated with uteroplacental insufficiency.  Recommend treatment to a goal blood pressure < 140/90  Weekly antepartum testing at 32 weeks (NST+AFV); twice weekly testing if control is poor, multiple comorbidities are present, or requires several medications for control   Delivery timing:  Controlled on single agent, comorbid conditions*: 37 0/7 - 38 6/7 weeks gestation  Uncontrolled or requiring >= 2 medications: 36 0/7 - 37 6/7 weeks gestation    *Comorbid conditions include BMI >= 40, diabetes, and complex medical condition associated with placental dysfunction (ie lupus or other vascular disease)      Obesity during pregnancy in second trimester  Management per primary OB provider      Patient was counseled that prenatal ultrasound studies have limitations. They do not detect all fetal, genetic, placental, and maternal abnormalities. A normal appearing prenatal ultrasound is reassuring. However, it does not guarantee the absence of an abnormality or predict a normal outcome for the fetus or the mother.     FOLLOW UP:  A follow up ultrasound will be made for 4 weeks from today with a follow up MFM MD visit.    The patient was given an opportunity to ask questions about the management of her high risk pregnancy problems. She expressed an understanding of and agreement to the above impression and plan. All questions were answered to her satisfaction.    25 minutes of total time spent on the encounter, which includes face to face time and non-face to face time preparing to see the patient (eg, review of tests), obtaining and/or reviewing separately obtained history, documenting clinical information in the electronic or other health record, independently interpreting results (not separately reported) and communicating results to the patient/family/caregiver, or care coordination (not separately reported).        Antony Matson MD   Maternal-Fetal Medicine      Electronically Signed by Antony Matson October 22,  2024

## 2024-10-24 ENCOUNTER — HOSPITAL ENCOUNTER (OUTPATIENT)
Dept: LAB | Facility: MEDICAL CENTER | Age: 74
End: 2024-10-24
Attending: FAMILY MEDICINE
Payer: MEDICARE

## 2024-10-24 DIAGNOSIS — E11.65 TYPE 2 DIABETES MELLITUS WITH HYPERGLYCEMIA, WITHOUT LONG-TERM CURRENT USE OF INSULIN (HCC): ICD-10-CM

## 2024-10-24 LAB
25(OH)D3 SERPL-MCNC: 23 NG/ML (ref 30–100)
ALBUMIN SERPL BCP-MCNC: 4.6 G/DL (ref 3.2–4.9)
ALBUMIN/GLOB SERPL: 1.6 G/DL
ALP SERPL-CCNC: 40 U/L (ref 30–99)
ALT SERPL-CCNC: 23 U/L (ref 2–50)
ANION GAP SERPL CALC-SCNC: 11 MMOL/L (ref 7–16)
AST SERPL-CCNC: 23 U/L (ref 12–45)
BASOPHILS # BLD AUTO: 1.7 % (ref 0–1.8)
BASOPHILS # BLD: 0.25 K/UL (ref 0–0.12)
BILIRUB SERPL-MCNC: 0.4 MG/DL (ref 0.1–1.5)
BUN SERPL-MCNC: 18 MG/DL (ref 8–22)
CALCIUM ALBUM COR SERPL-MCNC: 9.1 MG/DL (ref 8.5–10.5)
CALCIUM SERPL-MCNC: 9.6 MG/DL (ref 8.5–10.5)
CHLORIDE SERPL-SCNC: 103 MMOL/L (ref 96–112)
CHOLEST SERPL-MCNC: 121 MG/DL (ref 100–199)
CO2 SERPL-SCNC: 25 MMOL/L (ref 20–33)
COMMENT NL1176: NORMAL
CREAT SERPL-MCNC: 1.19 MG/DL (ref 0.5–1.4)
CREAT UR-MCNC: 101 MG/DL
EOSINOPHIL # BLD AUTO: 0.25 K/UL (ref 0–0.51)
EOSINOPHIL NFR BLD: 1.7 % (ref 0–6.9)
ERYTHROCYTE [DISTWIDTH] IN BLOOD BY AUTOMATED COUNT: 47.6 FL (ref 35.9–50)
GFR SERPLBLD CREATININE-BSD FMLA CKD-EPI: 63 ML/MIN/1.73 M 2
GLOBULIN SER CALC-MCNC: 2.8 G/DL (ref 1.9–3.5)
GLUCOSE SERPL-MCNC: 158 MG/DL (ref 65–99)
HCT VFR BLD AUTO: 45.3 % (ref 42–52)
HDLC SERPL-MCNC: 39 MG/DL
HGB BLD-MCNC: 14.6 G/DL (ref 14–18)
LDLC SERPL CALC-MCNC: 48 MG/DL
LYMPHOCYTES # BLD AUTO: 7.03 K/UL (ref 1–4.8)
LYMPHOCYTES NFR BLD: 47.5 % (ref 22–41)
MANUAL DIFF BLD: NORMAL
MCH RBC QN AUTO: 28.9 PG (ref 27–33)
MCHC RBC AUTO-ENTMCNC: 32.2 G/DL (ref 32.3–36.5)
MCV RBC AUTO: 89.5 FL (ref 81.4–97.8)
MICROALBUMIN UR-MCNC: <1.2 MG/DL
MICROALBUMIN/CREAT UR: NORMAL MG/G (ref 0–30)
MONOCYTES # BLD AUTO: 1.26 K/UL (ref 0–0.85)
MONOCYTES NFR BLD AUTO: 8.5 % (ref 0–13.4)
MORPHOLOGY BLD-IMP: NORMAL
NEUTROPHILS # BLD AUTO: 6.01 K/UL (ref 1.82–7.42)
NEUTROPHILS NFR BLD: 39.8 % (ref 44–72)
NEUTS BAND NFR BLD MANUAL: 0.8 % (ref 0–10)
NRBC # BLD AUTO: 0 K/UL
NRBC BLD-RTO: 0 /100 WBC (ref 0–0.2)
PLATELET # BLD AUTO: 200 K/UL (ref 164–446)
PLATELET BLD QL SMEAR: NORMAL
PMV BLD AUTO: 10.6 FL (ref 9–12.9)
POTASSIUM SERPL-SCNC: 4.4 MMOL/L (ref 3.6–5.5)
PROT SERPL-MCNC: 7.4 G/DL (ref 6–8.2)
RBC # BLD AUTO: 5.06 M/UL (ref 4.7–6.1)
RBC BLD AUTO: PRESENT
SMUDGE CELLS BLD QL SMEAR: NORMAL
SODIUM SERPL-SCNC: 139 MMOL/L (ref 135–145)
TRIGL SERPL-MCNC: 171 MG/DL (ref 0–149)
TSH SERPL DL<=0.005 MIU/L-ACNC: 4.07 UIU/ML (ref 0.38–5.33)
VARIANT LYMPHS BLD QL SMEAR: NORMAL
VIT B12 SERPL-MCNC: 768 PG/ML (ref 211–911)
WBC # BLD AUTO: 14.8 K/UL (ref 4.8–10.8)

## 2024-10-24 PROCEDURE — 84443 ASSAY THYROID STIM HORMONE: CPT

## 2024-10-24 PROCEDURE — 85007 BL SMEAR W/DIFF WBC COUNT: CPT

## 2024-10-24 PROCEDURE — 85027 COMPLETE CBC AUTOMATED: CPT

## 2024-10-24 PROCEDURE — 82306 VITAMIN D 25 HYDROXY: CPT

## 2024-10-24 PROCEDURE — 80053 COMPREHEN METABOLIC PANEL: CPT

## 2024-10-24 PROCEDURE — 82043 UR ALBUMIN QUANTITATIVE: CPT

## 2024-10-24 PROCEDURE — 80061 LIPID PANEL: CPT

## 2024-10-24 PROCEDURE — 36415 COLL VENOUS BLD VENIPUNCTURE: CPT

## 2024-10-24 PROCEDURE — 82570 ASSAY OF URINE CREATININE: CPT

## 2024-10-24 PROCEDURE — 82607 VITAMIN B-12: CPT

## 2024-10-30 ENCOUNTER — OFFICE VISIT (OUTPATIENT)
Dept: MEDICAL GROUP | Facility: PHYSICIAN GROUP | Age: 74
End: 2024-10-30
Payer: MEDICARE

## 2024-10-30 VITALS
HEIGHT: 69 IN | HEART RATE: 92 BPM | BODY MASS INDEX: 30.91 KG/M2 | OXYGEN SATURATION: 93 % | WEIGHT: 208.7 LBS | DIASTOLIC BLOOD PRESSURE: 64 MMHG | TEMPERATURE: 97.7 F | SYSTOLIC BLOOD PRESSURE: 110 MMHG

## 2024-10-30 DIAGNOSIS — E11.65 TYPE 2 DIABETES MELLITUS WITH HYPERGLYCEMIA, WITHOUT LONG-TERM CURRENT USE OF INSULIN (HCC): ICD-10-CM

## 2024-10-30 DIAGNOSIS — E55.9 VITAMIN D DEFICIENCY: ICD-10-CM

## 2024-10-30 DIAGNOSIS — C44.519 BASAL CELL CARCINOMA OF SKIN OF OTHER PART OF TRUNK: ICD-10-CM

## 2024-10-30 DIAGNOSIS — E78.5 HYPERLIPIDEMIA ASSOCIATED WITH TYPE 2 DIABETES MELLITUS (HCC): ICD-10-CM

## 2024-10-30 DIAGNOSIS — D72.820 LYMPHOCYTOSIS: ICD-10-CM

## 2024-10-30 DIAGNOSIS — E11.69 HYPERLIPIDEMIA ASSOCIATED WITH TYPE 2 DIABETES MELLITUS (HCC): ICD-10-CM

## 2024-10-30 LAB
HBA1C MFR BLD: 7.6 % (ref ?–5.8)
POCT INT CON NEG: NEGATIVE
POCT INT CON POS: POSITIVE

## 2024-10-30 ASSESSMENT — ENCOUNTER SYMPTOMS
FALLS: 0
SHORTNESS OF BREATH: 0
COUGH: 0
HEARTBURN: 0

## 2024-10-30 ASSESSMENT — FIBROSIS 4 INDEX: FIB4 SCORE: 1.82

## 2024-11-20 ENCOUNTER — HOSPITAL ENCOUNTER (OUTPATIENT)
Dept: LAB | Facility: MEDICAL CENTER | Age: 74
End: 2024-11-20
Attending: FAMILY MEDICINE
Payer: MEDICARE

## 2024-11-20 DIAGNOSIS — D72.820 LYMPHOCYTOSIS: ICD-10-CM

## 2024-11-20 DIAGNOSIS — E55.9 VITAMIN D DEFICIENCY: ICD-10-CM

## 2024-11-20 LAB
25(OH)D3 SERPL-MCNC: 43 NG/ML (ref 30–100)
BASOPHILS # BLD AUTO: 0.9 % (ref 0–1.8)
BASOPHILS # BLD: 0.12 K/UL (ref 0–0.12)
COMMENT NL1176: NORMAL
EOSINOPHIL # BLD AUTO: 0.23 K/UL (ref 0–0.51)
EOSINOPHIL NFR BLD: 1.8 % (ref 0–6.9)
ERYTHROCYTE [DISTWIDTH] IN BLOOD BY AUTOMATED COUNT: 48.9 FL (ref 35.9–50)
HCT VFR BLD AUTO: 44.4 % (ref 42–52)
HGB BLD-MCNC: 14.6 G/DL (ref 14–18)
LYMPHOCYTES # BLD AUTO: 8.9 K/UL (ref 1–4.8)
LYMPHOCYTES NFR BLD: 69 % (ref 22–41)
MANUAL DIFF BLD: NORMAL
MCH RBC QN AUTO: 30 PG (ref 27–33)
MCHC RBC AUTO-ENTMCNC: 32.9 G/DL (ref 32.3–36.5)
MCV RBC AUTO: 91.4 FL (ref 81.4–97.8)
MONOCYTES # BLD AUTO: 0.34 K/UL (ref 0–0.85)
MONOCYTES NFR BLD AUTO: 2.6 % (ref 0–13.4)
MORPHOLOGY BLD-IMP: NORMAL
NEUTROPHILS # BLD AUTO: 3.32 K/UL (ref 1.82–7.42)
NEUTROPHILS NFR BLD: 25.7 % (ref 44–72)
NRBC # BLD AUTO: 0 K/UL
NRBC BLD-RTO: 0 /100 WBC (ref 0–0.2)
PLATELET # BLD AUTO: 216 K/UL (ref 164–446)
PLATELET BLD QL SMEAR: NORMAL
PMV BLD AUTO: 10.9 FL (ref 9–12.9)
POIKILOCYTOSIS BLD QL SMEAR: NORMAL
RBC # BLD AUTO: 4.86 M/UL (ref 4.7–6.1)
RBC BLD AUTO: PRESENT
SMUDGE CELLS BLD QL SMEAR: NORMAL
WBC # BLD AUTO: 12.9 K/UL (ref 4.8–10.8)

## 2024-11-20 PROCEDURE — 86355 B CELLS TOTAL COUNT: CPT

## 2024-11-20 PROCEDURE — 86360 T CELL ABSOLUTE COUNT/RATIO: CPT

## 2024-11-20 PROCEDURE — 36415 COLL VENOUS BLD VENIPUNCTURE: CPT

## 2024-11-20 PROCEDURE — 85007 BL SMEAR W/DIFF WBC COUNT: CPT

## 2024-11-20 PROCEDURE — 82306 VITAMIN D 25 HYDROXY: CPT

## 2024-11-20 PROCEDURE — 86359 T CELLS TOTAL COUNT: CPT

## 2024-11-20 PROCEDURE — 85027 COMPLETE CBC AUTOMATED: CPT

## 2024-11-20 PROCEDURE — 86357 NK CELLS TOTAL COUNT: CPT

## 2024-11-27 ENCOUNTER — HOSPITAL ENCOUNTER (OUTPATIENT)
Dept: LAB | Facility: MEDICAL CENTER | Age: 74
End: 2024-11-27
Attending: FAMILY MEDICINE
Payer: MEDICARE

## 2024-11-27 PROCEDURE — 86357 NK CELLS TOTAL COUNT: CPT

## 2024-11-27 PROCEDURE — 86359 T CELLS TOTAL COUNT: CPT

## 2024-11-27 PROCEDURE — 86355 B CELLS TOTAL COUNT: CPT

## 2024-11-27 PROCEDURE — 86360 T CELL ABSOLUTE COUNT/RATIO: CPT

## 2024-11-30 LAB
ANNOTATION COMMENT IMP: ABNORMAL
CD19 CELLS NFR SPEC: 75 % (ref 5–21)
CD3 CELLS # BLD: 1682 CELLS/UL (ref 660–2200)
CD3 CELLS NFR SPEC: 22 % (ref 62–89)
CD3+CD4+ CELLS # BLD: 1300 CELLS/UL (ref 490–1600)
CD3+CD4+ CELLS NFR BLD: 17 % (ref 35–68)
CD3+CD4+ CELLS/CD3+CD8+ CLL BLD: 3.4 RATIO (ref 0.8–6.17)
CD3+CD8+ CELLS # BLD: 370 CELLS/UL (ref 150–1050)
CD3+CD8+ CELLS NFR SPEC: 5 % (ref 10–46)
CD3-CD16+CD56+ CELLS # SPEC: 190 CELLS/UL (ref 74–620)
CD3-CD16+CD56+ CELLS NFR SPEC: 2 % (ref 5–28)
CELLS.CD3-CD19+ [#/VOLUME] IN BLOOD: 5852 CELLS/UL (ref 74–510)

## 2024-12-11 ENCOUNTER — OFFICE VISIT (OUTPATIENT)
Dept: MEDICAL GROUP | Facility: PHYSICIAN GROUP | Age: 74
End: 2024-12-11
Payer: MEDICARE

## 2024-12-11 VITALS
TEMPERATURE: 97.3 F | HEART RATE: 78 BPM | RESPIRATION RATE: 12 BRPM | OXYGEN SATURATION: 96 % | HEIGHT: 69 IN | WEIGHT: 201 LBS | SYSTOLIC BLOOD PRESSURE: 112 MMHG | BODY MASS INDEX: 29.77 KG/M2 | DIASTOLIC BLOOD PRESSURE: 70 MMHG

## 2024-12-11 DIAGNOSIS — E78.5 HYPERLIPIDEMIA ASSOCIATED WITH TYPE 2 DIABETES MELLITUS (HCC): ICD-10-CM

## 2024-12-11 DIAGNOSIS — R33.8 BENIGN PROSTATIC HYPERPLASIA WITH URINARY RETENTION: ICD-10-CM

## 2024-12-11 DIAGNOSIS — E11.69 HYPERLIPIDEMIA ASSOCIATED WITH TYPE 2 DIABETES MELLITUS (HCC): ICD-10-CM

## 2024-12-11 DIAGNOSIS — N40.1 BENIGN PROSTATIC HYPERPLASIA WITH URINARY RETENTION: ICD-10-CM

## 2024-12-11 DIAGNOSIS — E11.65 TYPE 2 DIABETES MELLITUS WITH HYPERGLYCEMIA, WITHOUT LONG-TERM CURRENT USE OF INSULIN (HCC): ICD-10-CM

## 2024-12-11 DIAGNOSIS — D72.820 LYMPHOCYTOSIS: ICD-10-CM

## 2024-12-11 PROCEDURE — 99214 OFFICE O/P EST MOD 30 MIN: CPT | Performed by: FAMILY MEDICINE

## 2024-12-11 PROCEDURE — 3078F DIAST BP <80 MM HG: CPT | Performed by: FAMILY MEDICINE

## 2024-12-11 PROCEDURE — 3074F SYST BP LT 130 MM HG: CPT | Performed by: FAMILY MEDICINE

## 2024-12-11 ASSESSMENT — ENCOUNTER SYMPTOMS
NAUSEA: 0
SHORTNESS OF BREATH: 0
ABDOMINAL PAIN: 0

## 2024-12-11 ASSESSMENT — FIBROSIS 4 INDEX: FIB4 SCORE: 1.69

## 2024-12-11 NOTE — PROGRESS NOTES
Verbal consent was acquired by the patient to use [employer] ambient listening note generation during this visit         History of Present Illness  The patient presents today for a follow-up visit. He was last seen on 10/30/2024. He is accompanied by his wife.    He has type 2 diabetes. He does not monitor his blood glucose levels at home due to difficulty operating the device. His wife reports that they were provided with a different device, but neither of them can operate it. He does not engage in regular exercise. He is currently on metformin 1000 mg twice daily for blood glucose management.    He has hyperlipidemia. He is on rosuvastatin and fenofibrate.    He has lymphocytosis.  He has not yet consulted with a hematologist. He reports feeling well overall, with no complaints of sneezing. His appetite remains robust, and he has not been actively trying to lose weight. His diet includes pasta and occasional chips, but he does not express a desire for sweets or desserts. He reports no nausea or abdominal pain. His wife notes that he was restless the previous night. He does not experience shortness of breath or night sweats. He reports no swelling in his neck, armpits, or groin area. His wife has observed some redness around his waistband and belly button, which she attributes to his preference for tight clothing. He does not engage in regular exercise. He was hospitalized for a week in 02/2022 for a right lower quadrant mass, which was drained.    He has benign prostatic hyperplasia. He reports no issues with urination. He is on finasteride and tamsulosin.    MEDICATIONS  Metformin, B complex, vitamin D, thyroid medication, finasteride, tamsulosin, rosuvastatin, fenofibrate.  Discontinued: CoQ10, glucosamine.      Review of Systems   Constitutional:         Denies any night sweats   Respiratory:  Negative for shortness of breath.    Gastrointestinal:  Negative for abdominal pain and nausea.        Normal  "appetite       Outpatient Medications Marked as Taking for the 12/11/24 encounter (Office Visit) with Aurelia Zayas M.D.   Medication Sig Dispense Refill    metformin (GLUCOPHAGE) 1000 MG tablet TAKE 1 TABLET BY MOUTH TWICE A DAY WITH FOOD 200 Tablet 3    rosuvastatin (CRESTOR) 10 MG Tab TAKE 1 TABLET BY MOUTH EVERY DAY IN THE EVENING 100 Tablet 4    nystatin/triamcinolone (MYCOLOG) 861077-0.1 UNIT/GM-% Cream Apply 1 Application topically 2 times a day. 60 g 1    levothyroxine (SYNTHROID) 75 MCG Tab TAKE 1 TABLET BY MOUTH EVERY DAY IN THE MORNING ON AN EMPTY STOMACH 100 Tablet 3    fenofibrate micronized (LOFIBRA) 134 MG capsule TAKE 1 CAPSULE BY MOUTH EVERY  Capsule 3    B Complex Vitamins (VITAMIN-B COMPLEX PO) Take  by mouth.      tamsulosin (FLOMAX) 0.4 MG capsule Take 1 Capsule by mouth every day.      finasteride (PROSCAR) 5 MG Tab Take 1 Tablet by mouth every day.      latanoprost (XALATAN) 0.005 % Solution Administer 1 Drop into both eyes at bedtime.      acetaminophen (TYLENOL) 500 MG Tab Take 1 Tablet by mouth 2 times a day. Indications: Pain      Multiple Vitamins-Minerals (CENTRUM SILVER ADULT 50+) Tab Take 1 Tablet by mouth every day. Indications: Suppliment      Cholecalciferol (VITAMIN D3 ADULT GUMMIES PO) Take 2,000 Units by mouth every day. Indications: Suppliment         /70 (BP Location: Left arm, Patient Position: Sitting)   Pulse 78   Temp 36.3 °C (97.3 °F) (Temporal)   Resp 12   Ht 1.753 m (5' 9\")   Wt 91.2 kg (201 lb)   SpO2 96% , Body mass index is 29.68 kg/m².        Physical Exam  Constitutional:       Appearance: Normal appearance.   Eyes:      Extraocular Movements: Extraocular movements intact.   Cardiovascular:      Rate and Rhythm: Normal rate and regular rhythm.   Pulmonary:      Effort: Pulmonary effort is normal.      Breath sounds: Normal breath sounds.   Musculoskeletal:      Cervical back: Neck supple.   Lymphadenopathy:      Cervical: No cervical adenopathy. "   Neurological:      Mental Status: He is alert.   Psychiatric:         Mood and Affect: Mood normal.         Behavior: Behavior normal.         Results  Laboratory Studies  White blood cell count is slightly above normal. Thyroid levels were normal.     Encouraged to receive the covid vaccine  Assessment & Plan  1. Type 2 diabetes mellitus.  Chronic medical diagnosis.  Stable.  His last A1c from 6 weeks ago was at 7.6%.  He is currently taking Metformin 1000 mg twice a day. He is due for a foot exam, which was conducted during this visit. He is not checking his blood sugars at home due to difficulties with the glucose monitor. . He is advised to continue his current medication regimen and to monitor his blood sugar levels at home if possible.  Will plan on checking A1c in the office at the next visit.    2. Hyperlipidemia.  Chronic medical diagnosis.  Stable.  He is taking rosuvastatin and fenofibrate for cholesterol management. He should continue his current medication regimen.    3. Lymphocytosis.  Chronic medical diagnosis.  Not improving.  His white blood cell count remains slightly elevated, although there has been a slight improvement. This condition has persisted for several years.  A referral to a hematologist will be initiated to rule out any potential blood disorders. He should hear from the referral department within 7 to 10 days.     4. Benign prostatic hyperplasia.  Chronic medical diagnosis.  Stable.  He is taking finasteride and tamsulosin for urinary symptoms and reports no problems. He should continue his current medication regimen.    PROCEDURE  The patient underwent drainage of a significant groin swelling during a hospital stay in 02/2022.  Orders Placed This Encounter    Diabetic Monofilament Lower Extremity Exam                 This note was created using voice recognition software (Dragon). The accuracy of the dictation is limited by the abilities of the software. I have reviewed the note  prior to signing, however some errors in grammar and context are still possible. If you have any questions related to this note please do not hesitate to contact our office.

## 2024-12-27 DIAGNOSIS — D72.820 LYMPHOCYTOSIS: ICD-10-CM

## 2024-12-27 DIAGNOSIS — Z71.9 ENCOUNTER FOR CONSULTATION: ICD-10-CM

## 2025-02-25 ENCOUNTER — HOSPITAL ENCOUNTER (OUTPATIENT)
Dept: LAB | Facility: MEDICAL CENTER | Age: 75
End: 2025-02-25
Attending: STUDENT IN AN ORGANIZED HEALTH CARE EDUCATION/TRAINING PROGRAM
Payer: MEDICARE

## 2025-02-25 ENCOUNTER — HOSPITAL ENCOUNTER (OUTPATIENT)
Dept: HEMATOLOGY ONCOLOGY | Facility: MEDICAL CENTER | Age: 75
End: 2025-02-25
Attending: STUDENT IN AN ORGANIZED HEALTH CARE EDUCATION/TRAINING PROGRAM
Payer: MEDICARE

## 2025-02-25 VITALS
DIASTOLIC BLOOD PRESSURE: 82 MMHG | TEMPERATURE: 98.4 F | WEIGHT: 207.34 LBS | HEART RATE: 64 BPM | HEIGHT: 69 IN | SYSTOLIC BLOOD PRESSURE: 128 MMHG | OXYGEN SATURATION: 96 % | BODY MASS INDEX: 30.71 KG/M2

## 2025-02-25 DIAGNOSIS — D72.820 LYMPHOCYTOSIS: ICD-10-CM

## 2025-02-25 LAB
ANISOCYTOSIS BLD QL SMEAR: ABNORMAL
BASOPHILS # BLD AUTO: 0 % (ref 0–1.8)
BASOPHILS # BLD: 0 K/UL (ref 0–0.12)
COMMENT NL1176: NORMAL
EOSINOPHIL # BLD AUTO: 0.51 K/UL (ref 0–0.51)
EOSINOPHIL NFR BLD: 3.4 % (ref 0–6.9)
ERYTHROCYTE [DISTWIDTH] IN BLOOD BY AUTOMATED COUNT: 46.1 FL (ref 35.9–50)
HCT VFR BLD AUTO: 42.7 % (ref 42–52)
HGB BLD-MCNC: 14 G/DL (ref 14–18)
LYMPHOCYTES # BLD AUTO: 7.56 K/UL (ref 1–4.8)
LYMPHOCYTES NFR BLD: 50.4 % (ref 22–41)
MANUAL DIFF BLD: NORMAL
MCH RBC QN AUTO: 29.5 PG (ref 27–33)
MCHC RBC AUTO-ENTMCNC: 32.8 G/DL (ref 32.3–36.5)
MCV RBC AUTO: 90.1 FL (ref 81.4–97.8)
MICROCYTES BLD QL SMEAR: ABNORMAL
MONOCYTES # BLD AUTO: 0.89 K/UL (ref 0–0.85)
MONOCYTES NFR BLD AUTO: 5.9 % (ref 0–13.4)
MORPHOLOGY BLD-IMP: NORMAL
NEUTROPHILS # BLD AUTO: 6.04 K/UL (ref 1.82–7.42)
NEUTROPHILS NFR BLD: 40.3 % (ref 44–72)
NRBC # BLD AUTO: 0 K/UL
NRBC BLD-RTO: 0 /100 WBC (ref 0–0.2)
PLATELET # BLD AUTO: 230 K/UL (ref 164–446)
PLATELET BLD QL SMEAR: NORMAL
PMV BLD AUTO: 10.7 FL (ref 9–12.9)
RBC # BLD AUTO: 4.74 M/UL (ref 4.7–6.1)
RBC BLD AUTO: PRESENT
SMUDGE CELLS BLD QL SMEAR: NORMAL
VARIANT LYMPHS BLD QL SMEAR: NORMAL
WBC # BLD AUTO: 15 K/UL (ref 4.8–10.8)

## 2025-02-25 PROCEDURE — 36415 COLL VENOUS BLD VENIPUNCTURE: CPT

## 2025-02-25 PROCEDURE — 85007 BL SMEAR W/DIFF WBC COUNT: CPT

## 2025-02-25 PROCEDURE — 99204 OFFICE O/P NEW MOD 45 MIN: CPT | Performed by: STUDENT IN AN ORGANIZED HEALTH CARE EDUCATION/TRAINING PROGRAM

## 2025-02-25 PROCEDURE — 88184 FLOWCYTOMETRY/ TC 1 MARKER: CPT

## 2025-02-25 PROCEDURE — 88185 FLOWCYTOMETRY/TC ADD-ON: CPT | Mod: 91

## 2025-02-25 PROCEDURE — 85027 COMPLETE CBC AUTOMATED: CPT

## 2025-02-25 PROCEDURE — 99212 OFFICE O/P EST SF 10 MIN: CPT | Mod: 25 | Performed by: STUDENT IN AN ORGANIZED HEALTH CARE EDUCATION/TRAINING PROGRAM

## 2025-02-25 ASSESSMENT — FIBROSIS 4 INDEX: FIB4 SCORE: 1.69

## 2025-02-25 NOTE — PROGRESS NOTES
Consult:  Hematology/Oncology    Primary Care:  Aurelia Zayas M.D.    Diagnosis: Lymphocytosis    Chief Complaint:  Establish Care    History of Presenting Illness:  Lico Salinas is a 74 y.o. male with PMH DM II, HTN,hx autoimmune encephalitis, hx BCC, BPH who presents today to establish care for lymphocytosis.      Reports he has not seen a hematologist for this in the past.  Documented as far back as 2020.  Denies fever chills or night sweats.  Does endorse some unintentional weight loss.     CT CAP 2020 with concern for R breast mass and axillary adenopathy     240 to 140 lost in 2020.     Ear, shoulder, chest (most recent in October, nov)     Was given 5FU cream per dermatology for scalp but reacted - turned red     Previous smoker started 10 stopped 20's 1 PPD     Colonoscopy no polyps     Went to urology a week ago uses nystatin cream     Past Medical History:   Diagnosis Date    Acute cough 11/30/2023    New medical diagnosis.  Patient's spouse was ill last week with similar symptoms.  She took a home COVID test which was negative. Last covid vaccine was given June 2022. Complaining of URI symptoms and cough.    Anxiety     BMI 29.0-29.9,adult 07/09/2022    Diabetes (HCC) 07/10/2015    Dyslipidemia 11/11/2016    Glaucoma     OU    High cholesterol 07/10/2015    hx of, took self off simvastin    Hypertension 07/10/2015    hx of, took himself off lisinopril and HTCZ    Hypothyroidism     Neuropathy, peripheral     Right lower quadrant abdominal abscess (HCC) 02/28/2022    Skin cancer     Snoring     Thyroid disease     Urinary frequency 08/25/2020       Past Surgical History:   Procedure Laterality Date    CARPAL TUNNEL ENDOSCOPIC Left 9/5/2017    Procedure: CARPAL TUNNEL ENDOSCOPIC VERSUS;  Surgeon: Frank Alvarado M.D.;  Location: SURGERY [facility];  Service: Orthopedics    LYMPH NODE EXCISION Left 7/21/2015    Procedure: LYMPH NODE EXCISION DEEP CERVICAL;  Surgeon: Michele Gan M.D.;   Location: SURGERY SAME DAY [facility];  Service:     LUMBAR DISC REPLACEMENT         Social History     Socioeconomic History    Marital status:      Spouse name: Not on file    Number of children: Not on file    Years of education: Not on file    Highest education level: 12th grade   Occupational History    Not on file   Tobacco Use    Smoking status: Former     Current packs/day: 0.00     Average packs/day: 1 pack/day for 20.0 years (20.0 ttl pk-yrs)     Types: Cigarettes     Start date: 1960     Quit date: 1980     Years since quittin.1    Smokeless tobacco: Never   Vaping Use    Vaping status: Never Used   Substance and Sexual Activity    Alcohol use: Not Currently     Alcohol/week: 0.5 oz     Types: 1 Cans of beer per week     Comment: has not been drinking lately     Drug use: No    Sexual activity: Yes     Partners: Female   Other Topics Concern    Not on file   Social History Narrative    ,    No kids,    Retired manfacturing     Social Drivers of Health     Financial Resource Strain: Low Risk  (2022)    Overall Financial Resource Strain (CARDIA)     Difficulty of Paying Living Expenses: Not hard at all   Food Insecurity: No Food Insecurity (2022)    Hunger Vital Sign     Worried About Running Out of Food in the Last Year: Never true     Ran Out of Food in the Last Year: Never true   Transportation Needs: No Transportation Needs (2022)    PRAPARE - Transportation     Lack of Transportation (Medical): No     Lack of Transportation (Non-Medical): No   Physical Activity: Inactive (2022)    Exercise Vital Sign     Days of Exercise per Week: 0 days     Minutes of Exercise per Session: 0 min   Stress: No Stress Concern Present (2022)    [employer] - Occupational Stress Questionnaire     Feeling of Stress : Not at all   Social Connections: Moderately Integrated (2022)    Social Connection and Isolation Panel [NHANES]      Frequency of Communication with Friends and Family: Twice a week     Frequency of Social Gatherings with Friends and Family: Once a week     Attends Scientology Services: More than 4 times per year     Active Member of Clubs or Organizations: No     Attends Club or Organization Meetings: Never     Marital Status:    Intimate Partner Violence: Not on file   Housing Stability: Low Risk  (6/24/2022)    Housing Stability Vital Sign     Unable to Pay for Housing in the Last Year: No     Number of Places Lived in the Last Year: 2     Unstable Housing in the Last Year: No       Family History   Problem Relation Age of Onset    Heart Disease Mother     Alcohol abuse Mother     Lung Disease Father     Heart Disease Father     Drug abuse Sister     No Known Problems Brother        OB History   No obstetric history on file.       Allergies as of 02/25/2025 - Reviewed 12/11/2024   Allergen Reaction Noted    Pcn [penicillins] Unspecified 02/28/2022         Current Outpatient Medications:     metformin (GLUCOPHAGE) 1000 MG tablet, TAKE 1 TABLET BY MOUTH TWICE A DAY WITH FOOD, Disp: 200 Tablet, Rfl: 3    rosuvastatin (CRESTOR) 10 MG Tab, TAKE 1 TABLET BY MOUTH EVERY DAY IN THE EVENING, Disp: 100 Tablet, Rfl: 4    nystatin/triamcinolone (MYCOLOG) 282765-3.1 UNIT/GM-% Cream, Apply 1 Application topically 2 times a day., Disp: 60 g, Rfl: 1    levothyroxine (SYNTHROID) 75 MCG Tab, TAKE 1 TABLET BY MOUTH EVERY DAY IN THE MORNING ON AN EMPTY STOMACH, Disp: 100 Tablet, Rfl: 3    fenofibrate micronized (LOFIBRA) 134 MG capsule, TAKE 1 CAPSULE BY MOUTH EVERY DAY, Disp: 100 Capsule, Rfl: 3    B Complex Vitamins (VITAMIN-B COMPLEX PO), Take  by mouth., Disp: , Rfl:     tamsulosin (FLOMAX) 0.4 MG capsule, Take 1 Capsule by mouth every day., Disp: , Rfl:     finasteride (PROSCAR) 5 MG Tab, Take 1 Tablet by mouth every day., Disp: , Rfl:     latanoprost (XALATAN) 0.005 % Solution, Administer 1 Drop into both eyes at bedtime., Disp: , Rfl:  "    acetaminophen (TYLENOL) 500 MG Tab, Take 1 Tablet by mouth 2 times a day. Indications: Pain, Disp: , Rfl:     Multiple Vitamins-Minerals (CENTRUM SILVER ADULT 50+) Tab, Take 1 Tablet by mouth every day. Indications: Suppliment, Disp: , Rfl:     Cholecalciferol (VITAMIN D3 ADULT GUMMIES PO), Take 2,000 Units by mouth every day. Indications: Suppliment, Disp: , Rfl:     Review of Systems:  ROS as above       Physical Exam:  Vitals:    02/25/25 1254   Height: 1.753 m (5' 9.02\")         Physical Exam     Labs:  Lab Results   Component Value Date/Time    WBC 12.9 (H) 11/20/2024 01:19 PM    RBC 4.86 11/20/2024 01:19 PM    HEMOGLOBIN 14.6 11/20/2024 01:19 PM    HEMATOCRIT 44.4 11/20/2024 01:19 PM    MCV 91.4 11/20/2024 01:19 PM    MCH 30.0 11/20/2024 01:19 PM    MCHC 32.9 11/20/2024 01:19 PM    RDW 48.9 11/20/2024 01:19 PM    PLATELETCT 216 11/20/2024 01:19 PM    MPV 10.9 11/20/2024 01:19 PM    NEUTSPOLYS 25.70 (L) 11/20/2024 01:19 PM    LYMPHOCYTES 69.00 (H) 11/20/2024 01:19 PM    MONOCYTES 2.60 11/20/2024 01:19 PM    EOSINOPHILS 1.80 11/20/2024 01:19 PM    BASOPHILS 0.90 11/20/2024 01:19 PM    HYPOCHROMIA 1+ 03/04/2022 03:27 AM    ANISOCYTOSIS 1+ 03/07/2022 02:03 AM        Lab Results   Component Value Date/Time    SODIUM 139 10/24/2024 09:08 AM    POTASSIUM 4.4 10/24/2024 09:08 AM    CHLORIDE 103 10/24/2024 09:08 AM    CO2 25 10/24/2024 09:08 AM    GLUCOSE 158 (H) 10/24/2024 09:08 AM    BUN 18 10/24/2024 09:08 AM    CREATININE 1.19 10/24/2024 09:08 AM        Imaging:   All listed images below have been independently reviewed by me. I agree with the findings as summarized below:    No results found.     Pathology:  ***    Assessment & Plan:  Lico Mauricio Rita is a 74 y.o. [unfilled] with [facility] *** presents today to establish care for ***    Any questions and concerns raised by the patient were answered to the best of my ability. Thank you for allowing me to participate in the care for this patient. Please feel free to " contact me for any questions or concerns.     Total time spent on chart review, clinic encounter, and documentation: *** minutes.

## 2025-02-27 LAB
ACUTE LEUKEMIA MARKERS SPEC-IMP: NORMAL
EVENTS COUNTED SPEC: 26 MARKERS
SOURCE 9121: NORMAL

## 2025-03-17 ENCOUNTER — APPOINTMENT (OUTPATIENT)
Dept: HEMATOLOGY ONCOLOGY | Facility: MEDICAL CENTER | Age: 75
End: 2025-03-17
Attending: STUDENT IN AN ORGANIZED HEALTH CARE EDUCATION/TRAINING PROGRAM
Payer: MEDICARE

## 2025-03-29 NOTE — TELEPHONE ENCOUNTER
Let the patient and his wife know that the orders for the IVIG infusions have been entered.  They should be contacted in the near future by the infusion service once the treatments are authorized through insurance.   Patient : Ruth A Shone Age: 89 year old Sex: female   MRN: [id number] Encounter Date: 3/28/2025    History     Chief Complaint   Patient presents with    Fall       HPI        Ruth A Shone is a 89 year old presenting to the emergency department ***    {Chart Review (Optional):356642511}    Past/Family/Social History     Allergies   Allergen Reactions    Molds & Smuts Runny Nose       No current facility-administered medications for this encounter.     Current Outpatient Medications   Medication Sig    metoPROLOL succinate (TOPROL-XL) 25 MG 24 hr tablet Take 1 tablet by mouth daily.    HYDROcodone-acetaminophen (NORCO) 5-325 MG per tablet Take 1 tablet by mouth every 4 hours as needed for Pain. (Patient not taking: Reported on 1/15/2025)    polyethylene glycol (MIRALAX) 17 g packet Take 17 g by mouth daily. Stir and dissolve powder in any 4 to 8 ounces of beverage, then drink.    atorvastatin (LIPITOR) 10 MG tablet Take 10 mg by mouth daily. (Patient not taking: Reported on 3/7/2025)    donepezil (ARICEPT) 10 MG tablet Take 10 mg by mouth nightly.    levothyroxine 150 MCG tablet TAKE 1 TABLET DAILY ON MON, TUE, WED, THUR, FRI, SAT - DO NOT TAKE ON SUN for 30    OLANZapine (ZyPREXA) 2.5 MG tablet Take 2.5 mg by mouth daily as needed (mood/anxiety).    guaiFENesin-DM (ROBITUSSIN DM) 100-10 MG/5ML syrup Take 10 mLs by mouth 4 times daily as needed for Cough.    lidocaine (LIDOCARE) 4 % patch Place 1 patch onto the skin daily as needed (right shoulder pain).    albuterol (VENTOLIN) (2.5 MG/3ML) 0.083% nebulizer solution Take 2.5 mg by nebulization every 6 hours as needed for Wheezing.    diphenhydrAMINE HCl (Benadryl Itch Stopping) 2 % Gel Apply 1 Application topically 2 times daily as needed (itching).    midodrine (PROAMATINE) 2.5 MG tablet Take 2.5 mg by mouth as needed (systolic BP). Resident to take 1 tablet of midodrine 2.5 mg PRN for systolic BP AT or BELOW 100    acetaminophen (TYLENOL) 325 MG tablet Take 325 mg by  mouth every 6 hours as needed (mild pain). Maximum acetaminophen dose = 4 grams/day from all sources    memantine (NAMENDA) 10 MG tablet Take 10 mg by mouth in the morning and 10 mg in the evening.    apixaBAN (ELIQUIS) 2.5 MG Tab Take 1 tablet by mouth every 12 hours.       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       Social History     Tobacco Use    Smoking status: Never    Smokeless tobacco: Never    Tobacco comments:     Never used tobacco. denies smoking   Vaping Use    Vaping status: never used   Substance Use Topics    Alcohol use: Not Currently    Drug use: Never          Review of Systems   Review of Symptoms     Review of Systems       Physical Exam   Physical Exam     ED Triage Vitals   ED Triage Vitals Group      Temp 03/28/25 0659 98.1 °F (36.7 °C)      Heart Rate 03/28/25 0659 (!) 114      Resp 03/28/25 0659 16      BP 03/28/25 0659 (!) 153/83      SpO2 03/28/25 0825 99 %      EtCO2 mmHg --       Height --       Weight 03/28/25 0656 131 lb 2.8 oz (59.5 kg)      Weight Scale Used 03/28/25 0656 Scale in bed      BMI (Calculated) --       IBW/kg (Calculated) --        Physical Exam       Procedures   ED Procedures     Procedures       Lab Results   ED Lab     No results found for this visit on 03/28/25.       EKG     {EKG Documentation (Optional):964337995}    Radiology Results   ED Radiology Results     Imaging Results               CT HEAD WO CONTRAST (Final result)  Result time 03/28/25 07:35:54      Final result                   Impression:      Streak/beam hardening artifact from dental hardware limits evaluation.    1. No acute intracranial hemorrhage identified.    2. Chronic appearing involutional changes of the brain.    3. No acute calvarial fractures.          Electronically Signed by: ELYSIA MOLINA DO   Signed on: 3/28/2025 7:35 AM   Workstation ID: [id number]               Narrative:    EXAM: CT HEAD WO CONTRAST    CLINICAL INDICATION: 89-year-old female, trauma, fall. On anticoagulation.    COMPARISON: CT head and cervical spine from 12/29/2024.    TECHNIQUE: Noncontrast axial CT images were obtained from the vertex to the  skull base.  Multiplanar reformats were obtained.  Dose optimization was  utilized. mA and/or kVp was adjusted for patient size.    FINDINGS:    Streak/beam hardening artifact from dental hardware limits evaluation.    No acute intracranial hemorrhage, extra-axial fluid collection or midline  shift identified.    Atrophic changes of the cerebrum with ex vacuo effect upon the ventricles.  Periventricular white matter changes likely from chronic microvascular  ischemia. Overall gray-white matter differentiation appears intact. No  obvious CT evidence of a new large territory infarct.    The ventricles and basal cisterns are clear.    Cataract surgery changes on the right. Globes and intraorbital contents are  otherwise within normal limits.    Small mucous retention cyst versus polyp in the left frontal sinus. Ethmoid  air cells and sphenoid sinus are clear. Right maxillary sinus is clear.  Stable appearance of the left maxillary sinus which could represent a  mucocele.    Mastoid air cells are clear.    Atherosclerotic disease of the intracranial circulation.    No acute calvarial fractures identified.    Scalp soft tissues appear within normal limits.                                       CT  CERVICAL SPINE WO CONTRAST (Final result)  Result time 03/28/25 07:40:39      Final result                   Impression:      1. No acute fractures or dislocations identified.    2. Osteopenia and chronic/degenerative osseous changes.        Electronically Signed by: ELYSIA MOLINA DO   Signed on: 3/28/2025 7:40 AM   Workstation ID: [id number]               Narrative:    EXAM: CT CERVICAL SPINE WO CONTRAST    CLINICAL INDICATION: 89-year-old female, trauma, fall.    COMPARISON: 12/29/2024.    TECHNIQUE: Noncontrast axial CT images were obtained through the cervical  spine. Multiplanar reformats were created. Dose optimization was utilized.  mA and/or kVp was adjusted for patient size.    FINDINGS:    No acute fractures or dislocations identified. Prevertebral soft tissues  appear within normal limits.    Osteopenia. Degenerative changes of the lateral axial joint.    Multilevel degenerative endplate changes, degenerative disc disease and  osteophytosis of the visualized spine.    Normal spinal alignment with intact cervical lordosis.    Atherosclerotic disease of the bilateral carotid bifurcations.    Scarring/atelectasis of the lung apices.                                       XR PELVIS 1 VIEW (Final result)  Result time 03/28/25 08:01:36      Final result                   Impression:    FINDINGS/IMPRESSION:    Postsurgical changes of the lower lumbar spine.    Partially visualized ORIF changes of the right proximal femur.    Osteopenia. Degenerative changes of the spine, SI joints and bilateral hip  joints. No obvious acute fractures or dislocations identified.          Electronically Signed by: ELYSIA MOLINA DO   Signed on: 3/28/2025 8:01 AM   Workstation ID: [id number]               Narrative:    EXAM: XR PELVIS 1 VIEW    CLINICAL INDICATION: 89-year-old female, trauma.    COMPARISON: None.                                         ED Medications   ED Medications     ED Medication Orders (From admission,  onward)      None               ED Course     Vitals:    03/28/25 0656 03/28/25 0659 03/28/25 0825   BP:  (!) 153/83 (!) 145/75   BP Location:   LUE - Left upper extremity   Patient Position:   Semi-Gates's   Pulse:  (!) 114 89   Resp:  16 16   Temp:  98.1 °F (36.7 °C) 98 °F (36.7 °C)   TempSrc:  Oral Oral   SpO2:   99%   Weight: 59.5 kg (131 lb 2.8 oz)              {Data Independently Reviewed:817856843}    {ED Scoring Tool (Optional):249444389}    Consults                {Consult Documentation (Optional):682831086}        Medical Decision Making                   {(TIP) Please include a comprehensive MDM explaining the patients risks and why or why not they needed to be admitted.  This message will delete upon signing.  (Optional):3}            {MDM:758030764}    {Does the Patient have sepsis:770850553}     Critical Care       {Critical Care (Optional):735567}        Disposition     {ED Disposition:439887188}          Discharge 3/28/2025  7:44 AM  Ruth A Shone discharge to home/self care.        {(TIP) 2023 Billing Criteria (this will vanish upon signing)    Level 4   Must meet requirements of at least   1 of the 3 Categories    Level 5  Must meet requirements of at least   2 of the 3 categories      Category 1  Test Documents or independent historians  (need 3) -Review of prior external notes  -Review of results of a unique test  -Ordering of Each unique test  -Assessment Requiring an independent historian     Category 2  Independent interpretation of a test preformed by another physician/other qualified health care profesional -Cardiac Monitor Review  -Radiology interpretation  -EKG (unless billed separately)   Category 3  Discussion of Management or test interpretation with external physician/other qualified health care professional/appropriate source       -Consultation that specifies who you discussed the patient management with and what the plan is.   (Optional):3}             pelvis. Pt has no acute complaints.     CTH individually reviewed and interpreted by me with no acute ICH.   CT C spine negative  XR Pelvis individually reviewed and interpreted by me without acute fx or dislocation    Pt was ambulatory on scene. Will dc back to facility.     Social determinant of health affecting care: dementia     In addition to history obtained from the patient, I have obtained additional information from available independent historians such as family members and/or visitors. In addition, I have reviewed prior external records from each unique source, EMS records and any documents or data that accompanied the patient to the emergency department today. In consideration of this patient’s care, I have considered the need for laboratory or imaging exams, the possibility of hospitalization as well any prescription medications such as antibiotics and pain medication.     I have discussed the patient’s care with other providers including but not limited to PharmD, PA, ERT, RN and MDs when appropriate.    Standard anticipatory guidance was given to this patient which included follow up instructions,  prescribed medications (if any), and signs/symptoms that should prompt immediate return to the emergency department for re-evaluation.  The patient was also instructed to return to the emergency department immediately if there are any worsening or concerning symptoms not otherwise discussed. The patient demonstrated understanding of these instructions and was discharged in satisfactory condition.    Does the Patient have sepsis: NO     Critical Care     No Critical Care    Disposition       Clinical Impression and Diagnosis  8:12 AM       ED Diagnoses       Diagnosis Comment Associated Orders       Final diagnoses    Blunt head injury, initial encounter -- --    Chronic anticoagulation -- --    Fall in home, initial encounter -- --            Follow Up:  Josselin Chandra MD  [address] [place] [address]  [address]  [facility] [address]  [phone]    In 2 days            Summary of your Discharge Medications      You have not been prescribed any medications.         Pt is discharged to home/self care in stable condition.        Discharge 3/28/2025  7:44 AM  Ruth A Shone discharge to home/self care.                       Sharona Roper DO  04/02/25 0812

## 2025-03-31 ENCOUNTER — HOSPITAL ENCOUNTER (OUTPATIENT)
Dept: HEMATOLOGY ONCOLOGY | Facility: MEDICAL CENTER | Age: 75
End: 2025-03-31
Attending: STUDENT IN AN ORGANIZED HEALTH CARE EDUCATION/TRAINING PROGRAM
Payer: MEDICARE

## 2025-03-31 VITALS
HEIGHT: 69 IN | DIASTOLIC BLOOD PRESSURE: 62 MMHG | TEMPERATURE: 97.6 F | SYSTOLIC BLOOD PRESSURE: 114 MMHG | OXYGEN SATURATION: 94 % | HEART RATE: 92 BPM | WEIGHT: 209.11 LBS | BODY MASS INDEX: 30.97 KG/M2

## 2025-03-31 DIAGNOSIS — C91.10 CLL (CHRONIC LYMPHOCYTIC LEUKEMIA) (HCC): ICD-10-CM

## 2025-03-31 PROCEDURE — 99212 OFFICE O/P EST SF 10 MIN: CPT | Performed by: STUDENT IN AN ORGANIZED HEALTH CARE EDUCATION/TRAINING PROGRAM

## 2025-03-31 ASSESSMENT — PAIN SCALES - GENERAL: PAINLEVEL_OUTOF10: NO PAIN

## 2025-03-31 ASSESSMENT — FIBROSIS 4 INDEX: FIB4 SCORE: 1.58

## 2025-03-31 NOTE — PROGRESS NOTES
Clinic Note:  Hematology/Oncology    Primary Care:  Aurelia Zayas M.D.    Diagnosis: CLL    Chief Complaint:  Follow up     History of Presenting Illness:  Lico Salinas is a 74 y.o. male with PMH DM II, HTN,hx autoimmune encephalitis, hx BCC, BPH who presents today to establish care for lymphocytosis.      Reports he has not seen a hematologist for this in the past.  Documented as far back as 2020.  Denies fever chills or night sweats.  Does endorse some unintentional weight loss.     CT CAP 2020 with concern for R breast mass and axillary adenopathy.  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  Followed up with breast surgeon who at the time elected for surveillance.     Follows closely with dermatology for history of multiple skin cancers, and is s/p excision of multiple sites including ear, shoulder and chest.  Notes that he was previously given 5FU cream for some of the lesions but had a dermatologic reaction.     Denies fever, chills, night sweats.  Does report some unintentional weight loss in 2020 where he lost over 100 lbs (went from 240 to 140) but has since stabalized.  He is a former smoker that smoked from ages [age] and smoked about 1 PPD.      Reports he is UTD with colonoscopy and reports he was told they did not find any polyps anf follows with Urology.  Uses a nystatin cream for inguinal intertrigo.     Interval History: He is doing well today, no acute complaints since last visit.  He did not bring his hearing aid to visit.       Past Medical History:   Diagnosis Date    Acute cough 11/30/2023    New medical diagnosis.  Patient's spouse was ill last week with similar symptoms.  She took a home COVID test which was negative. Last covid vaccine was given June 2022. Complaining of URI symptoms and cough.    Anxiety     BMI 29.0-29.9,adult 07/09/2022    Diabetes (HCC) 07/10/2015    Dyslipidemia 11/11/2016    Glaucoma     OU    High cholesterol 07/10/2015    hx  of, took self off simvastin    Hypertension 07/10/2015    hx of, took himself off lisinopril and HTCZ    Hypothyroidism     Neuropathy, peripheral     Right lower quadrant abdominal abscess (HCC) 2022    Skin cancer     Snoring     Thyroid disease     Urinary frequency 2020       Past Surgical History:   Procedure Laterality Date    CARPAL TUNNEL ENDOSCOPIC Left 2017    Procedure: CARPAL TUNNEL ENDOSCOPIC VERSUS;  Surgeon: Frank Alvarado M.D.;  Location: SURGERY [facility] ORS;  Service: Orthopedics    LYMPH NODE EXCISION Left 2015    Procedure: LYMPH NODE EXCISION DEEP CERVICAL;  Surgeon: Michele Gan M.D.;  Location: SURGERY SAME DAY [facility] ORS;  Service:     LUMBAR DISC REPLACEMENT         Social History     Socioeconomic History    Marital status:      Spouse name: Not on file    Number of children: Not on file    Years of education: Not on file    Highest education level: 12th grade   Occupational History    Not on file   Tobacco Use    Smoking status: Former     Current packs/day: 0.00     Average packs/day: 1 pack/day for 20.0 years (20.0 ttl pk-yrs)     Types: Cigarettes     Start date: 1960     Quit date: 1980     Years since quittin.2    Smokeless tobacco: Never   Vaping Use    Vaping status: Never Used   Substance and Sexual Activity    Alcohol use: Not Currently     Alcohol/week: 0.5 oz     Types: 1 Cans of beer per week     Comment: has not been drinking lately     Drug use: No    Sexual activity: Yes     Partners: Female   Other Topics Concern    Not on file   Social History Narrative    ,    No kids,    Retired manfacturing     Social Drivers of Health     Financial Resource Strain: Low Risk  (2022)    Overall Financial Resource Strain (CARDIA)     Difficulty of Paying Living Expenses: Not hard at all   Food Insecurity: No Food Insecurity (2022)    Hunger Vital Sign     Worried About Running Out of Food in the Last Year: Never true      Ran Out of Food in the Last Year: Never true   Transportation Needs: No Transportation Needs (6/24/2022)    PRAPARE - Transportation     Lack of Transportation (Medical): No     Lack of Transportation (Non-Medical): No   Physical Activity: Inactive (6/24/2022)    Exercise Vital Sign     Days of Exercise per Week: 0 days     Minutes of Exercise per Session: 0 min   Stress: No Stress Concern Present (6/24/2022)    [employer] [place] [employer] - Occupational Stress Questionnaire     Feeling of Stress : Not at all   Social Connections: Moderately Integrated (6/24/2022)    Social Connection and Isolation Panel [NHANES]     Frequency of Communication with Friends and Family: Twice a week     Frequency of Social Gatherings with Friends and Family: Once a week     Attends Faith Services: More than 4 times per year     Active Member of Clubs or Organizations: No     Attends Club or Organization Meetings: Never     Marital Status:    Intimate Partner Violence: Not on file   Housing Stability: Low Risk  (6/24/2022)    Housing Stability Vital Sign     Unable to Pay for Housing in the Last Year: No     Number of Places Lived in the Last Year: 2     Unstable Housing in the Last Year: No     Family History   Problem Relation Age of Onset    Heart Disease Mother     Alcohol abuse Mother     Lung Disease Father     Heart Disease Father     Drug abuse Sister     No Known Problems Brother        Allergies as of 03/31/2025 - Reviewed 03/31/2025   Allergen Reaction Noted    Pcn [penicillins] Unspecified 02/28/2022       Current Outpatient Medications:     NON SPECIFIED, Gluco-Ntrol Blood sugar, Disp: , Rfl:     metformin (GLUCOPHAGE) 1000 MG tablet, TAKE 1 TABLET BY MOUTH TWICE A DAY WITH FOOD, Disp: 200 Tablet, Rfl: 3    rosuvastatin (CRESTOR) 10 MG Tab, TAKE 1 TABLET BY MOUTH EVERY DAY IN THE EVENING, Disp: 100 Tablet, Rfl: 4    nystatin/triamcinolone (MYCOLOG) 368374-2.1 UNIT/GM-% Cream, Apply 1 Application  "topically 2 times a day., Disp: 60 g, Rfl: 1    levothyroxine (SYNTHROID) 75 MCG Tab, TAKE 1 TABLET BY MOUTH EVERY DAY IN THE MORNING ON AN EMPTY STOMACH, Disp: 100 Tablet, Rfl: 3    fenofibrate micronized (LOFIBRA) 134 MG capsule, TAKE 1 CAPSULE BY MOUTH EVERY DAY, Disp: 100 Capsule, Rfl: 3    B Complex Vitamins (VITAMIN-B COMPLEX PO), Take  by mouth., Disp: , Rfl:     tamsulosin (FLOMAX) 0.4 MG capsule, Take 1 Capsule by mouth every day., Disp: , Rfl:     finasteride (PROSCAR) 5 MG Tab, Take 1 Tablet by mouth every day., Disp: , Rfl:     latanoprost (XALATAN) 0.005 % Solution, Administer 1 Drop into both eyes at bedtime., Disp: , Rfl:     Cholecalciferol (VITAMIN D3 ADULT GUMMIES PO), Take 2,000 Units by mouth every day. Indications: Suppliment, Disp: , Rfl:     acetaminophen (TYLENOL) 500 MG Tab, Take 1 Tablet by mouth 2 times a day. Indications: Pain (Patient not taking: Reported on 2/25/2025), Disp: , Rfl:     Multiple Vitamins-Minerals (CENTRUM SILVER ADULT 50+) Tab, Take 1 Tablet by mouth every day. Indications: Suppliment, Disp: , Rfl:     Review of Systems:  ROS as above     Physical Exam:  Vitals:    03/31/25 0826   BP: 114/62   BP Location: Right arm   Patient Position: Sitting   BP Cuff Size: Large adult   Pulse: 92   Temp: 36.4 °C (97.6 °F)   TempSrc: Temporal   SpO2: 94%   Weight: 94.8 kg (209 lb 1.7 oz)   Height: 1.753 m (5' 9.02\")     Physical Exam  Constitutional:       General: He is not in acute distress.  HENT:      Head: Normocephalic.      Mouth/Throat:      Mouth: Mucous membranes are moist.      Pharynx: Oropharynx is clear.   Eyes:      General: No scleral icterus.     Conjunctiva/sclera: Conjunctivae normal.   Cardiovascular:      Rate and Rhythm: Normal rate.   Pulmonary:      Effort: Pulmonary effort is normal. No respiratory distress.   Abdominal:      General: There is no distension.      Palpations: Abdomen is soft.      Tenderness: There is no abdominal tenderness.   Musculoskeletal:  "        General: No swelling.   Skin:     General: Skin is warm and dry.      Capillary Refill: Capillary refill takes less than 2 seconds.      Findings: No bruising.   Neurological:      General: No focal deficit present.      Mental Status: He is alert.   Psychiatric:         Mood and Affect: Mood normal.         Thought Content: Thought content normal.         Judgment: Judgment normal.      Labs:  Lab Results   Component Value Date/Time    WBC 15.0 (H) 02/25/2025 03:09 PM    RBC 4.74 02/25/2025 03:09 PM    HEMOGLOBIN 14.0 02/25/2025 03:09 PM    HEMATOCRIT 42.7 02/25/2025 03:09 PM    MCV 90.1 02/25/2025 03:09 PM    MCH 29.5 02/25/2025 03:09 PM    MCHC 32.8 02/25/2025 03:09 PM    RDW 46.1 02/25/2025 03:09 PM    PLATELETCT 230 02/25/2025 03:09 PM    MPV 10.7 02/25/2025 03:09 PM    NEUTSPOLYS 40.30 (L) 02/25/2025 03:09 PM    LYMPHOCYTES 50.40 (H) 02/25/2025 03:09 PM    MONOCYTES 5.90 02/25/2025 03:09 PM    EOSINOPHILS 3.40 02/25/2025 03:09 PM    BASOPHILS 0.00 02/25/2025 03:09 PM    HYPOCHROMIA 1+ 03/04/2022 03:27 AM    ANISOCYTOSIS 1+ 02/25/2025 03:09 PM      Lab Results   Component Value Date/Time    SODIUM 139 10/24/2024 09:08 AM    POTASSIUM 4.4 10/24/2024 09:08 AM    CHLORIDE 103 10/24/2024 09:08 AM    CO2 25 10/24/2024 09:08 AM    GLUCOSE 158 (H) 10/24/2024 09:08 AM    BUN 18 10/24/2024 09:08 AM    CREATININE 1.19 10/24/2024 09:08 AM      Imaging:   All listed images below have been independently reviewed by me. I agree with the findings as summarized below:    No results found.     Assessment & Plan:  Lico Salinas is a 74 y.o. PMH DM II, HTN,hx autoimmune encephalitis, hx BCC, BPH who presents as follow up.  Workup evidence of cells with phenotype of CLL on flow cytometry and ALC > 5000.     -will plan to continue monitor for now, no indications for treatment  -if he develops new anemia or worsening persistent thrombocytopenia or evidence of LAD clinically or new signs/symptoms or worsening disease  which he was counseled on today would consider further workup  -RTC in 3 months with repeat labs     Any questions and concerns raised by the patient were answered to the best of my ability. Thank you for allowing me to participate in the care for this patient. Please feel free to contact me for any questions or concerns.

## 2025-03-31 NOTE — ADDENDUM NOTE
Encounter addended by: Glenny Marrufo, Med Ass't on: 3/31/2025 1:11 PM   Actions taken: Charge Capture section accepted

## 2025-04-28 RX ORDER — LEVOTHYROXINE SODIUM 75 UG/1
75 TABLET ORAL
Qty: 100 TABLET | Refills: 3 | Status: SHIPPED | OUTPATIENT
Start: 2025-04-28

## 2025-04-28 RX ORDER — FENOFIBRATE 134 MG/1
134 CAPSULE ORAL DAILY
Qty: 100 CAPSULE | Refills: 3 | Status: SHIPPED | OUTPATIENT
Start: 2025-04-28

## 2025-06-27 ENCOUNTER — HOSPITAL ENCOUNTER (OUTPATIENT)
Dept: LAB | Facility: MEDICAL CENTER | Age: 75
End: 2025-06-27
Attending: STUDENT IN AN ORGANIZED HEALTH CARE EDUCATION/TRAINING PROGRAM
Payer: MEDICARE

## 2025-06-27 DIAGNOSIS — C91.10 CLL (CHRONIC LYMPHOCYTIC LEUKEMIA) (HCC): ICD-10-CM

## 2025-06-27 LAB
BASOPHILS # BLD AUTO: 0 % (ref 0–1.8)
BASOPHILS # BLD: 0 K/UL (ref 0–0.12)
BURR CELLS BLD QL SMEAR: NORMAL
EOSINOPHIL # BLD AUTO: 0 K/UL (ref 0–0.51)
EOSINOPHIL NFR BLD: 0 % (ref 0–6.9)
ERYTHROCYTE [DISTWIDTH] IN BLOOD BY AUTOMATED COUNT: 46.5 FL (ref 35.9–50)
HCT VFR BLD AUTO: 42.3 % (ref 42–52)
HGB BLD-MCNC: 14.8 G/DL (ref 14–18)
LYMPHOCYTES # BLD AUTO: 7.23 K/UL (ref 1–4.8)
LYMPHOCYTES NFR BLD: 35.1 % (ref 22–41)
MANUAL DIFF BLD: NORMAL
MCH RBC QN AUTO: 31.1 PG (ref 27–33)
MCHC RBC AUTO-ENTMCNC: 35 G/DL (ref 32.3–36.5)
MCV RBC AUTO: 88.9 FL (ref 81.4–97.8)
MONOCYTES # BLD AUTO: 0.7 K/UL (ref 0–0.85)
MONOCYTES NFR BLD AUTO: 3.5 % (ref 0–13.4)
MORPHOLOGY BLD-IMP: NORMAL
NEUTROPHILS # BLD AUTO: 12.65 K/UL (ref 1.82–7.42)
NEUTROPHILS NFR BLD: 59.6 % (ref 44–72)
NEUTS BAND NFR BLD MANUAL: 1.8 % (ref 0–10)
NRBC # BLD AUTO: 0 K/UL
NRBC BLD-RTO: 0 /100 WBC (ref 0–0.2)
OVALOCYTES BLD QL SMEAR: NORMAL
PLATELET # BLD AUTO: 310 K/UL (ref 164–446)
PLATELET BLD QL SMEAR: NORMAL
PMV BLD AUTO: 11 FL (ref 9–12.9)
POIKILOCYTOSIS BLD QL SMEAR: NORMAL
RBC # BLD AUTO: 4.76 M/UL (ref 4.7–6.1)
RBC BLD AUTO: PRESENT
SMUDGE CELLS BLD QL SMEAR: NORMAL
WBC # BLD AUTO: 20.6 K/UL (ref 4.8–10.8)

## 2025-06-27 PROCEDURE — 85007 BL SMEAR W/DIFF WBC COUNT: CPT

## 2025-06-27 PROCEDURE — 85027 COMPLETE CBC AUTOMATED: CPT

## 2025-06-27 PROCEDURE — 36415 COLL VENOUS BLD VENIPUNCTURE: CPT

## 2025-07-01 ENCOUNTER — HOSPITAL ENCOUNTER (OUTPATIENT)
Facility: MEDICAL CENTER | Age: 75
End: 2025-07-01
Attending: STUDENT IN AN ORGANIZED HEALTH CARE EDUCATION/TRAINING PROGRAM
Payer: MEDICARE

## 2025-07-01 VITALS
TEMPERATURE: 96.8 F | BODY MASS INDEX: 29.73 KG/M2 | DIASTOLIC BLOOD PRESSURE: 84 MMHG | HEIGHT: 69 IN | OXYGEN SATURATION: 96 % | HEART RATE: 95 BPM | WEIGHT: 200.73 LBS | SYSTOLIC BLOOD PRESSURE: 126 MMHG

## 2025-07-01 DIAGNOSIS — C44.519 BASAL CELL CARCINOMA OF SKIN OF OTHER PART OF TRUNK: Primary | ICD-10-CM

## 2025-07-01 DIAGNOSIS — Z12.11 ENCOUNTER FOR SCREENING FOR MALIGNANT NEOPLASM OF COLON: ICD-10-CM

## 2025-07-01 DIAGNOSIS — C91.10 CLL (CHRONIC LYMPHOCYTIC LEUKEMIA) (HCC): ICD-10-CM

## 2025-07-01 DIAGNOSIS — K92.1 HEMATOCHEZIA: ICD-10-CM

## 2025-07-01 PROCEDURE — 99212 OFFICE O/P EST SF 10 MIN: CPT | Performed by: STUDENT IN AN ORGANIZED HEALTH CARE EDUCATION/TRAINING PROGRAM

## 2025-07-01 PROCEDURE — 99214 OFFICE O/P EST MOD 30 MIN: CPT | Performed by: STUDENT IN AN ORGANIZED HEALTH CARE EDUCATION/TRAINING PROGRAM

## 2025-07-01 ASSESSMENT — PAIN SCALES - GENERAL: PAINLEVEL_OUTOF10: NO PAIN

## 2025-07-01 ASSESSMENT — FIBROSIS 4 INDEX: FIB4 SCORE: 1.19

## 2025-07-01 NOTE — PROGRESS NOTES
Clinic Note:  Hematology/Oncology    Primary Care:  Aurelia Zayas M.D.    Diagnosis: CLL    Chief Complaint:  Follow up     History of Presenting Illness:  Lico Salinas is a 74 y.o. male with PMH DM II, HTN,hx autoimmune encephalitis, hx BCC, BPH who presents today to establish care for lymphocytosis.      Reports he has not seen a hematologist for this in the past.  Documented as far back as 2020.  Denies fever chills or night sweats.  Does endorse some unintentional weight loss.     CT CAP 2020 with concern for R breast mass and axillary adenopathy.  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  Followed up with breast surgeon who at the time elected for surveillance.     Follows closely with dermatology for history of multiple skin cancers, and is s/p excision of multiple sites including ear, shoulder and chest.  Notes that he was previously given 5FU cream for some of the lesions but had a dermatologic reaction.     Denies fever, chills, night sweats.  Does report some unintentional weight loss in 2020 where he lost over 100 lbs (went from 240 to 140) but has since stabalized.  He is a former smoker that smoked from ages [age] and smoked about 1 PPD.      Reports he is UTD with colonoscopy and reports he was told they did not find any polyps anf follows with Urology.  Uses a nystatin cream for inguinal intertrigo.     Interval History: Reported has had a had lack of appetite recently, only eating one meal a day.  He was constipated last week resolved with colace.  Fleet enema was also attempted.  He was noted to have blood in his stool this morning.    Past Medical History:   Diagnosis Date    Acute cough 11/30/2023    New medical diagnosis.  Patient's spouse was ill last week with similar symptoms.  She took a home COVID test which was negative. Last covid vaccine was given June 2022. Complaining of URI symptoms and cough.    Anxiety     BMI 29.0-29.9,adult 07/09/2022     Diabetes (HCC) 07/10/2015    Dyslipidemia 2016    Glaucoma     OU    High cholesterol 07/10/2015    hx of, took self off simvastin    Hypertension 07/10/2015    hx of, took himself off lisinopril and HTCZ    Hypothyroidism     Neuropathy, peripheral     Right lower quadrant abdominal abscess (HCC) 2022    Skin cancer     Snoring     Thyroid disease     Urinary frequency 2020       Past Surgical History:   Procedure Laterality Date    CARPAL TUNNEL ENDOSCOPIC Left 2017    Procedure: CARPAL TUNNEL ENDOSCOPIC VERSUS;  Surgeon: Frank Alvarado M.D.;  Location: SURGERY [facility];  Service: Orthopedics    LYMPH NODE EXCISION Left 2015    Procedure: LYMPH NODE EXCISION DEEP CERVICAL;  Surgeon: Michele Gan M.D.;  Location: SURGERY SAME DAY [facility] ORS;  Service:     LUMBAR DISC REPLACEMENT         Social History     Socioeconomic History    Marital status:      Spouse name: Not on file    Number of children: Not on file    Years of education: Not on file    Highest education level: 12th grade   Occupational History    Not on file   Tobacco Use    Smoking status: Former     Current packs/day: 0.00     Average packs/day: 1 pack/day for 20.0 years (20.0 ttl pk-yrs)     Types: Cigarettes     Start date: 1960     Quit date: 1980     Years since quittin.5    Smokeless tobacco: Never   Vaping Use    Vaping status: Never Used   Substance and Sexual Activity    Alcohol use: Not Currently     Alcohol/week: 0.5 oz     Types: 1 Cans of beer per week     Comment: has not been drinking lately     Drug use: No    Sexual activity: Yes     Partners: Female   Other Topics Concern    Not on file   Social History Narrative    ,    No kids,    Retired manfacturing     Social Drivers of Health     Financial Resource Strain: Low Risk  (2022)    Overall Financial Resource Strain (CARDIA)     Difficulty of Paying Living Expenses: Not hard at all   Food Insecurity: No Food  Insecurity (6/24/2022)    Hunger Vital Sign     Worried About Running Out of Food in the Last Year: Never true     Ran Out of Food in the Last Year: Never true   Transportation Needs: No Transportation Needs (6/24/2022)    PRAPARE - Transportation     Lack of Transportation (Medical): No     Lack of Transportation (Non-Medical): No   Physical Activity: Inactive (6/24/2022)    Exercise Vital Sign     Days of Exercise per Week: 0 days     Minutes of Exercise per Session: 0 min   Stress: No Stress Concern Present (6/24/2022)    [employer] [place] [employer] - Occupational Stress Questionnaire     Feeling of Stress : Not at all   Social Connections: Moderately Integrated (6/24/2022)    Social Connection and Isolation Panel [NHANES]     Frequency of Communication with Friends and Family: Twice a week     Frequency of Social Gatherings with Friends and Family: Once a week     Attends Faith Services: More than 4 times per year     Active Member of Clubs or Organizations: No     Attends Club or Organization Meetings: Never     Marital Status:    Intimate Partner Violence: Not on file   Housing Stability: Low Risk  (6/24/2022)    Housing Stability Vital Sign     Unable to Pay for Housing in the Last Year: No     Number of Places Lived in the Last Year: 2     Unstable Housing in the Last Year: No     Family History   Problem Relation Age of Onset    Heart Disease Mother     Alcohol abuse Mother     Lung Disease Father     Heart Disease Father     Drug abuse Sister     No Known Problems Brother        Allergies as of 07/01/2025 - Reviewed 07/01/2025   Allergen Reaction Noted    Pcn [penicillins] Unspecified 02/28/2022       Current Outpatient Medications:     levothyroxine (SYNTHROID) 75 MCG Tab, TAKE 1 TABLET BY MOUTH EVERY DAY IN THE MORNING ON AN EMPTY STOMACH, Disp: 100 Tablet, Rfl: 3    fenofibrate micronized (LOFIBRA) 134 MG capsule, TAKE 1 CAPSULE BY MOUTH EVERY DAY, Disp: 100 Capsule, Rfl: 3     "NON SPECIFIED, Gluco-Ntrol Blood sugar, Disp: , Rfl:     metformin (GLUCOPHAGE) 1000 MG tablet, TAKE 1 TABLET BY MOUTH TWICE A DAY WITH FOOD, Disp: 200 Tablet, Rfl: 3    rosuvastatin (CRESTOR) 10 MG Tab, TAKE 1 TABLET BY MOUTH EVERY DAY IN THE EVENING, Disp: 100 Tablet, Rfl: 4    nystatin/triamcinolone (MYCOLOG) 431770-8.1 UNIT/GM-% Cream, Apply 1 Application topically 2 times a day., Disp: 60 g, Rfl: 1    B Complex Vitamins (VITAMIN-B COMPLEX PO), Take  by mouth., Disp: , Rfl:     tamsulosin (FLOMAX) 0.4 MG capsule, Take 1 Capsule by mouth every day., Disp: , Rfl:     finasteride (PROSCAR) 5 MG Tab, Take 1 Tablet by mouth every day., Disp: , Rfl:     latanoprost (XALATAN) 0.005 % Solution, Administer 1 Drop into both eyes at bedtime., Disp: , Rfl:     acetaminophen (TYLENOL) 500 MG Tab, Take 1 Tablet by mouth 2 times a day. Indications: Pain (Patient not taking: Reported on 2/25/2025), Disp: , Rfl:     Multiple Vitamins-Minerals (CENTRUM SILVER ADULT 50+) Tab, Take 1 Tablet by mouth every day. Indications: Suppliment, Disp: , Rfl:     Cholecalciferol (VITAMIN D3 ADULT GUMMIES PO), Take 2,000 Units by mouth every day. Indications: Suppliment, Disp: , Rfl:     Review of Systems:  ROS as above     Physical Exam:  Vitals:    07/01/25 1114   TempSrc: Temporal   Height: 1.753 m (5' 9\")     Physical Exam  Constitutional:       General: He is not in acute distress.  HENT:      Head: Normocephalic.      Mouth/Throat:      Mouth: Mucous membranes are moist.      Pharynx: Oropharynx is clear.   Eyes:      General: No scleral icterus.     Conjunctiva/sclera: Conjunctivae normal.   Cardiovascular:      Rate and Rhythm: Normal rate.   Pulmonary:      Effort: Pulmonary effort is normal. No respiratory distress.   Abdominal:      General: There is no distension.      Palpations: Abdomen is soft.      Tenderness: There is no abdominal tenderness.   Musculoskeletal:         General: No swelling.   Skin:     General: Skin is warm and " dry.      Capillary Refill: Capillary refill takes less than 2 seconds.      Findings: No bruising.   Neurological:      General: No focal deficit present.      Mental Status: He is alert.   Psychiatric:         Mood and Affect: Mood normal.         Thought Content: Thought content normal.         Judgment: Judgment normal.      Labs:  Lab Results   Component Value Date/Time    WBC 20.6 (H) 06/27/2025 09:53 AM    RBC 4.76 06/27/2025 09:53 AM    HEMOGLOBIN 14.8 06/27/2025 09:53 AM    HEMATOCRIT 42.3 06/27/2025 09:53 AM    MCV 88.9 06/27/2025 09:53 AM    MCH 31.1 06/27/2025 09:53 AM    MCHC 35.0 06/27/2025 09:53 AM    RDW 46.5 06/27/2025 09:53 AM    PLATELETCT 310 06/27/2025 09:53 AM    MPV 11.0 06/27/2025 09:53 AM    NEUTSPOLYS 59.60 06/27/2025 09:53 AM    LYMPHOCYTES 35.10 06/27/2025 09:53 AM    MONOCYTES 3.50 06/27/2025 09:53 AM    EOSINOPHILS 0.00 06/27/2025 09:53 AM    BASOPHILS 0.00 06/27/2025 09:53 AM    HYPOCHROMIA 1+ 03/04/2022 03:27 AM    ANISOCYTOSIS 1+ 02/25/2025 03:09 PM      Lab Results   Component Value Date/Time    SODIUM 139 10/24/2024 09:08 AM    POTASSIUM 4.4 10/24/2024 09:08 AM    CHLORIDE 103 10/24/2024 09:08 AM    CO2 25 10/24/2024 09:08 AM    GLUCOSE 158 (H) 10/24/2024 09:08 AM    BUN 18 10/24/2024 09:08 AM    CREATININE 1.19 10/24/2024 09:08 AM      Imaging:   All listed images below have been independently reviewed by me. I agree with the findings as summarized below:    No results found.     Assessment & Plan:  Lico Salinas is a 74 y.o. PMH DM II, HTN,hx autoimmune encephalitis, hx BCC, BPH who presents as follow up.  Workup evidence of cells with phenotype of CLL on flow cytometry and ALC > 5000.     -will plan to continue monitor for now, no indications for treatment  -if he develops new anemia or worsening persistent thrombocytopenia or evidence of LAD clinically or new signs/symptoms or worsening disease which he was counseled on today would consider further workup  -RTC with  repeat CBC in 1 month given increase in neutrophils  -Ordered cologuard as he is not interested in colonoscopy at this time    Any questions and concerns raised by the patient were answered to the best of my ability. Thank you for allowing me to participate in the care for this patient. Please feel free to contact me for any questions or concerns.

## 2025-07-02 NOTE — ADDENDUM NOTE
Encounter addended by: Emma Eastman, Med Ass't on: 7/2/2025 8:30 AM   Actions taken: Charge Capture section accepted

## 2025-07-10 ENCOUNTER — TELEPHONE (OUTPATIENT)
Dept: MEDICAL GROUP | Facility: PHYSICIAN GROUP | Age: 75
End: 2025-07-10
Payer: MEDICARE

## 2025-07-10 NOTE — TELEPHONE ENCOUNTER
Adherence STARS Outreach for [employer]    This member was identified as non-adherent for [employer] Medicare STAR ratings. Every member that is non-adherent counts against the [employer]'s STAR rating, which directly impacts the Medicare payments made to [employer], thus impacting [employer]'s ability to provide affordable benefits for our members.     07/10/25    Pt was identified on report for STAR medication adherence regarding medications:   Rosuvastatin  Metformin    Left      Identified barriers: TBD    Follow up plan: 1 business day      Peter Williamson, PharmD

## 2025-07-14 ENCOUNTER — TELEPHONE (OUTPATIENT)
Dept: MEDICAL GROUP | Facility: PHYSICIAN GROUP | Age: 75
End: 2025-07-14
Payer: MEDICARE

## 2025-07-14 NOTE — TELEPHONE ENCOUNTER
[facility]    Pt's SO answered the phone and reports pt doesn't speak on the phone.  They decline mail service for adherence medications despite education efforts that were provided today.     Peter Williamson, FelishaD

## 2025-07-19 ENCOUNTER — OFFICE VISIT (OUTPATIENT)
Dept: URGENT CARE | Facility: PHYSICIAN GROUP | Age: 75
End: 2025-07-19
Payer: MEDICARE

## 2025-07-19 ENCOUNTER — HOSPITAL ENCOUNTER (OUTPATIENT)
Facility: MEDICAL CENTER | Age: 75
End: 2025-07-19
Attending: FAMILY MEDICINE
Payer: MEDICARE

## 2025-07-19 VITALS
BODY MASS INDEX: 28.82 KG/M2 | RESPIRATION RATE: 16 BRPM | OXYGEN SATURATION: 94 % | TEMPERATURE: 96.6 F | WEIGHT: 194.6 LBS | HEIGHT: 69 IN | SYSTOLIC BLOOD PRESSURE: 110 MMHG | DIASTOLIC BLOOD PRESSURE: 64 MMHG | HEART RATE: 85 BPM

## 2025-07-19 DIAGNOSIS — N48.1 BALANITIS: Primary | ICD-10-CM

## 2025-07-19 DIAGNOSIS — N48.1 BALANITIS: ICD-10-CM

## 2025-07-19 PROCEDURE — 87077 CULTURE AEROBIC IDENTIFY: CPT

## 2025-07-19 PROCEDURE — 87076 CULTURE ANAEROBE IDENT EACH: CPT | Mod: 91

## 2025-07-19 PROCEDURE — 87070 CULTURE OTHR SPECIMN AEROBIC: CPT

## 2025-07-19 PROCEDURE — 99213 OFFICE O/P EST LOW 20 MIN: CPT | Performed by: FAMILY MEDICINE

## 2025-07-19 PROCEDURE — 87102 FUNGUS ISOLATION CULTURE: CPT

## 2025-07-19 PROCEDURE — 87075 CULTR BACTERIA EXCEPT BLOOD: CPT

## 2025-07-19 PROCEDURE — 3074F SYST BP LT 130 MM HG: CPT | Performed by: FAMILY MEDICINE

## 2025-07-19 PROCEDURE — 3078F DIAST BP <80 MM HG: CPT | Performed by: FAMILY MEDICINE

## 2025-07-19 PROCEDURE — 87205 SMEAR GRAM STAIN: CPT

## 2025-07-19 ASSESSMENT — FIBROSIS 4 INDEX: FIB4 SCORE: 1.19

## 2025-07-19 NOTE — PROGRESS NOTES
"SUBJECTIVE      Chief Complaint   Patient presents with    Rash     Genital area is red, little rash.          Subjective:      Chief Complaint   Patient presents with    Rash     Genital area is red, little rash.               Rash  This is a new problem. The current episode started in the past 7 days. The problem is unchanged. The rash is on the penis and bilat groin area. The rash is characterized by redness, swelling and itchiness. Pt was exposed to nothing. Pertinent negatives include no cough, fatigue, fever, shortness of breath or sore throat. Past treatments include nothing.   Sexually active with one partner only, no condoms       Past Medical History[1]      Social History[2]      Allergies[3]        Family History   Problem Relation Age of Onset    Heart Disease Mother     Alcohol abuse Mother     Lung Disease Father     Heart Disease Father     Drug abuse Sister     No Known Problems Brother          Review of Systems   Constitutional: Negative for fever and fatigue.   HENT: Negative for sore throat.    Respiratory: Negative for cough and shortness of breath.    Skin: Positive for rash.   All other systems reviewed and are negative.         Objective:   /64 (BP Location: Right arm, Patient Position: Sitting, BP Cuff Size: Adult)   Pulse 85   Temp 35.9 °C (96.6 °F)   Resp 16   Ht 1.746 m (5' 8.75\")   Wt 88.3 kg (194 lb 9.6 oz)   SpO2 94%       Physical Exam   Constitutional: pt is oriented to person, place, and time. Pt appears well-developed and well-nourished. No distress.   HENT:   Head: Normocephalic and atraumatic.   Mouth/Throat: Oropharynx is clear and moist and mucous membranes are normal.   Eyes: Conjunctivae are normal.   Cardiovascular: Normal rate, regular rhythm and normal heart sounds.    Pulmonary/Chest: Effort normal and breath sounds normal. No respiratory distress. She has no wheezes.   Neurological: pt is alert and oriented to person, place, and time. No cranial nerve " deficit.   Skin: Rash (Erythema and edema of glans penis, less prominent erythema on bilat groin area) noted. Pt is not diaphoretic. There is erythema.   Psychiatric: pt's behavior is normal.   Nursing note and vitals reviewed.              Assessment/Plan:      1. Balanitis (Primary)     - Clotrimazole 1 % Ointment; Apply 1 Application topically 2 times a day.  Dispense: 40 g; Refill: 0  - ANAEROBIC/AEROBIC/GRAM STAIN  - Fungal Culture; Future      Differential diagnosis, natural history, supportive care, and indications for immediate follow-up discussed. All questions answered. Patient agrees with the plan of care.     Follow-up as needed if symptoms worsen or fail to improve to PCP, Urgent care or Emergency Room.     I have personally reviewed prior external notes and test results pertinent to today's visit.  I have independently reviewed and interpreted all diagnostics ordered during this urgent care acute visit.                [1]   Past Medical History:  Diagnosis Date    Acute cough 2023    New medical diagnosis.  Patient's spouse was ill last week with similar symptoms.  She took a home COVID test which was negative. Last covid vaccine was given 2022. Complaining of URI symptoms and cough.    Anxiety     BMI 29.0-29.9,adult 2022    Diabetes (HCC) 07/10/2015    Dyslipidemia 2016    Glaucoma     OU    High cholesterol 07/10/2015    hx of, took self off simvastin    Hypertension 07/10/2015    hx of, took himself off lisinopril and HTCZ    Hypothyroidism     Neuropathy, peripheral     Right lower quadrant abdominal abscess (HCC) 2022    Skin cancer     Snoring     Thyroid disease     Urinary frequency 2020   [2]   Social History  Tobacco Use    Smoking status: Former     Current packs/day: 0.00     Average packs/day: 1 pack/day for 20.0 years (20.0 ttl pk-yrs)     Types: Cigarettes     Start date: 1960     Quit date: 1980     Years since quittin.5     Passive  exposure: Past    Smokeless tobacco: Never   Vaping Use    Vaping status: Never Used   Substance Use Topics    Alcohol use: Not Currently     Alcohol/week: 0.5 oz     Types: 1 Cans of beer per week     Comment: has not been drinking lately     Drug use: No   [3]   Allergies  Allergen Reactions    Pcn [Penicillins] Unspecified     As a child, unknown reaction  Tolerated Zosyn 3/2022

## 2025-07-20 LAB
FUNGUS SPEC FUNGUS STN: NORMAL
GRAM STN SPEC: NORMAL
SIGNIFICANT IND 70042: NORMAL
SIGNIFICANT IND 70042: NORMAL
SITE SITE: NORMAL
SITE SITE: NORMAL
SOURCE SOURCE: NORMAL
SOURCE SOURCE: NORMAL

## 2025-07-23 LAB
BACTERIA WND AEROBE CULT: NORMAL
FUNGUS SPEC CULT: NORMAL
FUNGUS SPEC FUNGUS STN: NORMAL
GRAM STN SPEC: NORMAL
SIGNIFICANT IND 70042: NORMAL
SIGNIFICANT IND 70042: NORMAL
SITE SITE: NORMAL
SITE SITE: NORMAL
SOURCE SOURCE: NORMAL
SOURCE SOURCE: NORMAL

## 2025-07-26 LAB
BACTERIA SPEC ANAEROBE CULT: NORMAL
SIGNIFICANT IND 70042: NORMAL
SITE SITE: NORMAL
SOURCE SOURCE: NORMAL

## 2025-08-15 LAB
FUNGUS SPEC CULT: NORMAL
FUNGUS SPEC FUNGUS STN: NORMAL
SIGNIFICANT IND 70042: NORMAL
SITE SITE: NORMAL
SOURCE SOURCE: NORMAL

## (undated) DEVICE — GLOVE, LITE (PAIR)

## (undated) DEVICE — TUBE CONNECTING SUCTION - CLEAR PLASTIC STERILE 72 IN (50EA/CA)

## (undated) DEVICE — SPONGE GAUZE STER 4X4 8-PL - (2/PK 50PK/BX 12BX/CS)

## (undated) DEVICE — HEAD HOLDER JUNIOR/ADULT

## (undated) DEVICE — LACTATED RINGERS INJ 1000 ML - (14EA/CA 60CA/PF)

## (undated) DEVICE — PAD PREP 24 X 48 CUFFED - (100/CA)

## (undated) DEVICE — PROTECTOR ULNA NERVE - (36PR/CA)

## (undated) DEVICE — CANISTER SUCTION RIGID RED 1500CC (40EA/CA)

## (undated) DEVICE — CLOSURE SKIN STRIP 1/2 X 4 IN - (STERI STRIP) (50/BX 4BX/CA)

## (undated) DEVICE — DRAPE STRLE REG TOWEL 18X24 - (10/BX 4BX/CA)"

## (undated) DEVICE — PACK LOWER EXTREMITY - (2/CA)

## (undated) DEVICE — GLOVE SURGICAL PROTEXIS PI 8.0 LF - (50PR/BX)

## (undated) DEVICE — DRESSING XEROFORM 1X8 - (50/BX 4BX/CA)

## (undated) DEVICE — NEPTUNE 4 PORT MANIFOLD - (20/PK)

## (undated) DEVICE — PADDING CAST 4 IN STERILE - 4 X 4 YDS (24/CA)

## (undated) DEVICE — BAG, SPONGE COUNT 50600

## (undated) DEVICE — SUCTION INSTRUMENT YANKAUER BULBOUS TIP W/O VENT (50EA/CA)

## (undated) DEVICE — WATER IRRIGATION STERILE 1000ML (12EA/CA)

## (undated) DEVICE — ELECTRODE 850 FOAM ADHESIVE - HYDROGEL RADIOTRNSPRNT (50/PK)

## (undated) DEVICE — BOVIE NEEDLE TIP INSULATD NON-SAFETY 2CM (50/PK)

## (undated) DEVICE — GOWN WARMING STANDARD FLEX - (30/CA)

## (undated) DEVICE — TOURNIQUET CUFF 18 X 3 ONE PORT DISP - STERILE (10/BX)

## (undated) DEVICE — BLADE SURGICAL #15 - (50/BX 3BX/CA)

## (undated) DEVICE — GLOVE BIOGEL SZ 8 SURGICAL PF LTX - (50PR/BX 4BX/CA)

## (undated) DEVICE — SUTURE 4-0 PROLENE PS-2 18 (36PK/BX)"

## (undated) DEVICE — KIT ANESTHESIA W/CIRCUIT & 3/LT BAG W/FILTER (20EA/CA)

## (undated) DEVICE — AGEE CARPAL TUNNEL RELEASE (6EA/PK)

## (undated) DEVICE — PADDING CAST 3 IN STERILE - 3 X 4 YDS (24EA/CA)

## (undated) DEVICE — STOCKINET BIAS 4 IN STERILE - (20/CA)

## (undated) DEVICE — TRAY SRGPRP PVP IOD WT PRP - (20/CA)

## (undated) DEVICE — PACK UPPER EXTREMITY SM OR - (3/CA)

## (undated) DEVICE — SENSOR SPO2 NEO LNCS ADHESIVE (20/BX) SEE USER NOTES

## (undated) DEVICE — DRAPE LARGE 3 QUARTER - (20/CA)

## (undated) DEVICE — KIT ROOM DECONTAMINATION

## (undated) DEVICE — SODIUM CHL IRRIGATION 0.9% 1000ML (12EA/CA)

## (undated) DEVICE — MASK ANESTHESIA ADULT  - (100/CA)

## (undated) DEVICE — GLOVE BIOGEL PI ORTHO SZ 8.5 PF LF (40/BX)

## (undated) DEVICE — ELECTRODE DUAL RETURN W/ CORD - (50/PK)

## (undated) DEVICE — HUMID-VENT HEAT AND MOISTURE EXCHANGE- (50/BX)

## (undated) DEVICE — SUTURE GENERAL

## (undated) DEVICE — STOCKINETTE, TUBULAR 3